# Patient Record
Sex: FEMALE | Race: OTHER | HISPANIC OR LATINO | Employment: FULL TIME | URBAN - METROPOLITAN AREA
[De-identification: names, ages, dates, MRNs, and addresses within clinical notes are randomized per-mention and may not be internally consistent; named-entity substitution may affect disease eponyms.]

---

## 2017-05-24 ENCOUNTER — GENERIC CONVERSION - ENCOUNTER (OUTPATIENT)
Dept: OTHER | Facility: OTHER | Age: 42
End: 2017-05-24

## 2017-05-25 ENCOUNTER — GENERIC CONVERSION - ENCOUNTER (OUTPATIENT)
Dept: OTHER | Facility: OTHER | Age: 42
End: 2017-05-25

## 2017-06-23 ENCOUNTER — ALLSCRIPTS OFFICE VISIT (OUTPATIENT)
Dept: OTHER | Facility: OTHER | Age: 42
End: 2017-06-23

## 2017-06-29 ENCOUNTER — HOSPITAL ENCOUNTER (OUTPATIENT)
Dept: NON INVASIVE DIAGNOSTICS | Facility: HOSPITAL | Age: 42
Discharge: HOME | End: 2017-06-29
Attending: INTERNAL MEDICINE
Payer: COMMERCIAL

## 2017-06-29 DIAGNOSIS — R42 DIZZINESS AND GIDDINESS: ICD-10-CM

## 2017-06-29 PROCEDURE — 93660 TILT TABLE EVALUATION: CPT

## 2017-07-31 ENCOUNTER — APPOINTMENT (OUTPATIENT)
Dept: LAB | Facility: CLINIC | Age: 42
End: 2017-07-31
Payer: COMMERCIAL

## 2017-07-31 ENCOUNTER — LAB REQUISITION (OUTPATIENT)
Dept: LAB | Facility: HOSPITAL | Age: 42
End: 2017-07-31
Payer: COMMERCIAL

## 2017-07-31 ENCOUNTER — ALLSCRIPTS OFFICE VISIT (OUTPATIENT)
Dept: OTHER | Facility: OTHER | Age: 42
End: 2017-07-31

## 2017-07-31 DIAGNOSIS — N93.9 ABNORMAL UTERINE AND VAGINAL BLEEDING, UNSPECIFIED: ICD-10-CM

## 2017-07-31 DIAGNOSIS — Z01.419 ENCOUNTER FOR GYNECOLOGICAL EXAMINATION WITHOUT ABNORMAL FINDING: ICD-10-CM

## 2017-07-31 DIAGNOSIS — R00.1 BRADYCARDIA: ICD-10-CM

## 2017-07-31 DIAGNOSIS — N84.1 POLYP OF CERVIX UTERI: ICD-10-CM

## 2017-07-31 DIAGNOSIS — Z11.3 ENCOUNTER FOR SCREENING FOR INFECTIONS WITH PREDOMINANTLY SEXUAL MODE OF TRANSMISSION: ICD-10-CM

## 2017-07-31 DIAGNOSIS — Z12.31 ENCOUNTER FOR SCREENING MAMMOGRAM FOR MALIGNANT NEOPLASM OF BREAST: ICD-10-CM

## 2017-07-31 LAB
ERYTHROCYTE [DISTWIDTH] IN BLOOD BY AUTOMATED COUNT: 14.2 % (ref 11.6–15.1)
HBV SURFACE AG SER QL: NORMAL
HCT VFR BLD AUTO: 35.4 % (ref 34.8–46.1)
HGB BLD-MCNC: 11.2 G/DL (ref 11.5–15.4)
MCH RBC QN AUTO: 25.9 PG (ref 26.8–34.3)
MCHC RBC AUTO-ENTMCNC: 31.6 G/DL (ref 31.4–37.4)
MCV RBC AUTO: 82 FL (ref 82–98)
PLATELET # BLD AUTO: 297 THOUSANDS/UL (ref 149–390)
PMV BLD AUTO: 10 FL (ref 8.9–12.7)
RBC # BLD AUTO: 4.33 MILLION/UL (ref 3.81–5.12)
TSH SERPL DL<=0.05 MIU/L-ACNC: 1.16 UIU/ML (ref 0.36–3.74)
WBC # BLD AUTO: 7.13 THOUSAND/UL (ref 4.31–10.16)

## 2017-07-31 PROCEDURE — 87389 HIV-1 AG W/HIV-1&-2 AB AG IA: CPT

## 2017-07-31 PROCEDURE — 88344 IMHCHEM/IMCYTCHM EA MLT ANTB: CPT | Performed by: OBSTETRICS & GYNECOLOGY

## 2017-07-31 PROCEDURE — 84443 ASSAY THYROID STIM HORMONE: CPT

## 2017-07-31 PROCEDURE — 87340 HEPATITIS B SURFACE AG IA: CPT

## 2017-07-31 PROCEDURE — 86592 SYPHILIS TEST NON-TREP QUAL: CPT

## 2017-07-31 PROCEDURE — 87624 HPV HI-RISK TYP POOLED RSLT: CPT | Performed by: OBSTETRICS & GYNECOLOGY

## 2017-07-31 PROCEDURE — 87591 N.GONORRHOEAE DNA AMP PROB: CPT | Performed by: OBSTETRICS & GYNECOLOGY

## 2017-07-31 PROCEDURE — G0145 SCR C/V CYTO,THINLAYER,RESCR: HCPCS | Performed by: OBSTETRICS & GYNECOLOGY

## 2017-07-31 PROCEDURE — 88305 TISSUE EXAM BY PATHOLOGIST: CPT | Performed by: OBSTETRICS & GYNECOLOGY

## 2017-07-31 PROCEDURE — 88342 IMHCHEM/IMCYTCHM 1ST ANTB: CPT | Performed by: OBSTETRICS & GYNECOLOGY

## 2017-07-31 PROCEDURE — 87491 CHLMYD TRACH DNA AMP PROBE: CPT | Performed by: OBSTETRICS & GYNECOLOGY

## 2017-07-31 PROCEDURE — 85027 COMPLETE CBC AUTOMATED: CPT

## 2017-07-31 PROCEDURE — 36415 COLL VENOUS BLD VENIPUNCTURE: CPT

## 2017-08-01 LAB — RPR SER QL: NORMAL

## 2017-08-02 LAB
CHLAMYDIA DNA CVX QL NAA+PROBE: NORMAL
N GONORRHOEA DNA GENITAL QL NAA+PROBE: NORMAL

## 2017-08-03 LAB — HIV 1+2 AB+HIV1 P24 AG SERPL QL IA: NORMAL

## 2017-08-04 LAB — HPV RRNA GENITAL QL NAA+PROBE: ABNORMAL

## 2017-08-11 LAB
LAB AP GYN PRIMARY INTERPRETATION: NORMAL
Lab: NORMAL
PATH INTERP SPEC-IMP: NORMAL

## 2017-09-15 ENCOUNTER — GENERIC CONVERSION - ENCOUNTER (OUTPATIENT)
Dept: OTHER | Facility: OTHER | Age: 42
End: 2017-09-15

## 2017-10-12 ENCOUNTER — GENERIC CONVERSION - ENCOUNTER (OUTPATIENT)
Dept: OTHER | Facility: OTHER | Age: 42
End: 2017-10-12

## 2017-10-12 DIAGNOSIS — R87.610 ATYPICAL SQUAMOUS CELLS OF UNDETERMINED SIGNIFICANCE ON CYTOLOGIC SMEAR OF CERVIX (ASC-US): ICD-10-CM

## 2017-10-12 PROCEDURE — 88305 TISSUE EXAM BY PATHOLOGIST: CPT | Performed by: OBSTETRICS & GYNECOLOGY

## 2017-10-15 ENCOUNTER — LAB REQUISITION (OUTPATIENT)
Dept: LAB | Facility: HOSPITAL | Age: 42
End: 2017-10-15
Payer: COMMERCIAL

## 2017-10-15 DIAGNOSIS — R87.610 ATYPICAL SQUAMOUS CELLS OF UNDETERMINED SIGNIFICANCE ON CYTOLOGIC SMEAR OF CERVIX (ASC-US): ICD-10-CM

## 2017-10-19 ENCOUNTER — GENERIC CONVERSION - ENCOUNTER (OUTPATIENT)
Dept: OTHER | Facility: OTHER | Age: 42
End: 2017-10-19

## 2018-01-09 NOTE — RESULT NOTES
Verified Results  (1) TISSUE EXAM [id number] 09:41AM Rajendra Vizcarra    Order Number: [id number]     Test Name Result Flag Reference   LAB AP CASE REPORT (Report)     Surgical Pathology Report             Case: [id number]                   Authorizing Provider: Enio Crooks MD  Collected:      07/31/2017 [address] [place] [address]        Pathologist:      Bria Sutton MD       Received:      08/01/2017 7803        Specimen:  Cervix, Endocervical polyp   LAB AP FINAL DIAGNOSIS      A  Endocervical polyp (biopsy):    - Polypoid endocervical tissue with areas of mucosal inflammation /   erosion     - Viral studies pending      - No dysplasia or malignancy identified  Electronically signed by Bria Sutton MD on 8/3/2017 at 1:49 PM   LAB AP NOTE      Interpretation performed at [place] [address]  LAB AP SURGICAL ADDITIONAL INFORMATION (Report)     All controls performed with the immunohistochemical stains reported above   reacted appropriately  These tests were developed and their performance   characteristics determined by [facility] or   [employer]  They may not be cleared or approved by the [place] S  Food and Drug Administration  The FDA has determined that such clearance   or approval is not necessary  These tests are used for clinical purposes  They should not be regarded as investigational or for research  This   laboratory has been approved by CLIA 88, designated as a high-complexity   laboratory and is qualified to perform these tests  LAB AP GROSS DESCRIPTION (Report)     A  The specimen is received in formalin, labeled with the patient's name   and hospital number, and is designated endocervical polyp  The specimen   consists of one white-tan pink polypoid soft tissue mass which measures   2 4 x 1 2 x 0 8 cm  The apparent margin of resection is inked blue  The   specimen is bisected revealing white tan pink dense cut surfaces  Entirely   submitted  Two cassettes  Note: The estimated total formalin fixation time based upon information   provided by the submitting clinician and the standard processing schedule   is 24 75 hours  MAS   LAB AP CLINICAL INFORMATION      Polyp of cervix uteri   LAB AP ADDENDUM 1      Immunostains for CMV and HSV 1/2 (with appropriate controls) are negative  Addendum electronically signed by Bria Sutton MD on 8/4/2017 at 11:25 AM     (1) HIV AG/AB Mara, 4TH GEN [id number] 09:21AM [place]    Order Number: [id number]     Test Name Result Flag Reference   HIV 1/2 AND P24 Non-Reactive  Non-Reactive   This test detects HIV 1, HIV2 and p24 Antigen  (1) THIN PREP PAP WITH IMAGING [id number] 08:24AM Rajendra Radish     Test Name Result Flag Reference   LAB AP CASE REPORT (Report)     Gynecologic Cytology Report            Case: [id number]                  Authorizing Provider: Enio Crooks MD  Collected:      07/31/2017 0824        First Screen:     SOCORRO Harrington    Received:      08/03/2017 1411        Pathologist:      Pérez Cleveland DO                               Specimen:  LIQUID-BASED PAP, SCREENING, Endocervical   HPV HIGH RISK RESULT (Report)     HPV, High Risk: HPV POS, HPV16 NEG, HPV18 NEG      Other High Risk HPV Positive, HPV 16 Negative, HPV 18 Negative  Specimen is positive for the DNA of any one of, or combination of the following high risk HPV types: 79,99,84,38,59,30,55,94,27,19,76,48  HPV types 16 and 18 DNA were undetectable or below the pre-set threshold  [employer]???s FDA approved Jenni 4800 is utilized with strict adherence to the ???s instruction  manual to test for the presence of High-Risk HPV DNA, as well as HPV 16 and HPV 18  This instrument  has been validated by our laboratory and/or by the     A negative result does not preclude the presence of HPV infection because results depend on adequate  specimen collection, absence of inhibitors and sufficient DNA to be detected  Additionally, HPV negative  results are not intended to prevent women from proceeding to colposcopy if clinically warranted  Positive HPV test results indicate the presence of any one or more of the high risk types, but since patients  are often co-infected with low-risk types it does not rule out the presence of low-risk types in patients  with mixed infections  LAB AP GYN PRIMARY INTERPRETATION      Epithelial cell abnormality  Electronically signed by Pawan Machado DO on 8/11/2017 at 9:33 AM   LAB AP GYN INTERPRETATION      Atypical squamous cells of undetermined significance   LAB AP GYN SPECIMEN ADEQUACY      Satisfactory for evaluation  Scant cellularity  LAB AP GYN NOTE      Interpretation performed at [facility], [address] [address]   LAB AP GYN ADDITIONAL INFORMATION (Report)     [employer]'s FDA approved ,  and ThinPrep Imaging System are   utilized with strict adherence to the 's instruction manual to   prepare gynecologic and non-gynecologic cytology specimens for the   production of ThinPrep slides as well as for gynecologic ThinPrep imaging  These processes have been validated by our laboratory and/or by the     The Pap test is not a diagnostic procedure and should not be used as the   sole means to detect cervical cancer  It is only a screening procedure to   aid in the detection of cervical cancer and its precursors  Both   false-negative and false-positive results have been experienced  Your   patient's test result should be interpreted in this context together with   the history and clinical findings

## 2018-01-13 VITALS
HEART RATE: 94 BPM | WEIGHT: 175 LBS | SYSTOLIC BLOOD PRESSURE: 108 MMHG | BODY MASS INDEX: 32.2 KG/M2 | HEIGHT: 62 IN | DIASTOLIC BLOOD PRESSURE: 62 MMHG

## 2018-01-14 VITALS
SYSTOLIC BLOOD PRESSURE: 90 MMHG | OXYGEN SATURATION: 97 % | BODY MASS INDEX: 30.95 KG/M2 | DIASTOLIC BLOOD PRESSURE: 59 MMHG | HEART RATE: 62 BPM | HEIGHT: 63 IN | WEIGHT: 174.7 LBS

## 2018-01-15 NOTE — RESULT NOTES
Verified Results  (1) TISSUE EXAM [id number] 02:09PM Jasmyne Nelson     Test Name Result Flag Reference   LAB AP CASE REPORT (Report)     Surgical Pathology Report             Case: [id number]                   Authorizing Provider: Zainab May MD  Collected:      10/12/2017 1409        Pathologist:      Saul Chaidez MD   Received:      10/16/2017 1109        Specimen:  Cervix, 12 o'clock   LAB AP FINAL DIAGNOSIS (Report)     A  Cervix, 12:00, biopsy:  - Low-grade squamous intraepithelial lesion (LSIL/RICHA 1) with viral   cytopathic effect and background acute and chronic inflammation involving   the transformation zone  Electronically signed by Saul Chaidez MD on 10/18/2017 at 1:43 PM   LAB AP SURGICAL ADDITIONAL INFORMATION (Report)     All controls performed with the immunohistochemical stains reported above   reacted appropriately  These tests were developed and their performance   characteristics determined by [facility] or   [facility]  They may not be cleared or approved by the [place]  Food and Drug Administration  The FDA has determined that such clearance   or approval is not necessary  These tests are used for clinical purposes  They should not be regarded as investigational or for research  This   laboratory has been approved by CLIA 88, designated as a high-complexity   laboratory and is qualified to perform these tests  Interpretation performed at , [address]   LAB AP GROSS DESCRIPTION (Report)     A  The specimen is received in formalin, labeled with the patient's name   and hospital number, and is designated cervix 12 o'clock, is a single   irregularly shaped fragment of tan and glistening soft tissue measuring   0 6 cm in greatest dimension  Entirely submitted  One cassette      Note: The estimated total formalin fixation time based upon information   provided by the submitting clinician and the standard processing schedule   is over 72 hours        RLR

## 2018-01-22 VITALS
SYSTOLIC BLOOD PRESSURE: 104 MMHG | DIASTOLIC BLOOD PRESSURE: 66 MMHG | BODY MASS INDEX: 31.65 KG/M2 | HEART RATE: 56 BPM | WEIGHT: 172 LBS | HEIGHT: 62 IN

## 2018-06-08 ENCOUNTER — TELEPHONE (OUTPATIENT)
Dept: OBGYN CLINIC | Facility: CLINIC | Age: 43
End: 2018-06-08
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
## 2018-06-11 DIAGNOSIS — T74.21XA SEXUAL ASSAULT OF ADULT, INITIAL ENCOUNTER: Primary | ICD-10-CM
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
## 2018-06-13 ENCOUNTER — LAB (OUTPATIENT)
Dept: LAB | Facility: CLINIC | Age: 43
End: 2018-06-13
Payer: COMMERCIAL

## 2018-06-13 DIAGNOSIS — T74.21XA SEXUAL ASSAULT OF ADULT, INITIAL ENCOUNTER: ICD-10-CM

## 2018-06-13 DIAGNOSIS — R87.610 ATYPICAL SQUAMOUS CELLS OF UNDETERMINED SIGNIFICANCE ON CYTOLOGIC SMEAR OF CERVIX (ASC-US): ICD-10-CM

## 2018-06-13 LAB
CHLAMYDIA DNA CVX QL NAA+PROBE: NORMAL
HBV SURFACE AG SER QL: NORMAL
N GONORRHOEA DNA GENITAL QL NAA+PROBE: NORMAL

## 2018-06-13 PROCEDURE — 87340 HEPATITIS B SURFACE AG IA: CPT

## 2018-06-13 PROCEDURE — 87491 CHLMYD TRACH DNA AMP PROBE: CPT | Performed by: OBSTETRICS & GYNECOLOGY

## 2018-06-13 PROCEDURE — 36415 COLL VENOUS BLD VENIPUNCTURE: CPT

## 2018-06-13 PROCEDURE — 87389 HIV-1 AG W/HIV-1&-2 AB AG IA: CPT

## 2018-06-13 PROCEDURE — 86592 SYPHILIS TEST NON-TREP QUAL: CPT

## 2018-06-13 PROCEDURE — 87591 N.GONORRHOEAE DNA AMP PROB: CPT | Performed by: OBSTETRICS & GYNECOLOGY

## 2018-06-14 LAB — RPR SER QL: NORMAL

## 2018-06-15 LAB — HIV 1+2 AB+HIV1 P24 AG SERPL QL IA: NORMAL

## 2018-06-18 DIAGNOSIS — T74.21XD SEXUAL ASSAULT OF ADULT, SUBSEQUENT ENCOUNTER: Primary | ICD-10-CM

## 2018-06-18 NOTE — TELEPHONE ENCOUNTER
Reviewed HIV, Hep B, Rpr, GC/CT negative  Patient will need repeat HIV in 6 month  Lab slip mailed to patient per patient request  Updated address

## 2019-02-20 ENCOUNTER — HOSPITAL ENCOUNTER (OUTPATIENT)
Facility: HOSPITAL | Age: 44
Setting detail: OBSERVATION
Discharge: HOME | End: 2019-02-22
Attending: EMERGENCY MEDICINE | Admitting: FAMILY MEDICINE
Payer: COMMERCIAL

## 2019-02-20 DIAGNOSIS — D64.9 ANEMIA: ICD-10-CM

## 2019-02-20 DIAGNOSIS — R55 NEAR SYNCOPE: ICD-10-CM

## 2019-02-20 DIAGNOSIS — D50.9 MICROCYTIC ANEMIA: Primary | ICD-10-CM

## 2019-02-20 LAB
ABO GROUP BLD: NORMAL
ALBUMIN SERPL BCP-MCNC: 3.5 G/DL (ref 3.5–5)
ALP SERPL-CCNC: 87 U/L (ref 46–116)
ALT SERPL W P-5'-P-CCNC: 26 U/L (ref 12–78)
ANION GAP SERPL CALCULATED.3IONS-SCNC: 10 MMOL/L (ref 4–13)
APTT PPP: 27 SECONDS (ref 26–38)
AST SERPL W P-5'-P-CCNC: 20 U/L (ref 5–45)
BASOPHILS # BLD AUTO: 0.09 THOUSANDS/ΜL (ref 0–0.1)
BASOPHILS NFR BLD AUTO: 1 % (ref 0–1)
BILIRUB SERPL-MCNC: 0.2 MG/DL (ref 0.2–1)
BILIRUB UR QL STRIP: NEGATIVE
BLD GP AB SCN SERPL QL: NEGATIVE
BUN SERPL-MCNC: 13 MG/DL (ref 5–25)
CALCIUM SERPL-MCNC: 9.1 MG/DL (ref 8.3–10.1)
CHLORIDE SERPL-SCNC: 103 MMOL/L (ref 100–108)
CLARITY UR: CLEAR
CO2 SERPL-SCNC: 26 MMOL/L (ref 21–32)
COLOR UR: YELLOW
CREAT SERPL-MCNC: 0.68 MG/DL (ref 0.6–1.3)
EOSINOPHIL # BLD AUTO: 0.06 THOUSAND/ΜL (ref 0–0.61)
EOSINOPHIL NFR BLD AUTO: 1 % (ref 0–6)
ERYTHROCYTE [DISTWIDTH] IN BLOOD BY AUTOMATED COUNT: 19.1 % (ref 11.6–15.1)
FERRITIN SERPL-MCNC: 2 NG/ML (ref 8–388)
FERRITIN SERPL-MCNC: 2 NG/ML (ref 8–388)
FOLATE SERPL-MCNC: >20 NG/ML (ref 3.1–17.5)
GFR SERPL CREATININE-BSD FRML MDRD: 107 ML/MIN/1.73SQ M
GLUCOSE SERPL-MCNC: 81 MG/DL (ref 65–140)
GLUCOSE UR STRIP-MCNC: NEGATIVE MG/DL
HCT VFR BLD AUTO: 24.9 % (ref 34.8–46.1)
HCT VFR BLD AUTO: 24.9 % (ref 34.8–46.1)
HGB BLD-MCNC: 6.6 G/DL (ref 11.5–15.4)
HGB BLD-MCNC: 7.1 G/DL (ref 11.5–15.4)
HGB UR QL STRIP.AUTO: NEGATIVE
IMM GRANULOCYTES # BLD AUTO: 0.02 THOUSAND/UL (ref 0–0.2)
IMM GRANULOCYTES NFR BLD AUTO: 0 % (ref 0–2)
INR PPP: 1.05 (ref 0.86–1.17)
IRON SATN MFR SERPL: 3 %
IRON SATN MFR SERPL: 3 %
IRON SERPL-MCNC: 12 UG/DL (ref 50–170)
IRON SERPL-MCNC: 15 UG/DL (ref 50–170)
KETONES UR STRIP-MCNC: ABNORMAL MG/DL
LEUKOCYTE ESTERASE UR QL STRIP: NEGATIVE
LYMPHOCYTES # BLD AUTO: 2.87 THOUSANDS/ΜL (ref 0.6–4.47)
LYMPHOCYTES NFR BLD AUTO: 34 % (ref 14–44)
MCH RBC QN AUTO: 17.7 PG (ref 26.8–34.3)
MCHC RBC AUTO-ENTMCNC: 26.5 G/DL (ref 31.4–37.4)
MCV RBC AUTO: 67 FL (ref 82–98)
MONOCYTES # BLD AUTO: 0.51 THOUSAND/ΜL (ref 0.17–1.22)
MONOCYTES NFR BLD AUTO: 6 % (ref 4–12)
NEUTROPHILS # BLD AUTO: 4.92 THOUSANDS/ΜL (ref 1.85–7.62)
NEUTS SEG NFR BLD AUTO: 58 % (ref 43–75)
NITRITE UR QL STRIP: NEGATIVE
NRBC BLD AUTO-RTO: 0 /100 WBCS
PH UR STRIP.AUTO: 6 [PH] (ref 4.5–8)
PLATELET # BLD AUTO: 410 THOUSANDS/UL (ref 149–390)
PMV BLD AUTO: 9.3 FL (ref 8.9–12.7)
POTASSIUM SERPL-SCNC: 3.7 MMOL/L (ref 3.5–5.3)
PROT SERPL-MCNC: 7.7 G/DL (ref 6.4–8.2)
PROT UR STRIP-MCNC: NEGATIVE MG/DL
PROTHROMBIN TIME: 13.4 SECONDS (ref 11.8–14.2)
RBC # BLD AUTO: 3.72 MILLION/UL (ref 3.81–5.12)
RH BLD: POSITIVE
SODIUM SERPL-SCNC: 139 MMOL/L (ref 136–145)
SP GR UR STRIP.AUTO: >=1.03 (ref 1–1.03)
SPECIMEN EXPIRATION DATE: NORMAL
TIBC SERPL-MCNC: 461 UG/DL (ref 250–450)
TIBC SERPL-MCNC: 581 UG/DL (ref 250–450)
UROBILINOGEN UR QL STRIP.AUTO: 0.2 E.U./DL
VIT B12 SERPL-MCNC: 1029 PG/ML (ref 100–900)
WBC # BLD AUTO: 8.47 THOUSAND/UL (ref 4.31–10.16)

## 2019-02-20 PROCEDURE — 36415 COLL VENOUS BLD VENIPUNCTURE: CPT | Performed by: EMERGENCY MEDICINE

## 2019-02-20 PROCEDURE — 86850 RBC ANTIBODY SCREEN: CPT | Performed by: EMERGENCY MEDICINE

## 2019-02-20 PROCEDURE — 83540 ASSAY OF IRON: CPT | Performed by: EMERGENCY MEDICINE

## 2019-02-20 PROCEDURE — 85014 HEMATOCRIT: CPT | Performed by: FAMILY MEDICINE

## 2019-02-20 PROCEDURE — 82746 ASSAY OF FOLIC ACID SERUM: CPT | Performed by: FAMILY MEDICINE

## 2019-02-20 PROCEDURE — 99220 PR INITIAL OBSERVATION CARE/DAY 70 MINUTES: CPT | Performed by: FAMILY MEDICINE

## 2019-02-20 PROCEDURE — 86920 COMPATIBILITY TEST SPIN: CPT

## 2019-02-20 PROCEDURE — 86900 BLOOD TYPING SEROLOGIC ABO: CPT | Performed by: EMERGENCY MEDICINE

## 2019-02-20 PROCEDURE — 80053 COMPREHEN METABOLIC PANEL: CPT | Performed by: EMERGENCY MEDICINE

## 2019-02-20 PROCEDURE — 85025 COMPLETE CBC W/AUTO DIFF WBC: CPT | Performed by: EMERGENCY MEDICINE

## 2019-02-20 PROCEDURE — 81003 URINALYSIS AUTO W/O SCOPE: CPT | Performed by: PHYSICIAN ASSISTANT

## 2019-02-20 PROCEDURE — 83550 IRON BINDING TEST: CPT | Performed by: EMERGENCY MEDICINE

## 2019-02-20 PROCEDURE — 93005 ELECTROCARDIOGRAM TRACING: CPT

## 2019-02-20 PROCEDURE — 82607 VITAMIN B-12: CPT | Performed by: FAMILY MEDICINE

## 2019-02-20 PROCEDURE — 83550 IRON BINDING TEST: CPT | Performed by: FAMILY MEDICINE

## 2019-02-20 PROCEDURE — 83540 ASSAY OF IRON: CPT | Performed by: FAMILY MEDICINE

## 2019-02-20 PROCEDURE — 82728 ASSAY OF FERRITIN: CPT | Performed by: EMERGENCY MEDICINE

## 2019-02-20 PROCEDURE — 82728 ASSAY OF FERRITIN: CPT | Performed by: FAMILY MEDICINE

## 2019-02-20 PROCEDURE — 86901 BLOOD TYPING SEROLOGIC RH(D): CPT | Performed by: EMERGENCY MEDICINE

## 2019-02-20 PROCEDURE — 85610 PROTHROMBIN TIME: CPT | Performed by: EMERGENCY MEDICINE

## 2019-02-20 PROCEDURE — 99285 EMERGENCY DEPT VISIT HI MDM: CPT

## 2019-02-20 PROCEDURE — 85018 HEMOGLOBIN: CPT | Performed by: FAMILY MEDICINE

## 2019-02-20 PROCEDURE — 85730 THROMBOPLASTIN TIME PARTIAL: CPT | Performed by: EMERGENCY MEDICINE

## 2019-02-20 PROCEDURE — P9021 RED BLOOD CELLS UNIT: HCPCS

## 2019-02-20 PROCEDURE — 96360 HYDRATION IV INFUSION INIT: CPT

## 2019-02-20 RX ORDER — LIDOCAINE 50 MG/G
1 PATCH TOPICAL DAILY
Status: DISCONTINUED | OUTPATIENT
Start: 2019-02-20 | End: 2019-02-22 | Stop reason: HOSPADM

## 2019-02-20 RX ORDER — ACETAMINOPHEN 325 MG/1
650 TABLET ORAL EVERY 6 HOURS PRN
Status: DISCONTINUED | OUTPATIENT
Start: 2019-02-20 | End: 2019-02-22 | Stop reason: HOSPADM

## 2019-02-20 RX ORDER — CALCIUM CARBONATE 200(500)MG
1000 TABLET,CHEWABLE ORAL DAILY PRN
Status: DISCONTINUED | OUTPATIENT
Start: 2019-02-20 | End: 2019-02-22 | Stop reason: HOSPADM

## 2019-02-20 RX ORDER — KETOROLAC TROMETHAMINE 30 MG/ML
30 INJECTION, SOLUTION INTRAMUSCULAR; INTRAVENOUS ONCE
Status: COMPLETED | OUTPATIENT
Start: 2019-02-20 | End: 2019-02-20

## 2019-02-20 RX ORDER — ONDANSETRON 2 MG/ML
4 INJECTION INTRAMUSCULAR; INTRAVENOUS EVERY 6 HOURS PRN
Status: DISCONTINUED | OUTPATIENT
Start: 2019-02-20 | End: 2019-02-22 | Stop reason: HOSPADM

## 2019-02-20 RX ADMIN — LIDOCAINE 1 PATCH: 50 PATCH TOPICAL at 21:18

## 2019-02-20 RX ADMIN — ACETAMINOPHEN 650 MG: 325 TABLET, FILM COATED ORAL at 18:25

## 2019-02-20 RX ADMIN — KETOROLAC TROMETHAMINE 30 MG: 30 INJECTION, SOLUTION INTRAMUSCULAR at 21:19

## 2019-02-20 RX ADMIN — SODIUM CHLORIDE 1000 ML: 0.9 INJECTION, SOLUTION INTRAVENOUS at 12:52

## 2019-02-21 LAB
ABO GROUP BLD BPU: NORMAL
ALBUMIN SERPL BCP-MCNC: 2.8 G/DL (ref 3.5–5)
ALP SERPL-CCNC: 70 U/L (ref 46–116)
ALT SERPL W P-5'-P-CCNC: 20 U/L (ref 12–78)
ANION GAP SERPL CALCULATED.3IONS-SCNC: 8 MMOL/L (ref 4–13)
AST SERPL W P-5'-P-CCNC: 18 U/L (ref 5–45)
BILIRUB SERPL-MCNC: 0.2 MG/DL (ref 0.2–1)
BPU ID: NORMAL
BUN SERPL-MCNC: 16 MG/DL (ref 5–25)
CALCIUM SERPL-MCNC: 8.3 MG/DL (ref 8.3–10.1)
CHLORIDE SERPL-SCNC: 109 MMOL/L (ref 100–108)
CO2 SERPL-SCNC: 23 MMOL/L (ref 21–32)
CREAT SERPL-MCNC: 0.64 MG/DL (ref 0.6–1.3)
CROSSMATCH: NORMAL
ERYTHROCYTE [DISTWIDTH] IN BLOOD BY AUTOMATED COUNT: 20.7 % (ref 11.6–15.1)
GFR SERPL CREATININE-BSD FRML MDRD: 110 ML/MIN/1.73SQ M
GLUCOSE P FAST SERPL-MCNC: 80 MG/DL (ref 65–99)
GLUCOSE SERPL-MCNC: 80 MG/DL (ref 65–140)
HCT VFR BLD AUTO: 24.7 % (ref 34.8–46.1)
HCT VFR BLD AUTO: 29 % (ref 34.8–46.1)
HCT VFR BLD AUTO: 31.6 % (ref 34.8–46.1)
HEMOCCULT STL QL: NEGATIVE
HGB BLD-MCNC: 7.2 G/DL (ref 11.5–15.4)
HGB BLD-MCNC: 8.1 G/DL (ref 11.5–15.4)
HGB BLD-MCNC: 9.3 G/DL (ref 11.5–15.4)
MAGNESIUM SERPL-MCNC: 2.1 MG/DL (ref 1.6–2.6)
MCH RBC QN AUTO: 20 PG (ref 26.8–34.3)
MCHC RBC AUTO-ENTMCNC: 29.1 G/DL (ref 31.4–37.4)
MCV RBC AUTO: 69 FL (ref 82–98)
PLATELET # BLD AUTO: 359 THOUSANDS/UL (ref 149–390)
PMV BLD AUTO: 9.4 FL (ref 8.9–12.7)
POTASSIUM SERPL-SCNC: 4 MMOL/L (ref 3.5–5.3)
PROT SERPL-MCNC: 6.3 G/DL (ref 6.4–8.2)
RBC # BLD AUTO: 3.6 MILLION/UL (ref 3.81–5.12)
SODIUM SERPL-SCNC: 140 MMOL/L (ref 136–145)
UNIT DISPENSE STATUS: NORMAL
UNIT PRODUCT CODE: NORMAL
UNIT RH: NORMAL
WBC # BLD AUTO: 8.21 THOUSAND/UL (ref 4.31–10.16)

## 2019-02-21 PROCEDURE — 82272 OCCULT BLD FECES 1-3 TESTS: CPT | Performed by: FAMILY MEDICINE

## 2019-02-21 PROCEDURE — P9016 RBC LEUKOCYTES REDUCED: HCPCS

## 2019-02-21 PROCEDURE — 83735 ASSAY OF MAGNESIUM: CPT | Performed by: FAMILY MEDICINE

## 2019-02-21 PROCEDURE — 85014 HEMATOCRIT: CPT | Performed by: NURSE PRACTITIONER

## 2019-02-21 PROCEDURE — 80053 COMPREHEN METABOLIC PANEL: CPT | Performed by: FAMILY MEDICINE

## 2019-02-21 PROCEDURE — 99225 PR SBSQ OBSERVATION CARE/DAY 25 MINUTES: CPT | Performed by: NURSE PRACTITIONER

## 2019-02-21 PROCEDURE — 85018 HEMOGLOBIN: CPT | Performed by: NURSE PRACTITIONER

## 2019-02-21 PROCEDURE — 85027 COMPLETE CBC AUTOMATED: CPT | Performed by: FAMILY MEDICINE

## 2019-02-21 RX ORDER — FERROUS SULFATE 325(65) MG
325 TABLET ORAL 2 TIMES DAILY WITH MEALS
Status: DISCONTINUED | OUTPATIENT
Start: 2019-02-21 | End: 2019-02-22 | Stop reason: HOSPADM

## 2019-02-21 RX ADMIN — ACETAMINOPHEN 650 MG: 325 TABLET, FILM COATED ORAL at 19:43

## 2019-02-21 RX ADMIN — ACETAMINOPHEN 650 MG: 325 TABLET, FILM COATED ORAL at 08:47

## 2019-02-21 RX ADMIN — FERROUS SULFATE TAB 325 MG (65 MG ELEMENTAL FE) 325 MG: 325 (65 FE) TAB at 16:31

## 2019-02-21 RX ADMIN — LIDOCAINE 1 PATCH: 50 PATCH TOPICAL at 21:37

## 2019-02-22 VITALS
HEIGHT: 61 IN | TEMPERATURE: 98.1 F | RESPIRATION RATE: 18 BRPM | OXYGEN SATURATION: 98 % | BODY MASS INDEX: 33.17 KG/M2 | SYSTOLIC BLOOD PRESSURE: 102 MMHG | DIASTOLIC BLOOD PRESSURE: 57 MMHG | HEART RATE: 61 BPM | WEIGHT: 175.71 LBS

## 2019-02-22 LAB
ABO GROUP BLD BPU: NORMAL
ATRIAL RATE: 55 BPM
BPU ID: NORMAL
CROSSMATCH: NORMAL
ERYTHROCYTE [DISTWIDTH] IN BLOOD BY AUTOMATED COUNT: 22.4 % (ref 11.6–15.1)
HCT VFR BLD AUTO: 29.2 % (ref 34.8–46.1)
HGB BLD-MCNC: 8.4 G/DL (ref 11.5–15.4)
MCH RBC QN AUTO: 20.6 PG (ref 26.8–34.3)
MCHC RBC AUTO-ENTMCNC: 28.8 G/DL (ref 31.4–37.4)
MCV RBC AUTO: 72 FL (ref 82–98)
P AXIS: 72 DEGREES
PLATELET # BLD AUTO: 366 THOUSANDS/UL (ref 149–390)
PMV BLD AUTO: 9 FL (ref 8.9–12.7)
PR INTERVAL: 140 MS
QRS AXIS: 37 DEGREES
QRSD INTERVAL: 82 MS
QT INTERVAL: 432 MS
QTC INTERVAL: 413 MS
RBC # BLD AUTO: 4.08 MILLION/UL (ref 3.81–5.12)
T WAVE AXIS: 52 DEGREES
UNIT DISPENSE STATUS: NORMAL
UNIT PRODUCT CODE: NORMAL
UNIT RH: NORMAL
VENTRICULAR RATE: 55 BPM
WBC # BLD AUTO: 9.19 THOUSAND/UL (ref 4.31–10.16)

## 2019-02-22 PROCEDURE — 85027 COMPLETE CBC AUTOMATED: CPT | Performed by: NURSE PRACTITIONER

## 2019-02-22 PROCEDURE — 93010 ELECTROCARDIOGRAM REPORT: CPT | Performed by: INTERNAL MEDICINE

## 2019-02-22 PROCEDURE — 99217 PR OBSERVATION CARE DISCHARGE MANAGEMENT: CPT | Performed by: NURSE PRACTITIONER

## 2019-02-22 RX ORDER — FERROUS SULFATE 325(65) MG
325 TABLET ORAL
Refills: 0 | Status: SHIPPED | OUTPATIENT
Start: 2019-02-22 | End: 2019-07-02

## 2019-02-22 RX ADMIN — IRON SUCROSE 200 MG: 20 INJECTION, SOLUTION INTRAVENOUS at 11:35

## 2019-02-22 RX ADMIN — ACETAMINOPHEN 650 MG: 325 TABLET, FILM COATED ORAL at 09:06

## 2019-02-22 RX ADMIN — FERROUS SULFATE TAB 325 MG (65 MG ELEMENTAL FE) 325 MG: 325 (65 FE) TAB at 09:06

## 2019-02-24 ENCOUNTER — HOSPITAL ENCOUNTER (EMERGENCY)
Facility: HOSPITAL | Age: 44
Discharge: HOME | End: 2019-02-24
Attending: EMERGENCY MEDICINE | Admitting: EMERGENCY MEDICINE
Payer: COMMERCIAL

## 2019-02-24 ENCOUNTER — APPOINTMENT (EMERGENCY)
Dept: ULTRASOUND IMAGING | Facility: HOSPITAL | Age: 44
End: 2019-02-24
Payer: COMMERCIAL

## 2019-02-24 VITALS
RESPIRATION RATE: 20 BRPM | SYSTOLIC BLOOD PRESSURE: 129 MMHG | HEART RATE: 71 BPM | DIASTOLIC BLOOD PRESSURE: 74 MMHG | WEIGHT: 174 LBS | TEMPERATURE: 98.9 F | BODY MASS INDEX: 32.88 KG/M2 | OXYGEN SATURATION: 99 %

## 2019-02-24 DIAGNOSIS — I80.9 SUPERFICIAL THROMBOPHLEBITIS: Primary | ICD-10-CM

## 2019-02-24 DIAGNOSIS — M79.602 LEFT ARM PAIN: ICD-10-CM

## 2019-02-24 PROCEDURE — 99283 EMERGENCY DEPT VISIT LOW MDM: CPT

## 2019-02-24 PROCEDURE — 93971 EXTREMITY STUDY: CPT

## 2019-02-24 PROCEDURE — 96372 THER/PROPH/DIAG INJ SC/IM: CPT

## 2019-02-24 RX ORDER — CEPHALEXIN 500 MG/1
500 CAPSULE ORAL EVERY 6 HOURS SCHEDULED
Qty: 28 CAPSULE | Refills: 0 | Status: SHIPPED | OUTPATIENT
Start: 2019-02-24 | End: 2019-03-03

## 2019-02-24 RX ORDER — CEPHALEXIN 250 MG/1
500 CAPSULE ORAL ONCE
Status: COMPLETED | OUTPATIENT
Start: 2019-02-24 | End: 2019-02-24

## 2019-02-24 RX ORDER — KETOROLAC TROMETHAMINE 30 MG/ML
30 INJECTION, SOLUTION INTRAMUSCULAR; INTRAVENOUS ONCE
Status: COMPLETED | OUTPATIENT
Start: 2019-02-24 | End: 2019-02-24

## 2019-02-24 RX ADMIN — KETOROLAC TROMETHAMINE 30 MG: 30 INJECTION, SOLUTION INTRAMUSCULAR at 14:39

## 2019-02-24 RX ADMIN — CEPHALEXIN 500 MG: 250 CAPSULE ORAL at 14:38

## 2019-02-25 PROCEDURE — 93971 EXTREMITY STUDY: CPT | Performed by: SURGERY

## 2019-03-18 ENCOUNTER — OFFICE VISIT (OUTPATIENT)
Dept: FAMILY MEDICINE CLINIC | Facility: OTHER | Age: 44
End: 2019-03-18
Payer: COMMERCIAL

## 2019-03-18 VITALS
HEART RATE: 78 BPM | TEMPERATURE: 99 F | SYSTOLIC BLOOD PRESSURE: 122 MMHG | HEIGHT: 61 IN | BODY MASS INDEX: 32.88 KG/M2 | OXYGEN SATURATION: 98 % | DIASTOLIC BLOOD PRESSURE: 88 MMHG

## 2019-03-18 DIAGNOSIS — E11.9 TYPE 2 DIABETES MELLITUS WITHOUT COMPLICATION, WITHOUT LONG-TERM CURRENT USE OF INSULIN (HCC): ICD-10-CM

## 2019-03-18 DIAGNOSIS — E46 PROTEIN-CALORIE MALNUTRITION, UNSPECIFIED SEVERITY (HCC): ICD-10-CM

## 2019-03-18 DIAGNOSIS — D50.8 IRON DEFICIENCY ANEMIA SECONDARY TO INADEQUATE DIETARY IRON INTAKE: ICD-10-CM

## 2019-03-18 DIAGNOSIS — Z12.39 BREAST CANCER SCREENING: Primary | ICD-10-CM

## 2019-03-18 DIAGNOSIS — Z98.84 HISTORY OF GASTRIC BYPASS: ICD-10-CM

## 2019-03-18 PROBLEM — N84.1 ENDOCERVICAL POLYP: Status: ACTIVE | Noted: 2017-07-31

## 2019-03-18 PROBLEM — R42 DIZZINESS: Status: ACTIVE | Noted: 2017-06-23

## 2019-03-18 PROBLEM — R00.1 BRADYCARDIA: Status: ACTIVE | Noted: 2017-06-23

## 2019-03-18 PROBLEM — R87.810 ASCUS WITH POSITIVE HIGH RISK HPV CERVICAL: Status: ACTIVE | Noted: 2017-10-12

## 2019-03-18 PROBLEM — R87.610 ASCUS WITH POSITIVE HIGH RISK HPV CERVICAL: Status: ACTIVE | Noted: 2017-10-12

## 2019-03-18 PROBLEM — N93.9 ABNORMAL UTERINE BLEEDING (AUB): Status: ACTIVE | Noted: 2017-07-31

## 2019-03-18 PROCEDURE — 99203 OFFICE O/P NEW LOW 30 MIN: CPT | Performed by: FAMILY MEDICINE

## 2019-03-18 RX ORDER — UBIDECARENONE 75 MG
CAPSULE ORAL EVERY 24 HOURS
COMMUNITY
Start: 2016-02-03

## 2019-03-18 NOTE — PATIENT INSTRUCTIONS
Anemia   WHAT YOU NEED TO KNOW:   Anemia is a low number of red blood cells or a low amount of hemoglobin in your red blood cells  Hemoglobin is a protein that helps carry oxygen throughout your body  Red blood cells use iron to create hemoglobin  Anemia may develop if your body does not have enough iron  It may also develop if your body does not make enough red blood cells or they die faster than your body can make them  DISCHARGE INSTRUCTIONS:   Call 911 or have someone call 911 for any of the following:   · You lose consciousness  · You have severe chest pain  Return to the emergency department if:   · You have dark or bloody bowel movements  Contact your healthcare provider if:   · Your symptoms are worse, even after treatment  · You have questions or concerns about your condition or care  Medicines:   · Iron or folic acid supplements  help increase your red blood cell and hemoglobin levels  · Vitamin B12 injections  may help boost your red blood cell level and decrease your symptoms  Ask your healthcare provider how to inject B12  · Take your medicine as directed  Contact your healthcare provider if you think your medicine is not helping or if you have side effects  Tell him of her if you are allergic to any medicine  Keep a list of the medicines, vitamins, and herbs you take  Include the amounts, and when and why you take them  Bring the list or the pill bottles to follow-up visits  Carry your medicine list with you in case of an emergency  Prevent anemia:  Eat healthy foods rich in iron and vitamin C  Nuts, meat, dark leafy green vegetables, and beans are high in iron and protein  Vitamin C helps your body absorb iron  Foods rich in vitamin C include oranges and other citrus fruits  Ask your healthcare provider for a list of other foods that are high in iron or vitamin C  Ask if you need to be on a special diet     Follow up with your healthcare provider as directed:  Write down your questions so you remember to ask them during your visits  © 2017 [address] [employer] Information is for End User's use only and may not be sold, redistributed or otherwise used for commercial purposes  All illustrations and images included in CareNotes® are the copyrighted property of A D [employer] M [employer] Inc  or [employer]  The above information is an  only  It is not intended as medical advice for individual conditions or treatments  Talk to your doctor, nurse or pharmacist before following any medical regimen to see if it is safe and effective for you

## 2019-03-18 NOTE — PROGRESS NOTES
Assessment/Plan:           Problem List Items Addressed This Visit        Endocrine    Type 2 diabetes mellitus without complication ([facility] [address] )    Relevant Orders:  Patient was controlled with diet   Will check labs  CBC and differential    Comprehensive metabolic panel    TSH, 3rd generation with Free T4 reflex    Lipid panel    HEMOGLOBIN A1C W/ EAG ESTIMATION    Vitamin D 25 hydroxy    UA w Reflex to Microscopic w Reflex to Culture -Lab Collect    Microalbumin / creatinine urine ratio       Other    Anemia    Relevant Medications    cyanocobalamin (VITAMIN B-12) 100 mcg tablet    Other Relevant Orders    CBC and differential    Comprehensive metabolic panel    TSH, 3rd generation with Free T4 reflex    Lipid panel    HEMOGLOBIN A1C W/ EAG ESTIMATION    Vitamin D 25 hydroxy    UA w Reflex to Microscopic w Reflex to Culture -Lab Collect    Microalbumin / creatinine urine ratio    History of gastric bypass  Doing well and taking supplements      Other Visit Diagnoses     Breast cancer screening    -  Primary    Relevant Orders    Mammo screening bilateral w 3d & cad    BMI 32 0-32 9,adult        Relevant Orders:  Discusussed low fat diet and regular exercise  CBC and differential    Comprehensive metabolic panel    TSH, 3rd generation with Free T4 reflex    Lipid panel    HEMOGLOBIN A1C W/ EAG ESTIMATION    Vitamin D 25 hydroxy    UA w Reflex to Microscopic w Reflex to Culture -Lab Collect    Microalbumin / creatinine urine ratio    Protein-calorie malnutrition, unspecified severity (HCC)        Relevant Orders:  Increase protein in diet  CBC and differential    Comprehensive metabolic panel    TSH, 3rd generation with Free T4 reflex    Lipid panel    HEMOGLOBIN A1C W/ EAG ESTIMATION    Vitamin D 25 hydroxy    UA w Reflex to Microscopic w Reflex to Culture -Lab Collect    Microalbumin / creatinine urine ratio            Subjective:      Patient ID: Maryann Markham is a 37 y o  female      Patient is here to establish care today  She has history of anemia chronic and also family history of anemia in mother along with leukemia  Other significant PMHx includes gastric bypass surgery in 2017 due to morbid obesity  She states that about a month ago she was feeling very dizzy and tired and was at the ER and they found her to be significantly anemic and was given 2 units of packed RBC's and iron infusion  After her therapy she felt good and was discharged with Hb level of 8+  4 days later felt pain in the arm and there was some redness and streaking of the arm where her IV was when she was evaluated for anemia  She was diagnosed with infection and treated with keflex which she finished for 7 days and feels the arm is back to normal     Today she is here as regular follow up  She has been on iron supplement and b12 and takes MV1 as well  She says since the gastric bypass surgery she hasn't followed with bariatric surgeon due to insurance reasons  The following portions of the patient's history were reviewed and updated as appropriate: allergies, current medications, past family history, past medical history, past social history, past surgical history and problem list     Review of Systems   Constitutional: Negative for appetite change, chills, fatigue, fever and unexpected weight change  HENT: Negative for congestion, ear pain, rhinorrhea and sore throat  Eyes: Negative for visual disturbance  Respiratory: Negative for cough, chest tightness and shortness of breath  Cardiovascular: Negative for chest pain, palpitations and leg swelling  Gastrointestinal: Negative for abdominal pain, blood in stool, constipation and diarrhea  Endocrine: Negative for cold intolerance, heat intolerance, polydipsia, polyphagia and polyuria  Genitourinary: Negative for dysuria, flank pain, menstrual problem and vaginal discharge  Musculoskeletal: Negative for arthralgias and back pain  Skin: Negative for rash  Allergic/Immunologic: Negative for environmental allergies  Neurological: Negative for dizziness, weakness and headaches  Hematological: Negative for adenopathy  Psychiatric/Behavioral: Negative for behavioral problems, decreased concentration and sleep disturbance  The patient is not nervous/anxious and is not hyperactive  Objective:      /88 (BP Location: Left arm, Patient Position: Sitting, Cuff Size: Adult)   Pulse 78   Temp 99 °F (37 2 °C) (Tympanic)   Ht 5' 1" (1 549 m)   LMP 02/22/2019   SpO2 98%   BMI 32 88 kg/m²          Physical Exam   Constitutional: She is oriented to person, place, and time  She appears well-developed and well-nourished  No distress  HENT:   Head: Normocephalic and atraumatic  Eyes: Right eye exhibits no discharge  Left eye exhibits no discharge  No scleral icterus  Neck: Normal range of motion  Neck supple  Cardiovascular: Normal rate, regular rhythm and normal heart sounds  No murmur heard  Pulmonary/Chest: Effort normal and breath sounds normal  No respiratory distress  She has no wheezes  Abdominal: Soft  Bowel sounds are normal  She exhibits no distension  There is no tenderness  Lymphadenopathy:     She has no cervical adenopathy  Neurological: She is alert and oriented to person, place, and time  No cranial nerve deficit  Skin: Skin is warm and dry  No rash noted  She is not diaphoretic  Psychiatric: She has a normal mood and affect  Her behavior is normal    Nursing note and vitals reviewed        Lab Results   Component Value Date    WBC 9 19 02/22/2019    HGB 8 4 (L) 02/22/2019    HCT 29 2 (L) 02/22/2019     02/22/2019    ALT 20 02/21/2019    AST 18 02/21/2019    K 4 0 02/21/2019     (H) 02/21/2019    CREATININE 0 64 02/21/2019    BUN 16 02/21/2019    CO2 23 02/21/2019    INR 1 05 02/20/2019    GLUF 80 02/21/2019     Lab Results   Component Value Date    GOJ2KEMVGENF 1 160 07/31/2017

## 2019-03-27 ENCOUNTER — TELEPHONE (OUTPATIENT)
Dept: FAMILY MEDICINE CLINIC | Facility: OTHER | Age: 44
End: 2019-03-27

## 2019-03-27 DIAGNOSIS — D50.9 IRON DEFICIENCY ANEMIA, UNSPECIFIED IRON DEFICIENCY ANEMIA TYPE: Primary | ICD-10-CM

## 2019-03-27 LAB
25(OH)D3 SERPL-MCNC: 33 NG/ML (ref 30–100)
ALBUMIN SERPL-MCNC: 3.9 G/DL (ref 3.6–5.1)
ALBUMIN/GLOB SERPL: 1.2 (CALC) (ref 1–2.5)
ALP SERPL-CCNC: 86 U/L (ref 33–115)
ALT SERPL-CCNC: 14 U/L (ref 6–29)
AST SERPL-CCNC: 18 U/L (ref 10–30)
BASOPHILS # BLD AUTO: 91 CELLS/UL (ref 0–200)
BASOPHILS NFR BLD AUTO: 1.3 %
BILIRUB SERPL-MCNC: 0.3 MG/DL (ref 0.2–1.2)
BUN SERPL-MCNC: 18 MG/DL (ref 7–25)
BUN/CREAT SERPL: NORMAL (CALC) (ref 6–22)
CALCIUM SERPL-MCNC: 9 MG/DL (ref 8.6–10.2)
CHLORIDE SERPL-SCNC: 106 MMOL/L (ref 98–110)
CHOLEST SERPL-MCNC: 164 MG/DL
CHOLEST/HDLC SERPL: 2.9 (CALC)
CO2 SERPL-SCNC: 26 MMOL/L (ref 20–32)
CREAT SERPL-MCNC: 0.73 MG/DL (ref 0.5–1.1)
EOSINOPHIL # BLD AUTO: 77 CELLS/UL (ref 15–500)
EOSINOPHIL NFR BLD AUTO: 1.1 %
ERYTHROCYTE [DISTWIDTH] IN BLOOD BY AUTOMATED COUNT: 24.7 % (ref 11–15)
EST. AVERAGE GLUCOSE BLD GHB EST-MCNC: 103 (CALC)
EST. AVERAGE GLUCOSE BLD GHB EST-SCNC: 5.7 (CALC)
GLOBULIN SER CALC-MCNC: 3.2 G/DL (CALC) (ref 1.9–3.7)
GLUCOSE SERPL-MCNC: 83 MG/DL (ref 65–99)
HBA1C MFR BLD: 5.2 % OF TOTAL HGB
HCT VFR BLD AUTO: 34.4 % (ref 35–45)
HDLC SERPL-MCNC: 56 MG/DL
HGB BLD-MCNC: 10.4 G/DL (ref 11.7–15.5)
LDLC SERPL CALC-MCNC: 89 MG/DL (CALC)
LYMPHOCYTES # BLD AUTO: 2212 CELLS/UL (ref 850–3900)
LYMPHOCYTES NFR BLD AUTO: 31.6 %
MCH RBC QN AUTO: 22.6 PG (ref 27–33)
MCHC RBC AUTO-ENTMCNC: 30.2 G/DL (ref 32–36)
MCV RBC AUTO: 74.8 FL (ref 80–100)
MONOCYTES # BLD AUTO: 385 CELLS/UL (ref 200–950)
MONOCYTES NFR BLD AUTO: 5.5 %
NEUTROPHILS # BLD AUTO: 4235 CELLS/UL (ref 1500–7800)
NEUTROPHILS NFR BLD AUTO: 60.5 %
NONHDLC SERPL-MCNC: 108 MG/DL (CALC)
PLATELET # BLD AUTO: 364 THOUSAND/UL (ref 140–400)
PMV BLD REES-ECKER: 9.8 FL (ref 7.5–12.5)
POTASSIUM SERPL-SCNC: 3.9 MMOL/L (ref 3.5–5.3)
PROT SERPL-MCNC: 7.1 G/DL (ref 6.1–8.1)
RBC # BLD AUTO: 4.6 MILLION/UL (ref 3.8–5.1)
SL AMB EGFR AFRICAN AMERICAN: 117 ML/MIN/1.73M2
SL AMB EGFR NON AFRICAN AMERICAN: 101 ML/MIN/1.73M2
SODIUM SERPL-SCNC: 139 MMOL/L (ref 135–146)
TRIGL SERPL-MCNC: 93 MG/DL
TSH SERPL-ACNC: 1.2 MIU/L
WBC # BLD AUTO: 7 THOUSAND/UL (ref 3.8–10.8)

## 2019-03-27 NOTE — TELEPHONE ENCOUNTER
Left message for pt to return call    ----- Message from Froylan Hawkins MD sent at 3/27/2019 11:53 AM EDT -----  The CBC results shows anemia  Hb level is at 10 and she will need to start taking OTC iron supplement if not on it already  Please have her follow up with me if any further questions  Will need to see her and check the blood count in 3 months to ensure its resolved

## 2019-03-27 NOTE — TELEPHONE ENCOUNTER
That will be alright  She will need to do labs before the visit  I will order the blood count today  No need to fast for this lab  Thanks

## 2019-07-02 ENCOUNTER — OFFICE VISIT (OUTPATIENT)
Dept: FAMILY MEDICINE CLINIC | Facility: OTHER | Age: 44
End: 2019-07-02
Payer: COMMERCIAL

## 2019-07-02 VITALS
DIASTOLIC BLOOD PRESSURE: 62 MMHG | BODY MASS INDEX: 32.92 KG/M2 | HEART RATE: 77 BPM | HEIGHT: 61 IN | SYSTOLIC BLOOD PRESSURE: 100 MMHG | WEIGHT: 174.38 LBS | OXYGEN SATURATION: 98 %

## 2019-07-02 DIAGNOSIS — Z11.4 SCREENING FOR HIV (HUMAN IMMUNODEFICIENCY VIRUS): ICD-10-CM

## 2019-07-02 DIAGNOSIS — Z98.84 HISTORY OF BARIATRIC SURGERY: ICD-10-CM

## 2019-07-02 DIAGNOSIS — K59.03 DRUG-INDUCED CONSTIPATION: ICD-10-CM

## 2019-07-02 DIAGNOSIS — Z00.00 ROUTINE PHYSICAL EXAMINATION: ICD-10-CM

## 2019-07-02 PROCEDURE — 99396 PREV VISIT EST AGE 40-64: CPT | Performed by: FAMILY MEDICINE

## 2019-07-02 RX ORDER — DOCUSATE SODIUM 100 MG/1
100 CAPSULE, LIQUID FILLED ORAL 2 TIMES DAILY
Qty: 60 CAPSULE | Refills: 5 | Status: SHIPPED | OUTPATIENT
Start: 2019-07-02 | End: 2020-04-27

## 2019-07-02 RX ORDER — PNV NO.95/FERROUS FUM/FOLIC AC 28MG-0.8MG
1 TABLET ORAL
Qty: 30 EACH | Refills: 6 | COMMUNITY
Start: 2019-07-02 | End: 2020-04-27
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
## 2019-07-02 NOTE — PATIENT INSTRUCTIONS
Low Fat Diet   AMBULATORY CARE:   A low-fat diet  is an eating plan that is low in total fat, unhealthy fat, and cholesterol  You may need to follow a low-fat diet if you have trouble digesting or absorbing fat  You may also need to follow this diet if you have high cholesterol  You can also lower your cholesterol by increasing the amount of fiber in your diet  Soluble fiber is a type of fiber that helps to decrease cholesterol levels  Different types of fat in food:   · Limit unhealthy fats  A diet that is high in cholesterol, saturated fat, and trans fat may cause unhealthy cholesterol levels  Unhealthy cholesterol levels increase your risk of heart disease  ¨ Cholesterol:  Limit intake of cholesterol to less than 200 mg per day  Cholesterol is found in meat, eggs, and dairy  ¨ Saturated fat:  Limit saturated fat to less than 7% of your total daily calories  Ask your dietitian how many calories you need each day  Saturated fat is found in butter, cheese, ice cream, whole milk, and palm oil  Saturated fat is also found in meat, such as beef, pork, chicken skin, and processed meats  Processed meats include sausage, hot dogs, and bologna  ¨ Trans fat:  Avoid trans fat as much as possible  Trans fat is used in fried and baked foods  Foods that say trans fat free on the label may still have up to 0 5 grams of trans fat per serving  · Include healthy fats  Replace foods that are high in saturated and trans fat with foods high in healthy fats  This may help to decrease high cholesterol levels  ¨ Monounsaturated fats: These are found in avocados, nuts, and vegetable oils, such as olive, canola, and sunflower oil  ¨ Polyunsaturated fats: These can be found in vegetable oils, such as soybean or corn oil  Omega-3 fats can help to decrease the risk of heart disease  Omega-3 fats are found in fish, such as salmon, herring, trout, and tuna   Omega-3 fats can also be found in plant foods, such as walnuts, flaxseed, soybeans, and canola oil    Foods to limit or avoid:   · Grains:      ¨ Snacks that are made with partially hydrogenated oils, such as chips, regular crackers, and butter-flavored popcorn    ¨ High-fat baked goods, such as biscuits, croissants, doughnuts, pies, cookies, and pastries    · Dairy:      ¨ Whole milk, 2% milk, and yogurt and ice cream made with whole milk    ¨ Half and half creamer, heavy cream, and whipping cream    ¨ Cheese, cream cheese, and sour cream    · Meats and proteins:      ¨ High-fat cuts of meat (T-bone steak, regular hamburger, and ribs)    ¨ Fried meat, poultry (turkey and chicken), and fish    ¨ Poultry (chicken and turkey) with skin    ¨ Cold cuts (salami or bologna), hot dogs, ulloa, and sausage    ¨ Whole eggs and egg yolks    · Vegetables and fruits with added fat:      ¨ Fried vegetables or vegetables in butter or high-fat sauces, such as cream or cheese sauces    ¨ Fried fruit or fruit served with butter or cream    · Fats:      ¨ Butter, stick margarine, and shortening    ¨ Coconut, palm oil, and palm kernel oil  Foods to include:   · Grains:      ¨ Whole-grain breads, cereals, pasta, and brown rice    ¨ Low-fat crackers and pretzels    · Vegetables and fruits:      ¨ Fresh, frozen, or canned vegetables (no salt or low-sodium)    ¨ Fresh, frozen, dried, or canned fruit (canned in light syrup or fruit juice)    ¨ Avocado    · Low-fat dairy products:      ¨ Nonfat (skim) or 1% milk    ¨ Nonfat or low-fat cheese, yogurt, and cottage cheese    · Meats and proteins:      ¨ Chicken or turkey with no skin    ¨ Baked or broiled fish    ¨ Lean beef and pork (loin, round, extra lean hamburger)    ¨ Beans and peas, unsalted nuts, soy products    ¨ Egg whites and substitutes    ¨ Seeds and nuts    · Fats:      ¨ Unsaturated oil, such as canola, olive, peanut, soybean, or sunflower oil    ¨ Soft or liquid margarine and vegetable oil spread    ¨ Low-fat salad dressing  Other ways to decrease fat:   · Read food labels before you buy foods  Choose foods that have less than 30% of calories from fat  Choose low-fat or fat-free dairy products  Remember that fat free does not mean calorie free  These foods still contain calories, and too many calories can lead to weight gain  · Trim fat from meat and avoid fried food  Trim all visible fat from meat before you cook it  Remove the skin from poultry  Do not avila meat, fish, or poultry  Bake, roast, boil, or broil these foods instead  Avoid fried foods  Eat a baked potato instead of [place] fries  Steam vegetables instead of sautéing them in butter  · Add less fat to foods  Use imitation ulloa bits on salads and baked potatoes instead of regular ulloa bits  Use fat-free or low-fat salad dressings instead of regular dressings  Use low-fat or nonfat butter-flavored topping instead of regular butter or margarine on popcorn and other foods  Ways to decrease fat in recipes:  Replace high-fat ingredients with low-fat or nonfat ones  This may cause baked goods to be drier than usual  You may need to use nonfat cooking spray on pans to prevent food from sticking  You also may need to change the amount of other ingredients, such as water, in the recipe  Try the following:  · Use low-fat or light margarine instead of regular margarine or shortening  · Use lean ground turkey breast or chicken, or lean ground beef (less than 5% fat) instead of hamburger  · Add 1 teaspoon of canola oil to 8 ounces of skim milk instead of using cream or half and half  · Use grated zucchini, carrots, or apples in breads instead of coconut  · Use blenderized, low-fat cottage cheese, plain tofu, or low-fat ricotta cheese instead of cream cheese  · Use 1 egg white and 1 teaspoon of canola oil, or use ¼ cup (2 ounces) of fat-free egg substitute instead of a whole egg       · Replace half of the oil that is called for in a recipe with applesauce when you bake  Use 3 tablespoons of cocoa powder and 1 tablespoon of canola oil instead of a square of baking chocolate  How to increase fiber:  Eat enough high-fiber foods to get 20 to 30 grams of fiber every day  Slowly increase your fiber intake to avoid stomach cramps, gas, and other problems  · Eat 3 ounces of whole-grain foods each day  An ounce is about 1 slice of bread  Eat whole-grain breads, such as whole-wheat bread  Whole wheat, whole-wheat flour, or other whole grains should be listed as the first ingredient on the food label  Replace white flour with whole-grain flour or use half of each in recipes  Whole-grain flour is heavier than white flour, so you may have to add more yeast or baking powder  · Eat a high-fiber cereal for breakfast   Oatmeal is a good source of soluble fiber  Look for cereals that have bran or fiber in the name  Choose whole-grain products, such as brown rice, barley, and whole-wheat pasta  · Eat more beans, peas, and lentils  For example, add beans to soups or salads  Eat at least 5 cups of fruits and vegetables each day  Eat fruits and vegetables with the peel because the peel is high in fiber  © 2017 [employer] Information is for End User's use only and may not be sold, redistributed or otherwise used for commercial purposes  All illustrations and images included in CareNotes® are the copyrighted property of [employer]  or [employer]  The above information is an  only  It is not intended as medical advice for individual conditions or treatments  Talk to your doctor, nurse or pharmacist before following any medical regimen to see if it is safe and effective for you  Heart Healthy Diet   AMBULATORY CARE:   A heart healthy diet  is an eating plan low in total fat, unhealthy fats, and sodium (salt)  A heart healthy diet helps decrease your risk for heart disease and stroke   Limit the amount of fat you eat to 25% to 35% of your total daily calories  Limit sodium to less than 2,300 mg each day  Healthy fats:  Healthy fats can help improve cholesterol levels  The risk for heart disease is decreased when cholesterol levels are normal  Choose healthy fats, such as the following:  · Unsaturated fat  is found in foods such as soybean, canola, olive, corn, and safflower oils  It is also found in soft tub margarine that is made with liquid vegetable oil  · Omega-3 fat  is found in certain fish, such as salmon, tuna, and trout, and in walnuts and flaxseed  Unhealthy fats:  Unhealthy fats can cause unhealthy cholesterol levels in your blood and increase your risk of heart disease  Limit unhealthy fats, such as the following:  · Cholesterol  is found in animal foods, such as eggs and lobster, and in dairy products made from whole milk  Limit cholesterol to less than 300 milligrams (mg) each day  You may need to limit cholesterol to 200 mg each day if you have heart disease  · Saturated fat  is found in meats, such as ulloa and hamburger  It is also found in chicken or turkey skin, whole milk, and butter  Limit saturated fat to less than 7% of your total daily calories  Limit saturated fat to less than 6% if you have heart disease or are at increased risk for it  · Trans fat  is found in packaged foods, such as potato chips and cookies  It is also in hard margarine, some fried foods, and shortening  Avoid trans fats as much as possible    Heart healthy foods and drinks to include:  Ask your dietitian or healthcare provider how many servings to have from each of the following food groups:  · Grains:      ¨ Whole-wheat breads, cereals, and pastas, and brown rice    ¨ Low-fat, low-sodium crackers and chips    · Vegetables:      ¨ Broccoli, green beans, green peas, and spinach    ¨ Collards, kale, and lima beans    ¨ Carrots, sweet potatoes, tomatoes, and peppers    ¨ Canned vegetables with no salt added    · Fruits:      ¨ Bananas, peaches, pears, and pineapple    ¨ Grapes, raisins, and dates    ¨ Oranges, tangerines, grapefruit, orange juice, and grapefruit juice    ¨ Apricots, mangoes, melons, and papaya    ¨ Raspberries and strawberries    ¨ Canned fruit with no added sugar    · Low-fat dairy products:      ¨ Nonfat (skim) milk, 1% milk, and low-fat almond, cashew, or soy milks fortified with calcium    ¨ Low-fat cheese, regular or frozen yogurt, and cottage cheese    · Meats and proteins , such as lean cuts of beef and pork (loin, leg, round), skinless chicken and turkey, legumes, soy products, egg whites, and nuts  Foods and drinks to limit or avoid:  Ask your dietitian or healthcare provider about these and other foods that are high in unhealthy fat, sodium, and sugar:  · Snack or packaged foods , such as frozen dinners, cookies, macaroni and cheese, and cereals with more than 300 mg of sodium per serving    · Canned or dry mixes  for cakes, soups, sauces, or gravies    · Vegetables with added sodium , such as instant potatoes, vegetables with added sauces, or regular canned vegetables    · Other foods high in sodium , such as ketchup, barbecue sauce, salad dressing, pickles, olives, soy sauce, and miso    · High-fat dairy foods  such as whole or 2% milk, cream cheese, or sour cream, and cheeses     · High-fat protein foods  such as high-fat cuts of beef (T-bone steaks, ribs), chicken or turkey with skin, and organ meats, such as liver    · Cured or smoked meats , such as hot dogs, ulloa, and sausage    · Unhealthy fats and oils , such as butter, stick margarine, shortening, and cooking oils such as coconut or palm oil    · Food and drinks high in sugar , such as soft drinks (soda), sports drinks, sweetened tea, candy, cake, cookies, pies, and doughnuts  Other diet guidelines to follow:   · Eat more foods containing omega-3 fats  Eat fish high in omega-3 fats at least 2 times a week  · Limit alcohol    Too much alcohol can damage your heart and raise your blood pressure  Women should limit alcohol to 1 drink a day  Men should limit alcohol to 2 drinks a day  A drink of alcohol is 12 ounces of beer, 5 ounces of wine, or 1½ ounces of liquor  · Choose low-sodium foods  High-sodium foods can lead to high blood pressure  Add little or no salt to food you prepare  Use herbs and spices in place of salt  · Eat more fiber  to help lower cholesterol levels  Eat at least 5 servings of fruits and vegetables each day  Eat 3 ounces of whole-grain foods each day  Legumes (beans) are also a good source of fiber  Lifestyle guidelines:   · Do not smoke  Nicotine and other chemicals in cigarettes and cigars can cause lung and heart damage  Ask your healthcare provider for information if you currently smoke and need help to quit  E-cigarettes or smokeless tobacco still contain nicotine  Talk to your healthcare provider before you use these products  · Exercise regularly  to help you maintain a healthy weight and improve your blood pressure and cholesterol levels  Ask your healthcare provider about the best exercise plan for you  Do not start an exercise program without asking your healthcare provider  Follow up with your healthcare provider as directed:  Write down your questions so you remember to ask them during your visits  © 2017 [address] [facility] Information is for End User's use only and may not be sold, redistributed or otherwise used for commercial purposes  All illustrations and images included in CareNotes® are the copyrighted property of [employer]  or Guille Cabello  The above information is an  only  It is not intended as medical advice for individual conditions or treatments  Talk to your doctor, nurse or pharmacist before following any medical regimen to see if it is safe and effective for you            Wellness Visit for Adults   AMBULATORY CARE:   A wellness visit  is when you see your healthcare provider to get screened for health problems  You can also get advice on how to stay healthy  Write down your questions so you remember to ask them  Ask your healthcare provider how often you should have a wellness visit  What happens at a wellness visit:  Your healthcare provider will ask about your health, and your family history of health problems  This includes high blood pressure, heart disease, and cancer  He or she will ask if you have symptoms that concern you, if you smoke, and about your mood  You may also be asked about your intake of medicines, supplements, food, and alcohol  Any of the following may be done: Your weight  will be checked  Your height may also be checked so your body mass index (BMI) can be calculated  Your BMI shows if you are at a healthy weight  Your blood pressure  and heart rate will be checked  Your temperature may also be checked  Blood and urine tests  may be done  Blood tests may be done to check your cholesterol levels  Abnormal cholesterol levels increase your risk for heart disease and stroke  You may also need a blood or urine test to check for diabetes if you are at increased risk  Urine tests may be done to look for signs of an infection or kidney disease  A physical exam  includes checking your heartbeat and lungs with a stethoscope  Your healthcare provider may also check your skin to look for sun damage  Screening tests  may be recommended  A screening test is done to check for diseases that may not cause symptoms  The screening tests you may need depend on your age, gender, family history, and lifestyle habits  For example, colorectal screening may be recommended if you are 48years old or older  Screening tests you need if you are a woman:   A Pap smear  is used to screen for cervical cancer  Pap smears are usually done every 3 to 5 years depending on your age   You may need them more often if you have had abnormal Pap smear test results in the past  Ask your healthcare provider how often you should have a Pap smear  A mammogram  is an x-ray of your breasts to screen for breast cancer  Experts recommend mammograms every 2 years starting at age 48 years  You may need a mammogram at age 52 years or younger if you have an increased risk for breast cancer  Talk to your healthcare provider about when you should start having mammograms and how often you need them  Vaccines you may need:   Get an influenza vaccine  every year  The influenza vaccine protects you from the flu  Several types of viruses cause the flu  The viruses change over time, so new vaccines are made each year  Get a tetanus-diphtheria (Td) booster vaccine  every 10 years  This vaccine protects you against tetanus and diphtheria  Tetanus is a severe infection that may cause painful muscle spasms and lockjaw  Diphtheria is a severe bacterial infection that causes a thick covering in the back of your mouth and throat  Get a human papillomavirus (HPV) vaccine  if you are female and aged 23 to 32 or male 23 to 24 and never received it  This vaccine protects you from HPV infection  HPV is the most common infection spread by sexual contact  HPV may also cause vaginal, penile, and anal cancers  Get a pneumococcal vaccine  if you are aged 72 years or older  The pneumococcal vaccine is an injection given to protect you from pneumococcal disease  Pneumococcal disease is an infection caused by pneumococcal bacteria  The infection may cause pneumonia, meningitis, or an ear infection  Get a shingles vaccine  if you are aged 61 or older, even if you have had shingles before  The shingles vaccine is an injection to protect you from the varicella-zoster virus  This is the same virus that causes chickenpox  Shingles is a painful rash that develops in people who had chickenpox or have been exposed to the virus  How to eat healthy:  My Plate is a model for planning healthy meals   It shows the types and amounts of foods that should go on your plate  Fruits and vegetables make up about half of your plate, and grains and protein make up the other half  A serving of dairy is included on the side of your plate  The amount of calories and serving sizes you need depends on your age, gender, weight, and height  Examples of healthy foods are listed below:  Eat a variety of vegetables  such as dark green, red, and orange vegetables  You can also include canned vegetables low in sodium (salt) and frozen vegetables without added butter or sauces  Eat a variety of fresh fruits , canned fruit in 100% juice, frozen fruit, and dried fruit  Include whole grains  At least half of the grains you eat should be whole grains  Examples include whole-wheat bread, wheat pasta, brown rice, and whole-grain cereals such as oatmeal     Eat a variety of protein foods such as seafood (fish and shellfish), lean meat, and poultry without skin (turkey and chicken)  Examples of lean meats include pork leg, shoulder, or tenderloin, and beef round, sirloin, tenderloin, and extra lean ground beef  Other protein foods include eggs and egg substitutes, beans, peas, soy products, nuts, and seeds  Choose low-fat dairy products such as skim or 1% milk or low-fat yogurt, cheese, and cottage cheese  Limit unhealthy fats  such as butter, hard margarine, and shortening  Exercise:  Exercise at least 30 minutes per day on most days of the week  Some examples of exercise include walking, biking, dancing, and swimming  You can also fit in more physical activity by taking the stairs instead of the elevator or parking farther away from stores  Include muscle strengthening activities 2 days each week  Regular exercise provides many health benefits  It helps you manage your weight, and decreases your risk for type 2 diabetes, heart disease, stroke, and high blood pressure  Exercise can also help improve your mood   Ask your healthcare provider about the best exercise plan for you  General health and safety guidelines:   Do not smoke  Nicotine and other chemicals in cigarettes and cigars can cause lung damage  Ask your healthcare provider for information if you currently smoke and need help to quit  E-cigarettes or smokeless tobacco still contain nicotine  Talk to your healthcare provider before you use these products  Limit alcohol  A drink of alcohol is 12 ounces of beer, 5 ounces of wine, or 1½ ounces of liquor  Lose weight, if needed  Being overweight increases your risk of certain health conditions  These include heart disease, high blood pressure, type 2 diabetes, and certain types of cancer  Protect your skin  Do not sunbathe or use tanning beds  Use sunscreen with a SPF 15 or higher  Apply sunscreen at least 15 minutes before you go outside  Reapply sunscreen every 2 hours  Wear protective clothing, hats, and sunglasses when you are outside  Drive safely  Always wear your seatbelt  Make sure everyone in your car wears a seatbelt  A seatbelt can save your life if you are in an accident  Do not use your cell phone when you are driving  This could distract you and cause an accident  Pull over if you need to make a call or send a text message  Practice safe sex  Use latex condoms if are sexually active and have more than one partner  Your healthcare provider may recommend screening tests for sexually transmitted infections (STIs)  Wear helmets, lifejackets, and protective gear  Always wear a helmet when you ride a bike or motorcycle, go skiing, or play sports that could cause a head injury  Wear protective equipment when you play sports  Wear a lifejacket when you are on a boat or doing water sports  © 2017 [address] [employer]  Information is for End User's use only and may not be sold, redistributed or otherwise used for commercial purposes   All illustrations and images included in CareNotes® are the copyrighted property of [employer]  or [employer]ello  The above information is an  only  It is not intended as medical advice for individual conditions or treatments  Talk to your doctor, nurse or pharmacist before following any medical regimen to see if it is safe and effective for you

## 2019-07-07 LAB
BASOPHILS # BLD AUTO: 58 CELLS/UL (ref 0–200)
BASOPHILS NFR BLD AUTO: 0.9 %
EOSINOPHIL # BLD AUTO: 70 CELLS/UL (ref 15–500)
EOSINOPHIL NFR BLD AUTO: 1.1 %
ERYTHROCYTE [DISTWIDTH] IN BLOOD BY AUTOMATED COUNT: 15.5 % (ref 11–15)
HCT VFR BLD AUTO: 31.9 % (ref 35–45)
HGB BLD-MCNC: 9.9 G/DL (ref 11.7–15.5)
LYMPHOCYTES # BLD AUTO: 1907 CELLS/UL (ref 850–3900)
LYMPHOCYTES NFR BLD AUTO: 29.8 %
MCH RBC QN AUTO: 22.9 PG (ref 27–33)
MCHC RBC AUTO-ENTMCNC: 31 G/DL (ref 32–36)
MCV RBC AUTO: 73.8 FL (ref 80–100)
MONOCYTES # BLD AUTO: 371 CELLS/UL (ref 200–950)
MONOCYTES NFR BLD AUTO: 5.8 %
NEUTROPHILS # BLD AUTO: 3994 CELLS/UL (ref 1500–7800)
NEUTROPHILS NFR BLD AUTO: 62.4 %
PLATELET # BLD AUTO: 395 THOUSAND/UL (ref 140–400)
PMV BLD REES-ECKER: 10.1 FL (ref 7.5–12.5)
RBC # BLD AUTO: 4.32 MILLION/UL (ref 3.8–5.1)
WBC # BLD AUTO: 6.4 THOUSAND/UL (ref 3.8–10.8)

## 2019-07-08 ENCOUNTER — TELEPHONE (OUTPATIENT)
Dept: FAMILY MEDICINE CLINIC | Facility: OTHER | Age: 44
End: 2019-07-08

## 2019-07-08 DIAGNOSIS — D50.8 IRON DEFICIENCY ANEMIA SECONDARY TO INADEQUATE DIETARY IRON INTAKE: Primary | ICD-10-CM

## 2019-07-08 NOTE — TELEPHONE ENCOUNTER
Left message for pt to return call    ----- Message from Carlos Tello MD sent at 7/8/2019 11:20 AM EDT -----  The patient still has anemia according to the result of the blood draw  The levels are slightly lower at 9 9 than the blood count 3 months ago at 10 4  Please recommend she start taking iron Twice daily regularly and follow with OBGYN in regards to the polyps  Follow with me every 3 months  I will order labs to be done prior to the next visit

## 2019-08-06 ENCOUNTER — HOSPITAL ENCOUNTER (OUTPATIENT)
Dept: RADIOLOGY | Age: 44
Discharge: HOME | End: 2019-08-06
Payer: COMMERCIAL

## 2019-08-06 VITALS — WEIGHT: 174 LBS | BODY MASS INDEX: 32.85 KG/M2 | HEIGHT: 61 IN

## 2019-08-06 DIAGNOSIS — Z12.39 BREAST CANCER SCREENING: ICD-10-CM

## 2019-08-06 PROCEDURE — 77067 SCR MAMMO BI INCL CAD: CPT

## 2019-08-06 PROCEDURE — 77063 BREAST TOMOSYNTHESIS BI: CPT

## 2019-08-16 ENCOUNTER — HOSPITAL ENCOUNTER (OUTPATIENT)
Dept: MAMMOGRAPHY | Facility: CLINIC | Age: 44
Discharge: HOME | End: 2019-08-16
Payer: COMMERCIAL

## 2019-08-16 ENCOUNTER — HOSPITAL ENCOUNTER (OUTPATIENT)
Dept: ULTRASOUND IMAGING | Facility: CLINIC | Age: 44
Discharge: HOME | End: 2019-08-16
Payer: COMMERCIAL

## 2019-08-16 VITALS — BODY MASS INDEX: 32.85 KG/M2 | HEIGHT: 61 IN | WEIGHT: 174 LBS

## 2019-08-16 DIAGNOSIS — R92.8 ABNORMAL MAMMOGRAM: ICD-10-CM

## 2019-08-16 PROCEDURE — 77065 DX MAMMO INCL CAD UNI: CPT

## 2019-08-16 PROCEDURE — G0279 TOMOSYNTHESIS, MAMMO: HCPCS

## 2019-08-16 PROCEDURE — 76642 ULTRASOUND BREAST LIMITED: CPT

## 2019-08-16 NOTE — PROGRESS NOTES
Met with patient and Dr Ricky Cage                 regarding recommendation for;      __x___ RIGHT ______LEFT      __x___Ultrasound guided  ______Stereotactic  Breast biopsy  __x___Verbalized understanding        Blood thinners:  _____yes __x___no    Date stopped: _____n/a______    Biopsy teaching sheet given:  ___x____yes ______no

## 2019-08-23 ENCOUNTER — HOSPITAL ENCOUNTER (OUTPATIENT)
Dept: ULTRASOUND IMAGING | Facility: CLINIC | Age: 44
Discharge: HOME | End: 2019-08-23
Payer: COMMERCIAL

## 2019-08-23 ENCOUNTER — HOSPITAL ENCOUNTER (OUTPATIENT)
Dept: MAMMOGRAPHY | Facility: CLINIC | Age: 44
Discharge: HOME | End: 2019-08-23
Payer: COMMERCIAL

## 2019-08-23 VITALS
SYSTOLIC BLOOD PRESSURE: 140 MMHG | DIASTOLIC BLOOD PRESSURE: 80 MMHG | HEART RATE: 68 BPM | BODY MASS INDEX: 32.66 KG/M2 | HEIGHT: 61 IN | WEIGHT: 173 LBS

## 2019-08-23 DIAGNOSIS — R92.8 ABNORMAL MAMMOGRAM: ICD-10-CM

## 2019-08-23 DIAGNOSIS — R92.8 ABNORMAL ULTRASOUND OF BREAST: ICD-10-CM

## 2019-08-23 PROCEDURE — 88305 TISSUE EXAM BY PATHOLOGIST: CPT | Performed by: PATHOLOGY

## 2019-08-23 PROCEDURE — 19083 BX BREAST 1ST LESION US IMAG: CPT

## 2019-08-23 RX ORDER — LIDOCAINE HYDROCHLORIDE 10 MG/ML
4 INJECTION, SOLUTION INFILTRATION; PERINEURAL ONCE
Status: COMPLETED | OUTPATIENT
Start: 2019-08-23 | End: 2019-08-23

## 2019-08-23 RX ADMIN — LIDOCAINE HYDROCHLORIDE 4 ML: 10 INJECTION, SOLUTION INFILTRATION; PERINEURAL at 10:49

## 2019-08-23 NOTE — PROGRESS NOTES
Ice pack given:    __x___yes _____no    Discharge instructions signed by patient:    __x___yes _____no    Discharge instructions given to patient:    __x___yes _____no    Discharged via:    _x____amulatory    _____wheelchair    _____stretcher    Stable on discharge:    __x___yes ____no

## 2019-08-23 NOTE — DISCHARGE INSTR - OTHER ORDERS
POST LARGE CORE BREAST BIOPSY PATIENT INFORMATION      1  Place an ice pack inside your bra over the top of the dressing every hour for 20 minutes (20 minutes on, 60 minutes off)  Do this until bedtime  2  Do not shower or bathe until the following morning  3  You may bathe your breast carefully with the steri-strips in place  Be careful    Not to loosen them  The steri-strips will fall off in 3-5 days  4  You may have mild discomfort, and you may have some bruising where the   Needle entered the skin  This should clear within 5-7 days  5  If you need medicine for discomfort, take acetaminophen products such as   Tylenol  You may also take Advil or Motrin products  6  Do not participate in strenuous activities such as-tennis, aerobics, skiing,  Weight lifting, etc  for 24 hours  Refrain from swimming/soaking for 72 hours  7  Wearing a bra for sleeping may be more comfortable for the first 24-48 hours  8  Watch for continued bleeding, pain or fever over 101; please call with any questions or concerns  For procedures done at the [facility]  [facility] call:  Radha Aly RN at [phone]  Estefany Esparza RN at [phone]                    *After 4 PM call the Interventional Radiology Department                    [phone] and ask to speak with the nurse on call  For procedures done at the [address] [place] [address] call:         Liz Matt RN at   *After 4 PM call the Interventional Radiology Department   [phone] and ask to speak with the nurse on call  For procedures done at [address] [place] [address] call: The Radiology Nurse at [phone]  *After 4 PM call your physician, or go to the Emergency Department  9          The final results of your biopsy are usually available within one week

## 2019-08-23 NOTE — PROGRESS NOTES
Procedure type:    ___X__ultrasound guided _____stereotactic    Breast:    _____Left __X___Right    Location: 9 o'clock    Needle: 12ga [place]    # of passes: 4    Clip: Heart (Ultra clip)    Performed by: Elkin Lake(PCA)    Pressure held for 5 minutes by:  Elkin Lake(PCA)    Steri Strips:    ____X_yes _____no    Band aid:    ___X__yes_____no    Tape and guaze:    _____yes ___X__no    Tolerated procedure:    __X___yes _____no

## 2019-08-26 NOTE — PROGRESS NOTES
Post procedure call completed on 8/26/19 @ 1020    Bleeding: _____yes ___x__no    Pain: _____yes __x____no    Redness/Swelling: ______yes __x____no    Band aid removed: __x___yes _____no    Steri-Strips intact: __x____yes _____no  Pt states her right breast is a little sore and bruised

## 2019-08-27 ENCOUNTER — TELEPHONE (OUTPATIENT)
Dept: MAMMOGRAPHY | Facility: CLINIC | Age: 44
End: 2019-08-27

## 2019-11-25 ENCOUNTER — ANNUAL EXAM (OUTPATIENT)
Dept: OBGYN CLINIC | Facility: CLINIC | Age: 44
End: 2019-11-25
Payer: COMMERCIAL

## 2019-11-25 ENCOUNTER — LAB (OUTPATIENT)
Dept: LAB | Facility: CLINIC | Age: 44
End: 2019-11-25
Payer: COMMERCIAL

## 2019-11-25 VITALS — WEIGHT: 180.8 LBS | BODY MASS INDEX: 34.16 KG/M2 | DIASTOLIC BLOOD PRESSURE: 76 MMHG | SYSTOLIC BLOOD PRESSURE: 118 MMHG

## 2019-11-25 DIAGNOSIS — K59.03 DRUG-INDUCED CONSTIPATION: ICD-10-CM

## 2019-11-25 DIAGNOSIS — B37.3 VAGINA, CANDIDIASIS: ICD-10-CM

## 2019-11-25 DIAGNOSIS — Z11.4 SCREENING FOR HIV (HUMAN IMMUNODEFICIENCY VIRUS): ICD-10-CM

## 2019-11-25 DIAGNOSIS — Z12.4 SCREENING FOR MALIGNANT NEOPLASM OF CERVIX: ICD-10-CM

## 2019-11-25 DIAGNOSIS — N93.9 ABNORMAL UTERINE BLEEDING: ICD-10-CM

## 2019-11-25 DIAGNOSIS — Z01.419 WOMEN'S ANNUAL ROUTINE GYNECOLOGICAL EXAMINATION: Primary | ICD-10-CM

## 2019-11-25 DIAGNOSIS — Z98.84 HISTORY OF BARIATRIC SURGERY: ICD-10-CM

## 2019-11-25 DIAGNOSIS — Z11.3 SCREEN FOR STD (SEXUALLY TRANSMITTED DISEASE): ICD-10-CM

## 2019-11-25 DIAGNOSIS — Z11.51 SCREENING FOR HPV (HUMAN PAPILLOMAVIRUS): ICD-10-CM

## 2019-11-25 DIAGNOSIS — D50.8 IRON DEFICIENCY ANEMIA SECONDARY TO INADEQUATE DIETARY IRON INTAKE: ICD-10-CM

## 2019-11-25 LAB
25(OH)D3 SERPL-MCNC: 28.6 NG/ML (ref 30–100)
ALBUMIN SERPL BCP-MCNC: 4 G/DL (ref 3.5–5)
ALP SERPL-CCNC: 98 U/L (ref 46–116)
ALT SERPL W P-5'-P-CCNC: 20 U/L (ref 12–78)
ANION GAP SERPL CALCULATED.3IONS-SCNC: 5 MMOL/L (ref 4–13)
AST SERPL W P-5'-P-CCNC: 18 U/L (ref 5–45)
BASOPHILS # BLD AUTO: 0.07 THOUSANDS/ΜL (ref 0–0.1)
BASOPHILS NFR BLD AUTO: 1 % (ref 0–1)
BILIRUB SERPL-MCNC: 0.24 MG/DL (ref 0.2–1)
BUN SERPL-MCNC: 17 MG/DL (ref 5–25)
CALCIUM SERPL-MCNC: 9 MG/DL (ref 8.3–10.1)
CHLORIDE SERPL-SCNC: 110 MMOL/L (ref 100–108)
CO2 SERPL-SCNC: 27 MMOL/L (ref 21–32)
CREAT SERPL-MCNC: 0.69 MG/DL (ref 0.6–1.3)
EOSINOPHIL # BLD AUTO: 0.05 THOUSAND/ΜL (ref 0–0.61)
EOSINOPHIL NFR BLD AUTO: 1 % (ref 0–6)
ERYTHROCYTE [DISTWIDTH] IN BLOOD BY AUTOMATED COUNT: 17.1 % (ref 11.6–15.1)
GFR SERPL CREATININE-BSD FRML MDRD: 106 ML/MIN/1.73SQ M
GLUCOSE P FAST SERPL-MCNC: 77 MG/DL (ref 65–99)
HBV SURFACE AG SER QL: NORMAL
HCT VFR BLD AUTO: 32.3 % (ref 34.8–46.1)
HGB BLD-MCNC: 9.4 G/DL (ref 11.5–15.4)
IMM GRANULOCYTES # BLD AUTO: 0.02 THOUSAND/UL (ref 0–0.2)
IMM GRANULOCYTES NFR BLD AUTO: 0 % (ref 0–2)
LYMPHOCYTES # BLD AUTO: 1.99 THOUSANDS/ΜL (ref 0.6–4.47)
LYMPHOCYTES NFR BLD AUTO: 26 % (ref 14–44)
MCH RBC QN AUTO: 22.1 PG (ref 26.8–34.3)
MCHC RBC AUTO-ENTMCNC: 29.1 G/DL (ref 31.4–37.4)
MCV RBC AUTO: 76 FL (ref 82–98)
MONOCYTES # BLD AUTO: 0.34 THOUSAND/ΜL (ref 0.17–1.22)
MONOCYTES NFR BLD AUTO: 5 % (ref 4–12)
NEUTROPHILS # BLD AUTO: 5.09 THOUSANDS/ΜL (ref 1.85–7.62)
NEUTS SEG NFR BLD AUTO: 67 % (ref 43–75)
NRBC BLD AUTO-RTO: 0 /100 WBCS
PLATELET # BLD AUTO: 392 THOUSANDS/UL (ref 149–390)
PMV BLD AUTO: 9.3 FL (ref 8.9–12.7)
POTASSIUM SERPL-SCNC: 4.2 MMOL/L (ref 3.5–5.3)
PROT SERPL-MCNC: 7.9 G/DL (ref 6.4–8.2)
RBC # BLD AUTO: 4.25 MILLION/UL (ref 3.81–5.12)
SODIUM SERPL-SCNC: 142 MMOL/L (ref 136–145)
TSH SERPL DL<=0.05 MIU/L-ACNC: 1.7 UIU/ML (ref 0.36–3.74)
VIT B12 SERPL-MCNC: 1288 PG/ML (ref 100–900)
WBC # BLD AUTO: 7.56 THOUSAND/UL (ref 4.31–10.16)

## 2019-11-25 PROCEDURE — 85025 COMPLETE CBC W/AUTO DIFF WBC: CPT

## 2019-11-25 PROCEDURE — 87389 HIV-1 AG W/HIV-1&-2 AB AG IA: CPT

## 2019-11-25 PROCEDURE — 36415 COLL VENOUS BLD VENIPUNCTURE: CPT

## 2019-11-25 PROCEDURE — 99396 PREV VISIT EST AGE 40-64: CPT | Performed by: OBSTETRICS & GYNECOLOGY

## 2019-11-25 PROCEDURE — 86592 SYPHILIS TEST NON-TREP QUAL: CPT

## 2019-11-25 PROCEDURE — 87624 HPV HI-RISK TYP POOLED RSLT: CPT | Performed by: OBSTETRICS & GYNECOLOGY

## 2019-11-25 PROCEDURE — 84443 ASSAY THYROID STIM HORMONE: CPT

## 2019-11-25 PROCEDURE — 82607 VITAMIN B-12: CPT

## 2019-11-25 PROCEDURE — 87591 N.GONORRHOEAE DNA AMP PROB: CPT | Performed by: OBSTETRICS & GYNECOLOGY

## 2019-11-25 PROCEDURE — G0145 SCR C/V CYTO,THINLAYER,RESCR: HCPCS | Performed by: OBSTETRICS & GYNECOLOGY

## 2019-11-25 PROCEDURE — 87491 CHLMYD TRACH DNA AMP PROBE: CPT | Performed by: OBSTETRICS & GYNECOLOGY

## 2019-11-25 PROCEDURE — 82306 VITAMIN D 25 HYDROXY: CPT

## 2019-11-25 PROCEDURE — 80053 COMPREHEN METABOLIC PANEL: CPT

## 2019-11-25 PROCEDURE — 87340 HEPATITIS B SURFACE AG IA: CPT

## 2019-11-25 RX ORDER — FLUCONAZOLE 150 MG/1
150 TABLET ORAL
Qty: 3 TABLET | Refills: 0 | Status: SHIPPED | OUTPATIENT
Start: 2019-11-25 | End: 2019-12-02
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
## 2019-11-26 LAB
C TRACH DNA SPEC QL NAA+PROBE: NEGATIVE
N GONORRHOEA DNA SPEC QL NAA+PROBE: NEGATIVE
RPR SER QL: NORMAL

## 2019-11-27 ENCOUNTER — TELEPHONE (OUTPATIENT)
Dept: FAMILY MEDICINE CLINIC | Facility: OTHER | Age: 44
End: 2019-11-27

## 2019-11-27 ENCOUNTER — TELEPHONE (OUTPATIENT)
Dept: HEMATOLOGY ONCOLOGY | Facility: CLINIC | Age: 44
End: 2019-11-27

## 2019-11-27 DIAGNOSIS — D64.89 ANEMIA DUE TO OTHER CAUSE, NOT CLASSIFIED: Primary | ICD-10-CM

## 2019-11-27 DIAGNOSIS — Z98.84 HISTORY OF GASTRIC BYPASS: ICD-10-CM

## 2019-11-27 LAB
HIV 1+2 AB+HIV1 P24 AG SERPL QL IA: NORMAL
HPV HR 12 DNA CVX QL NAA+PROBE: POSITIVE
HPV16 DNA CVX QL NAA+PROBE: NEGATIVE
HPV18 DNA CVX QL NAA+PROBE: NEGATIVE

## 2019-11-27 NOTE — TELEPHONE ENCOUNTER
----- Message from Rhiannon Kong MD sent at 11/27/2019  2:06 PM EST -----  The blood work shows normal renal and liver function and electrolytes  Blood sugar is good as well  Normal thyroid level and normal screen for HIV  The vit D level is slightly low  Please start taking vit D 2000 IU daily and if already taking then double the dose  Will recheck labs routine  The blood count shows anemia  Stable at Hb of 9+  For now it appears the oral therapy with iron is not helpful since there might be digestion issue  Will recommend you see hematology for further therapy recommendations  Referral is made today  Thanks

## 2019-11-27 NOTE — TELEPHONE ENCOUNTER
New Patient Encounter    New Patient Intake Form   Patient Details:  Rosana Douglas  1975  [id number]    Background Information:  [address] starts by opening a telephone encounter and gathering the following information   Who is calling to schedule? If not self, relationship to patient? self   Referring Provider Zurdo Mcneal   What is the diagnosis? anemia   When was the diagnosis? 11/2019   Is patient aware of diagnosis? Yes   Reason for visit? NP DX   Have you had any testing done? If so: when, where? Yes   Are records in "[employer]? yes   Was the patient told to bring a disk? no   Scheduling Information:   Preferred [place]: [place]     Requesting Specific Provider? no   Are there any dates/time the patient cannot be seen? no      Miscellaneous:    After completing the above information, please route to Financial Counselor and the appropriate Nurse Navigator for review

## 2019-12-02 LAB
LAB AP GYN PRIMARY INTERPRETATION: NORMAL
Lab: NORMAL

## 2019-12-19 NOTE — PROGRESS NOTES
Oncology Outpatient Consult Note  Kathlyne Brunner 40 y o  female MRN: @ Encounter: [id number]        Date:  12/24/2019      Assessment/ Plan:    1  FACUNDO  Secondary to poor absorption from history of gastric bypass surgery as well as blood loss from heavy menses  We discussed the possibility of IV iron  Potential side effects of IV iron could include but may not be limited to:  change in taste, diarrhea, muscle cramps, nausea or vomiting, pain in the arms or legs, pain or burning sensation in the injection site, allergic reaction  The patient verbalized understanding and wishes to proceed  Venofer 300 milligrams x6 doses recommended  She is asked to follow-up in approximately 4 months with repeat labs  We discussed the possibility of maintenance Venofer program           HPI:  Kathlyne Brunner is a 40 y o  seen for initial consultation 12/24/2019 at the referral of Juan Sanford MD regarding chronic anemia  11/25/2019 hemoglobin 9 4, MCV of 76, white blood cell count 7 56 with 67% neutrophils, 26% lymphocytes, 5% monocytes  Vitamin B12 1288  HIV normal       2/20/2019:  Hemoglobin 6 6, MCV 67  FOBT negative  Iron saturation 3%  Ferritin 2     7/31/2017:  Hemoglobin 11 2 with MCV 82       She takes oral iron bid since 7/2019  Patient was admitted 2/20/2019 which presented to the ED due to shortness of breath and fatigue  She received 2 units of packed red blood cells as well as IV iron  Patient status post gastric bypass surgery in January 2016  She also has heavy menses  She changes pad/ tampon hourly  Her periods are irregular and heavy  Progestins, IUD, ablation were discussed at her 11/25/2019 office visit with gynecology  She is undecided if she wishes to pursue any of these options  She denies any melena or hematochezia  She has not take NSAIDs regularly  Denies dyspepsia  Denies any dizziness or lightheadedness        Test Results:      Labs:   Lab Results   Component Value Date    HGB 9 4 (L) 2019    HCT 32 3 (L) 2019    MCV 76 (L) 2019     (H) 2019    WBC 7 56 2019    NRBC 0 2019     Lab Results   Component Value Date    K 4 2 2019     (H) 2019    CO2 27 2019    BUN 17 2019    CREATININE 0 69 2019    GLUF 77 2019    CALCIUM 9 0 2019    AST 18 2019    ALT 20 2019    ALKPHOS 98 2019    EGFR 106 2019           Imaging:   No results found  ROS: As mentioned in HPI & Interval History otherwise 14 point ROS negative        Active Problems:   Patient Active Problem List   Diagnosis    Anemia    Sprain of coronary ligament of knee, left, sequela    Abnormal uterine bleeding (AUB)    ASCUS with positive high risk HPV cervical    Bradycardia    Dizziness    Endocervical polyp    H/O bariatric surgery    Obesity    History of gastric bypass       Past Medical History:   Past Medical History:   Diagnosis Date    Abnormal Pap smear of cervix     2017    Anemia     Asthma     Diabetes mellitus ([address] )     HPV (human papilloma virus) infection     2017       Surgical History:   Past Surgical History:   Procedure Laterality Date    BREAST BIOPSY Right     u/s core bx    BREAST SURGERY      Incisional breast biopsy     SECTION      And     GASTRIC BYPASS  2016    TUBAL LIGATION  2009    US GUIDED BREAST BIOPSY RIGHT COMPLETE Right 2019       Family History:    Family History   Problem Relation Age of Onset    Diabetes Mother     Hypertension Mother     Cancer Mother     Arthritis Mother     Leukemia Mother     Hyperlipidemia Mother     Diabetes Father     Hypertension Father     Heart disease Father     Coronary artery disease Father     Hyperlipidemia Father     Stroke Paternal Grandfather     Thyroid cancer Sister 37    Hypertension Brother     Breast cancer Maternal Aunt 48    Breast cancer Paternal Aunt 79  No Known Problems Maternal Grandmother     No Known Problems Maternal Grandfather     No Known Problems Paternal Grandmother     No Known Problems Son     No Known Problems Son     No Known Problems Maternal Aunt     No Known Problems Maternal Aunt     No Known Problems Maternal Aunt     No Known Problems Maternal Aunt     No Known Problems Maternal Aunt     Cervical cancer Paternal Aunt 36    No Known Problems Paternal Aunt     No Known Problems Paternal Aunt     No Known Problems Paternal Aunt     No Known Problems Paternal Aunt     No Known Problems Paternal Aunt     Lung cancer Paternal Uncle 80       Cancer-related family history includes Breast cancer (age of onset: 48) in her maternal aunt; Breast cancer (age of onset: 79) in her paternal aunt; Cancer in her mother; Cervical cancer (age of onset: 36) in her paternal aunt; Lung cancer (age of onset: 80) in her paternal uncle; Thyroid cancer (age of onset: 37) in her sister      Social History:   Social History     Socioeconomic History    Marital status: Single     Spouse name: Not on file    Number of children: 2    Years of education: Not on file    Highest education level: Not on file   Occupational History    Not on file   Social Needs    Financial resource strain: Not on file    Food insecurity:     Worry: Not on file     Inability: Not on file    Transportation needs:     Medical: Not on file     Non-medical: Not on file   Tobacco Use    Smoking status: Current Every Day Smoker    Smokeless tobacco: Never Used   Substance and Sexual Activity    Alcohol use: Yes     Comment: social     Drug use: Never    Sexual activity: Yes     Partners: Male     Birth control/protection: None   Lifestyle    Physical activity:     Days per week: Not on file     Minutes per session: Not on file    Stress: Not on file   Relationships    Social connections:     Talks on phone: Not on file     Gets together: Not on file     Attends [employer] service: Not on file     Active member of club or organization: Not on file     Attends meetings of clubs or organizations: Not on file     Relationship status: Not on file    Intimate partner violence:     Fear of current or ex partner: Not on file     Emotionally abused: Not on file     Physically abused: Not on file     Forced sexual activity: Not on file   Other Topics Concern    Not on file   Social History Narrative    Current every day smoker - As per [employer]     Exercises daily    Sleeps 6-7 hours a day        Current Medications:   Current Outpatient Medications   Medication Sig Dispense Refill    Calcium Citrate-Vitamin D (CALCET CREAMY BITES) 500-400 MG-UNIT CHEW take 1 tablet by oral route 3 times every day      cyanocobalamin (VITAMIN B-12) 100 mcg tablet every 24 hours      docusate sodium (COLACE) 100 mg capsule Take 1 capsule (100 mg total) by mouth 2 (two) times a day 60 capsule 5    Ferrous Sulfate (IRON) 325 (65 Fe) MG TABS Take 1 tablet (325 mg total) by mouth 2 (two) times daily after meals 30 each 6    Multiple Vitamins-Minerals (MULTIVITAMIN ADULT PO) Take by mouth       No current facility-administered medications for this visit  Allergies: No Known Allergies      Physical Exam:    There is no height or weight on file to calculate BSA     Ht Readings from Last 3 Encounters:   08/23/19 5' 1" (1 549 m)   08/16/19 5' 1" (1 549 m)   08/06/19 5' 1" (1 549 m)       Wt Readings from Last 3 Encounters:   11/25/19 82 kg (180 lb 12 8 oz)   08/23/19 78 5 kg (173 lb)   08/16/19 78 9 kg (174 lb)        Temp Readings from Last 3 Encounters:   03/18/19 99 °F (37 2 °C) (Tympanic)   02/24/19 98 9 °F (37 2 °C) (Oral)   02/22/19 98 1 °F (36 7 °C) (Oral)        BP Readings from Last 3 Encounters:   11/25/19 118/76   08/23/19 140/80   07/02/19 100/62         Pulse Readings from Last 3 Encounters:   08/23/19 68   07/02/19 77   03/18/19 78         Physical Exam    Physical Exam Constitutional: She is oriented to person, place, and time  She appears well-developed and well-nourished  No distress  HENT:   Head: Normocephalic and atraumatic  Eyes: Conjunctivae are normal    Neck: Normal range of motion  Neck supple  No tracheal deviation present  Cardiovascular: Normal rate and regular rhythm  Exam reveals no gallop and no friction rub  No murmur heard  Pulmonary/Chest: Effort normal and breath sounds normal  No respiratory distress  She has no wheezes  She has no rales  She exhibits no tenderness  Abdominal: Soft  She exhibits no distension  There is no tenderness  Lymphadenopathy:     She has no cervical adenopathy  Neurological: She is alert and oriented to person, place, and time  Skin: Skin is warm and dry  She is not diaphoretic  No erythema  No pallor  Psychiatric: She has a normal mood and affect  Her behavior is normal  Judgment and thought content normal    Vitals reviewed            Emergency Contacts:    Elisa Javier, [phone],

## 2019-12-24 ENCOUNTER — CONSULT (OUTPATIENT)
Dept: HEMATOLOGY ONCOLOGY | Facility: CLINIC | Age: 44
End: 2019-12-24
Payer: COMMERCIAL

## 2019-12-24 VITALS
TEMPERATURE: 97.2 F | BODY MASS INDEX: 33.57 KG/M2 | HEIGHT: 61 IN | OXYGEN SATURATION: 98 % | SYSTOLIC BLOOD PRESSURE: 122 MMHG | HEART RATE: 74 BPM | DIASTOLIC BLOOD PRESSURE: 76 MMHG | RESPIRATION RATE: 16 BRPM | WEIGHT: 177.8 LBS

## 2019-12-24 DIAGNOSIS — D64.89 ANEMIA DUE TO OTHER CAUSE, NOT CLASSIFIED: ICD-10-CM

## 2019-12-24 DIAGNOSIS — Z98.84 HISTORY OF GASTRIC BYPASS: ICD-10-CM

## 2019-12-24 DIAGNOSIS — D50.0 IRON DEFICIENCY ANEMIA DUE TO CHRONIC BLOOD LOSS: ICD-10-CM

## 2019-12-24 DIAGNOSIS — Z98.84 H/O BARIATRIC SURGERY: Primary | ICD-10-CM

## 2019-12-24 PROCEDURE — 99202 OFFICE O/P NEW SF 15 MIN: CPT | Performed by: PHYSICIAN ASSISTANT

## 2019-12-24 RX ORDER — SODIUM CHLORIDE 9 MG/ML
20 INJECTION, SOLUTION INTRAVENOUS ONCE
Status: CANCELLED | OUTPATIENT
Start: 2019-12-27

## 2019-12-27 ENCOUNTER — HOSPITAL ENCOUNTER (OUTPATIENT)
Dept: INFUSION CENTER | Facility: HOSPITAL | Age: 44
Discharge: HOME | End: 2019-12-27
Attending: INTERNAL MEDICINE
Payer: COMMERCIAL

## 2019-12-27 VITALS
HEART RATE: 67 BPM | RESPIRATION RATE: 16 BRPM | TEMPERATURE: 97.6 F | DIASTOLIC BLOOD PRESSURE: 74 MMHG | SYSTOLIC BLOOD PRESSURE: 119 MMHG

## 2019-12-27 DIAGNOSIS — D50.0 IRON DEFICIENCY ANEMIA DUE TO CHRONIC BLOOD LOSS: Primary | ICD-10-CM

## 2019-12-27 PROCEDURE — 96366 THER/PROPH/DIAG IV INF ADDON: CPT

## 2019-12-27 PROCEDURE — 96365 THER/PROPH/DIAG IV INF INIT: CPT

## 2019-12-27 RX ORDER — SODIUM CHLORIDE 9 MG/ML
20 INJECTION, SOLUTION INTRAVENOUS ONCE
Status: COMPLETED | OUTPATIENT
Start: 2019-12-27 | End: 2019-12-27

## 2019-12-27 RX ORDER — SODIUM CHLORIDE 9 MG/ML
20 INJECTION, SOLUTION INTRAVENOUS ONCE
Status: CANCELLED | OUTPATIENT
Start: 2020-01-04

## 2019-12-27 RX ADMIN — IRON SUCROSE 300 MG: 20 INJECTION, SOLUTION INTRAVENOUS at 11:35

## 2019-12-27 RX ADMIN — SODIUM CHLORIDE 20 ML/HR: 0.9 INJECTION, SOLUTION INTRAVENOUS at 11:35

## 2020-01-03 ENCOUNTER — TELEPHONE (OUTPATIENT)
Dept: HEMATOLOGY ONCOLOGY | Facility: CLINIC | Age: 45
End: 2020-01-03

## 2020-01-03 NOTE — TELEPHONE ENCOUNTER
Pt has new insurance but it is not active until 1/15/2020-  She has an upcoming infusion for Venofer tomorrow  Is patient able to maybe r/s the appt for 1/4/2020 and 1/11/2020 ? Clinically patient said she has no symptoms except she is light headed time to time  Patient needs Saturday appointments and uses [facility] infusion       Thank you

## 2020-01-15 ENCOUNTER — DOCUMENTATION (OUTPATIENT)
Dept: HEMATOLOGY ONCOLOGY | Facility: CLINIC | Age: 45
End: 2020-01-15

## 2020-01-15 NOTE — PROGRESS NOTES
recvd email that pt has no ins  Saw notes in epic from [place] dated 01/03/20 that pt will have new ins that will be effective 01/15/20  I called thept to see if she has that new ins info with her  Got voicemail  left her a message to call me back

## 2020-01-18 ENCOUNTER — HOSPITAL ENCOUNTER (OUTPATIENT)
Dept: INFUSION CENTER | Facility: CLINIC | Age: 45
Discharge: HOME | End: 2020-01-18
Payer: COMMERCIAL

## 2020-01-18 VITALS
HEART RATE: 56 BPM | DIASTOLIC BLOOD PRESSURE: 62 MMHG | RESPIRATION RATE: 14 BRPM | OXYGEN SATURATION: 99 % | TEMPERATURE: 98 F | SYSTOLIC BLOOD PRESSURE: 104 MMHG

## 2020-01-18 DIAGNOSIS — D50.0 IRON DEFICIENCY ANEMIA DUE TO CHRONIC BLOOD LOSS: Primary | ICD-10-CM

## 2020-01-18 PROCEDURE — 96365 THER/PROPH/DIAG IV INF INIT: CPT

## 2020-01-18 PROCEDURE — 96366 THER/PROPH/DIAG IV INF ADDON: CPT

## 2020-01-18 RX ORDER — SODIUM CHLORIDE 9 MG/ML
20 INJECTION, SOLUTION INTRAVENOUS ONCE
Status: CANCELLED | OUTPATIENT
Start: 2020-01-25

## 2020-01-18 RX ORDER — SODIUM CHLORIDE 9 MG/ML
20 INJECTION, SOLUTION INTRAVENOUS ONCE
Status: COMPLETED | OUTPATIENT
Start: 2020-01-18 | End: 2020-01-18

## 2020-01-18 RX ADMIN — IRON SUCROSE 300 MG: 20 INJECTION, SOLUTION INTRAVENOUS at 10:21

## 2020-01-18 RX ADMIN — SODIUM CHLORIDE 20 ML/HR: 0.9 INJECTION, SOLUTION INTRAVENOUS at 10:21

## 2020-01-18 NOTE — PROGRESS NOTES
Pt tolerated Venofer without issue  Pt has f/u return appt next Saturday 1/25/2020   pt AVS provided to her      dcd stable and ambulatory

## 2020-01-25 ENCOUNTER — HOSPITAL ENCOUNTER (OUTPATIENT)
Dept: INFUSION CENTER | Facility: CLINIC | Age: 45
Discharge: HOME | End: 2020-01-25
Payer: COMMERCIAL

## 2020-01-25 VITALS
RESPIRATION RATE: 14 BRPM | TEMPERATURE: 98.4 F | OXYGEN SATURATION: 96 % | SYSTOLIC BLOOD PRESSURE: 104 MMHG | HEART RATE: 86 BPM | DIASTOLIC BLOOD PRESSURE: 60 MMHG

## 2020-01-25 DIAGNOSIS — D50.0 IRON DEFICIENCY ANEMIA DUE TO CHRONIC BLOOD LOSS: Primary | ICD-10-CM

## 2020-01-25 PROCEDURE — 96366 THER/PROPH/DIAG IV INF ADDON: CPT

## 2020-01-25 PROCEDURE — 96365 THER/PROPH/DIAG IV INF INIT: CPT

## 2020-01-25 RX ORDER — SODIUM CHLORIDE 9 MG/ML
20 INJECTION, SOLUTION INTRAVENOUS ONCE
Status: COMPLETED | OUTPATIENT
Start: 2020-01-25 | End: 2020-01-25

## 2020-01-25 RX ORDER — SODIUM CHLORIDE 9 MG/ML
20 INJECTION, SOLUTION INTRAVENOUS ONCE
Status: CANCELLED | OUTPATIENT
Start: 2020-01-27

## 2020-01-25 RX ADMIN — IRON SUCROSE 300 MG: 20 INJECTION, SOLUTION INTRAVENOUS at 09:49

## 2020-01-25 RX ADMIN — SODIUM CHLORIDE 20 ML/HR: 0.9 INJECTION, SOLUTION INTRAVENOUS at 09:48

## 2020-01-25 NOTE — PROGRESS NOTES
Patient here for venofer and tolerated treatment without incident  She offers no c/o at time of discharged and will RTO 2/1/20 for same as previously scheduled

## 2020-01-25 NOTE — PATIENT INSTRUCTIONS
January 2020 Sunday Monday Tuesday Wednesday Thursday Friday Saturday                  1     2     3     4    INF THERAPY PLAN   9:30 AM   (150 min )   AN INF CHAIR 5   St  [address] [place] [address] Road            5     6     7     8     9     10     11       Cycle 1, Treatment 4         12     13     14     15     16     17     18    INF THERAPY PLAN   9:30 AM   (150 min )   AN INF CHAIR 2   [facility]         Cycle 1, Treatment 2   19     20     21     22     23     24     25    INF THERAPY PLAN   9:30 AM   (150 min )   AN INF BED 1   [facility]     Cycle 1, Treatment 5   Cycle 1, Treatment 6 Cycle 1, Treatment 3   26     27     28     29     30     31              Cycle 1, Treatment 7   Cycle 1, Treatment 8         Treatment Details       1/5/2020 - Cycle 1, Treatment 4      Supportive Care: APPT 17    1/18/2020 - Cycle 1, Treatment 2      Supportive Care: APPT 17, ONCBCN NURSE [id number], sodium chloride, iron sucrose (VENOFER), MONITOR LACEY    1/21/2020 - Cycle 1, Treatment 5      Supportive Care: APPT 17    1/24/2020 - Cycle 1, Treatment 6      Supportive Care: APPT 17    1/25/2020 - Cycle 1, Treatment 3      Supportive Care: APPT 17, ONCBCN NURSE [id number], sodium chloride, iron sucrose (VENOFER), MONITOR LACEY    1/28/2020 - Cycle 1, Treatment 7      Supportive Care: APPT 17    1/31/2020 - Cycle 1, Treatment 8      Supportive Care: APPT 17 February 2020 Sunday Monday Tuesday Wednesday Thursday Friday Saturday                                 1    INF THERAPY PLAN  10:00 AM   (150 min )   AN INF BED [address]            2     3     4     5     6     7     8    INF THERAPY PLAN   9:00 AM   (150 min )   AN INF BED 6   [facility]    Cycle 1, Treatment 9        9     10     11     12     13     14     15    INF THERAPY PLAN   9:00 AM   (150 min )   AN INF CHAIR 2   [address]            16     17     18     19     20     21     22                23     24     25     26     27     28     29                     Treatment Details       2/3/2020 - Cycle 1, Treatment 9      Supportive Care: APPT 17

## 2020-02-01 ENCOUNTER — HOSPITAL ENCOUNTER (OUTPATIENT)
Dept: INFUSION CENTER | Facility: CLINIC | Age: 45
Discharge: HOME | End: 2020-02-01
Payer: COMMERCIAL

## 2020-02-01 VITALS
HEART RATE: 76 BPM | OXYGEN SATURATION: 97 % | RESPIRATION RATE: 16 BRPM | DIASTOLIC BLOOD PRESSURE: 62 MMHG | SYSTOLIC BLOOD PRESSURE: 106 MMHG | TEMPERATURE: 98.4 F

## 2020-02-01 DIAGNOSIS — D50.0 IRON DEFICIENCY ANEMIA DUE TO CHRONIC BLOOD LOSS: Primary | ICD-10-CM

## 2020-02-01 PROCEDURE — 96365 THER/PROPH/DIAG IV INF INIT: CPT

## 2020-02-01 RX ORDER — SODIUM CHLORIDE 9 MG/ML
20 INJECTION, SOLUTION INTRAVENOUS ONCE
Status: COMPLETED | OUTPATIENT
Start: 2020-02-01 | End: 2020-02-01

## 2020-02-01 RX ORDER — SODIUM CHLORIDE 9 MG/ML
20 INJECTION, SOLUTION INTRAVENOUS ONCE
Status: CANCELLED | OUTPATIENT
Start: 2020-02-08

## 2020-02-01 RX ADMIN — IRON SUCROSE 300 MG: 20 INJECTION, SOLUTION INTRAVENOUS at 10:24

## 2020-02-01 RX ADMIN — SODIUM CHLORIDE 20 ML/HR: 0.9 INJECTION, SOLUTION INTRAVENOUS at 10:21

## 2020-02-01 NOTE — PROGRESS NOTES
Patient is here for venofer  She states she does feel tired after her dose  She also stated she did vomit after her second dose when she was home  She also stated she does get headache at home as well  Patient instructed to call the dr if this happens again  She verbalized understanding

## 2020-02-01 NOTE — PROGRESS NOTES
venofer complete  She states she tired and a slight headache  she states she will take tylenol when she gets home  Reviewed with patient again to call the Dr if the symptoms get worse and she verbalized understanding  next appointment confirmed and she declined avs

## 2020-02-03 ENCOUNTER — HOSPITAL ENCOUNTER (EMERGENCY)
Facility: HOSPITAL | Age: 45
Discharge: HOME | End: 2020-02-03
Attending: EMERGENCY MEDICINE | Admitting: EMERGENCY MEDICINE
Payer: COMMERCIAL

## 2020-02-03 ENCOUNTER — APPOINTMENT (EMERGENCY)
Dept: CT IMAGING | Facility: HOSPITAL | Age: 45
End: 2020-02-03
Payer: COMMERCIAL

## 2020-02-03 VITALS
OXYGEN SATURATION: 97 % | RESPIRATION RATE: 17 BRPM | HEART RATE: 50 BPM | BODY MASS INDEX: 32.1 KG/M2 | WEIGHT: 170 LBS | HEIGHT: 61 IN | TEMPERATURE: 98.4 F | DIASTOLIC BLOOD PRESSURE: 57 MMHG | SYSTOLIC BLOOD PRESSURE: 100 MMHG

## 2020-02-03 DIAGNOSIS — K43.9 VENTRAL HERNIA WITHOUT OBSTRUCTION OR GANGRENE: ICD-10-CM

## 2020-02-03 DIAGNOSIS — K80.20 CHOLELITHIASIS: ICD-10-CM

## 2020-02-03 DIAGNOSIS — R10.9 ACUTE ABDOMINAL PAIN: Primary | ICD-10-CM

## 2020-02-03 LAB
ALBUMIN SERPL BCP-MCNC: 3.6 G/DL (ref 3.5–5)
ALP SERPL-CCNC: 99 U/L (ref 46–116)
ALT SERPL W P-5'-P-CCNC: 24 U/L (ref 12–78)
ANION GAP SERPL CALCULATED.3IONS-SCNC: 12 MMOL/L (ref 4–13)
AST SERPL W P-5'-P-CCNC: 27 U/L (ref 5–45)
BASOPHILS # BLD AUTO: 0.09 THOUSANDS/ΜL (ref 0–0.1)
BASOPHILS NFR BLD AUTO: 1 % (ref 0–1)
BILIRUB SERPL-MCNC: 0.18 MG/DL (ref 0.2–1)
BILIRUB UR QL STRIP: NEGATIVE
BUN SERPL-MCNC: 22 MG/DL (ref 5–25)
CALCIUM SERPL-MCNC: 9.1 MG/DL (ref 8.3–10.1)
CHLORIDE SERPL-SCNC: 108 MMOL/L (ref 100–108)
CLARITY UR: CLEAR
CO2 SERPL-SCNC: 23 MMOL/L (ref 21–32)
COLOR UR: YELLOW
CREAT SERPL-MCNC: 0.82 MG/DL (ref 0.6–1.3)
EOSINOPHIL # BLD AUTO: 0.12 THOUSAND/ΜL (ref 0–0.61)
EOSINOPHIL NFR BLD AUTO: 1 % (ref 0–6)
ERYTHROCYTE [DISTWIDTH] IN BLOOD BY AUTOMATED COUNT: 24.1 % (ref 11.6–15.1)
EXT PREG TEST URINE: NEGATIVE
EXT. CONTROL ED NAV: NORMAL
GFR SERPL CREATININE-BSD FRML MDRD: 87 ML/MIN/1.73SQ M
GLUCOSE SERPL-MCNC: 128 MG/DL (ref 65–140)
GLUCOSE UR STRIP-MCNC: NEGATIVE MG/DL
HCT VFR BLD AUTO: 38.2 % (ref 34.8–46.1)
HGB BLD-MCNC: 11.8 G/DL (ref 11.5–15.4)
HGB UR QL STRIP.AUTO: NEGATIVE
IMM GRANULOCYTES # BLD AUTO: 0.03 THOUSAND/UL (ref 0–0.2)
IMM GRANULOCYTES NFR BLD AUTO: 0 % (ref 0–2)
KETONES UR STRIP-MCNC: ABNORMAL MG/DL
LEUKOCYTE ESTERASE UR QL STRIP: NEGATIVE
LYMPHOCYTES # BLD AUTO: 2.83 THOUSANDS/ΜL (ref 0.6–4.47)
LYMPHOCYTES NFR BLD AUTO: 31 % (ref 14–44)
MCH RBC QN AUTO: 25 PG (ref 26.8–34.3)
MCHC RBC AUTO-ENTMCNC: 30.9 G/DL (ref 31.4–37.4)
MCV RBC AUTO: 81 FL (ref 82–98)
MONOCYTES # BLD AUTO: 0.49 THOUSAND/ΜL (ref 0.17–1.22)
MONOCYTES NFR BLD AUTO: 5 % (ref 4–12)
NEUTROPHILS # BLD AUTO: 5.44 THOUSANDS/ΜL (ref 1.85–7.62)
NEUTS SEG NFR BLD AUTO: 62 % (ref 43–75)
NITRITE UR QL STRIP: NEGATIVE
NRBC BLD AUTO-RTO: 0 /100 WBCS
PH UR STRIP.AUTO: 5 [PH] (ref 4.5–8)
PLATELET # BLD AUTO: 294 THOUSANDS/UL (ref 149–390)
PMV BLD AUTO: 9.4 FL (ref 8.9–12.7)
POTASSIUM SERPL-SCNC: 3.9 MMOL/L (ref 3.5–5.3)
PROT SERPL-MCNC: 7.7 G/DL (ref 6.4–8.2)
PROT UR STRIP-MCNC: NEGATIVE MG/DL
RBC # BLD AUTO: 4.72 MILLION/UL (ref 3.81–5.12)
SODIUM SERPL-SCNC: 143 MMOL/L (ref 136–145)
SP GR UR STRIP.AUTO: >=1.03 (ref 1–1.03)
UROBILINOGEN UR QL STRIP.AUTO: 0.2 E.U./DL
WBC # BLD AUTO: 9 THOUSAND/UL (ref 4.31–10.16)

## 2020-02-03 PROCEDURE — 81003 URINALYSIS AUTO W/O SCOPE: CPT

## 2020-02-03 PROCEDURE — 80053 COMPREHEN METABOLIC PANEL: CPT | Performed by: EMERGENCY MEDICINE

## 2020-02-03 PROCEDURE — 74177 CT ABD & PELVIS W/CONTRAST: CPT

## 2020-02-03 PROCEDURE — 99284 EMERGENCY DEPT VISIT MOD MDM: CPT

## 2020-02-03 PROCEDURE — 96374 THER/PROPH/DIAG INJ IV PUSH: CPT

## 2020-02-03 PROCEDURE — 36415 COLL VENOUS BLD VENIPUNCTURE: CPT | Performed by: EMERGENCY MEDICINE

## 2020-02-03 PROCEDURE — 96375 TX/PRO/DX INJ NEW DRUG ADDON: CPT

## 2020-02-03 PROCEDURE — 85025 COMPLETE CBC W/AUTO DIFF WBC: CPT | Performed by: EMERGENCY MEDICINE

## 2020-02-03 PROCEDURE — 96361 HYDRATE IV INFUSION ADD-ON: CPT

## 2020-02-03 PROCEDURE — 81025 URINE PREGNANCY TEST: CPT | Performed by: EMERGENCY MEDICINE

## 2020-02-03 PROCEDURE — 99284 EMERGENCY DEPT VISIT MOD MDM: CPT | Performed by: EMERGENCY MEDICINE

## 2020-02-03 RX ORDER — HYDROMORPHONE HCL/PF 1 MG/ML
0.5 SYRINGE (ML) INJECTION ONCE
Status: COMPLETED | OUTPATIENT
Start: 2020-02-03 | End: 2020-02-03

## 2020-02-03 RX ORDER — ONDANSETRON 2 MG/ML
4 INJECTION INTRAMUSCULAR; INTRAVENOUS ONCE
Status: COMPLETED | OUTPATIENT
Start: 2020-02-03 | End: 2020-02-03

## 2020-02-03 RX ADMIN — SODIUM CHLORIDE 1000 ML: 0.9 INJECTION, SOLUTION INTRAVENOUS at 20:17

## 2020-02-03 RX ADMIN — ONDANSETRON 4 MG: 2 INJECTION INTRAMUSCULAR; INTRAVENOUS at 20:19

## 2020-02-03 RX ADMIN — IOHEXOL 100 ML: 350 INJECTION, SOLUTION INTRAVENOUS at 21:49

## 2020-02-03 RX ADMIN — HYDROMORPHONE HYDROCHLORIDE 0.5 MG: 1 INJECTION, SOLUTION INTRAMUSCULAR; INTRAVENOUS; SUBCUTANEOUS at 20:19

## 2020-02-04 NOTE — ED PROVIDER NOTES
History  Chief Complaint   Patient presents with    Groin Pain     Pt reports R groin pain starting this am, states she was recently dx with a hernia  States the pain is radiating from her back down into her groin/leg  +NV      44-year-old female presents to the emergency department for evaluation of lower abdominal pain  Patient states that she was injured at work a few weeks ago  Since her injury at work she has been having right-sided hip pain  She had an MRI of the right hip and was informed that she has a hernia but the hip was unremarkable  Patient is unsure exact location of the hernia but she believes that is in the right inguinal region  Since being told that she has a hernia she has noticed discomfort in the right inguinal region  She is also noticing some discomfort in the left inguinal region  Patient states yesterday she did have an episode of nausea with vomiting  She has been moving her bowels normally  Patient states that she has been having low back and right hip pain since the injury and at times the pain radiates into the thigh  Patient denies fevers or chills  Patient has a history of prior gastric bypass surgery  History provided by:  Patient and medical records   used: No    Abdominal Pain   Pain location:  RLQ and LLQ (inguinal pain)  Pain quality: aching    Pain radiates to:  Groin  Pain severity:  Moderate  Onset quality:  Gradual  Duration:  2 days  Timing:  Intermittent  Progression:  Worsening  Chronicity:  New  Context: previous surgery and trauma    Context: not eating and not sick contacts    Relieved by:  Nothing  Worsened by:  Nothing  Ineffective treatments:  None tried  Associated symptoms: nausea and vomiting    Associated symptoms: no anorexia, no diarrhea, no fatigue, no fever and no hematuria    Risk factors: no recent hospitalization        Prior to Admission Medications   Prescriptions Last Dose Informant Patient Reported? Taking? Calcium Citrate-Vitamin D (CALCET CREAMY BITES) 500-400 MG-UNIT CHEW  Self Yes No   Sig: take 1 tablet by oral route 3 times every day   Ferrous Sulfate (IRON) 325 (65 Fe) MG TABS   No No   Sig: Take 1 tablet (325 mg total) by mouth 2 (two) times daily after meals   Patient not taking: Reported on 2019   Multiple Vitamins-Minerals (MULTIVITAMIN ADULT PO)  Self Yes No   Sig: Take by mouth   cyanocobalamin (VITAMIN B-12) 100 mcg tablet  Self Yes No   Sig: every 24 hours   docusate sodium (COLACE) 100 mg capsule   No No   Sig: Take 1 capsule (100 mg total) by mouth 2 (two) times a day   Patient not taking: Reported on 2019      Facility-Administered Medications: None       Past Medical History:   Diagnosis Date    Abnormal Pap smear of cervix     2017    Anemia     Asthma     Diabetes mellitus ([address] )     HPV (human papilloma virus) infection     2017       Past Surgical History:   Procedure Laterality Date    BREAST BIOPSY Right     u/s core bx    BREAST SURGERY      Incisional breast biopsy     SECTION      And     GASTRIC BYPASS  2016    TUBAL LIGATION  2009    US GUIDED BREAST BIOPSY RIGHT COMPLETE Right 2019       Family History   Problem Relation Age of Onset    Diabetes Mother     Hypertension Mother     Cancer Mother     Arthritis Mother     Leukemia Mother     Hyperlipidemia Mother     Diabetes Father     Hypertension Father     Heart disease Father     Coronary artery disease Father     Hyperlipidemia Father     Stroke Paternal Grandfather     Thyroid cancer Sister 37    Hypertension Brother     Breast cancer Maternal Aunt 48    Breast cancer Paternal Aunt 79    No Known Problems Maternal Grandmother     No Known Problems Maternal Grandfather     No Known Problems Paternal Grandmother     No Known Problems Son     No Known Problems Son     No Known Problems Maternal Aunt     No Known Problems Maternal Aunt     No Known Problems Maternal Aunt     No Known Problems Maternal Aunt     No Known Problems Maternal Aunt     Cervical cancer Paternal Aunt 36    No Known Problems Paternal Aunt     No Known Problems Paternal Aunt     No Known Problems Paternal Aunt     No Known Problems Paternal Aunt     No Known Problems Paternal Aunt     Lung cancer Paternal Uncle 80     I have reviewed and agree with the history as documented  Social History     Tobacco Use    Smoking status: Current Every Day Smoker     Packs/day: 0 25     Types: Cigarettes    Smokeless tobacco: Never Used    Tobacco comment: refused   Substance Use Topics    Alcohol use: Yes     Frequency: Monthly or less     Comment: social     Drug use: Never        Review of Systems   Constitutional: Negative for fatigue and fever  Gastrointestinal: Positive for abdominal pain, nausea and vomiting  Negative for anorexia and diarrhea  Genitourinary: Negative for hematuria  All other systems reviewed and are negative  Physical Exam  Physical Exam   Constitutional: She is oriented to person, place, and time  She appears well-developed and well-nourished  No distress  HENT:   Head: Normocephalic  Nose: Nose normal    Mouth/Throat: Oropharynx is clear and moist  No oropharyngeal exudate  Eyes: Pupils are equal, round, and reactive to light  Conjunctivae and EOM are normal    Neck: Normal range of motion  Neck supple  Cardiovascular: Normal rate, regular rhythm, normal heart sounds and intact distal pulses  Pulmonary/Chest: Effort normal and breath sounds normal    Abdominal: Soft  Bowel sounds are normal  She exhibits no distension  There is tenderness  There is no rebound and no guarding  A hernia is present  Hernia confirmed positive in the ventral area  Hernia confirmed negative in the right inguinal area and confirmed negative in the left inguinal area  Musculoskeletal: Normal range of motion  She exhibits no edema, tenderness or deformity  Lymphadenopathy:     She has no cervical adenopathy  Neurological: She is alert and oriented to person, place, and time  She has normal strength and normal reflexes  No cranial nerve deficit or sensory deficit  She exhibits normal muscle tone  Coordination and gait normal    Skin: Skin is warm, dry and intact  No rash noted  Psychiatric: She has a normal mood and affect  Her behavior is normal  Judgment and thought content normal    Nursing note and vitals reviewed        Vital Signs  ED Triage Vitals   Temperature Pulse Respirations Blood Pressure SpO2   02/03/20 1933 02/03/20 1934 02/03/20 1934 02/03/20 1934 02/03/20 1934   98 4 °F (36 9 °C) 66 17 122/78 98 %      Temp Source Heart Rate Source Patient Position - Orthostatic VS BP Location FiO2 (%)   02/03/20 1933 02/03/20 1934 02/03/20 1934 02/03/20 1934 --   Oral Monitor Sitting Left arm       Pain Score       02/03/20 1934       Worst Possible Pain           Vitals:    02/03/20 1934 02/03/20 2030 02/03/20 2246   BP: 122/78 118/61 100/57   Pulse: 66 86 (!) 50   Patient Position - Orthostatic VS: Sitting Lying Lying         Visual Acuity      ED Medications  Medications   sodium chloride 0 9 % bolus 1,000 mL (0 mL Intravenous Stopped 2/3/20 2117)   HYDROmorphone (DILAUDID) injection 0 5 mg (0 5 mg Intravenous Given 2/3/20 2019)   ondansetron (ZOFRAN) injection 4 mg (4 mg Intravenous Given 2/3/20 2019)   iohexol (OMNIPAQUE) 350 MG/ML injection (MULTI-DOSE) 100 mL (100 mL Intravenous Given 2/3/20 2149)       Diagnostic Studies  Results Reviewed     Procedure Component Value Units Date/Time    Comprehensive metabolic panel [[id number]]  (Abnormal) Collected:  02/03/20 2015    Lab Status:  Final result Specimen:  Blood from Arm, Right Updated:  02/03/20 2115     Sodium 143 mmol/L      Potassium 3 9 mmol/L      Chloride 108 mmol/L      CO2 23 mmol/L      ANION GAP 12 mmol/L      BUN 22 mg/dL      Creatinine 0 82 mg/dL      Glucose 128 mg/dL      Calcium 9 1 mg/dL AST 27 U/L      ALT 24 U/L      Alkaline Phosphatase 99 U/L      Total Protein 7 7 g/dL      Albumin 3 6 g/dL      Total Bilirubin 0 18 mg/dL      eGFR 87 ml/min/1 73sq m     Narrative:       [place] guidelines for Chronic Kidney Disease (CKD):     Stage 1 with normal or high GFR (GFR > 90 mL/min/1 73 square meters)    Stage 2 Mild CKD (GFR = 60-89 mL/min/1 73 square meters)    Stage 3A Moderate CKD (GFR = 45-59 mL/min/1 73 square meters)    Stage 3B Moderate CKD (GFR = 30-44 mL/min/1 73 square meters)    Stage 4 Severe CKD (GFR = 15-29 mL/min/1 73 square meters)    Stage 5 End Stage CKD (GFR <15 mL/min/1 73 square meters)  Note: GFR calculation is accurate only with a steady state creatinine    CBC and differential [[id number]]  (Abnormal) Collected:  02/03/20 2015    Lab Status:  Final result Specimen:  Blood from Arm, Right Updated:  02/03/20 2034     WBC 9 00 Thousand/uL      RBC 4 72 Million/uL      Hemoglobin 11 8 g/dL      Hematocrit 38 2 %      MCV 81 fL      MCH 25 0 pg      MCHC 30 9 g/dL      RDW 24 1 %      MPV 9 4 fL      Platelets 885 Thousands/uL      nRBC 0 /100 WBCs      Neutrophils Relative 62 %      Immat GRANS % 0 %      Lymphocytes Relative 31 %      Monocytes Relative 5 %      Eosinophils Relative 1 %      Basophils Relative 1 %      Neutrophils Absolute 5 44 Thousands/µL      Immature Grans Absolute 0 03 Thousand/uL      Lymphocytes Absolute 2 83 Thousands/µL      Monocytes Absolute 0 49 Thousand/µL      Eosinophils Absolute 0 12 Thousand/µL      Basophils Absolute 0 09 Thousands/µL     POCT pregnancy, urine [[id number]]  (Normal) Resulted:  02/03/20 2006    Lab Status:  Final result Updated:  02/03/20 2006     EXT PREG TEST UR (Ref: Negative) negative     Control valid    Urine Macroscopic, POC [[id number]]  (Abnormal) Collected:  02/03/20 2010    Lab Status:  Final result Specimen:  Urine Updated:  02/03/20 2005     Color, UA Yellow     Clarity, UA Clear pH, UA 5 0     Leukocytes, UA Negative     Nitrite, UA Negative     Protein, UA Negative mg/dl      Glucose, UA Negative mg/dl      Ketones, UA Trace mg/dl      Urobilinogen, UA 0 2 E U /dl      Bilirubin, UA Negative     Blood, UA Negative     Specific Gravity, UA >=1 030    Narrative:       [facility] RESULT                 CT abdomen pelvis with contrast   Final Result by Amor Swenson MD (02/03 2230)      No acute intra-abdominal abnormality  No free air or free fluid  Large midline infraumbilical ventral hernia containing multiple loops of nondilated small bowel  No evidence of large or small bowel obstruction  Cholelithiasis with no pericholecystic inflammatory change  Workstation performed: [id number]                    Procedures  Procedures         ED Course                               MDM  Number of Diagnoses or Management Options  Acute abdominal pain: new and requires workup  Cholelithiasis: new and requires workup  Ventral hernia without obstruction or gangrene: new and requires workup     Amount and/or Complexity of Data Reviewed  Clinical lab tests: ordered and reviewed  Tests in the radiology section of CPT®: ordered and reviewed  Decide to obtain previous medical records or to obtain history from someone other than the patient: yes  Independent visualization of images, tracings, or specimens: yes    Risk of Complications, Morbidity, and/or Mortality  General comments: 45-year-old female presents with inguinal/lower abdominal pain  Patient has a ventral hernia without any sign of obstruction  Patient's pain is currently well controlled and she feels comfortable being discharged  She will follow up with General surgery as needed  We discussed signs and symptoms to return to the emergency department      Patient Progress  Patient progress: stable        Disposition  Final diagnoses:   Acute abdominal pain   Ventral hernia without obstruction or gangrene   Cholelithiasis Time reflects when diagnosis was documented in both MDM as applicable and the Disposition within this note     Time User Action Codes Description Comment    2/3/2020 10:40 PM Yesenia Weinstein Add [R10 9] Acute abdominal pain     2/3/2020 10:40 PM Yesenia Weinstein Add [K43 9] Ventral hernia without obstruction or gangrene     2/3/2020 10:41 PM Jannet December Add [K80 20] Cholelithiasis       ED Disposition     ED Disposition Condition Date/Time Comment    Discharge Stable Mon Feb 3, 2020 10:40 PM Cristin Maskofi discharge to home/self care  Follow-up Information     Follow up With Specialties Details Why Contact Info    Amie Barr MD Family Medicine Schedule an appointment as soon as possible for a visit  As needed [address] [facility]      Ginger Quach MD General Surgery Schedule an appointment as soon as possible for a visit in 3 days for evaluation of ventral hernia [address] [place] [address]  [phone]            Discharge Medication List as of 2/3/2020 10:42 PM      CONTINUE these medications which have NOT CHANGED    Details   Calcium Citrate-Vitamin D (CALCET CREAMY BITES) 500-400 MG-UNIT CHEW take 1 tablet by oral route 3 times every day, Historical Med      cyanocobalamin (VITAMIN B-12) 100 mcg tablet every 24 hours, Starting Wed 2/3/2016, Historical Med      docusate sodium (COLACE) 100 mg capsule Take 1 capsule (100 mg total) by mouth 2 (two) times a day, Starting Tue 7/2/2019, Normal      Ferrous Sulfate (IRON) 325 (65 Fe) MG TABS Take 1 tablet (325 mg total) by mouth 2 (two) times daily after meals, Starting Tue 7/2/2019, OTC      Multiple Vitamins-Minerals (MULTIVITAMIN ADULT PO) Take by mouth, Starting Thu 10/12/2017, Historical Med           No discharge procedures on file      ED Provider  Electronically Signed by           Milli Marcos DO  02/05/20 [id number]

## 2020-02-08 ENCOUNTER — HOSPITAL ENCOUNTER (OUTPATIENT)
Dept: INFUSION CENTER | Facility: CLINIC | Age: 45
Discharge: HOME | End: 2020-02-08
Payer: COMMERCIAL

## 2020-02-08 VITALS
TEMPERATURE: 97.6 F | SYSTOLIC BLOOD PRESSURE: 100 MMHG | HEART RATE: 67 BPM | OXYGEN SATURATION: 97 % | DIASTOLIC BLOOD PRESSURE: 68 MMHG | RESPIRATION RATE: 16 BRPM

## 2020-02-08 DIAGNOSIS — D50.0 IRON DEFICIENCY ANEMIA DUE TO CHRONIC BLOOD LOSS: Primary | ICD-10-CM

## 2020-02-08 PROCEDURE — 96365 THER/PROPH/DIAG IV INF INIT: CPT

## 2020-02-08 RX ORDER — SODIUM CHLORIDE 9 MG/ML
20 INJECTION, SOLUTION INTRAVENOUS ONCE
Status: COMPLETED | OUTPATIENT
Start: 2020-02-08 | End: 2020-02-08

## 2020-02-08 RX ORDER — SODIUM CHLORIDE 9 MG/ML
20 INJECTION, SOLUTION INTRAVENOUS ONCE
Status: CANCELLED | OUTPATIENT
Start: 2020-02-10

## 2020-02-08 RX ADMIN — SODIUM CHLORIDE 20 ML/HR: 0.9 INJECTION, SOLUTION INTRAVENOUS at 09:04

## 2020-02-08 RX ADMIN — IRON SUCROSE 300 MG: 20 INJECTION, SOLUTION INTRAVENOUS at 09:10

## 2020-02-08 NOTE — PROGRESS NOTES
Pt here for #5/6 venofer  Patient states she gets sick when she gets home with vomiting, headache and fatigue  Patient did have a couple episodes of dry heaving during infusion today and patient states she did not eat prior to coming  Patient felt better after infusion stopped with only 4 minutes left and KVO wide open, but continued with headache and will go home to take tylenol and rest   Email sent to med/onc nurses to add premeds for last treatment  Confirmed appt back next Sat  And AVS declined

## 2020-02-15 ENCOUNTER — HOSPITAL ENCOUNTER (OUTPATIENT)
Dept: INFUSION CENTER | Facility: CLINIC | Age: 45
Discharge: HOME | End: 2020-02-15
Payer: COMMERCIAL

## 2020-02-15 VITALS
RESPIRATION RATE: 14 BRPM | TEMPERATURE: 98 F | HEART RATE: 71 BPM | DIASTOLIC BLOOD PRESSURE: 62 MMHG | SYSTOLIC BLOOD PRESSURE: 104 MMHG | OXYGEN SATURATION: 96 %

## 2020-02-15 DIAGNOSIS — D50.0 IRON DEFICIENCY ANEMIA DUE TO CHRONIC BLOOD LOSS: Primary | ICD-10-CM

## 2020-02-15 PROCEDURE — 96365 THER/PROPH/DIAG IV INF INIT: CPT

## 2020-02-15 RX ORDER — SODIUM CHLORIDE 9 MG/ML
20 INJECTION, SOLUTION INTRAVENOUS ONCE
Status: CANCELLED | OUTPATIENT
Start: 2020-02-17

## 2020-02-15 RX ORDER — SODIUM CHLORIDE 9 MG/ML
20 INJECTION, SOLUTION INTRAVENOUS ONCE
Status: COMPLETED | OUTPATIENT
Start: 2020-02-15 | End: 2020-02-15

## 2020-02-15 RX ADMIN — IRON SUCROSE 300 MG: 20 INJECTION, SOLUTION INTRAVENOUS at 09:12

## 2020-02-15 RX ADMIN — SODIUM CHLORIDE 20 ML/HR: 0.9 INJECTION, SOLUTION INTRAVENOUS at 09:10

## 2020-02-15 NOTE — PROGRESS NOTES
Pt arrived to unit for ordered Venofer #6 of 6  Pt states she did not receive any call or follow up since last Saturdays Venofer infusion from the office re a possible pre med to be added today  Pt didn't want me to page anyone today, stating " I do get nauseous and have a headche after the Venofer txs but Im just dealing with it, and this is my last one"  pt given crackers and ginger ale to start eating while PIV initiated    Pt has call bell within reach

## 2020-04-24 ENCOUNTER — APPOINTMENT (OUTPATIENT)
Dept: LAB | Facility: CLINIC | Age: 45
End: 2020-04-24
Payer: COMMERCIAL

## 2020-04-24 ENCOUNTER — TRANSCRIBE ORDERS (OUTPATIENT)
Dept: LAB | Facility: CLINIC | Age: 45
End: 2020-04-24

## 2020-04-24 ENCOUNTER — TELEPHONE (OUTPATIENT)
Dept: HEMATOLOGY ONCOLOGY | Facility: CLINIC | Age: 45
End: 2020-04-24

## 2020-04-24 DIAGNOSIS — Z98.84 HISTORY OF GASTRIC BYPASS: ICD-10-CM

## 2020-04-24 DIAGNOSIS — D64.89 ANEMIA DUE TO OTHER CAUSE, NOT CLASSIFIED: ICD-10-CM

## 2020-04-24 LAB
BASOPHILS # BLD AUTO: 0.05 THOUSANDS/ΜL (ref 0–0.1)
BASOPHILS NFR BLD AUTO: 1 % (ref 0–1)
EOSINOPHIL # BLD AUTO: 0.06 THOUSAND/ΜL (ref 0–0.61)
EOSINOPHIL NFR BLD AUTO: 1 % (ref 0–6)
ERYTHROCYTE [DISTWIDTH] IN BLOOD BY AUTOMATED COUNT: 15.7 % (ref 11.6–15.1)
FERRITIN SERPL-MCNC: 101 NG/ML (ref 8–388)
HCT VFR BLD AUTO: 48 % (ref 34.8–46.1)
HGB BLD-MCNC: 15.8 G/DL (ref 11.5–15.4)
IMM GRANULOCYTES # BLD AUTO: 0.03 THOUSAND/UL (ref 0–0.2)
IMM GRANULOCYTES NFR BLD AUTO: 0 % (ref 0–2)
IRON SATN MFR SERPL: 20 %
IRON SERPL-MCNC: 77 UG/DL (ref 50–170)
LYMPHOCYTES # BLD AUTO: 3.09 THOUSANDS/ΜL (ref 0.6–4.47)
LYMPHOCYTES NFR BLD AUTO: 32 % (ref 14–44)
MCH RBC QN AUTO: 29.8 PG (ref 26.8–34.3)
MCHC RBC AUTO-ENTMCNC: 32.9 G/DL (ref 31.4–37.4)
MCV RBC AUTO: 90 FL (ref 82–98)
MONOCYTES # BLD AUTO: 0.43 THOUSAND/ΜL (ref 0.17–1.22)
MONOCYTES NFR BLD AUTO: 5 % (ref 4–12)
NEUTROPHILS # BLD AUTO: 5.93 THOUSANDS/ΜL (ref 1.85–7.62)
NEUTS SEG NFR BLD AUTO: 61 % (ref 43–75)
NRBC BLD AUTO-RTO: 0 /100 WBCS
PLATELET # BLD AUTO: 263 THOUSANDS/UL (ref 149–390)
PMV BLD AUTO: 9.3 FL (ref 8.9–12.7)
RBC # BLD AUTO: 5.31 MILLION/UL (ref 3.81–5.12)
TIBC SERPL-MCNC: 391 UG/DL (ref 250–450)
VIT B12 SERPL-MCNC: 1083 PG/ML (ref 100–900)
WBC # BLD AUTO: 9.59 THOUSAND/UL (ref 4.31–10.16)

## 2020-04-24 PROCEDURE — 36415 COLL VENOUS BLD VENIPUNCTURE: CPT

## 2020-04-24 PROCEDURE — 83550 IRON BINDING TEST: CPT

## 2020-04-24 PROCEDURE — 82607 VITAMIN B-12: CPT

## 2020-04-24 PROCEDURE — 83540 ASSAY OF IRON: CPT

## 2020-04-24 PROCEDURE — 82728 ASSAY OF FERRITIN: CPT

## 2020-04-24 PROCEDURE — 85025 COMPLETE CBC W/AUTO DIFF WBC: CPT

## 2020-04-27 ENCOUNTER — OFFICE VISIT (OUTPATIENT)
Dept: HEMATOLOGY ONCOLOGY | Facility: CLINIC | Age: 45
End: 2020-04-27
Payer: COMMERCIAL

## 2020-04-27 VITALS
WEIGHT: 183 LBS | HEIGHT: 61 IN | TEMPERATURE: 99 F | SYSTOLIC BLOOD PRESSURE: 108 MMHG | BODY MASS INDEX: 34.55 KG/M2 | RESPIRATION RATE: 14 BRPM | DIASTOLIC BLOOD PRESSURE: 64 MMHG

## 2020-04-27 DIAGNOSIS — D50.0 IRON DEFICIENCY ANEMIA DUE TO CHRONIC BLOOD LOSS: Primary | ICD-10-CM

## 2020-04-27 DIAGNOSIS — Z98.84 H/O BARIATRIC SURGERY: ICD-10-CM

## 2020-04-27 PROCEDURE — 3008F BODY MASS INDEX DOCD: CPT | Performed by: PHYSICIAN ASSISTANT

## 2020-04-27 PROCEDURE — 99213 OFFICE O/P EST LOW 20 MIN: CPT | Performed by: PHYSICIAN ASSISTANT

## 2020-08-10 ENCOUNTER — HOSPITAL ENCOUNTER (OUTPATIENT)
Dept: RADIOLOGY | Age: 45
Discharge: HOME | End: 2020-08-10
Payer: COMMERCIAL

## 2020-08-10 VITALS — BODY MASS INDEX: 32.47 KG/M2 | HEIGHT: 61 IN | WEIGHT: 172 LBS

## 2020-08-10 DIAGNOSIS — Z12.31 ENCOUNTER FOR SCREENING MAMMOGRAM FOR MALIGNANT NEOPLASM OF BREAST: ICD-10-CM

## 2020-08-10 PROCEDURE — 77063 BREAST TOMOSYNTHESIS BI: CPT

## 2020-08-10 PROCEDURE — 77067 SCR MAMMO BI INCL CAD: CPT

## 2020-08-25 ENCOUNTER — HOSPITAL ENCOUNTER (OUTPATIENT)
Dept: ULTRASOUND IMAGING | Facility: CLINIC | Age: 45
Discharge: HOME | End: 2020-08-25
Payer: COMMERCIAL

## 2020-08-25 VITALS — WEIGHT: 172 LBS | BODY MASS INDEX: 32.47 KG/M2 | HEIGHT: 61 IN

## 2020-08-25 DIAGNOSIS — R92.8 ABNORMAL MAMMOGRAM: ICD-10-CM

## 2020-08-25 PROCEDURE — 76642 ULTRASOUND BREAST LIMITED: CPT

## 2020-09-01 ENCOUNTER — TELEPHONE (OUTPATIENT)
Dept: FAMILY MEDICINE CLINIC | Facility: OTHER | Age: 45
End: 2020-09-01

## 2020-09-01 NOTE — TELEPHONE ENCOUNTER
----- Message from Chaparrita Kurtz MD sent at 8/31/2020  9:24 AM EDT -----  Please schedule a follow up visit to discuss mammogram and US results in detail and plan for follow up studies  Next available visit is ok      Thanks,  Dr Ansley Dewey

## 2020-09-04 ENCOUNTER — OFFICE VISIT (OUTPATIENT)
Dept: FAMILY MEDICINE CLINIC | Facility: OTHER | Age: 45
End: 2020-09-04
Payer: COMMERCIAL

## 2020-09-04 VITALS
SYSTOLIC BLOOD PRESSURE: 132 MMHG | OXYGEN SATURATION: 98 % | HEIGHT: 61 IN | DIASTOLIC BLOOD PRESSURE: 80 MMHG | WEIGHT: 175.2 LBS | HEART RATE: 59 BPM | TEMPERATURE: 98.4 F | RESPIRATION RATE: 18 BRPM | BODY MASS INDEX: 33.08 KG/M2

## 2020-09-04 DIAGNOSIS — D64.89 ANEMIA DUE TO OTHER CAUSE, NOT CLASSIFIED: ICD-10-CM

## 2020-09-04 DIAGNOSIS — R92.8 ABNORMAL MAMMOGRAM OF LEFT BREAST: ICD-10-CM

## 2020-09-04 DIAGNOSIS — B07.8 COMMON WART: ICD-10-CM

## 2020-09-04 DIAGNOSIS — N63.20 BREAST MASS, LEFT: Primary | ICD-10-CM

## 2020-09-04 DIAGNOSIS — Z23 ENCOUNTER FOR IMMUNIZATION: ICD-10-CM

## 2020-09-04 PROCEDURE — 17003 DESTRUCT PREMALG LES 2-14: CPT | Performed by: FAMILY MEDICINE

## 2020-09-04 PROCEDURE — 3725F SCREEN DEPRESSION PERFORMED: CPT | Performed by: FAMILY MEDICINE

## 2020-09-04 PROCEDURE — 90471 IMMUNIZATION ADMIN: CPT

## 2020-09-04 PROCEDURE — 90732 PPSV23 VACC 2 YRS+ SUBQ/IM: CPT

## 2020-09-04 PROCEDURE — 99213 OFFICE O/P EST LOW 20 MIN: CPT | Performed by: FAMILY MEDICINE

## 2020-09-04 PROCEDURE — 17000 DESTRUCT PREMALG LESION: CPT | Performed by: FAMILY MEDICINE

## 2020-09-04 NOTE — PROGRESS NOTES
Assessment/Plan:           Problem List Items Addressed This Visit        Other    Anemia    Relevant Orders  Already has orders for CBC iron panel as well as B12 levels  Comprehensive metabolic panel    Lipid panel    TSH, 3rd generation with Free T4 reflex      Other Visit Diagnoses     Breast mass, left    -  Primary    Relevant Orders    US breast left complete (abus)    Abnormal mammogram of left breast        Relevant Orders    US breast left complete (abus)    Encounter for immunization        Relevant Orders    Pneumococcal Polysaccharide Vaccine 23-Valent =>1yo SQ IM    Common wart    : This lesion was located on the lateral thigh left side  Had been present for months and growing  Discussed options for therapy and agreed to have it removed by cryotherapy  Consent obtained and R/B/A discussed and she agreed to go for the treatment today  FU In 4-8 weeks  Subjective:      Patient ID: Dimitrios Henriquez is a 39 y o  female  Patient is here for a visit to go over mammogram and breast ultrasound results as well as requesting blood work prior to next visit to ensure she gets screen for anemia due to history of anemia in the past   Of note she does not have any current symptoms of fatigue shortness of breath chest pain breathing trouble  No soreness or pain in the breasts either  Patient was aware that the breast lesion on the left side was benign however she is recommended to have a 6 month follow-up study which will be ordered today  Patient's questions were answered to her satisfaction  she would also like to bring up a lesion that is on her left thigh laterally for several months now  Patient says that it can get irritated and it will feel itchy but there is no pain no discharge no bleeding no purulence  She does not have any fever related to this lesion the lesion does not feel sore to touch  It has been slowly growing  It feels rough to touch          The following portions of the patient's history were reviewed and updated as appropriate:   She  has a past medical history of Abnormal Pap smear of cervix, Anemia, Asthma, Diabetes mellitus ([address] ), and HPV (human papilloma virus) infection  She   Patient Active Problem List    Diagnosis Date Noted    Iron deficiency anemia due to chronic blood loss 2019    History of gastric bypass 2019    Anemia 2019    ASCUS with positive high risk HPV cervical 10/12/2017    Abnormal uterine bleeding (AUB) 2017    Endocervical polyp 2017    Bradycardia 2017    Dizziness 2017    H/O bariatric surgery 2016    Obesity 2015    Sprain of coronary ligament of knee, left, sequela 2015     She  has a past surgical history that includes Gastric bypass (2016);  section (); Tubal ligation (); Breast surgery; Breast biopsy (Right, ); and US guided breast biopsy right complete (Right, 2019)  Her family history includes Arthritis in her mother; Breast cancer (age of onset: 48) in her maternal aunt; Breast cancer (age of onset: 79) in her paternal aunt; Cancer in her mother; Cervical cancer (age of onset: 36) in her paternal aunt; Coronary artery disease in her father; Diabetes in her father and mother; Heart disease in her father; Hyperlipidemia in her father and mother; Hypertension in her brother, father, and mother; Leukemia in her mother; Lung cancer (age of onset: 80) in her paternal uncle; No Known Problems in her maternal aunt, maternal aunt, maternal aunt, maternal aunt, maternal aunt, maternal grandfather, maternal grandmother, paternal aunt, paternal aunt, paternal aunt, paternal aunt, paternal aunt, paternal grandmother, son, and son; Stroke in her paternal grandfather; Thyroid cancer (age of onset: 37) in her sister  She  reports that she has been smoking cigarettes  She has been smoking about 0 25 packs per day   She has never used smokeless tobacco  She reports current alcohol use  She reports that she does not use drugs  Current Outpatient Medications   Medication Sig Dispense Refill    Calcium Citrate-Vitamin D (CALCET CREAMY BITES) 500-400 MG-UNIT CHEW take 1 tablet by oral route 3 times every day      cyanocobalamin (VITAMIN B-12) 100 mcg tablet every 24 hours      Multiple Vitamins-Minerals (MULTIVITAMIN ADULT PO) Take by mouth       No current facility-administered medications for this visit  Current Outpatient Medications on File Prior to Visit   Medication Sig    Calcium Citrate-Vitamin D (CALCET CREAMY BITES) 500-400 MG-UNIT CHEW take 1 tablet by oral route 3 times every day    cyanocobalamin (VITAMIN B-12) 100 mcg tablet every 24 hours    Multiple Vitamins-Minerals (MULTIVITAMIN ADULT PO) Take by mouth     No current facility-administered medications on file prior to visit  She has No Known Allergies       Review of Systems   Constitutional: Negative for appetite change, chills, fatigue and fever  HENT: Negative for congestion  Respiratory: Negative for cough and shortness of breath  Cardiovascular: Negative for chest pain  Gastrointestinal: Negative for abdominal pain, nausea and vomiting  Musculoskeletal: Negative for arthralgias and myalgias  Skin: Positive for rash  Neurological: Negative for tremors, weakness, light-headedness and headaches  Objective:      /80   Pulse 59   Temp 98 4 °F (36 9 °C)   Resp 18   Ht 5' 1" (1 549 m)   Wt 79 5 kg (175 lb 3 2 oz)   LMP 08/14/2020 (Exact Date)   SpO2 98%   BMI 33 10 kg/m²          Physical Exam  Vitals signs and nursing note reviewed  Constitutional:       General: She is not in acute distress  Appearance: She is well-developed  She is not diaphoretic  HENT:      Head: Normocephalic and atraumatic  Right Ear: External ear normal       Left Ear: External ear normal       Mouth/Throat:      Pharynx: No oropharyngeal exudate     Eyes: General: No scleral icterus  Right eye: No discharge  Left eye: No discharge  Neck:      Musculoskeletal: Normal range of motion and neck supple  Cardiovascular:      Rate and Rhythm: Normal rate and regular rhythm  Heart sounds: Normal heart sounds  No murmur  Pulmonary:      Effort: Pulmonary effort is normal  No respiratory distress  Breath sounds: Normal breath sounds  No wheezing  Abdominal:      General: Bowel sounds are normal  There is no distension  Palpations: Abdomen is soft  Tenderness: There is no abdominal tenderness  Musculoskeletal: Normal range of motion  Lymphadenopathy:      Cervical: No cervical adenopathy  Skin:     General: Skin is warm and dry  Findings: Lesion present  No rash  Neurological:      General: No focal deficit present  Mental Status: She is alert and oriented to person, place, and time  Psychiatric:         Mood and Affect: Mood normal          Behavior: Behavior normal                  Study Result     DIAGNOSIS: Encounter for screening mammogram for malignant neoplasm of breast      TECHNIQUE:  Digital screening mammography was performed  Computer Aided Detection (CAD) analyzed all applicable images  COMPARISONS: Prior breast imaging dated: 08/23/2019, 08/16/2019, and 08/06/2019     RELEVANT HISTORY:   Family Breast Cancer History: History of breast cancer in Maternal Aunt, Paternal Aunt  Family Medical History: Family medical history includes breast cancer in maternal aunt and breast cancer in paternal aunt  Personal History: No known relevant hormone history  Surgical history includes breast biopsy  No known relevant medical history      The patient is scheduled in a reminder system for screening mammography      8-10% of cancers will be missed on mammography  Management of a palpable abnormality must be based on clinical grounds  Patients will be notified of their results via letter from our facility  Accredited by [employer] and FDA      RISK ASSESSMENT:   5 Year Tyrer-Cuzick: 1 06 %  10 Year Tyrer-Cuzick: 2 42 %  Lifetime Tyrer-Cuzick: 13 19 %     TISSUE DENSITY:   There are scattered areas of fibroglandular density         INDICATION: Katrina Alvarez is a 39 y o  female presenting for screening mammography      FINDINGS:   LEFT  3) MASS: There is a round mass with circumscribed margins seen in the left breast at 2 o'clock  This is a new finding  Recommend ultrasound evaluation      RIGHT  There are no suspicious masses, grouped microcalcifications or areas of architectural distortion  The skin and nipple areolar complex are unremarkable  Previously biopsied right breast lesions are unchanged         IMPRESSION:  Additional imaging required  A breast health care nurse from our facility will be contacting the patient regarding the need for additional imaging        ASSESSMENT/BI-RADS CATEGORY:  Left: 0 - Incomplete: Needs Additional Imaging Evaluation  Right: 2 - Benign  Overall: 0 - Incomplete: Needs Additional Imaging Evaluation     RECOMMENDATION:       - Ultrasound at the current time for the left breast        - Routine screening mammogram in 1 year for the right breast      Workstation ID: [id number]      Imaging     Mammo screening bilateral w 3d & cad (Order: [id number]) - 8/10/2020   Result History     Mammo screening bilateral w 3d & cad (Order #[id number]) on 8/14/2020 - Order Result History Report    Order Report     Order Details   Order Questions     Question  Answer  Comment    Exam reason  screening mammogram     Note:  Enter reason for exam    Pregnant?   No     Approval for Diagnostic Call Back/Ultrasound If Necessary  Yes     Approval for Coordination for Additional Testing  Yes             Reason for Exam     screening mammogram    Dx: Encounter for screening mammogram for malignant neoplasm of breast [Z12 31 (ICD-10-CM)]    All Reviewers List     Jayme Willams MD on 8/25/2020  6:13 PM    Signed by     Signed  Date/Time   Phone  Pager    Go Marc  8/14/2020  11:49  [phone]     Exam Information     Status  Exam Begun   Exam Ended   Performing Tech    Final [99]  8/10/2020  10:18  8/10/2020  10:44  Kesha David        US breast left limited (diagnostic)  8/25/2020  Status: Final result    PACS Images      Show images for US breast left limited (diagnostic)    Study Result        DIAGNOSIS: Abnormal mammogram     TECHNIQUE:   Ultrasound of the left breast(s) was performed      COMPARISONS: Prior breast imaging dated: 08/10/2020     RELEVANT HISTORY:   Family Breast Cancer History: History of breast cancer in Maternal Aunt, Paternal [de-identified]  Family Medical History: Family medical history includes breast cancer in maternal aunt and breast cancer in paternal aunt  Personal History: No known relevant hormone history  Surgical history includes breast biopsy  No known relevant medical history      RISK ASSESSMENT:   5 Year Tyrer-Cuzick: 1 06 %  10 Year Tyrer-Cuzick: 2 42 %  Lifetime Tyrer-Cuzick: 13 19 %     INDICATION: Shubham Browning is a 39 y o  female presenting for   abnormal mammogram      FINDINGS:   LEFT  3) MASS: There is a 7 mm x 6 mm x 3 mm oval, hypoechoic mass with posterior enhancement seen in the left breast at 2 o'clock, 12 cm from the nipple  The mass correlates with the finding seen on the mammogram   The appearance is consistent with either complicated cyst or fibroadenoma, among other entities      No other cystic or solid masses are seen         IMPRESSION:   1  There is a 7 mm probably benign mass at the 2 o'clock position of the left breast, corresponding to the finding on recent mammography    2  Repeat left breast ultrasound in 6 months recommended to ensure stability/resolution            ASSESSMENT/BI-RADS CATEGORY:  Left: 3 - Probably Benign  Overall: 3 - Probably Benign     RECOMMENDATION:       - Ultrasound in 6 months for the left breast      Workstation ID: [id number]     Lab Results   Component Value Date    WBC 9 59 04/24/2020    HGB 15 8 (H) 04/24/2020    HCT 48 0 (H) 04/24/2020     04/24/2020    TRIG 93 03/25/2019    HDL 56 03/25/2019    ALT 24 02/03/2020    AST 27 02/03/2020    K 3 9 02/03/2020     02/03/2020    CREATININE 0 82 02/03/2020    BUN 22 02/03/2020    CO2 23 02/03/2020    INR 1 05 02/20/2019    GLUF 77 11/25/2019    HGBA1C 5 2 03/25/2019     Lab Results   Component Value Date    OAV8GEPHBQKX 1 700 11/25/2019       Lesion Destruction    Date/Time: 9/4/2020 4:24 PM  Performed by: Mary Moore MD  Authorized by: Mary Moore MD     Procedure Details - Lesion Destruction:     Number of Lesions:  1  Lesion 1:     Body area:  Lower extremity    Lower extremity location:  L upper leg    Skin lesion 1 size (mm): 4  Final defect size (mm): 4     Malignancy: benign hyperkeratotic lesion      Destruction method: cryotherapy    Lesion 6:         Informed consent was obtained prior to procedure  Patient tolerated the procedure well  No complications noted during procedure  Follow-up in 4 weeks if lesion is still persistent for 2nd treatment

## 2020-10-19 ENCOUNTER — TRANSCRIBE ORDERS (OUTPATIENT)
Dept: LAB | Facility: CLINIC | Age: 45
End: 2020-10-19

## 2020-10-19 ENCOUNTER — LAB (OUTPATIENT)
Dept: LAB | Facility: CLINIC | Age: 45
End: 2020-10-19
Payer: COMMERCIAL

## 2020-10-19 DIAGNOSIS — D64.89 ANEMIA DUE TO OTHER CAUSE, NOT CLASSIFIED: ICD-10-CM

## 2020-10-19 DIAGNOSIS — D50.0 IRON DEFICIENCY ANEMIA DUE TO CHRONIC BLOOD LOSS: ICD-10-CM

## 2020-10-19 LAB
ALBUMIN SERPL BCP-MCNC: 3.6 G/DL (ref 3.5–5)
ALP SERPL-CCNC: 91 U/L (ref 46–116)
ALT SERPL W P-5'-P-CCNC: 25 U/L (ref 12–78)
ANION GAP SERPL CALCULATED.3IONS-SCNC: 9 MMOL/L (ref 4–13)
AST SERPL W P-5'-P-CCNC: 19 U/L (ref 5–45)
BASOPHILS # BLD AUTO: 0.08 THOUSANDS/ΜL (ref 0–0.1)
BASOPHILS NFR BLD AUTO: 1 % (ref 0–1)
BILIRUB SERPL-MCNC: 0.38 MG/DL (ref 0.2–1)
BUN SERPL-MCNC: 16 MG/DL (ref 5–25)
CALCIUM SERPL-MCNC: 9.3 MG/DL (ref 8.3–10.1)
CHLORIDE SERPL-SCNC: 104 MMOL/L (ref 100–108)
CHOLEST SERPL-MCNC: 177 MG/DL (ref 50–200)
CO2 SERPL-SCNC: 28 MMOL/L (ref 21–32)
CREAT SERPL-MCNC: 0.76 MG/DL (ref 0.6–1.3)
EOSINOPHIL # BLD AUTO: 0.1 THOUSAND/ΜL (ref 0–0.61)
EOSINOPHIL NFR BLD AUTO: 1 % (ref 0–6)
ERYTHROCYTE [DISTWIDTH] IN BLOOD BY AUTOMATED COUNT: 12.7 % (ref 11.6–15.1)
FERRITIN SERPL-MCNC: 27 NG/ML (ref 8–388)
GFR SERPL CREATININE-BSD FRML MDRD: 95 ML/MIN/1.73SQ M
GLUCOSE P FAST SERPL-MCNC: 82 MG/DL (ref 65–99)
HCT VFR BLD AUTO: 45 % (ref 34.8–46.1)
HDLC SERPL-MCNC: 61 MG/DL
HGB BLD-MCNC: 14.6 G/DL (ref 11.5–15.4)
IMM GRANULOCYTES # BLD AUTO: 0.02 THOUSAND/UL (ref 0–0.2)
IMM GRANULOCYTES NFR BLD AUTO: 0 % (ref 0–2)
IRON SATN MFR SERPL: 26 %
IRON SERPL-MCNC: 94 UG/DL (ref 50–170)
LDLC SERPL CALC-MCNC: 95 MG/DL (ref 0–100)
LYMPHOCYTES # BLD AUTO: 2.43 THOUSANDS/ΜL (ref 0.6–4.47)
LYMPHOCYTES NFR BLD AUTO: 30 % (ref 14–44)
MCH RBC QN AUTO: 29.7 PG (ref 26.8–34.3)
MCHC RBC AUTO-ENTMCNC: 32.4 G/DL (ref 31.4–37.4)
MCV RBC AUTO: 92 FL (ref 82–98)
MONOCYTES # BLD AUTO: 0.48 THOUSAND/ΜL (ref 0.17–1.22)
MONOCYTES NFR BLD AUTO: 6 % (ref 4–12)
NEUTROPHILS # BLD AUTO: 4.98 THOUSANDS/ΜL (ref 1.85–7.62)
NEUTS SEG NFR BLD AUTO: 62 % (ref 43–75)
NONHDLC SERPL-MCNC: 116 MG/DL
NRBC BLD AUTO-RTO: 0 /100 WBCS
PLATELET # BLD AUTO: 276 THOUSANDS/UL (ref 149–390)
PMV BLD AUTO: 9.5 FL (ref 8.9–12.7)
POTASSIUM SERPL-SCNC: 4.4 MMOL/L (ref 3.5–5.3)
PROT SERPL-MCNC: 8 G/DL (ref 6.4–8.2)
RBC # BLD AUTO: 4.91 MILLION/UL (ref 3.81–5.12)
SODIUM SERPL-SCNC: 141 MMOL/L (ref 136–145)
TIBC SERPL-MCNC: 355 UG/DL (ref 250–450)
TRIGL SERPL-MCNC: 105 MG/DL
TSH SERPL DL<=0.05 MIU/L-ACNC: 1.82 UIU/ML (ref 0.36–3.74)
VIT B12 SERPL-MCNC: 612 PG/ML (ref 100–900)
WBC # BLD AUTO: 8.09 THOUSAND/UL (ref 4.31–10.16)

## 2020-10-19 PROCEDURE — 80053 COMPREHEN METABOLIC PANEL: CPT

## 2020-10-19 PROCEDURE — 83550 IRON BINDING TEST: CPT

## 2020-10-19 PROCEDURE — 82728 ASSAY OF FERRITIN: CPT

## 2020-10-19 PROCEDURE — 82607 VITAMIN B-12: CPT

## 2020-10-19 PROCEDURE — 85025 COMPLETE CBC W/AUTO DIFF WBC: CPT

## 2020-10-19 PROCEDURE — 84443 ASSAY THYROID STIM HORMONE: CPT

## 2020-10-19 PROCEDURE — 80061 LIPID PANEL: CPT

## 2020-10-19 PROCEDURE — 36415 COLL VENOUS BLD VENIPUNCTURE: CPT

## 2020-10-19 PROCEDURE — 83540 ASSAY OF IRON: CPT

## 2020-12-13 ENCOUNTER — APPOINTMENT (EMERGENCY)
Dept: RADIOLOGY | Facility: HOSPITAL | Age: 45
End: 2020-12-13
Payer: COMMERCIAL

## 2020-12-13 ENCOUNTER — APPOINTMENT (EMERGENCY)
Dept: ULTRASOUND IMAGING | Facility: HOSPITAL | Age: 45
End: 2020-12-13
Payer: COMMERCIAL

## 2020-12-13 ENCOUNTER — HOSPITAL ENCOUNTER (EMERGENCY)
Facility: HOSPITAL | Age: 45
Discharge: HOME | End: 2020-12-13
Payer: COMMERCIAL

## 2020-12-13 VITALS
HEART RATE: 65 BPM | RESPIRATION RATE: 18 BRPM | DIASTOLIC BLOOD PRESSURE: 67 MMHG | HEIGHT: 61 IN | SYSTOLIC BLOOD PRESSURE: 119 MMHG | WEIGHT: 172 LBS | TEMPERATURE: 98 F | BODY MASS INDEX: 32.47 KG/M2 | OXYGEN SATURATION: 100 %

## 2020-12-13 DIAGNOSIS — R93.89 THICKENED ENDOMETRIUM: ICD-10-CM

## 2020-12-13 DIAGNOSIS — B37.3 VAGINAL CANDIDA: ICD-10-CM

## 2020-12-13 DIAGNOSIS — M54.50 ACUTE LOW BACK PAIN: Primary | ICD-10-CM

## 2020-12-13 LAB
BILIRUB UR QL STRIP: NEGATIVE
CLARITY UR: CLEAR
COLOR UR: YELLOW
EXT PREG TEST URINE: NEGATIVE
EXT. CONTROL ED NAV: NORMAL
GLUCOSE UR STRIP-MCNC: NEGATIVE MG/DL
HGB UR QL STRIP.AUTO: NEGATIVE
KETONES UR STRIP-MCNC: NEGATIVE MG/DL
LEUKOCYTE ESTERASE UR QL STRIP: NEGATIVE
NITRITE UR QL STRIP: NEGATIVE
PH UR STRIP.AUTO: 5.5 [PH]
PROT UR STRIP-MCNC: NEGATIVE MG/DL
SP GR UR STRIP.AUTO: 1.01 (ref 1–1.03)
UROBILINOGEN UR QL STRIP.AUTO: 0.2 E.U./DL

## 2020-12-13 PROCEDURE — 72100 X-RAY EXAM L-S SPINE 2/3 VWS: CPT

## 2020-12-13 PROCEDURE — 99284 EMERGENCY DEPT VISIT MOD MDM: CPT | Performed by: PHYSICIAN ASSISTANT

## 2020-12-13 PROCEDURE — 87481 CANDIDA DNA AMP PROBE: CPT | Performed by: PHYSICIAN ASSISTANT

## 2020-12-13 PROCEDURE — 76830 TRANSVAGINAL US NON-OB: CPT

## 2020-12-13 PROCEDURE — 87801 DETECT AGNT MULT DNA AMPLI: CPT | Performed by: PHYSICIAN ASSISTANT

## 2020-12-13 PROCEDURE — 81025 URINE PREGNANCY TEST: CPT | Performed by: PHYSICIAN ASSISTANT

## 2020-12-13 PROCEDURE — 87661 TRICHOMONAS VAGINALIS AMPLIF: CPT | Performed by: PHYSICIAN ASSISTANT

## 2020-12-13 PROCEDURE — 81003 URINALYSIS AUTO W/O SCOPE: CPT | Performed by: PHYSICIAN ASSISTANT

## 2020-12-13 PROCEDURE — 76856 US EXAM PELVIC COMPLETE: CPT

## 2020-12-13 PROCEDURE — 99284 EMERGENCY DEPT VISIT MOD MDM: CPT

## 2020-12-13 RX ORDER — FLUCONAZOLE 150 MG/1
150 TABLET ORAL ONCE
Qty: 1 TABLET | Refills: 0 | Status: SHIPPED | OUTPATIENT
Start: 2020-12-13 | End: 2020-12-13

## 2020-12-13 RX ORDER — ACETAMINOPHEN 325 MG/1
650 TABLET ORAL ONCE
Status: COMPLETED | OUTPATIENT
Start: 2020-12-13 | End: 2020-12-13

## 2020-12-13 RX ADMIN — ACETAMINOPHEN 650 MG: 325 TABLET, FILM COATED ORAL at 11:33

## 2020-12-14 LAB
C GLABRATA DNA VAG QL NAA+PROBE: NEGATIVE
C KRUSEI DNA VAG QL NAA+PROBE: NEGATIVE
CANDIDA SP 6 PNL VAG NAA+PROBE: NEGATIVE
T VAGINALIS DNA VAG QL NAA+PROBE: NEGATIVE
VAGINOSIS/ITIS DNA PNL VAG PROBE+SIG AMP: NEGATIVE

## 2020-12-15 ENCOUNTER — OFFICE VISIT (OUTPATIENT)
Dept: OBGYN CLINIC | Facility: CLINIC | Age: 45
End: 2020-12-15
Payer: COMMERCIAL

## 2020-12-15 VITALS
HEIGHT: 61 IN | BODY MASS INDEX: 34.17 KG/M2 | WEIGHT: 181 LBS | DIASTOLIC BLOOD PRESSURE: 64 MMHG | SYSTOLIC BLOOD PRESSURE: 122 MMHG

## 2020-12-15 DIAGNOSIS — N92.0 MENORRHAGIA WITH REGULAR CYCLE: ICD-10-CM

## 2020-12-15 DIAGNOSIS — N84.0 ENDOMETRIAL POLYP: Primary | ICD-10-CM

## 2020-12-15 PROCEDURE — 3008F BODY MASS INDEX DOCD: CPT | Performed by: OBSTETRICS & GYNECOLOGY

## 2020-12-15 PROCEDURE — 99214 OFFICE O/P EST MOD 30 MIN: CPT | Performed by: OBSTETRICS & GYNECOLOGY

## 2021-01-22 ENCOUNTER — OFFICE VISIT (OUTPATIENT)
Dept: LAB | Facility: CLINIC | Age: 46
End: 2021-01-22
Payer: COMMERCIAL

## 2021-01-22 ENCOUNTER — OFFICE VISIT (OUTPATIENT)
Dept: OBGYN CLINIC | Facility: CLINIC | Age: 46
End: 2021-01-22
Payer: COMMERCIAL

## 2021-01-22 VITALS — WEIGHT: 179 LBS | SYSTOLIC BLOOD PRESSURE: 108 MMHG | BODY MASS INDEX: 33.82 KG/M2 | DIASTOLIC BLOOD PRESSURE: 68 MMHG

## 2021-01-22 DIAGNOSIS — R00.1 BRADYCARDIA: ICD-10-CM

## 2021-01-22 DIAGNOSIS — Z12.31 ENCOUNTER FOR SCREENING MAMMOGRAM FOR MALIGNANT NEOPLASM OF BREAST: ICD-10-CM

## 2021-01-22 DIAGNOSIS — Z12.4 SCREENING FOR MALIGNANT NEOPLASM OF CERVIX: ICD-10-CM

## 2021-01-22 DIAGNOSIS — Z01.818 PREOP EXAMINATION: Primary | ICD-10-CM

## 2021-01-22 PROCEDURE — 99213 OFFICE O/P EST LOW 20 MIN: CPT | Performed by: OBSTETRICS & GYNECOLOGY

## 2021-01-22 PROCEDURE — 93005 ELECTROCARDIOGRAM TRACING: CPT

## 2021-01-22 PROCEDURE — G0143 SCR C/V CYTO,THINLAYER,RESCR: HCPCS | Performed by: OBSTETRICS & GYNECOLOGY

## 2021-01-22 PROCEDURE — 87624 HPV HI-RISK TYP POOLED RSLT: CPT | Performed by: OBSTETRICS & GYNECOLOGY

## 2021-01-22 NOTE — H&P (VIEW-ONLY)
Assessment Diagnoses and all orders for this visit:    Well woman exam with routine gynecological exam    Screening for malignant neoplasm of cervix  -     Liquid-based pap, screening    Encounter for screening mammogram for malignant neoplasm of breast    Bradycardia  -     ECG with rhythm strip; Future         Plan    Deya Howell is scheduled for hysteroscopy, polypectomy, D&C on 2/16/21  Pre-op instructions discussed with patient and patient received paper copy  The risks, benefits and alternatives to the procedure were discussed  We discussed the risks of pain, bleeding, blood clot, infection, allergic reaction, neurovascular injury, injury to uterus, injury to surrounding structures such as bowel, bladder and/or ureters, and possibility of inability to complete the procedure  We discussed code status - full code  Blood transfusion is acceptable  We discussed resident physician participation in the procedure, including pelvic exam   All questions answered, consent obtained  Subjective     Deya Howell is a 39 y o  female here for a pre-op visit  She states her last period was slightly less painful and less heavy and she attributes this to more intense exercise  She has decided against using the IUD at the time of her procedure, understands we can place it in the office postop if her bleeding continues to be heavy  Her only preop concern is her history of bradycardia  She has had sinus bradycardia for several years and has been evaluated with stress test that was normal  She has been told that her heart rate is low because she routinely exercises  No other cardiac history          Patient Active Problem List   Diagnosis    Anemia    Sprain of coronary ligament of knee, left, sequela    Abnormal uterine bleeding (AUB)    ASCUS with positive high risk HPV cervical    Bradycardia    Dizziness    Endocervical polyp    H/O bariatric surgery    Obesity    History of gastric bypass    Iron deficiency anemia due to chronic blood loss         Gynecologic History  Patient's last menstrual period was 2021 (exact date)        Obstetric History  OB History    Para Term  AB Living   2 2 2     2   SAB TAB Ectopic Multiple Live Births           2      # Outcome Date GA Lbr Faisal/2nd Weight Sex Delivery Anes PTL Lv   2 Term      CS-LTranv   SHELIA   1 Term      CS-LTranv   SHELIA      Obstetric Comments   Menarche 10       Past Medical/Surgical/Family/Social History    Past Medical History:   Diagnosis Date    Abnormal Pap smear of cervix         Anemia     Asthma     Diabetes mellitus ([facility] [address] )     HPV (human papilloma virus) infection     2017    Varicella      Past Surgical History:   Procedure Laterality Date    BREAST BIOPSY Right     u/s core bx    BREAST SURGERY      Incisional breast biopsy     SECTION      And     GASTRIC BYPASS  2016    TUBAL LIGATION  2009    US GUIDED BREAST BIOPSY RIGHT COMPLETE Right 2019     Family History   Problem Relation Age of Onset    Diabetes Mother     Hypertension Mother     Cancer Mother     Arthritis Mother     Leukemia Mother     Hyperlipidemia Mother     Diabetes Father     Hypertension Father     Heart disease Father     Coronary artery disease Father     Hyperlipidemia Father     Stroke Paternal Grandfather     Thyroid cancer Sister 37    Hypertension Brother     Breast cancer Maternal Aunt 48    Breast cancer Paternal Aunt 79    No Known Problems Maternal Grandmother     No Known Problems Maternal Grandfather     No Known Problems Paternal Grandmother     No Known Problems Son     No Known Problems Son     No Known Problems Maternal Aunt     No Known Problems Maternal Aunt     No Known Problems Maternal Aunt     No Known Problems Maternal Aunt     No Known Problems Maternal Aunt     Cervical cancer Paternal Aunt 36    No Known Problems Paternal Aunt     No Known Problems Paternal Aunt     No Known Problems Paternal Aunt     No Known Problems Paternal Aunt     No Known Problems Paternal Aunt     Lung cancer Paternal Uncle 80     Social History     Socioeconomic History    Marital status: Single     Spouse name: Not on file    Number of children: 2    Years of education: Not on file    Highest education level: Not on file   Occupational History    Not on file   Social Needs    Financial resource strain: Not on file    Food insecurity     Worry: Not on file     Inability: Not on file   [employer] needs     Medical: Not on file     Non-medical: Not on file   Tobacco Use    Smoking status: Current Every Day Smoker     Packs/day: 0 25     Years: 10 00     Pack years: 2 50     Types: Cigarettes    Smokeless tobacco: Never Used    Tobacco comment: refused   Substance and Sexual Activity    Alcohol use: Yes     Alcohol/week: 0 0 standard drinks     Frequency: Monthly or less     Comment: social     Drug use: Never    Sexual activity: Yes     Partners: Male     Birth control/protection: Condom Male, None   Lifestyle    Physical activity     Days per week: Not on file     Minutes per session: Not on file    Stress: Not on file   Relationships    Social connections     Talks on phone: Not on file     Gets together: Not on file     Attends Mu-ism service: Not on file     Active member of club or organization: Not on file     Attends meetings of clubs or organizations: Not on file     Relationship status: Not on file    Intimate partner violence     Fear of current or ex partner: Not on file     Emotionally abused: Not on file     Physically abused: Not on file     Forced sexual activity: Not on file   Other Topics Concern    Not on file   Social History Narrative    Current every day smoker - As per [employer]     Exercises daily    Sleeps 6-7 hours a day          Patient has no known allergies      Current Outpatient Medications:     Calcium Citrate-Vitamin D (CALCET CREAMY BITES) 500-400 MG-UNIT CHEW, take 1 tablet by oral route 3 times every day, Disp: , Rfl:     cyanocobalamin (VITAMIN B-12) 100 mcg tablet, every 24 hours, Disp: , Rfl:     Multiple Vitamins-Minerals (MULTIVITAMIN ADULT PO), Take by mouth, Disp: , Rfl:       Review of Systems  Constitutional: no fever, feels well  Pulmonary: no cough, no shortness of breath  Gastrointestinal: no complaints of abdominal pain, constipation, nausea, vomiting, or diarrhea or bloody stools  Genitourinary : see HPI       Objective     /68   Wt 81 2 kg (179 lb)   LMP 01/11/2021 (Exact Date)   Breastfeeding No   BMI 33 82 kg/m²     GEN: The patient was alert and oriented x3, pleasant well-appearing female in no acute distress  CV: Bradycardic rate (~55), regular rhythm  PULM: nonlabored respirations, CTAB  : Normal appearing external female genitalia  On speculum exam the vaginal mucosa is rugated, pink and normal in appearance  Grossly normal cervix without lesions  No vaginal discharge or bleeding     MSK: Normal gait  Skin: warm, dry  Neuro: no focal deficits  Psych: normal affect and judgement, cooperative

## 2021-01-22 NOTE — PROGRESS NOTES
Assessment Diagnoses and all orders for this visit:    Well woman exam with routine gynecological exam    Screening for malignant neoplasm of cervix  -     Liquid-based pap, screening    Encounter for screening mammogram for malignant neoplasm of breast    Bradycardia  -     ECG with rhythm strip; Future         Plan    Deya Howell is scheduled for hysteroscopy, polypectomy, D&C on 2/16/21  Pre-op instructions discussed with patient and patient received paper copy  The risks, benefits and alternatives to the procedure were discussed  We discussed the risks of pain, bleeding, blood clot, infection, allergic reaction, neurovascular injury, injury to uterus, injury to surrounding structures such as bowel, bladder and/or ureters, and possibility of inability to complete the procedure  We discussed code status - full code  Blood transfusion is acceptable  We discussed resident physician participation in the procedure, including pelvic exam   All questions answered, consent obtained  Subjective     Deya Howell is a 39 y o  female here for a pre-op visit  She states her last period was slightly less painful and less heavy and she attributes this to more intense exercise  She has decided against using the IUD at the time of her procedure, understands we can place it in the office postop if her bleeding continues to be heavy  Her only preop concern is her history of bradycardia  She has had sinus bradycardia for several years and has been evaluated with stress test that was normal  She has been told that her heart rate is low because she routinely exercises  No other cardiac history          Patient Active Problem List   Diagnosis    Anemia    Sprain of coronary ligament of knee, left, sequela    Abnormal uterine bleeding (AUB)    ASCUS with positive high risk HPV cervical    Bradycardia    Dizziness    Endocervical polyp    H/O bariatric surgery    Obesity    History of gastric bypass    Iron deficiency anemia due to chronic blood loss         Gynecologic History  Patient's last menstrual period was 2021 (exact date)        Obstetric History  OB History    Para Term  AB Living   2 2 2     2   SAB TAB Ectopic Multiple Live Births           2      # Outcome Date GA Lbr Faisal/2nd Weight Sex Delivery Anes PTL Lv   2 Term      CS-LTranv   SHELIA   1 Term      CS-LTranv   SHELIA      Obstetric Comments   Menarche 10       Past Medical/Surgical/Family/Social History    Past Medical History:   Diagnosis Date    Abnormal Pap smear of cervix         Anemia     Asthma     Diabetes mellitus ([facility] [address]     HPV (human papilloma virus) infection     2017    Varicella      Past Surgical History:   Procedure Laterality Date    BREAST BIOPSY Right     u/s core bx    BREAST SURGERY      Incisional breast biopsy     SECTION      And     GASTRIC BYPASS  2016    TUBAL LIGATION  2009    US GUIDED BREAST BIOPSY RIGHT COMPLETE Right 2019     Family History   Problem Relation Age of Onset    Diabetes Mother     Hypertension Mother     Cancer Mother     Arthritis Mother     Leukemia Mother     Hyperlipidemia Mother     Diabetes Father     Hypertension Father     Heart disease Father     Coronary artery disease Father     Hyperlipidemia Father     Stroke Paternal Grandfather     Thyroid cancer Sister 37    Hypertension Brother     Breast cancer Maternal Aunt 48    Breast cancer Paternal Aunt 79    No Known Problems Maternal Grandmother     No Known Problems Maternal Grandfather     No Known Problems Paternal Grandmother     No Known Problems Son     No Known Problems Son     No Known Problems Maternal Aunt     No Known Problems Maternal Aunt     No Known Problems Maternal Aunt     No Known Problems Maternal Aunt     No Known Problems Maternal Aunt     Cervical cancer Paternal Aunt 36    No Known Problems Paternal Aunt     No Known Problems Paternal Aunt     No Known Problems Paternal Aunt     No Known Problems Paternal Aunt     No Known Problems Paternal Aunt     Lung cancer Paternal Uncle 80     Social History     Socioeconomic History    Marital status: Single     Spouse name: Not on file    Number of children: 2    Years of education: Not on file    Highest education level: Not on file   Occupational History    Not on file   Social Needs    Financial resource strain: Not on file    Food insecurity     Worry: Not on file     Inability: Not on file   [employer] needs     Medical: Not on file     Non-medical: Not on file   Tobacco Use    Smoking status: Current Every Day Smoker     Packs/day: 0 25     Years: 10 00     Pack years: 2 50     Types: Cigarettes    Smokeless tobacco: Never Used    Tobacco comment: refused   Substance and Sexual Activity    Alcohol use: Yes     Alcohol/week: 0 0 standard drinks     Frequency: Monthly or less     Comment: social     Drug use: Never    Sexual activity: Yes     Partners: Male     Birth control/protection: Condom Male, None   Lifestyle    Physical activity     Days per week: Not on file     Minutes per session: Not on file    Stress: Not on file   Relationships    Social connections     Talks on phone: Not on file     Gets together: Not on file     Attends [employer] service: Not on file     Active member of club or organization: Not on file     Attends meetings of clubs or organizations: Not on file     Relationship status: Not on file    Intimate partner violence     Fear of current or ex partner: Not on file     Emotionally abused: Not on file     Physically abused: Not on file     Forced sexual activity: Not on file   Other Topics Concern    Not on file   Social History Narrative    Current every day smoker - As per [employer]     Exercises daily    Sleeps 6-7 hours a day          Patient has no known allergies      Current Outpatient Medications:     Calcium Citrate-Vitamin D (CALCET CREAMY BITES) 500-400 MG-UNIT CHEW, take 1 tablet by oral route 3 times every day, Disp: , Rfl:     cyanocobalamin (VITAMIN B-12) 100 mcg tablet, every 24 hours, Disp: , Rfl:     Multiple Vitamins-Minerals (MULTIVITAMIN ADULT PO), Take by mouth, Disp: , Rfl:       Review of Systems  Constitutional: no fever, feels well  Pulmonary: no cough, no shortness of breath  Gastrointestinal: no complaints of abdominal pain, constipation, nausea, vomiting, or diarrhea or bloody stools  Genitourinary : see HPI       Objective     /68   Wt 81 2 kg (179 lb)   LMP 01/11/2021 (Exact Date)   Breastfeeding No   BMI 33 82 kg/m²     GEN: The patient was alert and oriented x3, pleasant well-appearing female in no acute distress  CV: Bradycardic rate (~55), regular rhythm  PULM: nonlabored respirations, CTAB  : Normal appearing external female genitalia  On speculum exam the vaginal mucosa is rugated, pink and normal in appearance  Grossly normal cervix without lesions  No vaginal discharge or bleeding     MSK: Normal gait  Skin: warm, dry  Neuro: no focal deficits  Psych: normal affect and judgement, cooperative

## 2021-01-23 ENCOUNTER — DOCUMENTATION (OUTPATIENT)
Dept: FAMILY MEDICINE CLINIC | Facility: OTHER | Age: 46
End: 2021-01-23

## 2021-01-23 ENCOUNTER — TELEMEDICINE (OUTPATIENT)
Dept: FAMILY MEDICINE CLINIC | Facility: OTHER | Age: 46
End: 2021-01-23
Payer: COMMERCIAL

## 2021-01-23 ENCOUNTER — TELEPHONE (OUTPATIENT)
Dept: OTHER | Facility: OTHER | Age: 46
End: 2021-01-23

## 2021-01-23 DIAGNOSIS — B34.9 VIRAL INFECTION, UNSPECIFIED: Primary | ICD-10-CM

## 2021-01-23 DIAGNOSIS — J45.20 MILD INTERMITTENT ASTHMA WITHOUT COMPLICATION: ICD-10-CM

## 2021-01-23 DIAGNOSIS — Z20.822 EXPOSURE TO COVID-19 VIRUS: Primary | ICD-10-CM

## 2021-01-23 LAB
ATRIAL RATE: 51 BPM
P AXIS: 34 DEGREES
PR INTERVAL: 134 MS
QRS AXIS: 16 DEGREES
QRSD INTERVAL: 78 MS
QT INTERVAL: 432 MS
QTC INTERVAL: 398 MS
T WAVE AXIS: 22 DEGREES
VENTRICULAR RATE: 51 BPM

## 2021-01-23 PROCEDURE — 93010 ELECTROCARDIOGRAM REPORT: CPT | Performed by: INTERNAL MEDICINE

## 2021-01-23 PROCEDURE — 99214 OFFICE O/P EST MOD 30 MIN: CPT | Performed by: FAMILY MEDICINE

## 2021-01-23 RX ORDER — ALBUTEROL SULFATE 90 UG/1
2 AEROSOL, METERED RESPIRATORY (INHALATION) EVERY 6 HOURS PRN
Qty: 1 INHALER | Refills: 1 | Status: SHIPPED | OUTPATIENT
Start: 2021-01-23 | End: 2022-07-29 | Stop reason: SDUPTHER

## 2021-01-23 NOTE — PROGRESS NOTES
COVID-19 Virtual Visit     Assessment/Plan:    Patient is a 38 yo F with pmhx significant for well controlled asthma presenting with possible COVID-19 exposure and symptoms of sore throat, headache, cough, mild chest tightness, and myalgias  Patient presently on Day 3 after symptoms onset with some respiratory complaints but overall stable  Will send albuterol inhaler, reviewed self-isolation guidelines and patient given ED precautions should symptoms acutely worsen  Problem List Items Addressed This Visit        Respiratory    Mild intermittent asthma without complication    Relevant Medications    - Patient with pmhx significant for well-controlled asthma but experiencing cough with mild chest tightness  Will order albuterol inhaler at this time   -albuterol (PROVENTIL HFA,VENTOLIN HFA) 90 mcg/act inhaler      Other Visit Diagnoses     Viral infection, unspecified    -  Primary  - Patient on Day 3 after symptom onset, COVID-19 test ordered and patient to begin self-isolation precautions   - Reviewed guidelines for self-isolation as noted below  - Continue symptomatic care as needed  -  Encouraged to call back if respiratory symptoms worsen, given ED precautions if worsening respiratory symptoms/ decreasing oxygen saturation levels( states that she does own a pulse oximeter) and will monitor closely         Disposition:     I referred patient to one of our centralized sites for a COVID-19 swab  Patient to begin self-isolation of at least 10 days from symptoms onset  Given symptoms of mild cough and chest-tightness patient given call back/ ED precautions if she experiences SOB or worsening respiratory symptoms  I have spent 11 minutes directly with the patient  Greater than 50% of this time was spent in counseling/coordination of care regarding: instructions for management        [address]    Your healthcare provider and/or public health staff have evaluated you and have determined that you do not need to remain in the hospital at this time  At this time you can be isolated at home where you will be monitored by staff from your local or state health department  You should carefully follow the prevention and isolation steps below until a healthcare provider or local or state health department says that you can return to your normal activities  Stay home except to get medical care    People who are mildly ill with COVID-19 are able to isolate at home during their illness  You should restrict activities outside your home, except for getting medical care  Do not go to work, school, or public areas  Avoid using public transportation, ride-sharing, or taxis  Separate yourself from other people and animals in your home    People: As much as possible, you should stay in a specific room and away from other people in your home  Also, you should use a separate bathroom, if available  Animals: You should restrict contact with pets and other animals while you are sick with COVID-19, just like you would around other people  Although there have not been reports of pets or other animals becoming sick with COVID-19, it is still recommended that people sick with COVID-19 limit contact with animals until more information is known about the virus  When possible, have another member of your household care for your animals while you are sick  If you are sick with COVID-19, avoid contact with your pet, including petting, snuggling, being kissed or licked, and sharing food  If you must care for your pet or be around animals while you are sick, wash your hands before and after you interact with pets and wear a facemask  See COVID-19 and Animals for more information  Call ahead before visiting your doctor    If you have a medical appointment, call the healthcare provider and tell them that you have or may have COVID-19   This will help the healthcare providers office take steps to keep other people from getting infected or exposed  Wear a facemask    You should wear a facemask when you are around other people (e g , sharing a room or vehicle) or pets and before you enter a healthcare providers office  If you are not able to wear a facemask (for example, because it causes trouble breathing), then people who live with you should not stay in the same room with you, or they should wear a facemask if they enter your room  Cover your coughs and sneezes    Cover your mouth and nose with a tissue when you cough or sneeze  Throw used tissues in a lined trash can  Immediately wash your hands with soap and water for at least 20 seconds or, if soap and water are not available, clean your hands with an alcohol-based hand  that contains at least 60% alcohol  Clean your hands often    Wash your hands often with soap and water for at least 20 seconds, especially after blowing your nose, coughing, or sneezing; going to the bathroom; and before eating or preparing food  If soap and water are not readily available, use an alcohol-based hand  with at least 60% alcohol, covering all surfaces of your hands and rubbing them together until they feel dry  Soap and water are the best option if hands are visibly dirty  Avoid touching your eyes, nose, and mouth with unwashed hands  Avoid sharing personal household items    You should not share dishes, drinking glasses, cups, eating utensils, towels, or bedding with other people or pets in your home  After using these items, they should be washed thoroughly with soap and water  Clean all high-touch surfaces everyday    High touch surfaces include counters, tabletops, doorknobs, bathroom fixtures, toilets, phones, keyboards, tablets, and bedside tables  Also, clean any surfaces that may have blood, stool, or body fluids on them  Use a household cleaning spray or wipe, according to the label instructions   Labels contain instructions for safe and effective use of the cleaning product including precautions you should take when applying the product, such as wearing gloves and making sure you have good ventilation during use of the product  Monitor your symptoms    Seek prompt medical attention if your illness is worsening (e g , difficulty breathing)  Before seeking care, call your healthcare provider and tell them that you have, or are being evaluated for, COVID-19  Put on a facemask before you enter the facility  These steps will help the healthcare providers office to keep other people in the office or waiting room from getting infected or exposed  Ask your healthcare provider to call the local or [facility] health department  Persons who are placed under active monitoring or facilitated self-monitoring should follow instructions provided by their local health department or occupational health professionals, as appropriate  If you have a medical emergency and need to call 911, notify the dispatch personnel that you have, or are being evaluated for COVID-19  If possible, put on a facemask before emergency medical services arrive  Discontinuing home isolation    Patients with confirmed COVID-19 should remain under home isolation precautions until the following conditions are met:   - They have had no fever for at least 24 hours (that is one full day of no fever without the use medicine that reduces fevers)  AND  - other symptoms have improved (for example, when their cough or shortness of breath have improved)  AND  - If had mild or moderate illness, at least 10 days have passed since their symptoms first appeared or if severe illness (needed oxygen) or immunosuppressed, at least 20 days have passed since symptoms first appeared  Patients with confirmed COVID-19 should also notify close contacts (including their workplace) and ask that they self-quarantine   Currently, close contact is defined as being within 6 feet for 15 minutes or more from the period 24 hours starting 48 hours before symptom onset to the time at which the patient went into isolation  Close contacts of patients diagnosed with COVID-19 should be instructed by the patient to self-quarantine for 14 days from the last time of their last contact with the patient  Source: [facility] fi      Encounter provider Sagar Tapia MD    Provider located at [address] [address]    Recent Visits  No visits were found meeting these conditions  Showing recent visits within past 7 days and meeting all other requirements     Today's Visits  Date Type Provider Dept   01/23/21 Telemedicine Sagar Tapia MD [facility]   Showing today's visits and meeting all other requirements     Future Appointments  No visits were found meeting these conditions  Showing future appointments within next 150 days and meeting all other requirements      This virtual check-in was done via [employer] and patient was informed that this is a secure, HIPAA-compliant platform  She agrees to proceed  Patient agrees to participate in a virtual check in via telephone or video visit instead of presenting to the office to address urgent/immediate medical needs  Patient is aware this is a billable service  After connecting through [facility], the patient was identified by name and date of birth  Cadence Miller was informed that this was a telemedicine visit and that the exam was being conducted confidentially over secure lines  My office door was closed  No one else was in the room  Cadence Miller acknowledged consent and understanding of privacy and security of the telemedicine visit  I informed the patient that I have reviewed her record in Epic and presented the opportunity for her to ask any questions regarding the visit today  The patient agreed to participate  Subjective:   Cadence Miller is a 39 y o  female who is concerned about COVID-19  Patient's symptoms include fatigue, nasal congestion, sore throat, cough, chest tightness, myalgias and headache  Patient denies fever, shortness of breath, nausea, vomiting and diarrhea  Date of symptom onset: 2021    Exposure:   Contact with a person who is under investigation (PUI) for or who is positive for COVID-19 within the last 14 days?: Yes    Hospitalized recently for fever and/or lower respiratory symptoms?: No      Currently a healthcare worker that is involved in direct patient care?: No      Works in a special setting where the risk of COVID-19 transmission may be high? (this may include long-term care, correctional and nursing home facilities; homeless shelters; assisted-living facilities and group homes ): No      Resident in a special setting where the risk of COVID-19 transmission may be high? (this may include long-term care, correctional and nursing home facilities; homeless shelters; assisted-living facilities and group homes ): No      Patient reporting that last weekend her son was over at his bestfriend's house  States that the friend's mother started feeling ill and tested positive on 21  Patient states that he son is asymptomatic, but she started to develop symptoms on 21 and then subsequently worsened by 21  Of, note patient does have a history of asthma (diagnosed 10 years ago), usually well controlled-last asthma attack was 5 years ago and uses albuterol inhaler on a PRN basis      No results found for: [address] [facility] [address] [facility], [address] [facility] [address]  Past Medical History:   Diagnosis Date    Abnormal Pap smear of cervix     2017    Anemia     Asthma     Diabetes mellitus ([facility] [address] )     HPV (human papilloma virus) infection     2017    Varicella      Past Surgical History:   Procedure Laterality Date    BREAST BIOPSY Right     u/s core bx    BREAST SURGERY      Incisional breast biopsy     SECTION      And 2003    GASTRIC BYPASS  01/25/2016    TUBAL LIGATION  2009    US GUIDED BREAST BIOPSY RIGHT COMPLETE Right 8/23/2019     Current Outpatient Medications   Medication Sig Dispense Refill    albuterol (PROVENTIL HFA,VENTOLIN HFA) 90 mcg/act inhaler Inhale 2 puffs every 6 (six) hours as needed for wheezing or shortness of breath 1 Inhaler 1    Calcium Citrate-Vitamin D (CALCET CREAMY BITES) 500-400 MG-UNIT CHEW take 1 tablet by oral route 3 times every day      cyanocobalamin (VITAMIN B-12) 100 mcg tablet every 24 hours      Multiple Vitamins-Minerals (MULTIVITAMIN ADULT PO) Take by mouth       No current facility-administered medications for this visit  No Known Allergies    Review of Systems   Constitutional: Positive for fatigue  Negative for fever  HENT: Positive for congestion and sore throat  Respiratory: Positive for cough and chest tightness  Negative for shortness of breath  Gastrointestinal: Negative for diarrhea, nausea and vomiting  Musculoskeletal: Positive for myalgias  Neurological: Positive for headaches  Objective: There were no vitals filed for this visit  Physical Exam  Constitutional:       General: She is not in acute distress  Comments: Telephonically   HENT:      Nose:      Comments: Nasal voice secondary to congestation  Pulmonary:      Effort: Pulmonary effort is normal       Comments: No conversational dyspnea  Neurological:      Mental Status: She is alert and oriented to person, place, and time  Psychiatric:         Mood and Affect: Mood normal          Thought Content: Thought content normal           Patient was driving during the time of encounter  In order to ensure safety, I did not have patient turn on video at that time  VIRTUAL VISIT DISCLAIMER    Quan Conrad acknowledges that she has consented to an online visit or consultation   She understands that the online visit is based solely on information provided by her, and that, in the absence of a face-to-face physical evaluation by the physician, the diagnosis she receives is both limited and provisional in terms of accuracy and completeness  This is not intended to replace a full medical face-to-face evaluation by the physician  Char Guy understands and accepts these terms

## 2021-01-24 DIAGNOSIS — Z20.822 EXPOSURE TO COVID-19 VIRUS: ICD-10-CM

## 2021-01-24 PROCEDURE — U0005 INFEC AGEN DETEC AMPLI PROBE: HCPCS | Performed by: FAMILY MEDICINE

## 2021-01-24 PROCEDURE — U0003 INFECTIOUS AGENT DETECTION BY NUCLEIC ACID (DNA OR RNA); SEVERE ACUTE RESPIRATORY SYNDROME CORONAVIRUS 2 (SARS-COV-2) (CORONAVIRUS DISEASE [COVID-19]), AMPLIFIED PROBE TECHNIQUE, MAKING USE OF HIGH THROUGHPUT TECHNOLOGIES AS DESCRIBED BY CMS-2020-01-R: HCPCS | Performed by: FAMILY MEDICINE

## 2021-01-25 LAB
HPV HR 12 DNA CVX QL NAA+PROBE: NEGATIVE
HPV16 DNA CVX QL NAA+PROBE: NEGATIVE
HPV18 DNA CVX QL NAA+PROBE: NEGATIVE
SARS-COV-2 RNA RESP QL NAA+PROBE: NEGATIVE

## 2021-01-26 ENCOUNTER — TELEPHONE (OUTPATIENT)
Dept: OBGYN CLINIC | Facility: CLINIC | Age: 46
End: 2021-01-26

## 2021-01-26 ENCOUNTER — TELEPHONE (OUTPATIENT)
Dept: FAMILY MEDICINE CLINIC | Facility: OTHER | Age: 46
End: 2021-01-26

## 2021-01-26 NOTE — TELEPHONE ENCOUNTER
Patient called to request a copy of CoVid-19 test results be faxed over to her employer  Results to be faxed to [phone] Attn: Zen Mitchell    She is also asking for a return to work note stating that she can return tomorrow

## 2021-01-26 NOTE — TELEPHONE ENCOUNTER
Please schedule a follow up visit as recommended per last virtual visit  This Friday  Currently It was suggest she should self isolate for 10 days since the onset of symptoms per Dr Guru Kincaid who saw her  Going back to work tomorrow is too soon  Thanks,  Dr Maryann Cantu

## 2021-01-26 NOTE — TELEPHONE ENCOUNTER
Called pts insurance to see if pt needs prior auth for surgery on 02/16/21   Using code [id number] (D+C Hysteroscopy, Polypectomy)            NO PA REQUIRED   Call Ref # [id number], 01/26/21 @ 12:30pm

## 2021-01-26 NOTE — TELEPHONE ENCOUNTER
----- Message from Suzi Roe DO sent at 1/26/2021  3:30 PM EST -----  COVID negative -- needs appt to evaluate sx and clear for return to work    Suzi Roe DO

## 2021-01-28 ENCOUNTER — TELEMEDICINE (OUTPATIENT)
Dept: FAMILY MEDICINE CLINIC | Facility: OTHER | Age: 46
End: 2021-01-28
Payer: COMMERCIAL

## 2021-01-28 DIAGNOSIS — Z20.822 EXPOSURE TO COVID-19 VIRUS: Primary | ICD-10-CM

## 2021-01-28 LAB
LAB AP GYN PRIMARY INTERPRETATION: NORMAL
Lab: NORMAL

## 2021-01-28 PROCEDURE — 99213 OFFICE O/P EST LOW 20 MIN: CPT | Performed by: FAMILY MEDICINE

## 2021-01-28 NOTE — PROGRESS NOTES
COVID-19 Virtual Visit     Assessment/Plan:    Problem List Items Addressed This Visit     None      Visit Diagnoses     Exposure to COVID-19 virus    -  Primary         Disposition:     I have spent 12 minutes directly with the patient  Though patient reports improvement in symptoms, she continues to experience shortness of breath with exertion, chest tightness and numerous other symptoms listed in HPI  Although patient tested negative for COVID-19 on January 24th, I have advised that she continue on quarantine until resolution of respiratory symptoms and fevers  Patient was given ED precautions  Return in about 4 days (around 2/1/2021) for Recheck symptoms  The patient indicates understanding of these issues and agrees with the plan  Encounter provider Melanie Boone MD    Provider located at [address] [address]    Recent Visits  Date Type Provider Dept   01/26/21 Telephone Bridget Benitez [facility]   01/26/21 Telephone María Elena Trufantchristina MENG MA [facility]   01/23/21 Telemedicine Karena Mercado MD [facility]   Showing recent visits within past 7 days and meeting all other requirements     Today's Visits  Date Type Provider Dept   01/28/21 Ashly Choudhury MD [facility]   Showing today's visits and meeting all other requirements     Future Appointments  No visits were found meeting these conditions  Showing future appointments within next 150 days and meeting all other requirements      This virtual check-in was done via [employer] and patient was informed that this is a secure, HIPAA-compliant platform  She agrees to proceed  Patient agrees to participate in a virtual check in via telephone or video visit instead of presenting to the office to address urgent/immediate medical needs  Patient is aware this is a billable service      After connecting through [facility], the patient was identified by name and date of birth  Shelby Colvin was informed that this was a telemedicine visit and that the exam was being conducted confidentially over secure lines  My office door was closed  No one else was in the room  Shelby Colvin acknowledged consent and understanding of privacy and security of the telemedicine visit  I informed the patient that I have reviewed her record in Epic and presented the opportunity for her to ask any questions regarding the visit today  The patient agreed to participate  Subjective:   Shelby Colvin is a 39 y o  female who has been screened for COVID-19  Symptom change since last report: improving  Patient's symptoms include fever (  last fever was on Monday ), nasal congestion, rhinorrhea, shortness of breath, chest tightness, nausea, diarrhea and myalgias  Patient denies chills, fatigue, sore throat, anosmia, loss of taste, cough, abdominal pain, vomiting and headaches  Moe Bradley has been staying home and has isolated themselves in her home  She is taking care to not share personal items and is cleaning all surfaces that are touched often, like counters, tabletops, and doorknobs using household cleaning sprays or wipes  She is wearing a mask when she leaves her room  Date of symptom onset: 2021    Though patient tested COVID-19 negative on , she was advised to continue on quarantine until improvement in respiratory symptoms seen       Lab Results   Component Value Date    SARSCOV2 Negative 2021     Past Medical History:   Diagnosis Date    Abnormal Pap smear of cervix     2017    Anemia     Asthma     Diabetes mellitus ([address] )     HPV (human papilloma virus) infection     2017    Varicella      Past Surgical History:   Procedure Laterality Date    BREAST BIOPSY Right     u/s core bx    BREAST SURGERY      Incisional breast biopsy     SECTION      And     GASTRIC BYPASS  2016    TUBAL LIGATION  2009    US GUIDED BREAST BIOPSY RIGHT COMPLETE Right 8/23/2019     Current Outpatient Medications   Medication Sig Dispense Refill    albuterol (PROVENTIL HFA,VENTOLIN HFA) 90 mcg/act inhaler Inhale 2 puffs every 6 (six) hours as needed for wheezing or shortness of breath 1 Inhaler 1    Calcium Citrate-Vitamin D (CALCET CREAMY BITES) 500-400 MG-UNIT CHEW take 1 tablet by oral route 3 times every day      cyanocobalamin (VITAMIN B-12) 100 mcg tablet every 24 hours      Multiple Vitamins-Minerals (MULTIVITAMIN ADULT PO) Take by mouth       No current facility-administered medications for this visit  No Known Allergies    Review of Systems   Constitutional: Positive for fever (  last fever was on Monday )  Negative for chills and fatigue  HENT: Positive for congestion and rhinorrhea  Negative for sore throat  Respiratory: Positive for chest tightness and shortness of breath  Negative for cough  Gastrointestinal: Positive for diarrhea and nausea  Negative for abdominal pain and vomiting  Musculoskeletal: Positive for myalgias  Neurological: Negative for headaches  Objective: There were no vitals filed for this visit  Physical Exam  Constitutional:       General: She is not in acute distress  Appearance: Normal appearance  She is not ill-appearing, toxic-appearing or diaphoretic  HENT:      Nose: Congestion present  Eyes:      General: No scleral icterus  Right eye: No discharge  Left eye: No discharge  Extraocular Movements: Extraocular movements intact  Conjunctiva/sclera: Conjunctivae normal    Pulmonary:      Effort: Pulmonary effort is normal  No respiratory distress  Skin:     General: Skin is dry  Neurological:      Mental Status: She is alert and oriented to person, place, and time     Psychiatric:         Mood and Affect: Mood normal          Behavior: Behavior normal        VIRTUAL VISIT DISCLAIMER    Yvette Puckett acknowledges that she has consented to an online visit or consultation  She understands that the online visit is based solely on information provided by her, and that, in the absence of a face-to-face physical evaluation by the physician, the diagnosis she receives is both limited and provisional in terms of accuracy and completeness  This is not intended to replace a full medical face-to-face evaluation by the physician  Garth Boykin understands and accepts these terms

## 2021-02-01 ENCOUNTER — TELEMEDICINE (OUTPATIENT)
Dept: FAMILY MEDICINE CLINIC | Facility: OTHER | Age: 46
End: 2021-02-01
Payer: COMMERCIAL

## 2021-02-01 DIAGNOSIS — R05.9 COUGH: Primary | ICD-10-CM

## 2021-02-01 PROCEDURE — 99212 OFFICE O/P EST SF 10 MIN: CPT | Performed by: FAMILY MEDICINE

## 2021-02-01 NOTE — LETTER
February 1, 2021     Patient: Yvette Puckett   YOB: 1975   Date of Visit: 2/1/2021       To Whom it May Concern:    Yvette Puckett is under my professional care  She was seen in my office on 2/1/2021  She may return to work on 2/1/21  If you have any questions or concerns, please don't hesitate to call           Sincerely,          Micah Arzola MD        CC: No Recipients

## 2021-02-01 NOTE — PROGRESS NOTES
COVID-19 Virtual Visit     Assessment/Plan:    Problem List Items Addressed This Visit     None      Visit Diagnoses     Cough    -  Primary         Advised patient to use inhaler prn ( rescue)  Use OTC cough medication prn  Work release note provided today  Follow the masking and distancing and hygiene measures as before  Disposition:     I recommended continued isolation until at least 24 hours have passed since recovery defined as resolution of fever without the use of fever-reducing medications AND improvement in COVID symptoms AND 10 days have passed since onset of symptoms (or 10 days have passed since date of first positive viral diagnostic test for asymptomatic patients)  Patient cleared back to work as the duration of self isolation is completed and patient shows significant improvement  Work release note is provided today  I have spent 7 minutes directly with the patient  Greater than 50% of this time was spent in counseling/coordination of care regarding: diagnostic results, prognosis, risks and benefits of treatment options, instructions for management, patient and family education, importance of treatment compliance and risk factor reductions  Encounter provider Micah Arzola MD    Provider located at [address] [address]    Recent Visits  Date Type Provider Dept   01/28/21 Telemedicine Danni Jon MD [facility]   01/26/21 Telephone Bridget Benitez [facility]   01/26/21 Telephone Yane MENG MA [facility]   Showing recent visits within past 7 days and meeting all other requirements     Today's Visits  Date Type Provider Dept   02/01/21 Telemedicine MD Presley Adams   Showing today's visits and meeting all other requirements     Future Appointments  No visits were found meeting these conditions     Showing future appointments within next 150 days and meeting all other requirements        Patient agrees to participate in a virtual check in via telephone or video visit instead of presenting to the office to address urgent/immediate medical needs  Patient is aware this is a billable service  After connecting through Telephone, the patient was identified by name and date of birth  Irene Morejon was informed that this was a telemedicine visit and that the exam was being conducted confidentially over secure lines  My office door was closed  No one else was in the room  Irene Morejon acknowledged consent and understanding of privacy and security of the telemedicine visit  I informed the patient that I have reviewed her record in Epic and presented the opportunity for her to ask any questions regarding the visit today  The patient agreed to participate  It was my intent to perform this visit via video technology but the patient was not able to do a video connection so the visit was completed via audio telephone only  Subjective:   Irene Morejon is a 39 y o  female who has been screened for COVID-19  Patient's symptoms include cough (much improved  )  Patient denies fever, congestion, shortness of breath and chest tightness         Date of symptom onset: 2021    Date tested Negative 21    Lab Results   Component Value Date    SARSCOV2 Negative 2021     Past Medical History:   Diagnosis Date    Abnormal Pap smear of cervix     2017    Anemia     Asthma     Diabetes mellitus ([facility] [address] [facility]     HPV (human papilloma virus) infection     2017    Varicella      Past Surgical History:   Procedure Laterality Date    BREAST BIOPSY Right     u/s core bx    BREAST SURGERY      Incisional breast biopsy     SECTION      And     GASTRIC BYPASS  2016    TUBAL LIGATION  2009    US GUIDED BREAST BIOPSY RIGHT COMPLETE Right 2019     Current Outpatient Medications   Medication Sig Dispense Refill    albuterol (PROVENTIL HFA,VENTOLIN HFA) 90 mcg/act inhaler Inhale 2 puffs every 6 (six) hours as needed for wheezing or shortness of breath 1 Inhaler 1    Calcium Citrate-Vitamin D (CALCET CREAMY BITES) 500-400 MG-UNIT CHEW take 1 tablet by oral route 3 times every day      cyanocobalamin (VITAMIN B-12) 100 mcg tablet every 24 hours      Multiple Vitamins-Minerals (MULTIVITAMIN ADULT PO) Take by mouth       No current facility-administered medications for this visit  No Known Allergies    Review of Systems   Constitutional: Negative for fever  HENT: Negative for congestion  Respiratory: Positive for cough (much improved  )  Negative for chest tightness and shortness of breath  Objective: There were no vitals filed for this visit  Physical Exam  Constitutional:       General: She is not in acute distress  Pulmonary:      Effort: Pulmonary effort is normal  No respiratory distress  Comments: Speaks in full sentences with occasional cough  No respiratory distress  Neurological:      Mental Status: She is alert and oriented to person, place, and time  VIRTUAL VISIT DISCLAIMER    Casimiro Morgan acknowledges that she has consented to an online visit or consultation  She understands that the online visit is based solely on information provided by her, and that, in the absence of a face-to-face physical evaluation by the physician, the diagnosis she receives is both limited and provisional in terms of accuracy and completeness  This is not intended to replace a full medical face-to-face evaluation by the physician  Casimiro Morgan understands and accepts these terms

## 2021-02-02 ENCOUNTER — ANESTHESIA EVENT (OUTPATIENT)
Dept: PERIOP | Facility: AMBULARY SURGERY CENTER | Age: 46
End: 2021-02-02
Payer: COMMERCIAL

## 2021-02-05 ENCOUNTER — OFFICE VISIT (OUTPATIENT)
Dept: URGENT CARE | Facility: CLINIC | Age: 46
End: 2021-02-05
Payer: COMMERCIAL

## 2021-02-05 VITALS
HEART RATE: 52 BPM | BODY MASS INDEX: 33.79 KG/M2 | WEIGHT: 179 LBS | DIASTOLIC BLOOD PRESSURE: 59 MMHG | HEIGHT: 61 IN | TEMPERATURE: 98 F | RESPIRATION RATE: 16 BRPM | SYSTOLIC BLOOD PRESSURE: 103 MMHG

## 2021-02-05 DIAGNOSIS — M79.604 RIGHT LEG PAIN: Primary | ICD-10-CM

## 2021-02-05 PROCEDURE — 99213 OFFICE O/P EST LOW 20 MIN: CPT | Performed by: NURSE PRACTITIONER

## 2021-02-05 NOTE — PROGRESS NOTES
[facility]        NAME: Yvette Puckett is a 39 y o  female  : 1975    MRN: [id number]  DATE: 2021  TIME: 2:30 PM    Assessment and Plan   Right leg pain [M79 [id number]]  1  Right leg pain           Patient Instructions   Tylenol as needed for pain   Ice and heat to the knee/leg   Follow up with physical therapy  If swelling, erythema, or increased pain proceed to the ER    Follow up with PCP in 3-5 days  Proceed to  ER if symptoms worsen  Chief Complaint     Chief Complaint   Patient presents with    Leg Pain     started in back of R knee now goes down leg to ankle and recently pain in buttocks  Also having nubness of foot  Took no meds Started two weeks ago  History of Present Illness       Patient is a 39year old female presenting with right leg pain for the past 2+ weeks  She denies trauma  Pain originated in the posterior knee and now radiates to the buttock and anterior ankle  Denies swelling, ecchymosis, erythema, fever, or chills  She has taken Tylenol without improvement  She is unable to take NSAID s/p gastric bypass  She is a smoker  Denies birth control, recent surgery or trauma  Review of Systems   Review of Systems   Constitutional: Negative for activity change, chills and fever  HENT: Negative for congestion, rhinorrhea, sinus pain and sore throat  Respiratory: Negative for cough and shortness of breath  Gastrointestinal: Negative for abdominal pain, diarrhea, nausea and vomiting  Musculoskeletal: Positive for arthralgias and myalgias  Negative for joint swelling  Skin: Negative for color change, rash and wound  Neurological: Negative for weakness, numbness and headaches           Current Medications       Current Outpatient Medications:     albuterol (PROVENTIL HFA,VENTOLIN HFA) 90 mcg/act inhaler, Inhale 2 puffs every 6 (six) hours as needed for wheezing or shortness of breath, Disp: 1 Inhaler, Rfl: 1    Calcium Citrate-Vitamin D (CALCET CREAMY BITES) 500-400 MG-UNIT CHEW, take 1 tablet by oral route 3 times every day, Disp: , Rfl:     cyanocobalamin (VITAMIN B-12) 100 mcg tablet, every 24 hours, Disp: , Rfl:     Multiple Vitamins-Minerals (MULTIVITAMIN ADULT PO), Take by mouth, Disp: , Rfl:     Current Allergies     Allergies as of 2021    (No Known Allergies)            The following portions of the patient's history were reviewed and updated as appropriate: allergies, current medications, past family history, past medical history, past social history, past surgical history and problem list      Past Medical History:   Diagnosis Date    Abnormal Pap smear of cervix     2017    Anemia     Asthma     [facility] mellitus ([facility] [address]     HPV (human papilloma virus) infection     2017    Varicella        Past Surgical History:   Procedure Laterality Date    BREAST BIOPSY Right     u/s core bx    BREAST SURGERY      Incisional breast biopsy     SECTION      And     GASTRIC BYPASS  2016    TUBAL LIGATION      US GUIDED BREAST BIOPSY RIGHT COMPLETE Right 2019       Family History   Problem Relation Age of Onset    Diabetes Mother     Hypertension Mother     Cancer Mother     Arthritis Mother     Leukemia Mother     Hyperlipidemia Mother     Diabetes Father     Hypertension Father     Heart disease Father     Coronary artery disease Father     Hyperlipidemia Father     Stroke Paternal Grandfather     Thyroid cancer Sister 37    Hypertension Brother     Breast cancer Maternal Aunt 48    Breast cancer Paternal Aunt 79    No Known Problems Maternal Grandmother     No Known Problems Maternal Grandfather     No Known Problems Paternal Grandmother     No Known Problems Son     No Known Problems Son     No Known Problems Maternal Aunt     No Known Problems Maternal Aunt     No Known Problems Maternal Aunt     No Known Problems Maternal Aunt     No Known Problems Maternal Aunt     Cervical cancer Paternal Aunt 36    No Known Problems Paternal Aunt     No Known Problems Paternal Aunt     No Known Problems Paternal Aunt     No Known Problems Paternal Aunt     No Known Problems Paternal Aunt     Lung cancer Paternal Uncle 80         Medications have been verified  Objective   /59 (Patient Position: Sitting)   Pulse (!) 52   Temp 98 °F (36 7 °C) (Temporal)   Resp 16   Ht 5' 1" (1 549 m)   Wt 81 2 kg (179 lb)   LMP 01/11/2021 (Exact Date)   BMI 33 82 kg/m²        Physical Exam     Physical Exam  Vitals signs reviewed  Constitutional:       General: She is awake  She is not in acute distress  Appearance: Normal appearance  She is obese  HENT:      Head: Normocephalic  Right Ear: Hearing normal       Left Ear: Hearing normal       Nose: Nose normal    Cardiovascular:      Rate and Rhythm: Normal rate and regular rhythm  Pulses:           Dorsalis pedis pulses are 2+ on the right side  Posterior tibial pulses are 2+ on the right side  Pulmonary:      Effort: Pulmonary effort is normal    Musculoskeletal:      Right knee: Normal  She exhibits normal range of motion, no swelling, no effusion and no ecchymosis  No tenderness found  Neurological:      General: No focal deficit present  Mental Status: She is alert, oriented to person, place, and time and easily aroused  Psychiatric:         Behavior: Behavior is cooperative

## 2021-02-09 NOTE — PRE-PROCEDURE INSTRUCTIONS
Pre-Surgery Instructions:   Medication Instructions    albuterol (PROVENTIL HFA,VENTOLIN HFA) 90 mcg/act inhaler Instructed patient per Anesthesia Guidelines   Calcium Citrate-Vitamin D (CALCET CREAMY BITES) 500-400 MG-UNIT CHEW Patient was instructed by Physician and understands   cyanocobalamin (VITAMIN B-12) 100 mcg tablet Patient was instructed by Physician and understands   Multiple Vitamins-Minerals (MULTIVITAMIN ADULT PO) Patient was instructed by Physician and understands  Pre-op medication, and showering instructions with antibacteral soap reviewed  Pt  Verbalized understanding of current visitor restrictions  Pt  Verbalized an understanding of all instructions reviewed and offers no concerns at this time  Instructed to take as needed including DOS  Instructed to avoid all ASA/NSAIDs and OTC Vit/Supp from now until after surgery   Tylenol ok prn

## 2021-02-16 ENCOUNTER — ANESTHESIA (OUTPATIENT)
Dept: PERIOP | Facility: AMBULARY SURGERY CENTER | Age: 46
End: 2021-02-16
Payer: COMMERCIAL

## 2021-02-16 ENCOUNTER — HOSPITAL ENCOUNTER (OUTPATIENT)
Facility: AMBULARY SURGERY CENTER | Age: 46
Setting detail: OUTPATIENT SURGERY
Discharge: HOME | End: 2021-02-16
Attending: OBSTETRICS & GYNECOLOGY | Admitting: OBSTETRICS & GYNECOLOGY
Payer: COMMERCIAL

## 2021-02-16 VITALS
DIASTOLIC BLOOD PRESSURE: 67 MMHG | BODY MASS INDEX: 33.99 KG/M2 | HEART RATE: 61 BPM | WEIGHT: 180 LBS | RESPIRATION RATE: 16 BRPM | HEIGHT: 61 IN | SYSTOLIC BLOOD PRESSURE: 97 MMHG | OXYGEN SATURATION: 97 % | TEMPERATURE: 97.4 F

## 2021-02-16 VITALS — HEART RATE: 59 BPM

## 2021-02-16 DIAGNOSIS — N92.0 MENORRHAGIA WITH REGULAR CYCLE: ICD-10-CM

## 2021-02-16 DIAGNOSIS — N84.0 ENDOMETRIAL POLYP: ICD-10-CM

## 2021-02-16 LAB
EXT PREGNANCY TEST URINE: NEGATIVE
EXT. CONTROL: NORMAL

## 2021-02-16 PROCEDURE — 58558 HYSTEROSCOPY BIOPSY: CPT | Performed by: OBSTETRICS & GYNECOLOGY

## 2021-02-16 PROCEDURE — 88305 TISSUE EXAM BY PATHOLOGIST: CPT | Performed by: PATHOLOGY

## 2021-02-16 PROCEDURE — 81025 URINE PREGNANCY TEST: CPT | Performed by: OBSTETRICS & GYNECOLOGY

## 2021-02-16 RX ORDER — DEXMEDETOMIDINE HYDROCHLORIDE 100 UG/ML
INJECTION, SOLUTION INTRAVENOUS AS NEEDED
Status: DISCONTINUED | OUTPATIENT
Start: 2021-02-16 | End: 2021-02-16

## 2021-02-16 RX ORDER — FENTANYL CITRATE/PF 50 MCG/ML
50 SYRINGE (ML) INJECTION
Status: DISCONTINUED | OUTPATIENT
Start: 2021-02-16 | End: 2021-02-16

## 2021-02-16 RX ORDER — ACETAMINOPHEN 325 MG/1
650 TABLET ORAL EVERY 6 HOURS PRN
Status: DISCONTINUED | OUTPATIENT
Start: 2021-02-16 | End: 2021-02-16 | Stop reason: HOSPADM

## 2021-02-16 RX ORDER — ONDANSETRON 2 MG/ML
4 INJECTION INTRAMUSCULAR; INTRAVENOUS ONCE AS NEEDED
Status: DISCONTINUED | OUTPATIENT
Start: 2021-02-16 | End: 2021-02-16

## 2021-02-16 RX ORDER — IBUPROFEN 600 MG/1
600 TABLET ORAL EVERY 6 HOURS PRN
Status: DISCONTINUED | OUTPATIENT
Start: 2021-02-16 | End: 2021-02-16 | Stop reason: HOSPADM

## 2021-02-16 RX ORDER — MIDAZOLAM HYDROCHLORIDE 2 MG/2ML
INJECTION, SOLUTION INTRAMUSCULAR; INTRAVENOUS AS NEEDED
Status: DISCONTINUED | OUTPATIENT
Start: 2021-02-16 | End: 2021-02-16

## 2021-02-16 RX ORDER — DEXAMETHASONE SODIUM PHOSPHATE 10 MG/ML
INJECTION, SOLUTION INTRAMUSCULAR; INTRAVENOUS AS NEEDED
Status: DISCONTINUED | OUTPATIENT
Start: 2021-02-16 | End: 2021-02-16

## 2021-02-16 RX ORDER — ONDANSETRON 2 MG/ML
INJECTION INTRAMUSCULAR; INTRAVENOUS AS NEEDED
Status: DISCONTINUED | OUTPATIENT
Start: 2021-02-16 | End: 2021-02-16

## 2021-02-16 RX ORDER — ONDANSETRON 2 MG/ML
4 INJECTION INTRAMUSCULAR; INTRAVENOUS EVERY 6 HOURS PRN
Status: DISCONTINUED | OUTPATIENT
Start: 2021-02-16 | End: 2021-02-16 | Stop reason: HOSPADM

## 2021-02-16 RX ORDER — MAGNESIUM HYDROXIDE 1200 MG/15ML
LIQUID ORAL AS NEEDED
Status: DISCONTINUED | OUTPATIENT
Start: 2021-02-16 | End: 2021-02-16 | Stop reason: HOSPADM

## 2021-02-16 RX ORDER — LIDOCAINE HYDROCHLORIDE 20 MG/ML
INJECTION, SOLUTION EPIDURAL; INFILTRATION; INTRACAUDAL; PERINEURAL AS NEEDED
Status: DISCONTINUED | OUTPATIENT
Start: 2021-02-16 | End: 2021-02-16

## 2021-02-16 RX ORDER — PROPOFOL 10 MG/ML
INJECTION, EMULSION INTRAVENOUS AS NEEDED
Status: DISCONTINUED | OUTPATIENT
Start: 2021-02-16 | End: 2021-02-16

## 2021-02-16 RX ORDER — KETOROLAC TROMETHAMINE 30 MG/ML
INJECTION, SOLUTION INTRAMUSCULAR; INTRAVENOUS AS NEEDED
Status: DISCONTINUED | OUTPATIENT
Start: 2021-02-16 | End: 2021-02-16

## 2021-02-16 RX ORDER — SODIUM CHLORIDE, SODIUM LACTATE, POTASSIUM CHLORIDE, CALCIUM CHLORIDE 600; 310; 30; 20 MG/100ML; MG/100ML; MG/100ML; MG/100ML
50 INJECTION, SOLUTION INTRAVENOUS CONTINUOUS
Status: DISCONTINUED | OUTPATIENT
Start: 2021-02-16 | End: 2021-02-16

## 2021-02-16 RX ORDER — DIPHENHYDRAMINE HYDROCHLORIDE 50 MG/ML
12.5 INJECTION INTRAMUSCULAR; INTRAVENOUS ONCE AS NEEDED
Status: DISCONTINUED | OUTPATIENT
Start: 2021-02-16 | End: 2021-02-16

## 2021-02-16 RX ADMIN — ONDANSETRON 4 MG: 2 INJECTION INTRAMUSCULAR; INTRAVENOUS at 09:32

## 2021-02-16 RX ADMIN — KETOROLAC TROMETHAMINE 30 MG: 30 INJECTION, SOLUTION INTRAMUSCULAR at 09:39

## 2021-02-16 RX ADMIN — ACETAMINOPHEN 650 MG: 325 TABLET, FILM COATED ORAL at 11:51

## 2021-02-16 RX ADMIN — DEXMEDETOMIDINE HYDROCHLORIDE 12 MCG: 100 INJECTION, SOLUTION INTRAVENOUS at 09:39

## 2021-02-16 RX ADMIN — PROPOFOL 200 MG: 10 INJECTION, EMULSION INTRAVENOUS at 09:39

## 2021-02-16 RX ADMIN — MIDAZOLAM HYDROCHLORIDE 2 MG: 1 INJECTION, SOLUTION INTRAMUSCULAR; INTRAVENOUS at 09:32

## 2021-02-16 RX ADMIN — DEXAMETHASONE SODIUM PHOSPHATE 4 MG: 10 INJECTION, SOLUTION INTRAMUSCULAR; INTRAVENOUS at 09:39

## 2021-02-16 RX ADMIN — LIDOCAINE HYDROCHLORIDE 100 MG: 20 INJECTION, SOLUTION EPIDURAL; INFILTRATION; INTRACAUDAL at 09:39

## 2021-02-16 RX ADMIN — SODIUM CHLORIDE, SODIUM LACTATE, POTASSIUM CHLORIDE, AND CALCIUM CHLORIDE: .6; .31; .03; .02 INJECTION, SOLUTION INTRAVENOUS at 09:26

## 2021-02-16 RX ADMIN — DEXMEDETOMIDINE HYDROCHLORIDE 8 MCG: 100 INJECTION, SOLUTION INTRAVENOUS at 09:47

## 2021-02-16 NOTE — DISCHARGE INSTRUCTIONS
Hysteroscopy   WHAT YOU NEED TO KNOW:   A hysteroscopy is a procedure to find and treat problems in your uterus  A hysteroscopy may be done to find, and possibly treat, the cause of abnormal vaginal bleeding, problems getting pregnant, or miscarriage  It may also be done to insert or remove a device that prevents pregnancy  DISCHARGE INSTRUCTIONS:   Call 911 if:   · You have trouble breathing  Seek care immediately if:   · You have heavy vaginal bleeding that fills 1 or more sanitary pads in 1 hour  · You have severe pain or bloating in your abdomen  · You feel lightheaded, weak, and confused  · You see blood in your urine  · You stop urinating or urinate less than usual     Contact your healthcare provider if:   · You have a fever or chills  · You have pus or a foul-smelling odor coming from your vagina  · You see blood clots on your sanitary pad that are larger than the size of a quarter  · You have nausea or are vomiting  · Your skin is itchy, swollen, or you have a rash  · You have questions or concerns about your condition or care  Medicines: You may need any of the following:  · Estrogen  helps heal the lining of your uterus  · Antibiotics  help prevent a bacterial infection  · Prescription pain medicine  may be given  Ask your healthcare provider how to take this medicine safely  Some prescription pain medicines contain acetaminophen  Do not take other medicines that contain acetaminophen without talking to your healthcare provider  Too much acetaminophen may cause liver damage  Prescription pain medicine may cause constipation  Ask your healthcare provider how to prevent or treat constipation  · NSAIDs , such as ibuprofen, help decrease swelling, pain, and fever  NSAIDs can cause stomach bleeding or kidney problems in certain people  If you take blood thinner medicine, always ask your healthcare provider if NSAIDs are safe for you   Always read the medicine label and follow directions  · Take your medicine as directed  Contact your healthcare provider if you think your medicine is not helping or if you have side effects  Tell him or her if you are allergic to any medicine  Keep a list of the medicines, vitamins, and herbs you take  Include the amounts, and when and why you take them  Bring the list or the pill bottles to follow-up visits  Carry your medicine list with you in case of an emergency  Self-care:  Do not have sex, use tampons, or douche for 6 weeks  These actions may cause an infection  Ask your healthcare provider if it is okay to take a tub bath or swim  Rest as needed  Do not drive, return to work, or exercise for 24 hours or as directed  You can return to most activities in 1 to 2 days  Follow up with your healthcare provider as directed:  Write down your questions so you remember to ask them during your visits  © Copyright [address] Information is for End User's use only and may not be sold, redistributed or otherwise used for commercial purposes  All illustrations and images included in CareNotes® are the copyrighted property of [employer]  or [facility] [employer]   The above information is an  only  It is not intended as medical advice for individual conditions or treatments  Talk to your doctor, nurse or pharmacist before following any medical regimen to see if it is safe and effective for you

## 2021-02-16 NOTE — OP NOTE
OPERATIVE REPORT  PATIENT NAME: Adolph Romero    :  1975  MRN: [id number]  Pt Location: [facility]    SURGERY DATE: 2021    Surgeon(s) and Role:     * Nikki Garcia DO - Primary     * Esteban Hughes MD - Assisting    Preop Diagnosis:  Endometrial polyp [N84 0]  Menorrhagia with regular cycle [N92 0]    Post-Op Diagnosis Codes:     * Endometrial polyp [N84 0]     * Menorrhagia with regular cycle [N92 0]    Procedure(s) (LRB):  DILATATION AND CURETTAGE (D&C) WITH HYSTEROSCOPY (MYOSURE) (N/A)  POLYPECTOMY (N/A)    Specimen(s):  ID Type Source Tests Collected by Time Destination   1 : Endometrial currettings  Tissue Endometrium TISSUE EXAM Nikki Garcia DO 2021 1004    2 : Endometrial polyp Tissue Polyp, Cervical/Endometrial TISSUE EXAM Nikki Garcia DO 2021 1005        Estimated Blood Loss:   Minimal    Drains:  * No LDAs found *    Anesthesia Type:   Choice    Operative Indications:  Endometrial polyp [N84 0]  Menorrhagia with regular cycle [N92 0]      Operative Findings:  Anteverted normal size uterus   Normal appearing cervix  Endometrial polyps from anterior wall and left ostia    Complications:   None    Procedure and Technique:  Patient was identified in the holding area and brought back to the operating room where general anesthesia was initiated  Bilateral lower extremity compression boots were placed prior to initiation of anesthesia  Patient was placed in the dorsal lithotomy position and prepped and draped in a sterile fashion  Timeout procedure was completed  Bladder was emptied with a sterile straight catheter  Exam under anesthesia was completed and above findings noted  A Guthrie retractor and emmie were placed into the vagina for visualization of the cervix  A single toothed tenaculum was used to grasp the anterior lip of the cervix  The uterus was sounded and the cervix was dilated to accommodate the insertion of the hysteroscope      Using the Aquilex fluid management system according to product instructions, the device was primed prior to use  The hysteroscope was inserted and using normal saline as the distension fluid the endometrium was visualized  The above findings were noted  The Myosure was inserted through the hysteroscope and used to remove the polyps under direct visualization  Good hemostasis was noted  The hysteroscope was removed and the specimen was sent to pathology for evaluation  Sharp curetting was performed, starting at the 12'oclock position and rotating a total of 360 degrees to cover all surfaces  Endometrial tissue was obtained and sent for pathology  The single toothed tenaculum was removed from the anterior lip of the cervix  Good hemostasis was confirmed at the tenaculum puncture sites  Guthrie retractor was then removed from the vagina  All instruments were removed from the vagina and good hemostasis was noted  The patient was extubated and brought the the recovery room in stable condition  All sponge, lap and needle counts were correct  Dr Fran Rodrigues was present and participated in all key portions of the case      Patient Disposition:  PACU     SIGNATURE: Kami Cardenas MD  DATE: February 16, 2021  TIME: 10:22 AM

## 2021-02-16 NOTE — ANESTHESIA POSTPROCEDURE EVALUATION
Post-Op Assessment Note    CV Status:  Stable  Pain Score: 0    Pain management: adequate     Mental Status:  Awake and alert   Hydration Status:  Stable   PONV Controlled:  None   Airway Patency:  Patent and adequate      Post Op Vitals Reviewed: Yes      Staff: CRNA, Anesthesiologist         No complications documented      BP   91/64   Temp  97 4   Pulse  95   Resp   15   SpO2   98%

## 2021-02-16 NOTE — ANESTHESIA PREPROCEDURE EVALUATION
Procedure:  DILATATION AND CURETTAGE (D&C) WITH HYSTEROSCOPY (MYOSURE) (N/A Uterus)  POLYPECTOMY (N/A Uterus)    Relevant Problems   ANESTHESIA (within normal limits)      GI/HEPATIC   (+) H/O bariatric surgery (2016)      HEMATOLOGY   (+) Anemia   (+) Iron deficiency anemia due to chronic blood loss      PULMONARY   (+) Mild intermittent asthma without complication        Physical Exam    Airway    Mallampati score: II  TM Distance: >3 FB  Neck ROM: full     Dental       Cardiovascular  Cardiovascular exam normal    Pulmonary  Pulmonary exam normal     Other Findings        Anesthesia Plan  ASA Score- 2     Anesthesia Type- general with ASA Monitors  Additional Monitors:   Airway Plan: LMA  Plan Factors-Exercise tolerance (METS): >4 METS  Chart reviewed  Existing labs reviewed  Patient is a current smoker  Patient instructed to abstain from smoking on day of procedure  Patient did not smoke on day of surgery  Induction- intravenous  Postoperative Plan- Plan for postoperative opioid use  Informed Consent- Anesthetic plan and risks discussed with patient  I personally reviewed this patient with the CRNA  Discussed and agreed on the Anesthesia Plan with the CRNA             Lab Results   Component Value Date    HGBA1C 5 2 03/25/2019       Lab Results   Component Value Date    K 4 4 10/19/2020     10/19/2020    CO2 28 10/19/2020    BUN 16 10/19/2020    CREATININE 0 76 10/19/2020    GLUF 82 10/19/2020    CALCIUM 9 3 10/19/2020    AST 19 10/19/2020    ALT 25 10/19/2020    ALKPHOS 91 10/19/2020    EGFR 95 10/19/2020       Lab Results   Component Value Date    WBC 8 09 10/19/2020    HGB 14 6 10/19/2020    HCT 45 0 10/19/2020    MCV 92 10/19/2020     10/19/2020    Sinus bradycardia  When compared with ECG of 20-FEB-2019 12:50,  No significant change was found  Confirmed by Aniya March ([id number]) on 1/23/2021 3:33:39 PM

## 2021-02-16 NOTE — INTERVAL H&P NOTE
H&P reviewed  After examining the patient I find no changes in the patients condition since the H&P had been written      Vitals:    02/16/21 0909   BP: 118/63   Pulse: 71   Resp: 18   Temp: (!) 97 1 °F (36 2 °C)   SpO2: 97%

## 2021-02-17 ENCOUNTER — TELEPHONE (OUTPATIENT)
Dept: OBGYN CLINIC | Facility: CLINIC | Age: 46
End: 2021-02-17

## 2021-02-17 NOTE — TELEPHONE ENCOUNTER
Ace Richards called to ask for a note to go back to work for Friday or Monday of next week is that ok or does she have to wait for her post op?

## 2021-02-26 ENCOUNTER — TELEPHONE (OUTPATIENT)
Dept: FAMILY MEDICINE CLINIC | Facility: OTHER | Age: 46
End: 2021-02-26

## 2021-02-26 ENCOUNTER — TELEMEDICINE (OUTPATIENT)
Dept: FAMILY MEDICINE CLINIC | Facility: CLINIC | Age: 46
End: 2021-02-26
Payer: COMMERCIAL

## 2021-02-26 DIAGNOSIS — B34.9 VIRAL ILLNESS: Primary | ICD-10-CM

## 2021-02-26 DIAGNOSIS — B34.9 VIRAL INFECTION, UNSPECIFIED: Primary | ICD-10-CM

## 2021-02-26 DIAGNOSIS — B34.9 VIRAL ILLNESS: ICD-10-CM

## 2021-02-26 PROCEDURE — 99212 OFFICE O/P EST SF 10 MIN: CPT | Performed by: FAMILY MEDICINE

## 2021-02-26 PROCEDURE — U0005 INFEC AGEN DETEC AMPLI PROBE: HCPCS | Performed by: FAMILY MEDICINE

## 2021-02-26 PROCEDURE — U0003 INFECTIOUS AGENT DETECTION BY NUCLEIC ACID (DNA OR RNA); SEVERE ACUTE RESPIRATORY SYNDROME CORONAVIRUS 2 (SARS-COV-2) (CORONAVIRUS DISEASE [COVID-19]), AMPLIFIED PROBE TECHNIQUE, MAKING USE OF HIGH THROUGHPUT TECHNOLOGIES AS DESCRIBED BY CMS-2020-01-R: HCPCS | Performed by: FAMILY MEDICINE

## 2021-02-26 NOTE — TELEPHONE ENCOUNTER
COVID SYMPTOM/EXPOSURE CALL    Marlene Malik calling with the following sx concerning for COVID:  COVID-19 SYMPTOMS: fever, chills, headache, fatigue, abdominal pain, diarrhea and nausea  Symptoms Began: 2/24/2021  Date of Exposure: N/A      PATIENT ADVISED:   Testing will be ordered at the discretion of their Physician -- Instruction will be provided once order is placed   Instructed pt to call clinic if symptoms worsen  If unable to reach us, call 911, and be sure to tell  you are COVID positive and wear mask before their arrival    Advised the following:  o Monitor your symptoms     o Stay home except to get medical care  o Separate yourself from other people   o Wear a face mask when around others  o Avoid sharing personal household items/utensils  o Clean your hands often  o Clean all high touch surfaces daily

## 2021-02-26 NOTE — TELEPHONE ENCOUNTER
Will order the COVID test today  Please have patient self isolate and go for testing to the designated testing centers  Please schedule a visit today with PM provider this evening  Thanks

## 2021-02-26 NOTE — PROGRESS NOTES
COVID-19 Virtual Visit     Assessment/Plan:    Problem List Items Addressed This Visit        Other    Viral infection, unspecified - Primary     Symptoms started 2/24  They include headache, chills, myalgias, fatigue, abdominal pain, nausea, and diarrhea  No known exposure to person with or being tested for COVID-19  Last fever was 2/25 with a temp of 100 4 ° F  Today temp is 99° F     Plan:  · COVID swab PCR ordered and completed today  Will call with results  · Patient advised to isolate herself  Also continue with Covid-19 precautions including frequent hand washing, cleaning high touch surfaces, wearing mask if she ever has to leave her room  · Call office if symptoms worsen and/or she begins to have shortness of breath, chest pain, blue discoloration of lips/tongue/fingers/toes  · Patient verbalized understanding and is agreeable  Disposition:     I referred patient to one of our centralized sites for a COVID-19 swab  I have spent 15 minutes directly with the patient  Greater than 50% of this time was spent in counseling/coordination of care regarding: instructions for management  Encounter provider Shilpa Prieto MD    Provider located at [address] [place] [address]  [phone]    Recent Visits  No visits were found meeting these conditions  Showing recent visits within past 7 days and meeting all other requirements     Today's Visits  Date Type Provider Dept   02/26/21 Telemedicine Shilpa Prieto MD [facility]   Showing today's visits and meeting all other requirements     Future Appointments  No visits were found meeting these conditions  Showing future appointments within next 150 days and meeting all other requirements      This virtual check-in was done via [employer] and patient was informed that this is a secure, HIPAA-compliant platform  She agrees to proceed      Patient agrees to participate in a virtual check in via telephone or video visit instead of presenting to the office to address urgent/immediate medical needs  Patient is aware this is a billable service  After connecting through [facility], the patient was identified by name and date of birth  Nkechi Burk was informed that this was a telemedicine visit and that the exam was being conducted confidentially over secure lines  My office door was closed  No one else was in the room  Nkechi Burk acknowledged consent and understanding of privacy and security of the telemedicine visit  I informed the patient that I have reviewed her record in Epic and presented the opportunity for her to ask any questions regarding the visit today  The patient agreed to participate  Subjective:   Nkechi Burk is a 39 y o  female who is concerned about COVID-19  Patient's symptoms include chills, fatigue, abdominal pain, nausea, diarrhea and myalgias  Patient denies fever, malaise, congestion, rhinorrhea, sore throat, anosmia, loss of taste, cough, shortness of breath, chest tightness, vomiting and headaches  Date of symptom onset: 2/24/2021    Exposure:   Contact with a person who is under investigation (PUI) for or who is positive for COVID-19 within the last 14 days?: No    Hospitalized recently for fever and/or lower respiratory symptoms?: No      Currently a healthcare worker that is involved in direct patient care?: No      Works in a special setting where the risk of COVID-19 transmission may be high? (this may include long-term care, correctional and senior living facilities; homeless shelters; assisted-living facilities and group homes ): No      Resident in a special setting where the risk of COVID-19 transmission may be high? (this may include long-term care, correctional and senior living facilities; homeless shelters; assisted-living facilities and group homes ): No      Headache on day 1  Day 2 myalgia, fever, headache, nausea   Had D&C done on  at [facility]  Today temp 99F  Lab Results   Component Value Date    SARSCOV2 Negative 2021     Past Medical History:   Diagnosis Date    Abnormal Pap smear of cervix     2017    Anemia     Asthma     History of transfusion 2018    HPV (human papilloma virus) infection     2017    Varicella      Past Surgical History:   Procedure Laterality Date    BREAST BIOPSY Right 2012    u/s core bx    BREAST SURGERY      Incisional breast biopsy     SECTION      And     GASTRIC BYPASS  2016    UT HYSTEROSCOPY,W/ENDO BX N/A 2021    Procedure: DILATATION AND CURETTAGE (D&C) WITH HYSTEROSCOPY (MYOSURE); Surgeon: Omar Marie DO;  [facility];  Service: Gynecology    UT HYSTEROSCOPY,W/ENDO BX N/A 2021    Procedure: POLYPECTOMY;  Surgeon: Omar Marie DO;  [facility];  Service: Gynecology    TUBAL LIGATION      US GUIDED BREAST BIOPSY RIGHT COMPLETE Right 2019     Current Outpatient Medications   Medication Sig Dispense Refill    albuterol (PROVENTIL HFA,VENTOLIN HFA) 90 mcg/act inhaler Inhale 2 puffs every 6 (six) hours as needed for wheezing or shortness of breath 1 Inhaler 1    Calcium Citrate-Vitamin D (CALCET CREAMY BITES) 500-400 MG-UNIT CHEW take 1 tablet by oral route 3 times every day      cyanocobalamin (VITAMIN B-12) 100 mcg tablet every 24 hours      Multiple Vitamins-Minerals (MULTIVITAMIN ADULT PO) Take by mouth       No current facility-administered medications for this visit  No Known Allergies    Review of Systems   Constitutional: Positive for chills and fatigue  Negative for fever  HENT: Negative for congestion, rhinorrhea and sore throat  Respiratory: Negative for cough, chest tightness and shortness of breath  Gastrointestinal: Positive for abdominal pain, diarrhea and nausea  Negative for vomiting  Musculoskeletal: Positive for myalgias  Neurological: Negative for headaches  Objective: There were no vitals filed for this visit  Physical Exam  Constitutional:       General: She is not in acute distress  Appearance: She is not diaphoretic  Comments: Speaking in full sentences without needing to stop to catch breath and smiling which supports the statement of no SOB / no distress  Eyes:      Extraocular Movements: Extraocular movements intact  Pulmonary:      Effort: Pulmonary effort is normal       Comments: No cyanosis of lips  Neurological:      Mental Status: She is alert  Psychiatric:         Mood and Affect: Mood normal       Comments: Smiling, cooperative        VIRTUAL VISIT DISCLAIMER    Torres Thomas acknowledges that she has consented to an online visit or consultation  She understands that the online visit is based solely on information provided by her, and that, in the absence of a face-to-face physical evaluation by the physician, the diagnosis she receives is both limited and provisional in terms of accuracy and completeness  This is not intended to replace a full medical face-to-face evaluation by the physician  Torres Thomas understands and accepts these terms

## 2021-02-26 NOTE — ASSESSMENT & PLAN NOTE
Symptoms started 2/24  They include headache, chills, myalgias, fatigue, abdominal pain, nausea, and diarrhea  No known exposure to person with or being tested for COVID-19  Last fever was 2/25 with a temp of 100 4 ° F  Today temp is 99° F     Plan:  · COVID swab PCR ordered and completed today  Will call with results  · Patient advised to isolate herself  Also continue with Covid-19 precautions including frequent hand washing, cleaning high touch surfaces, wearing mask if she ever has to leave her room  · Call office if symptoms worsen and/or she begins to have shortness of breath, chest pain, blue discoloration of lips/tongue/fingers/toes  · Patient verbalized understanding and is agreeable

## 2021-02-27 LAB — SARS-COV-2 RNA RESP QL NAA+PROBE: NEGATIVE

## 2021-03-02 ENCOUNTER — TELEPHONE (OUTPATIENT)
Dept: FAMILY MEDICINE CLINIC | Facility: OTHER | Age: 46
End: 2021-03-02

## 2021-03-02 NOTE — TELEPHONE ENCOUNTER
----- Message from Maine Mccarthy MD sent at 3/1/2021  2:22 PM EST -----  The COVID-19 test result is negative

## 2021-03-08 ENCOUNTER — HOSPITAL ENCOUNTER (OUTPATIENT)
Dept: RADIOLOGY | Facility: HOSPITAL | Age: 46
Discharge: HOME | End: 2021-03-08
Payer: COMMERCIAL

## 2021-03-08 ENCOUNTER — OFFICE VISIT (OUTPATIENT)
Dept: FAMILY MEDICINE CLINIC | Facility: OTHER | Age: 46
End: 2021-03-08
Payer: COMMERCIAL

## 2021-03-08 ENCOUNTER — OFFICE VISIT (OUTPATIENT)
Dept: OBGYN CLINIC | Facility: CLINIC | Age: 46
End: 2021-03-08

## 2021-03-08 VITALS
TEMPERATURE: 97.8 F | BODY MASS INDEX: 33.61 KG/M2 | DIASTOLIC BLOOD PRESSURE: 62 MMHG | WEIGHT: 178 LBS | SYSTOLIC BLOOD PRESSURE: 94 MMHG | HEART RATE: 62 BPM | OXYGEN SATURATION: 98 % | HEIGHT: 61 IN

## 2021-03-08 VITALS
WEIGHT: 178.6 LBS | BODY MASS INDEX: 33.72 KG/M2 | DIASTOLIC BLOOD PRESSURE: 68 MMHG | SYSTOLIC BLOOD PRESSURE: 118 MMHG | HEIGHT: 61 IN

## 2021-03-08 DIAGNOSIS — Z09 POSTOP CHECK: Primary | ICD-10-CM

## 2021-03-08 DIAGNOSIS — M25.561 ACUTE PAIN OF RIGHT KNEE: ICD-10-CM

## 2021-03-08 DIAGNOSIS — Z28.21 REFUSED INFLUENZA VACCINE: Primary | ICD-10-CM

## 2021-03-08 DIAGNOSIS — M79.604 LEG PAIN, POSTERIOR, RIGHT: ICD-10-CM

## 2021-03-08 PROBLEM — N84.1 ENDOCERVICAL POLYP: Status: RESOLVED | Noted: 2017-07-31 | Resolved: 2021-03-08

## 2021-03-08 PROCEDURE — 99024 POSTOP FOLLOW-UP VISIT: CPT | Performed by: OBSTETRICS & GYNECOLOGY

## 2021-03-08 PROCEDURE — 73564 X-RAY EXAM KNEE 4 OR MORE: CPT

## 2021-03-08 PROCEDURE — 99213 OFFICE O/P EST LOW 20 MIN: CPT | Performed by: FAMILY MEDICINE

## 2021-03-08 NOTE — PROGRESS NOTES
Assessment/Plan:    No problem-specific Assessment & Plan notes found for this encounter  Problem List Items Addressed This Visit     None      Visit Diagnoses     Refused influenza vaccine    -  Primary    Relevant Orders    influenza vaccine, quadrivalent, 0 5 mL, preservative-free, for adult and pediatric patients 6 mos+ (AFLURIA, FLUARIX, FLULAVAL, FLUZONE)    Leg pain, posterior, right        Relevant Orders    XR knee 4+ vw right injury    Acute pain of right knee        Relevant Orders    XR knee 4+ vw right injury       Will check Xray of right knee  Recommended RICE therapy  Recommended knee support  Take OTC tylenol prn discomfort  If xray is normal and pain persist might benefit from steroid taper dose  Subjective:      Patient ID: Juan A Dickens is a 39 y o  female  Knee Pain   The incident occurred more than 1 week ago (feels discomfort for a month  has had knee pain in the past as well but not persistent  )  The incident occurred at home  There was no injury mechanism  The pain is present in the right knee  The pain is moderate  The pain has been constant since onset  Pertinent negatives include no inability to bear weight, muscle weakness or numbness  Associated symptoms comments: Feels numbness in the feet bilaterally but right is worse  Occurs after she exercises    She reports no foreign bodies present  The symptoms are aggravated by palpation (sitting for long)  She has tried acetaminophen and ice (patient had gastric surgery and can't take NSAIDS) for the symptoms  The treatment provided mild relief  The following portions of the patient's history were reviewed and updated as appropriate:   She  has a past medical history of Abnormal Pap smear of cervix, Anemia, Anxiety, Asthma, Diabetes mellitus ([address] ), History of transfusion (2018), HPV (human papilloma virus) infection, Obesity, and Varicella    She   Patient Active Problem List    Diagnosis Date Noted    Viral infection, unspecified 2021    Mild intermittent asthma without complication 15/54/3661    Iron deficiency anemia due to chronic blood loss 2019    History of gastric bypass 2019    Anemia 2019    ASCUS with positive high risk HPV cervical 10/12/2017    Abnormal uterine bleeding (AUB) 2017    Bradycardia 2017    Dizziness 2017    H/O bariatric surgery 2016    Obesity 2015    Sprain of coronary ligament of knee, left, sequela 2015     She  has a past surgical history that includes Gastric bypass (2016);  section (); Tubal ligation (); Breast surgery; Breast biopsy (Right, ); US guided breast biopsy right complete (Right, 2019); pr hysteroscopy,w/endo bx (N/A, 2021); and pr hysteroscopy,w/endo bx (N/A, 2021)  Her family history includes Arthritis in her mother; Breast cancer (age of onset: 48) in her maternal aunt; Breast cancer (age of onset: 79) in her paternal aunt; Cancer in her mother; Cervical cancer (age of onset: 36) in her paternal aunt; Coronary artery disease in her father; Diabetes in her father and mother; Heart disease in her father; Hyperlipidemia in her father and mother; Hypertension in her brother, father, and mother; Leukemia in her mother; Lung cancer (age of onset: 80) in her paternal uncle; No Known Problems in her maternal aunt, maternal aunt, maternal aunt, maternal aunt, maternal aunt, maternal grandfather, maternal grandmother, paternal aunt, paternal aunt, paternal aunt, paternal aunt, paternal aunt, paternal grandmother, son, and son; Stroke in her paternal grandfather; Thyroid cancer (age of onset: 37) in her sister  She  reports that she has been smoking cigarettes  She has a 2 50 pack-year smoking history  She has never used smokeless tobacco  She reports current alcohol use  She reports that she does not use drugs    Current Outpatient Medications   Medication Sig Dispense Refill    albuterol (PROVENTIL HFA,VENTOLIN HFA) 90 mcg/act inhaler Inhale 2 puffs every 6 (six) hours as needed for wheezing or shortness of breath 1 Inhaler 1    Calcium Citrate-Vitamin D (CALCET CREAMY BITES) 500-400 MG-UNIT CHEW take 1 tablet by oral route 3 times every day      cyanocobalamin (VITAMIN B-12) 100 mcg tablet every 24 hours      Multiple Vitamins-Minerals (MULTIVITAMIN ADULT PO) Take by mouth       No current facility-administered medications for this visit  Current Outpatient Medications on File Prior to Visit   Medication Sig    albuterol (PROVENTIL HFA,VENTOLIN HFA) 90 mcg/act inhaler Inhale 2 puffs every 6 (six) hours as needed for wheezing or shortness of breath    Calcium Citrate-Vitamin D (CALCET CREAMY BITES) 500-400 MG-UNIT CHEW take 1 tablet by oral route 3 times every day    cyanocobalamin (VITAMIN B-12) 100 mcg tablet every 24 hours    Multiple Vitamins-Minerals (MULTIVITAMIN ADULT PO) Take by mouth     No current facility-administered medications on file prior to visit  She has No Known Allergies       Review of Systems   Constitutional: Negative for chills, fatigue, fever and unexpected weight change  Genitourinary: Negative for dysuria and flank pain  Musculoskeletal: Positive for arthralgias and myalgias  Negative for back pain  Neurological: Positive for weakness  Negative for light-headedness and numbness  Objective:      BP 94/62 (BP Location: Left arm, Patient Position: Sitting, Cuff Size: Adult)   Pulse 62   Temp 97 8 °F (36 6 °C) (Temporal)   Ht 5' 1" (1 549 m)   Wt 80 7 kg (178 lb)   LMP 03/05/2021 (Exact Date)   SpO2 98%   BMI 33 63 kg/m²          Physical Exam  Vitals signs and nursing note reviewed  Constitutional:       General: She is not in acute distress  Appearance: She is well-developed  She is not diaphoretic  HENT:      Head: Normocephalic and atraumatic     Neck:      Musculoskeletal: Normal range of motion and neck supple  Cardiovascular:      Rate and Rhythm: Normal rate and regular rhythm  Heart sounds: Normal heart sounds  No murmur  Pulmonary:      Effort: Pulmonary effort is normal  No respiratory distress  Breath sounds: Normal breath sounds  No wheezing  Musculoskeletal:         General: No deformity  Right lower leg: No edema  Left lower leg: No edema  Legs:    Skin:     General: Skin is warm and dry  Findings: No rash  Neurological:      General: No focal deficit present  Mental Status: She is alert and oriented to person, place, and time     Psychiatric:         Behavior: Behavior normal

## 2021-03-08 NOTE — PROGRESS NOTES
Assessment      Doing well postoperatively  Plan    Will see how the next few cycles go  If continued heavy menstrual bleeding follow up in the office to discuss more definitive management  Can consider IUD, ablation, hysterectomy  Subjective    Quan Conrad is a 39 y o  y o  female who presents 3 weeks status post operative hysteroscopy for abnormal uterine bleeding and endometrial polyp  Eating a regular diet without difficulty  Bowel movements are normal  The patient is not having any pain  Operative findings, photos and pathology discussed with patient  The following portions of the patient's history were reviewed and updated as appropriate: allergies, current medications, past family history, past medical history, past social history, past surgical history and problem list       Patient has no known allergies  Current Outpatient Medications:     albuterol (PROVENTIL HFA,VENTOLIN HFA) 90 mcg/act inhaler, Inhale 2 puffs every 6 (six) hours as needed for wheezing or shortness of breath, Disp: 1 Inhaler, Rfl: 1    Calcium Citrate-Vitamin D (CALCET CREAMY BITES) 500-400 MG-UNIT CHEW, take 1 tablet by oral route 3 times every day, Disp: , Rfl:     cyanocobalamin (VITAMIN B-12) 100 mcg tablet, every 24 hours, Disp: , Rfl:     Multiple Vitamins-Minerals (MULTIVITAMIN ADULT PO), Take by mouth, Disp: , Rfl:       Review of Systems  Denies Fevers/chills/N/V/Constipation/Vaginal bleeding/excessive pain/dysuria/frequency of urination  Also as noted in HPI        Objective   /68   Ht 5' 1" (1 549 m)   Wt 81 kg (178 lb 9 6 oz)   LMP 03/05/2021 (Exact Date)   BMI 33 75 kg/m²     General: alert and oriented, in no acute distress  CV: RRR  PULM: CTAB  Abdomen:soft, non-tender
independent

## 2021-03-10 DIAGNOSIS — Z23 ENCOUNTER FOR IMMUNIZATION: ICD-10-CM

## 2021-03-12 ENCOUNTER — IMMUNIZATIONS (OUTPATIENT)
Dept: FAMILY MEDICINE CLINIC | Facility: HOSPITAL | Age: 46
End: 2021-03-12

## 2021-03-12 DIAGNOSIS — Z23 ENCOUNTER FOR IMMUNIZATION: Primary | ICD-10-CM

## 2021-03-12 PROCEDURE — 91300 SARS-COV-2 / COVID-19 MRNA VACCINE (PFIZER-BIONTECH) 30 MCG: CPT

## 2021-03-12 PROCEDURE — 0001A SARS-COV-2 / COVID-19 MRNA VACCINE (PFIZER-BIONTECH) 30 MCG: CPT

## 2021-03-15 ENCOUNTER — TELEPHONE (OUTPATIENT)
Dept: FAMILY MEDICINE CLINIC | Facility: OTHER | Age: 46
End: 2021-03-15

## 2021-03-15 NOTE — TELEPHONE ENCOUNTER
Left message      ----- Message from Micah Arzola MD sent at 3/15/2021 10:32 AM EDT -----  The xray of right knee shows no fractures or bone abnormality  Thanks

## 2021-04-01 ENCOUNTER — OFFICE VISIT (OUTPATIENT)
Dept: FAMILY MEDICINE CLINIC | Facility: OTHER | Age: 46
End: 2021-04-01
Payer: COMMERCIAL

## 2021-04-01 ENCOUNTER — PROCEDURE VISIT (OUTPATIENT)
Dept: FAMILY MEDICINE CLINIC | Facility: OTHER | Age: 46
End: 2021-04-01
Payer: COMMERCIAL

## 2021-04-01 VITALS
WEIGHT: 177 LBS | OXYGEN SATURATION: 97 % | TEMPERATURE: 98.2 F | HEART RATE: 60 BPM | BODY MASS INDEX: 33.42 KG/M2 | HEIGHT: 61 IN | DIASTOLIC BLOOD PRESSURE: 76 MMHG | SYSTOLIC BLOOD PRESSURE: 110 MMHG

## 2021-04-01 DIAGNOSIS — M25.361 INSTABILITY OF RIGHT KNEE JOINT: ICD-10-CM

## 2021-04-01 DIAGNOSIS — L91.8 SKIN TAGS, MULTIPLE ACQUIRED: Primary | ICD-10-CM

## 2021-04-01 DIAGNOSIS — Z00.00 ANNUAL PHYSICAL EXAM: Primary | ICD-10-CM

## 2021-04-01 DIAGNOSIS — D22.9 ATYPICAL NEVUS: ICD-10-CM

## 2021-04-01 DIAGNOSIS — N93.9 ABNORMAL UTERINE BLEEDING (AUB): ICD-10-CM

## 2021-04-01 DIAGNOSIS — M25.561 RIGHT KNEE PAIN, UNSPECIFIED CHRONICITY: ICD-10-CM

## 2021-04-01 DIAGNOSIS — R10.2 PELVIC PAIN: ICD-10-CM

## 2021-04-01 PROCEDURE — 11200 RMVL SKIN TAGS UP TO&INC 15: CPT | Performed by: FAMILY MEDICINE

## 2021-04-01 PROCEDURE — 88305 TISSUE EXAM BY PATHOLOGIST: CPT | Performed by: STUDENT IN AN ORGANIZED HEALTH CARE EDUCATION/TRAINING PROGRAM

## 2021-04-01 PROCEDURE — 3008F BODY MASS INDEX DOCD: CPT | Performed by: FAMILY MEDICINE

## 2021-04-01 PROCEDURE — 99396 PREV VISIT EST AGE 40-64: CPT | Performed by: FAMILY MEDICINE

## 2021-04-01 PROCEDURE — 17000 DESTRUCT PREMALG LESION: CPT | Performed by: FAMILY MEDICINE

## 2021-04-01 NOTE — PATIENT INSTRUCTIONS
Skin Tags   AMBULATORY CARE:   A skin tag  is a small growth that forms on the skin  The growth hangs off the skin from a small piece of tissue called a stalk  A skin tag often grows where skin rubs against skin and causes friction  Diabetes or obesity can increase your risk for skin tags  The risk also increases with age  Skin tags are usually harmless  Signs and symptoms of a skin tag: You may have a few skin tags in the same area  This is common  A skin tag is usually the same color as your skin, or it may be a little darker  A skin tag is usually small but can be as large as 1/2 inch (1 centimeter)  Skin tags usually do not cause pain, but they can cause irritation by rubbing against your clothes or jewelry  Common areas for skin tags to grow are your underarms, between folds of skin, eyelids, or inner thighs  They can also grow on your neck or other body areas  Contact your healthcare provider if:   · The skin tag changes in size, shape, or color  · You have a rash or other skin problem around the skin tag  · The skin tag bleeds  · Your skin tag is affecting your ability to do your daily activities  · You have questions or concerns about your condition or care  Treatment  may not be needed  The skin tag will not go away on its own, but you may not notice it or be bothered by it  You can help remove a skin tag by tying a string or dental floss around the skin tag  This will cut off the blood supply to the skin tag, and it will fall off after a few days  The following may be needed if the skin tag irritates your skin:  · Cryotherapy  is a procedure used to freeze the skin tag  Your healthcare provider uses liquid nitrogen to freeze the area  · Cauterization  is a procedure used to burn the skin tag off  Your healthcare provider uses a device to burn the area  · Surgery  may be used to remove the skin tag  Do not try to cut the skin tag off by yourself with a sharp object   Skin tags can bleed heavily when they still have a blood supply  Manage or prevent skin tags:   · You can put a bandage over the skin tag to help with irritation  Change the bandage every day or if it gets wet or dirty  · Skin tags often cannot be prevented  You may be able to lower your risk by losing weight  This will reduce skin folds where skin tags tend to form  Talk to your healthcare provider about how much you should weigh  He or she can help you create a safe weight loss plan  Follow up with your healthcare provider as directed:  Write down your questions so you remember to ask them during your visits  © Copyright [address] Information is for End User's use only and may not be sold, redistributed or otherwise used for commercial purposes  All illustrations and images included in CareNotes® are the copyrighted property of A D A M , Inc  or [facility] [place]   The above information is an  only  It is not intended as medical advice for individual conditions or treatments  Talk to your doctor, nurse or pharmacist before following any medical regimen to see if it is safe and effective for you  Skin biopsy   WHAT YOU NEED TO KNOW:   A skin biopsy is a procedure used to remove a small piece of skin for testing  Part or all of a skin lesion (affected area of skin) may be removed  A punch biopsy allows the whole thickness of a very small piece skin to be taken  WHILE YOU ARE HERE:   Before your procedure:   · Informed consent  is a legal document that explains the tests, treatments, or procedures that you may need  Informed consent means you understand what will be done and can make decisions about what you want  You give your permission when you sign the consent form  You can have someone sign this form for you if you are not able to sign it  You have the right to understand your medical care in words you know   Before you sign the consent form, understand the risks and benefits of what will be done  Make sure all your questions are answered  · Your healthcare provider will clean the skin where he will do the biopsy  He will use a local anesthetic medicine to make you more comfortable during your procedure  This medicine is a shot put into the skin that will numb the area, and dull your pain  You may still feel pressure or pushing during the procedure after you get this medicine  During your procedure:   · Your healthcare provider will stretch the skin around the biopsy area to make the skin tight  The biopsy punch tool will be firmly placed on the area where the skin sample will be taken  Your healthcare provider will move and press the punch downward to cut the skin  A punch with a round cutting edge will be rotated (turned in circles)  If the punch has an oval cutting edge, the punch will be moved back and forth  Once the skin is loosened, your healthcare provider will pull it up, and cut it out  · The wound may be closed with tapes or stitches  Your healthcare provider may put medicine on your wound to stop the area from bleeding  The skin sample will be sent to a lab for tests  After your procedure:  A bandage will cover the biopsy area to keep it clean and dry, and protect the area from infection  When the procedure is over, you may be able to go home  You will return to your room, or stay in your room, if you are a hospital patient  You may have some bleeding, oozing, redness, or swelling after the biopsy  These are normal  You may also have pain during the first 24 to 48 hours after your procedure  RISKS:   · A skin biopsy may cause you to bleed from the biopsy area, or get an infection  You may have bruising, swelling, or pain in the area where the biopsy was done  You may have scarring from where the skin tissue was removed  You are at higher risk of having problems healing after your procedure if you smoke, or take steroid medicines   You may have an allergic response from the numbing medicine used for the procedure  · If you do not have a needed skin biopsy, you may have a serious skin or nerve condition, and not know it  Serious skin and nerve conditions left untreated may cause you to have lifelong problems and illness  CARE AGREEMENT:   You have the right to help plan your care  Learn about your health condition and how it may be treated  Discuss treatment options with your caregivers to decide what care you want to receive  You always have the right to refuse treatment  © 2017 [address] [facility] Information is for End User's use only and may not be sold, redistributed or otherwise used for commercial purposes  All illustrations and images included in CareNotes® are the copyrighted property of A D A M , Inc  or Guille Cabello  The above information is an  only  It is not intended as medical advice for individual conditions or treatments  Talk to your doctor, nurse or pharmacist before following any medical regimen to see if it is safe and effective for you

## 2021-04-01 NOTE — PROGRESS NOTES
Lesion Destruction    Date/Time: 4/1/2021 2:42 PM  Performed by: Kerrie Escobar MD  Authorized by: Kerrie Escobar MD   Universal Protocol:  Procedure performed by: (Dr Leah No)  Consent: Verbal consent obtained  Written consent obtained  Risks and benefits: risks, benefits and alternatives were discussed  Consent given by: patient  Time out: Immediately prior to procedure a "time out" was called to verify the correct patient, procedure, equipment, support staff and site/side marked as required  Timeout called at: 4/1/2021 2:20 PM   Patient understanding: patient states understanding of the procedure being performed  Patient consent: the patient's understanding of the procedure matches consent given  Procedure consent: procedure consent matches procedure scheduled  Relevant documents: relevant documents present and verified  Patient identity confirmed: verbally with patient      Procedure Details - Lesion Destruction:     Number of Lesions:  1  Lesion 1:     Body area:  Head/neck    Initial size (mm):  10    Malignancy: pre-malignant lesion      Destruction method: surgical removal      Destruction method comment:  Shave     2mL 2% lidocaine with epi  Skin tag removal    Date/Time: 4/1/2021 2:53 PM  Performed by: Kerrie Escobar MD  Authorized by: Kerrie Escobar MD   Universal Protocol:  Consent: Verbal consent obtained  Risks and benefits: risks, benefits and alternatives were discussed  Consent given by: patient  Time out: Immediately prior to procedure a "time out" was called to verify the correct patient, procedure, equipment, support staff and site/side marked as required    Timeout called at: 4/1/2021 2:20 PM   Patient understanding: patient states understanding of the procedure being performed  Patient consent: the patient's understanding of the procedure matches consent given  Procedure consent: procedure consent matches procedure scheduled  Relevant documents: relevant documents not present or verified  Site marked: the operative site was marked  Patient identity confirmed: verbally with patient        Procedure Details - Skin Tag Destruction:     Up to 15      Body area:  Head/neck    Initial size (mm):  1    Cosmetic?: Yes    Lesion 2:     Body area:  Head/neck    Initial size (mm):  2    Destruction method: scissors used for extraction      Cosmetic?: Yes    Lesion 3:     Body area:  Head/neck    Initial size (mm):  1    Destruction method: scissors used for extraction      Cosmetic?: Yes    Lesion 4:     Body area:  Head/neck    Initial size (mm):  1    Malignancy: benign lesion      Destruction method: scissors used for extraction      Cosmetic?: Yes    Lesion 6:      no concerns      Judy Lisa MD  Family Medicine PGY-1  4/1/2021  3:05 PM

## 2021-04-01 NOTE — PATIENT INSTRUCTIONS

## 2021-04-01 NOTE — PROGRESS NOTES
ADULT ANNUAL [address] [facility]    NAME: Claritza Zaragoza  AGE: 39 y o  SEX: female  : 1975     DATE: 2021     Assessment and Plan:     Problem List Items Addressed This Visit        Genitourinary    Abnormal uterine bleeding (AUB)    Relevant Orders    UA w Reflex to Microscopic w Reflex to Culture -Lab Collect      Other Visit Diagnoses     Annual physical exam    -  Primary    Right knee pain, unspecified chronicity        Relevant Orders    MRI knee right  wo contrast    Ambulatory referral to Physical Therapy    BMI 33 0-33 9,adult        Relevant Orders    CBC and differential    Comprehensive metabolic panel    Lipid panel    TSH, 3rd generation with Free T4 reflex    HEMOGLOBIN A1C W/ EAG ESTIMATION    Instability of right knee joint        Relevant Orders    MRI knee right  wo contrast    Ambulatory referral to Physical Therapy    Pelvic pain        Relevant Orders    UA w Reflex to Microscopic w Reflex to Culture -Lab Collect          Immunizations and preventive care screenings were discussed with patient today  Appropriate education was printed on patient's after visit summary  Counseling:  Dental Health: discussed importance of regular tooth brushing, flossing, and dental visits  Injury prevention: discussed safety/seat belts, safety helmets, smoke detectors, carbon dioxide detectors, and smoking near bedding or upholstery  · Exercise: the importance of regular exercise/physical activity was discussed  Recommend exercise 3-5 times per week for at least 30 minutes  BMI Counseling: Body mass index is 33 44 kg/m²  The BMI is above normal  Nutrition recommendations include encouraging healthy choices of fruits and vegetables, limiting drinks that contain sugar and reducing intake of cholesterol  Depression Screening and Follow-up Plan: Patient's depression screening was positive with a PHQ-2 score of 0   Clincally patient does not have depression  No treatment is required  Return in about 1 year (around 4/1/2022) for Annual physical      Chief Complaint:     Chief Complaint   Patient presents with    Physical Exam      History of Present Illness:     Adult Annual Physical   Patient here for a comprehensive physical exam  The patient reports problems - patient has chronic knee pain right sided that has been persistent for months  Knee is painful up to 8/10 depending on activity  Knee also gives out as she is going about her daily activities  At times she is close to falling due to the knee instability  She has been using OTC pain killers and xray imaging shows no significant arthritis or bone fracture or abnormalities  She hasn't worked with PT and will refer her to work with PT today  She is agreeable to do so  Will obtain MRI as well  Fall precautions advised  Patient is using a knee brace daily  Patient with history of abnormal menstrual bleeds with normal biopsy of the uterus  She feels pain when she voids  For now recommend checking UA to rule out infectious causes  Diet and Physical Activity  · Diet/Nutrition: well balanced diet  · Exercise: no formal exercise  Tough to do due to the knee issue as well but she has been working out as possible  Depression Screening  PHQ-9 Depression Screening    PHQ-9:   Frequency of the following problems over the past two weeks:      Little interest or pleasure in doing things: 0 - not at all  Feeling down, depressed, or hopeless: 0 - not at all  PHQ-2 Score: 0       General Health  · Sleep: sleeps well  · Hearing: normal - bilateral   · Vision: no vision problems  · Dental: regular dental visits  /GYN Health  · Patient is:having periods with heavy bleeding and irregular intervals  Follows with GYN  · Last menstrual period: 4/1/21  · Contraceptive method: none   Patient interested in hysterectomy due to the uterine bleeding issues    Will follow up with GYN to discuss further options  Review of Systems:     Review of Systems   Constitutional: Negative for chills and fever  HENT: Negative for ear pain and sore throat  Eyes: Negative for pain and visual disturbance  Respiratory: Negative for cough and shortness of breath  Cardiovascular: Negative for chest pain and palpitations  Gastrointestinal: Negative for abdominal pain and vomiting  Genitourinary: Positive for menstrual problem and pelvic pain  Negative for dysuria and hematuria  Musculoskeletal: Positive for arthralgias, gait problem and myalgias  Negative for back pain  Skin: Negative for color change and rash  Neurological: Positive for weakness (knee instablity )  Negative for seizures and syncope  All other systems reviewed and are negative  Past Medical History:     Past Medical History:   Diagnosis Date    Abnormal Pap smear of cervix     2017    Anemia     Anxiety     Asthma     Diabetes mellitus ([facility] [address] )     History of transfusion 2018    HPV (human papilloma virus) infection     2017    Obesity     Varicella       Past Surgical History:     Past Surgical History:   Procedure Laterality Date    BREAST BIOPSY Right     u/s core bx    BREAST SURGERY      Incisional breast biopsy     SECTION      And     GASTRIC BYPASS  2016    NM HYSTEROSCOPY,W/ENDO BX N/A 2021    Procedure: DILATATION AND CURETTAGE (D&C) WITH HYSTEROSCOPY (MYOSURE);   Surgeon: Toby Evans DO;  [facility];  Service: Gynecology    NM HYSTEROSCOPY,W/ENDO BX N/A 2021    Procedure: POLYPECTOMY;  Surgeon: Toby Evans DO;  [facility];  Service: Gynecology    TUBAL LIGATION      US GUIDED BREAST BIOPSY RIGHT COMPLETE Right 2019      Social History:     E-Cigarette/Vaping    E-Cigarette Use Never User      E-Cigarette/Vaping Substances    Nicotine No     THC No     CBD No     Flavoring No     Other No     Unknown No      Social History     Socioeconomic History    Marital status: Single     Spouse name: None    Number of children: 2    Years of education: None    Highest education level: None   Occupational History    None   Social Needs    Financial resource strain: None    Food insecurity     Worry: None     Inability: None    Transportation needs     Medical: None     Non-medical: None   Tobacco Use    Smoking status: Current Every Day Smoker     Packs/day: 0 25     Years: 10 00     Pack years: 2 50     Types: Cigarettes    Smokeless tobacco: Never Used    Tobacco comment: refused   Substance and Sexual Activity    Alcohol use:  Yes     Alcohol/week: 0 0 standard drinks     Frequency: Monthly or less     Drinks per session: 1 or 2     Comment: social     Drug use: Never    Sexual activity: Yes     Partners: Male     Birth control/protection: Condom Male, None   Lifestyle    Physical activity     Days per week: None     Minutes per session: None    Stress: None   Relationships    Social connections     Talks on phone: None     Gets together: None     Attends Congregational service: None     Active member of club or organization: None     Attends meetings of clubs or organizations: None     Relationship status: None    Intimate partner violence     Fear of current or ex partner: None     Emotionally abused: None     Physically abused: None     Forced sexual activity: None   Other Topics Concern    None   Social History Narrative    Current every day smoker - As per [employer]     Exercises daily    Sleeps 6-7 hours a day       Family History:     Family History   Problem Relation Age of Onset    Diabetes Mother     Hypertension Mother     Cancer Mother     Arthritis Mother     Leukemia Mother     Hyperlipidemia Mother     Diabetes Father     Hypertension Father     Heart disease Father     Coronary artery disease Father     Hyperlipidemia Father     Stroke Paternal Rom Richard Thyroid cancer Sister 37    Hypertension Brother     Breast cancer Maternal Aunt 48    Breast cancer Paternal Aunt 79    No Known Problems Maternal Grandmother     No Known Problems Maternal Grandfather     No Known Problems Paternal Grandmother     No Known Problems Son     No Known Problems Son     No Known Problems Maternal Aunt     No Known Problems Maternal Aunt     No Known Problems Maternal Aunt     No Known Problems Maternal Aunt     No Known Problems Maternal Aunt     Cervical cancer Paternal Aunt 36    No Known Problems Paternal Aunt     No Known Problems Paternal Aunt     No Known Problems Paternal Aunt     No Known Problems Paternal Aunt     No Known Problems Paternal Aunt     Lung cancer Paternal Uncle 80      Current Medications:     Current Outpatient Medications   Medication Sig Dispense Refill    albuterol (PROVENTIL HFA,VENTOLIN HFA) 90 mcg/act inhaler Inhale 2 puffs every 6 (six) hours as needed for wheezing or shortness of breath 1 Inhaler 1    Calcium Citrate-Vitamin D (CALCET CREAMY BITES) 500-400 MG-UNIT CHEW take 1 tablet by oral route 3 times every day      cyanocobalamin (VITAMIN B-12) 100 mcg tablet every 24 hours      Multiple Vitamins-Minerals (MULTIVITAMIN ADULT PO) Take by mouth       No current facility-administered medications for this visit  Allergies:     No Known Allergies   Physical Exam:     /76 (BP Location: Left arm, Patient Position: Sitting, Cuff Size: Adult)   Pulse 60   Temp 98 2 °F (36 8 °C) (Temporal)   Ht 5' 1" (1 549 m)   Wt 80 3 kg (177 lb)   LMP 03/05/2021 (Exact Date)   SpO2 97%   BMI 33 44 kg/m²     Physical Exam  Constitutional:       General: She is not in acute distress  Appearance: She is not diaphoretic  HENT:      Head: Normocephalic and atraumatic  Eyes:      General: No scleral icterus  Conjunctiva/sclera: Conjunctivae normal    Neck:      Musculoskeletal: Normal range of motion and neck supple  Cardiovascular:      Rate and Rhythm: Normal rate and regular rhythm  Pulses: Normal pulses  Heart sounds: No murmur  Pulmonary:      Effort: Pulmonary effort is normal  No respiratory distress  Breath sounds: Normal breath sounds  No wheezing  Abdominal:      General: Bowel sounds are normal       Palpations: Abdomen is soft  Musculoskeletal:      Right lower leg: No edema  Left lower leg: No edema  Skin:     General: Skin is warm and dry  Comments: Many skin tags at the neck area  Neurological:      General: No focal deficit present  Mental Status: She is alert and oriented to person, place, and time  Psychiatric:         Behavior: Behavior normal          Thought Content:  Thought content normal         Lab Results   Component Value Date    WBC 8 09 10/19/2020    HGB 14 6 10/19/2020    HCT 45 0 10/19/2020     10/19/2020    TRIG 105 10/19/2020    HDL 61 10/19/2020    ALT 25 10/19/2020    AST 19 10/19/2020    K 4 4 10/19/2020     10/19/2020    CREATININE 0 76 10/19/2020    BUN 16 10/19/2020    CO2 28 10/19/2020    INR 1 05 02/20/2019    GLUF 82 10/19/2020    HGBA1C 5 2 03/25/2019     Lab Results   Component Value Date    CDO4RZGVCONS 1 822 10/19/2020         Maryann Patel MD  [address]

## 2021-04-02 ENCOUNTER — APPOINTMENT (OUTPATIENT)
Dept: LAB | Facility: AMBULARY SURGERY CENTER | Age: 46
End: 2021-04-02
Payer: COMMERCIAL

## 2021-04-02 LAB
ALBUMIN SERPL BCP-MCNC: 3.8 G/DL (ref 3.5–5)
ALP SERPL-CCNC: 111 U/L (ref 46–116)
ALT SERPL W P-5'-P-CCNC: 31 U/L (ref 12–78)
ANION GAP SERPL CALCULATED.3IONS-SCNC: 4 MMOL/L (ref 4–13)
AST SERPL W P-5'-P-CCNC: 23 U/L (ref 5–45)
BACTERIA UR QL AUTO: ABNORMAL /HPF
BASOPHILS # BLD AUTO: 0.06 THOUSANDS/ΜL (ref 0–0.1)
BASOPHILS NFR BLD AUTO: 1 % (ref 0–1)
BILIRUB SERPL-MCNC: 0.72 MG/DL (ref 0.2–1)
BILIRUB UR QL STRIP: NEGATIVE
BUN SERPL-MCNC: 18 MG/DL (ref 5–25)
CALCIUM SERPL-MCNC: 9.5 MG/DL (ref 8.3–10.1)
CHLORIDE SERPL-SCNC: 107 MMOL/L (ref 100–108)
CHOLEST SERPL-MCNC: 173 MG/DL (ref 50–200)
CLARITY UR: ABNORMAL
CO2 SERPL-SCNC: 28 MMOL/L (ref 21–32)
COLOR UR: ABNORMAL
CREAT SERPL-MCNC: 0.83 MG/DL (ref 0.6–1.3)
EOSINOPHIL # BLD AUTO: 0.07 THOUSAND/ΜL (ref 0–0.61)
EOSINOPHIL NFR BLD AUTO: 1 % (ref 0–6)
ERYTHROCYTE [DISTWIDTH] IN BLOOD BY AUTOMATED COUNT: 13.3 % (ref 11.6–15.1)
EST. AVERAGE GLUCOSE BLD GHB EST-MCNC: 108 MG/DL
GFR SERPL CREATININE-BSD FRML MDRD: 85 ML/MIN/1.73SQ M
GLUCOSE P FAST SERPL-MCNC: 82 MG/DL (ref 65–99)
GLUCOSE UR STRIP-MCNC: NEGATIVE MG/DL
HBA1C MFR BLD: 5.4 %
HCT VFR BLD AUTO: 44.8 % (ref 34.8–46.1)
HDLC SERPL-MCNC: 59 MG/DL
HGB BLD-MCNC: 14.6 G/DL (ref 11.5–15.4)
HGB UR QL STRIP.AUTO: ABNORMAL
IMM GRANULOCYTES # BLD AUTO: 0.02 THOUSAND/UL (ref 0–0.2)
IMM GRANULOCYTES NFR BLD AUTO: 0 % (ref 0–2)
KETONES UR STRIP-MCNC: ABNORMAL MG/DL
LDLC SERPL CALC-MCNC: 97 MG/DL (ref 0–100)
LEUKOCYTE ESTERASE UR QL STRIP: ABNORMAL
LYMPHOCYTES # BLD AUTO: 1.98 THOUSANDS/ΜL (ref 0.6–4.47)
LYMPHOCYTES NFR BLD AUTO: 29 % (ref 14–44)
MCH RBC QN AUTO: 28.5 PG (ref 26.8–34.3)
MCHC RBC AUTO-ENTMCNC: 32.6 G/DL (ref 31.4–37.4)
MCV RBC AUTO: 88 FL (ref 82–98)
MONOCYTES # BLD AUTO: 0.36 THOUSAND/ΜL (ref 0.17–1.22)
MONOCYTES NFR BLD AUTO: 5 % (ref 4–12)
NEUTROPHILS # BLD AUTO: 4.26 THOUSANDS/ΜL (ref 1.85–7.62)
NEUTS SEG NFR BLD AUTO: 64 % (ref 43–75)
NITRITE UR QL STRIP: NEGATIVE
NON-SQ EPI CELLS URNS QL MICRO: ABNORMAL /HPF
NONHDLC SERPL-MCNC: 114 MG/DL
NRBC BLD AUTO-RTO: 0 /100 WBCS
PH UR STRIP.AUTO: 6 [PH]
PLATELET # BLD AUTO: 304 THOUSANDS/UL (ref 149–390)
PMV BLD AUTO: 9.9 FL (ref 8.9–12.7)
POTASSIUM SERPL-SCNC: 4.2 MMOL/L (ref 3.5–5.3)
PROT SERPL-MCNC: 8 G/DL (ref 6.4–8.2)
PROT UR STRIP-MCNC: ABNORMAL MG/DL
RBC # BLD AUTO: 5.12 MILLION/UL (ref 3.81–5.12)
RBC #/AREA URNS AUTO: ABNORMAL /HPF
SODIUM SERPL-SCNC: 139 MMOL/L (ref 136–145)
SP GR UR STRIP.AUTO: 1.01 (ref 1–1.03)
TRIGL SERPL-MCNC: 86 MG/DL
TSH SERPL DL<=0.05 MIU/L-ACNC: 0.89 UIU/ML (ref 0.36–3.74)
UROBILINOGEN UR QL STRIP.AUTO: 1 E.U./DL
WBC # BLD AUTO: 6.75 THOUSAND/UL (ref 4.31–10.16)
WBC #/AREA URNS AUTO: ABNORMAL /HPF

## 2021-04-02 PROCEDURE — 80061 LIPID PANEL: CPT

## 2021-04-02 PROCEDURE — 36415 COLL VENOUS BLD VENIPUNCTURE: CPT

## 2021-04-02 PROCEDURE — 81001 URINALYSIS AUTO W/SCOPE: CPT | Performed by: FAMILY MEDICINE

## 2021-04-02 PROCEDURE — 85025 COMPLETE CBC W/AUTO DIFF WBC: CPT

## 2021-04-02 PROCEDURE — 80053 COMPREHEN METABOLIC PANEL: CPT

## 2021-04-02 PROCEDURE — 84443 ASSAY THYROID STIM HORMONE: CPT

## 2021-04-02 PROCEDURE — 83036 HEMOGLOBIN GLYCOSYLATED A1C: CPT

## 2021-04-05 ENCOUNTER — TELEPHONE (OUTPATIENT)
Dept: FAMILY MEDICINE CLINIC | Facility: OTHER | Age: 46
End: 2021-04-05

## 2021-04-05 NOTE — TELEPHONE ENCOUNTER
----- Message from Maryann Patel MD sent at 4/5/2021  8:33 AM EDT -----  The blood work result is normal

## 2021-04-14 ENCOUNTER — TELEPHONE (OUTPATIENT)
Dept: FAMILY MEDICINE CLINIC | Facility: OTHER | Age: 46
End: 2021-04-14

## 2021-04-14 ENCOUNTER — IMMUNIZATIONS (OUTPATIENT)
Dept: FAMILY MEDICINE CLINIC | Facility: HOSPITAL | Age: 46
End: 2021-04-14

## 2021-04-14 DIAGNOSIS — Z23 ENCOUNTER FOR IMMUNIZATION: Primary | ICD-10-CM

## 2021-04-14 PROCEDURE — 91300 SARS-COV-2 / COVID-19 MRNA VACCINE (PFIZER-BIONTECH) 30 MCG: CPT

## 2021-04-14 PROCEDURE — 0002A SARS-COV-2 / COVID-19 MRNA VACCINE (PFIZER-BIONTECH) 30 MCG: CPT

## 2021-04-14 NOTE — TELEPHONE ENCOUNTER
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    Thank you   Stephanie Hernandez

## 2021-05-05 ENCOUNTER — TELEPHONE (OUTPATIENT)
Dept: FAMILY MEDICINE CLINIC | Facility: OTHER | Age: 46
End: 2021-05-05

## 2021-05-05 NOTE — TELEPHONE ENCOUNTER
I contacted the insurance company and discussed the study  Please let patient know that they are not accepting the MRI due to no PT therapy records in chart  Has she done any PT for at least 4 weeks? If so I will need the dates for these services and records  If no PT then there was a referral in chart and have patient present for the PT therapy at least for 4 weeks  The study will then be approved if her knee issue is still not resolved  Thanks,  Dr Radha Hughes

## 2021-05-05 NOTE — TELEPHONE ENCOUNTER
Yarely Escamilla from [facility] called and said the MRI of the right knee needs a peer to peer  The number is [phone] option 3  Tracking # is [id number]

## 2021-06-07 ENCOUNTER — HOSPITAL ENCOUNTER (EMERGENCY)
Facility: HOSPITAL | Age: 46
Discharge: HOME | End: 2021-06-07
Attending: RADIOLOGY
Payer: COMMERCIAL

## 2021-06-07 VITALS
SYSTOLIC BLOOD PRESSURE: 114 MMHG | TEMPERATURE: 97.6 F | RESPIRATION RATE: 16 BRPM | DIASTOLIC BLOOD PRESSURE: 79 MMHG | OXYGEN SATURATION: 98 % | HEART RATE: 82 BPM

## 2021-06-07 DIAGNOSIS — T78.40XA ALLERGIC REACTION, INITIAL ENCOUNTER: Primary | ICD-10-CM

## 2021-06-07 PROCEDURE — 99284 EMERGENCY DEPT VISIT MOD MDM: CPT | Performed by: PHYSICIAN ASSISTANT

## 2021-06-07 PROCEDURE — 99282 EMERGENCY DEPT VISIT SF MDM: CPT

## 2021-06-07 RX ORDER — PREDNISONE 20 MG/1
60 TABLET ORAL ONCE
Status: COMPLETED | OUTPATIENT
Start: 2021-06-07 | End: 2021-06-07

## 2021-06-07 RX ORDER — PREDNISONE 50 MG/1
50 TABLET ORAL DAILY
Qty: 4 TABLET | Refills: 0 | Status: SHIPPED | OUTPATIENT
Start: 2021-06-08 | End: 2021-06-12

## 2021-06-07 RX ORDER — FAMOTIDINE 20 MG/1
20 TABLET, FILM COATED ORAL ONCE
Status: COMPLETED | OUTPATIENT
Start: 2021-06-07 | End: 2021-06-07

## 2021-06-07 RX ADMIN — PREDNISONE 60 MG: 20 TABLET ORAL at 18:52

## 2021-06-07 RX ADMIN — FAMOTIDINE 20 MG: 20 TABLET ORAL at 18:53

## 2021-06-07 NOTE — DISCHARGE INSTRUCTIONS
Use Benadryl 50 mg every 6 hours as needed for itching  You can add over-the-counter Pepcid 20 mg daily to help with symptoms  Take all oral steroids until done  Ice to area to help keep skin cool to help with symptoms  Try to keep skin covered to prevent scratching    If no improvement follow-up with your doctor in next few days  none

## 2021-06-07 NOTE — ED NOTES
D/c reviewed with pt  Pt verbalized understanding and has no further questions at this time  Pt ambulatory off unit with steady gait       Jam Jimenez RN  06/07/21 [id number]

## 2021-06-07 NOTE — ED PROVIDER NOTES
History  Chief Complaint   Patient presents with    Rash     pt recently traveled to [facility] where she obtained mosquito bites that she feels has caused an itchy blistering rash all over her body  Pt with Past Medical History: Abnormal Pap smear of cervix, Anemia, Anxiety, Asthma, Diabetes mellitus, HPV, Obesity, Varicella   Past Surgical History: GASTRIC BYPASS 01/25/2016, HYSTEROSCOPY,W/ENDO BX, POLYPECTOMY, TUBAL LIGATION  2009  Presents to ED c/o puritic rash to B/L forearms since she  recently traveled to [facility] where she obtained mosquito bites, which she's had an allergy to in the past, and then developed worsening rash  Pt scratching at forearms with scattered, excoriated papules, scratched open, + mild swelling ntoed to left forearm around rash  No fever, no sob, no NVD, no wheezing  Pt has been using Benadryl 50mg at home, none today, states she doesn't like to take bc it makes her sleepy and she has 2 sons at home to take care of   PT driving, doesn't want any benadryl here          Prior to Admission Medications   Prescriptions Last Dose Informant Patient Reported? Taking?    Calcium Citrate-Vitamin D (CALCET CREAMY BITES) 500-400 MG-UNIT CHEW  Self Yes No   Sig: take 1 tablet by oral route 3 times every day   Multiple Vitamins-Minerals (MULTIVITAMIN ADULT PO)  Self Yes No   Sig: Take by mouth   albuterol (PROVENTIL HFA,VENTOLIN HFA) 90 mcg/act inhaler  Self No No   Sig: Inhale 2 puffs every 6 (six) hours as needed for wheezing or shortness of breath   cyanocobalamin (VITAMIN B-12) 100 mcg tablet  Self Yes No   Sig: every 24 hours      Facility-Administered Medications: None       Past Medical History:   Diagnosis Date    Abnormal Pap smear of cervix     2017    Anemia     Anxiety     Asthma     Diabetes mellitus ([facility] [address] )     History of transfusion 2018    HPV (human papilloma virus) infection     2017    Obesity     Varicella        Past Surgical History:   Procedure Laterality Date    BREAST BIOPSY Right 2012    u/s core bx    BREAST SURGERY      Incisional breast biopsy     SECTION      And     GASTRIC BYPASS  2016    GA HYSTEROSCOPY,W/ENDO BX N/A 2021    Procedure: DILATATION AND CURETTAGE (D&C) WITH HYSTEROSCOPY (MYOSURE); Surgeon: Wendie Booker DO;  [facility];  Service: Gynecology    GA HYSTEROSCOPY,W/ENDO BX N/A 2021    Procedure: POLYPECTOMY;  Surgeon: Wendie Booker DO;  [facility];  Service: Gynecology    TUBAL LIGATION      US GUIDED BREAST BIOPSY RIGHT COMPLETE Right 2019       Family History   Problem Relation Age of Onset    Diabetes Mother     Hypertension Mother     Cancer Mother     Arthritis Mother     Leukemia Mother     Hyperlipidemia Mother     Diabetes Father     Hypertension Father     Heart disease Father     Coronary artery disease Father     Hyperlipidemia Father     Stroke Paternal Grandfather     Thyroid cancer Sister 37    Hypertension Brother     Breast cancer Maternal Aunt 48    Breast cancer Paternal Aunt 79    No Known Problems Maternal Grandmother     No Known Problems Maternal Grandfather     No Known Problems Paternal Grandmother     No Known Problems Son     No Known Problems Son     No Known Problems Maternal Aunt     No Known Problems Maternal Aunt     No Known Problems Maternal Aunt     No Known Problems Maternal Aunt     No Known Problems Maternal Aunt     Cervical cancer Paternal Aunt 36    No Known Problems Paternal Aunt     No Known Problems Paternal Aunt     No Known Problems Paternal Aunt     No Known Problems Paternal Aunt     No Known Problems Paternal Aunt     Lung cancer Paternal Uncle 80     I have reviewed and agree with the history as documented      E-Cigarette/Vaping    E-Cigarette Use Never User      E-Cigarette/Vaping Substances    Nicotine No     THC No     CBD No     Flavoring No     Other No     Unknown No Social History     Tobacco Use    Smoking status: Current Every Day Smoker     Packs/day: 0 25     Years: 10 00     Pack years: 2 50     Types: Cigarettes    Smokeless tobacco: Never Used    Tobacco comment: refused   Substance Use Topics    Alcohol use: Yes     Alcohol/week: 0 0 standard drinks     Frequency: Monthly or less     Drinks per session: 1 or 2     Comment: social     Drug use: Never       Review of Systems   Constitutional: Negative for chills and fever  HENT: Negative for hearing loss, mouth sores, sore throat and trouble swallowing  Eyes: Negative for visual disturbance  Respiratory: Negative for cough, shortness of breath and wheezing  Cardiovascular: Negative for chest pain and leg swelling  Gastrointestinal: Negative for abdominal pain, diarrhea and vomiting  Genitourinary: Negative for dysuria and frequency  Musculoskeletal: Negative for arthralgias and myalgias  Skin: Positive for rash  Negative for color change and pallor  Neurological: Negative for dizziness, weakness and headaches  Psychiatric/Behavioral: Negative for behavioral problems  All other systems reviewed and are negative  Physical Exam  Physical Exam  Vitals signs and nursing note reviewed  Constitutional:       General: She is not in acute distress  Appearance: Normal appearance  She is well-developed  HENT:      Head: Normocephalic and atraumatic  Right Ear: External ear normal       Left Ear: External ear normal       Nose: Nose normal       Mouth/Throat:      Mouth: Mucous membranes are moist       Tongue: Tongue does not deviate from midline  Pharynx: Oropharynx is clear  Uvula midline  No pharyngeal swelling or uvula swelling  Eyes:      Conjunctiva/sclera: Conjunctivae normal    Neck:      Musculoskeletal: Normal range of motion and neck supple  Cardiovascular:      Rate and Rhythm: Normal rate and regular rhythm     Pulmonary:      Effort: Pulmonary effort is normal  Breath sounds: Normal breath sounds  No wheezing  Musculoskeletal: Normal range of motion  General: Swelling present  Right lower leg: No edema  Left lower leg: No edema  Skin:     General: Skin is warm and dry  Capillary Refill: Capillary refill takes less than 2 seconds  Findings: Rash present  Comments: + scattered papular /erythematous rash noted to B/L forearms, L>R with excoriated papules from pt scratching, worse on left   Neurological:      General: No focal deficit present  Mental Status: She is alert and oriented to person, place, and time  Motor: No weakness  Psychiatric:         Mood and Affect: Mood normal          Behavior: Behavior normal          Vital Signs  ED Triage Vitals   Temperature Pulse Respirations Blood Pressure SpO2   06/07/21 1843 06/07/21 1843 06/07/21 1843 06/07/21 1846 06/07/21 1843   97 6 °F (36 4 °C) 82 16 114/79 98 %      Temp Source Heart Rate Source Patient Position - Orthostatic VS BP Location FiO2 (%)   06/07/21 1843 06/07/21 1843 06/07/21 1843 06/07/21 1843 --   Oral Monitor Sitting Right arm       Pain Score       --                  Vitals:    06/07/21 1843 06/07/21 1846   BP:  114/79   Pulse: 82    Patient Position - Orthostatic VS: Sitting          Visual Acuity      ED Medications  Medications   predniSONE tablet 60 mg (60 mg Oral Given 6/7/21 1852)   famotidine (PEPCID) tablet 20 mg (20 mg Oral Given 6/7/21 1853)       Diagnostic Studies  Results Reviewed     None                 No orders to display              Procedures  Procedures         ED Course                                           MDM  Number of Diagnoses or Management Options  Allergic reaction, initial encounter:       Disposition  Final diagnoses:    Allergic reaction, initial encounter     Time reflects when diagnosis was documented in both MDM as applicable and the Disposition within this note     Time User Action Codes Description Comment 6/7/2021  6:50 PM Laly Bolton Add [T78 [id number]] Allergic reaction, initial encounter       ED Disposition     ED Disposition Condition Date/Time Comment    Discharge Stable Mon Jun 7, 2021  6:50 PM Claritza Zaragoza discharge to home/self care  Follow-up Information     Follow up With Specialties Details Why Contact Info    Tara Stinson MD Family Medicine  As needed [address]  [phone]            Discharge Medication List as of 6/7/2021  6:52 PM      START taking these medications    Details   predniSONE 50 mg tablet Take 1 tablet (50 mg total) by mouth daily for 4 days, Starting Tue 6/8/2021, Until Sat 6/12/2021, Normal         CONTINUE these medications which have NOT CHANGED    Details   albuterol (PROVENTIL HFA,VENTOLIN HFA) 90 mcg/act inhaler Inhale 2 puffs every 6 (six) hours as needed for wheezing or shortness of breath, Starting Sat 1/23/2021, Normal      Calcium Citrate-Vitamin D (CALCET CREAMY BITES) 500-400 MG-UNIT CHEW take 1 tablet by oral route 3 times every day, Historical Med      cyanocobalamin (VITAMIN B-12) 100 mcg tablet every 24 hours, Starting Wed 2/3/2016, Historical Med      Multiple Vitamins-Minerals (MULTIVITAMIN ADULT PO) Take by mouth, Starting Thu 10/12/2017, Historical Med           No discharge procedures on file      PDMP Review     None          ED Provider  Electronically Signed by           Fritz Warren PA-C  06/07/21 5612

## 2021-06-10 ENCOUNTER — TELEMEDICINE (OUTPATIENT)
Dept: FAMILY MEDICINE CLINIC | Facility: OTHER | Age: 46
End: 2021-06-10
Payer: COMMERCIAL

## 2021-06-10 DIAGNOSIS — L20.9 ATOPIC DERMATITIS, UNSPECIFIED TYPE: Primary | ICD-10-CM

## 2021-06-10 PROCEDURE — 99213 OFFICE O/P EST LOW 20 MIN: CPT | Performed by: FAMILY MEDICINE

## 2021-06-10 RX ORDER — PREDNISONE 10 MG/1
TABLET ORAL
Qty: 20 TABLET | Refills: 0 | Status: SHIPPED | OUTPATIENT
Start: 2021-06-10 | End: 2021-09-16 | Stop reason: ALTCHOICE

## 2021-06-10 RX ORDER — CETIRIZINE HYDROCHLORIDE 10 MG/1
10 TABLET ORAL DAILY PRN
Qty: 30 TABLET | Refills: 0 | Status: SHIPPED | OUTPATIENT
Start: 2021-06-10 | End: 2022-07-29

## 2021-06-10 NOTE — PROGRESS NOTES
Virtual Regular Visit      Assessment/Plan:    Problem List Items Addressed This Visit     None      Visit Diagnoses     Atopic dermatitis, unspecified type    -  Primary    Relevant Medications    cetirizine (ZyrTEC) 10 mg tablet    predniSONE 10 mg tablet        Advised patient to finish the prednisone 50 mg for 5 days then start to taper dosage that I will be faxing to her pharmacy today  She will start taking Zyrtec daily as well    patient was advised to stay hydrated where natural cotton fibers and loose clothing  ER precautions were advised in case patient's rash was worsening despite treatment or if she was to develop any breathing trouble swelling in throat or face  Reason for visit is   Chief Complaint   Patient presents with    Follow-up     ED f/u     Urticaria    Virtual Regular Visit        Encounter provider Bernard Rincon MD    Provider located at [address] [address]      Recent Visits  No visits were found meeting these conditions  Showing recent visits within past 7 days and meeting all other requirements     Today's Visits  Date Type Provider Dept   06/10/21 Telemedicine MD Presley Green   Showing today's visits and meeting all other requirements     Future Appointments  No visits were found meeting these conditions  Showing future appointments within next 150 days and meeting all other requirements        The patient was identified by name and date of birth  Josh Latoya was informed that this is a telemedicine visit and that the visit is being conducted through [address] [place] [address] Now and patient was informed that this is a secure, HIPAA-compliant platform  She agrees to proceed     My office door was closed  No one else was in the room  She acknowledged consent and understanding of privacy and security of the video platform   The patient has agreed to participate and understands they can discontinue the visit at any time  Patient is aware this is a billable service  Subjective  Josh Klein is a 39 y o  female presents for ER follow up after experiencing rash   Patient presents today for a follow-up on ER visit   On   Patient was seen there due to a itchy rash that she noted on the forearms bilaterally as well as her systemic  She states that it started after a mosquito bite while she was away in [facility]   She was treated with prednisone 50 mg daily for 4 days after the emergency room evaluation and she states that although the forearm rash is okay it is spreading to the neck area and she has noticed some on the face as well  The rash is mild but they are itchy there is no fever there is no pain associated no induration  She denies having any shortness of breath although initially her asthma was not flare but it has calmed down  She denies any chest pain any swelling in the mouth or throat  She has taken the 4 days of 50 mg prednisone daily and she has 1 dose left for tomorrow  She also has been taking Benadryl when she goes to bed  Past Medical History:   Diagnosis Date    Abnormal Pap smear of cervix     2017    Anemia     Anxiety     Asthma     Diabetes mellitus ([address] )     History of transfusion 2018    HPV (human papilloma virus) infection     2017    Obesity     Varicella        Past Surgical History:   Procedure Laterality Date    BREAST BIOPSY Right 2012    u/s core bx    BREAST SURGERY      Incisional breast biopsy     SECTION      And     GASTRIC BYPASS  2016    [facility] HYSTEROSCOPY,W/ENDO BX N/A 2021    Procedure: DILATATION AND CURETTAGE (D&C) WITH HYSTEROSCOPY (MYOSURE);   Surgeon: Louise York DO;  [facility];  Service: Gynecology    [place] HYSTEROSCOPY,W/ENDO [address] N/A 2021    Procedure: POLYPECTOMY;  Surgeon: Louise York DO;  [facility];  Service: Gynecology    TUBAL LIGATION      US GUIDED BREAST BIOPSY RIGHT COMPLETE Right 8/23/2019       Current Outpatient Medications   Medication Sig Dispense Refill    albuterol (PROVENTIL HFA,VENTOLIN HFA) 90 mcg/act inhaler Inhale 2 puffs every 6 (six) hours as needed for wheezing or shortness of breath 1 Inhaler 1    Calcium Citrate-Vitamin D (CALCET CREAMY BITES) 500-400 MG-UNIT CHEW take 1 tablet by oral route 3 times every day      cyanocobalamin (VITAMIN B-12) 100 mcg tablet every 24 hours      Multiple Vitamins-Minerals (MULTIVITAMIN ADULT PO) Take by mouth      predniSONE 50 mg tablet Take 1 tablet (50 mg total) by mouth daily for 4 days 4 tablet 0    cetirizine (ZyrTEC) 10 mg tablet Take 1 tablet (10 mg total) by mouth daily as needed for allergies 30 tablet 0    predniSONE 10 mg tablet Take 4 tabs po daily for 2 days then 3 tab po daily for 2 days then 2 tab po daily for 2 days then 1 tab po daily for 2 days then stop  20 tablet 0     No current facility-administered medications for this visit  No Known Allergies    Review of Systems   Constitutional: Negative for chills, fever and unexpected weight change  HENT: Negative for congestion  Respiratory: Negative for cough, shortness of breath and wheezing  Cardiovascular: Negative for chest pain  Gastrointestinal: Negative for abdominal pain  Musculoskeletal: Negative for arthralgias and myalgias  Skin: Positive for color change and rash  Neurological: Negative for tremors, weakness, light-headedness and headaches  Video Exam    There were no vitals filed for this visit  Physical Exam  Constitutional:       General: She is not in acute distress  Appearance: She is well-developed  She is not diaphoretic  HENT:      Head: Normocephalic and atraumatic  Pulmonary:      Effort: Pulmonary effort is normal  No respiratory distress  Skin:     Findings: Rash (noted erythematous patch on the sternum right side  mild patch of erythema on face) present  Neurological:      General: No focal deficit present  Mental Status: She is alert and oriented to person, place, and time  Psychiatric:         Behavior: Behavior normal          Thought Content: Thought content normal           I spent 15 minutes directly with the patient during this visit      VIRTUAL VISIT DISCLAIMER    Keeshashe Childsta acknowledges that she has consented to an online visit or consultation  She understands that the online visit is based solely on information provided by her, and that, in the absence of a face-to-face physical evaluation by the physician, the diagnosis she receives is both limited and provisional in terms of accuracy and completeness  This is not intended to replace a full medical face-to-face evaluation by the physician  Keesha Ojeda understands and accepts these terms

## 2021-06-28 ENCOUNTER — OFFICE VISIT (OUTPATIENT)
Dept: FAMILY MEDICINE CLINIC | Facility: OTHER | Age: 46
End: 2021-06-28
Payer: COMMERCIAL

## 2021-06-28 ENCOUNTER — HOSPITAL ENCOUNTER (EMERGENCY)
Facility: HOSPITAL | Age: 46
Discharge: HOME | End: 2021-06-28
Attending: EMERGENCY MEDICINE
Payer: COMMERCIAL

## 2021-06-28 VITALS
DIASTOLIC BLOOD PRESSURE: 81 MMHG | RESPIRATION RATE: 18 BRPM | TEMPERATURE: 98 F | SYSTOLIC BLOOD PRESSURE: 131 MMHG | HEART RATE: 61 BPM | OXYGEN SATURATION: 97 %

## 2021-06-28 VITALS
DIASTOLIC BLOOD PRESSURE: 74 MMHG | OXYGEN SATURATION: 98 % | SYSTOLIC BLOOD PRESSURE: 118 MMHG | HEIGHT: 61 IN | HEART RATE: 58 BPM | TEMPERATURE: 98 F | WEIGHT: 178 LBS | BODY MASS INDEX: 33.61 KG/M2

## 2021-06-28 DIAGNOSIS — H57.13 EYE PAIN, BILATERAL: Primary | ICD-10-CM

## 2021-06-28 DIAGNOSIS — H57.89 EYE REDNESS: ICD-10-CM

## 2021-06-28 PROCEDURE — 3008F BODY MASS INDEX DOCD: CPT | Performed by: FAMILY MEDICINE

## 2021-06-28 PROCEDURE — 99284 EMERGENCY DEPT VISIT MOD MDM: CPT | Performed by: EMERGENCY MEDICINE

## 2021-06-28 PROCEDURE — 99213 OFFICE O/P EST LOW 20 MIN: CPT | Performed by: FAMILY MEDICINE

## 2021-06-28 PROCEDURE — 99283 EMERGENCY DEPT VISIT LOW MDM: CPT

## 2021-06-28 RX ORDER — ERYTHROMYCIN 5 MG/G
0.5 OINTMENT OPHTHALMIC ONCE
Status: COMPLETED | OUTPATIENT
Start: 2021-06-28 | End: 2021-06-28

## 2021-06-28 RX ORDER — TETRACAINE HYDROCHLORIDE 5 MG/ML
1 SOLUTION OPHTHALMIC ONCE
Status: COMPLETED | OUTPATIENT
Start: 2021-06-28 | End: 2021-06-28

## 2021-06-28 RX ORDER — ERYTHROMYCIN 5 MG/G
OINTMENT OPHTHALMIC
Qty: 3.5 G | Refills: 1 | Status: SHIPPED | OUTPATIENT
Start: 2021-06-28 | End: 2022-03-21

## 2021-06-28 RX ADMIN — TETRACAINE HYDROCHLORIDE 1 DROP: 5 SOLUTION OPHTHALMIC at 18:15

## 2021-06-28 RX ADMIN — FLUORESCEIN SODIUM 1 STRIP: 1 STRIP OPHTHALMIC at 18:15

## 2021-06-28 RX ADMIN — ERYTHROMYCIN 0.5 INCH: 5 OINTMENT OPHTHALMIC at 18:43

## 2021-06-28 NOTE — DISCHARGE INSTRUCTIONS
Please do not wear contacts for 1 week  Follow-up with your eye doctor in the next 2-3 days if you are still having symptoms

## 2021-06-28 NOTE — PROGRESS NOTES
Assessment/Plan:    No problem-specific Assessment & Plan notes found for this encounter  Problem List Items Addressed This Visit     None      Visit Diagnoses     Eye pain, bilateral    -  Primary    Relevant Orders    Transfer to other facility (Completed)    Eye redness        Relevant Orders    Transfer to other facility (Completed)        Given this patient's symptoms of bilateral eye redness light sensitivity foreign body sensation as well as pain with sudden onset I would suggest patient gets evaluated at the emergency room by Ophthalmology as there is no outpatient ophthalmology available to see her at this point  Follow-up after emergency visit as needed  Subjective:      Patient ID: Norma Vázquez is a 55 y o  female  Eye Pain   Both eyes are affected  This is a new problem  The current episode started today  The problem occurs constantly  The problem has been unchanged  The injury mechanism was contact lenses (  Patient started noticing eye irritation redness light sensitivity and pain bilaterally as of this morning without any apparent injury mechanism  She does wear contacts and she had to remove them and replace it with her glasses  )  The pain is moderate  There is no known exposure to pink eye  She wears contacts  Associated symptoms include eye redness, a foreign body sensation, itching and photophobia  Pertinent negatives include no blurred vision, eye discharge, fever, nausea, recent URI or vomiting  She has tried nothing for the symptoms  The treatment provided no relief         The following portions of the patient's history were reviewed and updated as appropriate: She   Patient Active Problem List    Diagnosis Date Noted    Viral infection, unspecified 02/26/2021    Mild intermittent asthma without complication 62/74/9440    Iron deficiency anemia due to chronic blood loss 12/24/2019    History of gastric bypass 03/18/2019    Anemia 02/20/2019    ASCUS with positive high Physical Exam  Constitutional:       General: She is in acute distress (secondary to discomfort of eyes)  Appearance: She is not ill-appearing  HENT:      Head: Normocephalic and atraumatic  Eyes:      General: No scleral icterus  Extraocular Movements: Extraocular movements intact  Pupils: Pupils are equal, round, and reactive to light  Comments: Conjunctival injection present in both eyes  Both eyes are sensitive to light and she is not able to keep them fully open in the room due to the room light  No periorbital edema or erythema or discharge noted  Skin:     Coloration: Skin is not pale  Findings: No rash  Neurological:      Mental Status: She is alert and oriented to person, place, and time

## 2021-06-28 NOTE — ED PROVIDER NOTES
History  Chief Complaint   Patient presents with    Eye Pain     bilateral eye pain and redness starting today, sent here from pcp for further eval     HPI     Patient sent to ED for bilateral eye pain / redness  Started today  No blurry vision  Typically wears contacts but currently wearing eyeglasses  Has seasonal allergies  Recently took steroids for bug bites  No chemical exposure  Prior to Admission Medications   Prescriptions Last Dose Informant Patient Reported? Taking? Calcium Citrate-Vitamin D (CALCET CREAMY BITES) 500-400 MG-UNIT CHEW  Self Yes No   Sig: take 1 tablet by oral route 3 times every day   Multiple Vitamins-Minerals (MULTIVITAMIN ADULT PO)  Self Yes No   Sig: Take by mouth   albuterol (PROVENTIL HFA,VENTOLIN HFA) 90 mcg/act inhaler  Self No No   Sig: Inhale 2 puffs every 6 (six) hours as needed for wheezing or shortness of breath   cetirizine (ZyrTEC) 10 mg tablet   No No   Sig: Take 1 tablet (10 mg total) by mouth daily as needed for allergies   cyanocobalamin (VITAMIN B-12) 100 mcg tablet  Self Yes No   Sig: every 24 hours   predniSONE 10 mg tablet   No No   Sig: Take 4 tabs po daily for 2 days then 3 tab po daily for 2 days then 2 tab po daily for 2 days then 1 tab po daily for 2 days then stop     Patient not taking: Reported on 2021      Facility-Administered Medications: None       Past Medical History:   Diagnosis Date    Abnormal Pap smear of cervix     2017    Anemia     Anxiety     Asthma     Diabetes mellitus ([address] )     History of transfusion 2018    HPV (human papilloma virus) infection     2017    Obesity     Varicella        Past Surgical History:   Procedure Laterality Date    BREAST BIOPSY Right 2012    u/s core bx    BREAST SURGERY      Incisional breast biopsy     SECTION      And     GASTRIC BYPASS  2016    MA HYSTEROSCOPY,W/ENDO BX N/A 2021    Procedure: DILATATION AND CURETTAGE (D&C) WITH HYSTEROSCOPY (MYOSURE); Surgeon: Ekta Mercedes DO;  Location: [facility];  Service: Gynecology    RI HYSTEROSCOPY,W/ENDO BX N/A 2/16/2021    Procedure: POLYPECTOMY;  Surgeon: Ekta Mercedes DO;  Location: [facility];  Service: Gynecology    TUBAL LIGATION  2009    US GUIDED BREAST BIOPSY RIGHT COMPLETE Right 8/23/2019       Family History   Problem Relation Age of Onset    Diabetes Mother     Hypertension Mother     Cancer Mother     Arthritis Mother     Leukemia Mother     Hyperlipidemia Mother     Diabetes Father     Hypertension Father     Heart disease Father     Coronary artery disease Father     Hyperlipidemia Father     Stroke Paternal Grandfather     Thyroid cancer Sister 37    Hypertension Brother     Breast cancer Maternal Aunt 48    Breast cancer Paternal Aunt 79    No Known Problems Maternal Grandmother     No Known Problems Maternal Grandfather     No Known Problems Paternal Grandmother     No Known Problems Son     No Known Problems Son     No Known Problems Maternal Aunt     No Known Problems Maternal Aunt     No Known Problems Maternal Aunt     No Known Problems Maternal Aunt     No Known Problems Maternal Aunt     Cervical cancer Paternal Aunt 36    No Known Problems Paternal Aunt     No Known Problems Paternal Aunt     No Known Problems Paternal Aunt     No Known Problems Paternal Aunt     No Known Problems Paternal Aunt     Lung cancer Paternal Uncle 80     I have reviewed and agree with the history as documented      E-Cigarette/Vaping    E-Cigarette Use Never User      E-Cigarette/Vaping Substances    Nicotine No     THC No     CBD No     Flavoring No     Other No     Unknown No      Social History     Tobacco Use    Smoking status: Current Every Day Smoker     Packs/day: 0 25     Years: 10 00     Pack years: 2 50     Types: Cigarettes    Smokeless tobacco: Never Used    Tobacco comment: refused   Vaping Use    Vaping Use: Never used   Substance Use Topics  Alcohol use: Yes     Alcohol/week: 0 0 standard drinks     Comment: social     Drug use: Never       Review of Systems   Eyes: Positive for redness  Negative for pain and visual disturbance  All other systems reviewed and are negative  Physical Exam  Physical Exam  Vitals and nursing note reviewed  Constitutional:       Appearance: Normal appearance  HENT:      Head: Normocephalic  Right Ear: External ear normal       Left Ear: External ear normal       Nose: Nose normal    Eyes:      Extraocular Movements: Extraocular movements intact  Conjunctiva/sclera: Conjunctivae normal       Pupils: Pupils are equal, round, and reactive to light  Comments: No ulcer or abrasion noted   Cardiovascular:      Rate and Rhythm: Normal rate  Pulmonary:      Effort: Pulmonary effort is normal    Abdominal:      General: Abdomen is flat  Musculoskeletal:         General: Normal range of motion  Cervical back: Normal range of motion  Skin:     General: Skin is warm  Capillary Refill: Capillary refill takes less than 2 seconds  Neurological:      General: No focal deficit present  Mental Status: She is alert     Psychiatric:         Mood and Affect: Mood normal          Vital Signs  ED Triage Vitals   Temperature Pulse Respirations Blood Pressure SpO2   06/28/21 1730 06/28/21 1729 06/28/21 1729 06/28/21 1729 06/28/21 1729   98 °F (36 7 °C) 61 18 131/81 97 %      Temp Source Heart Rate Source Patient Position - Orthostatic VS BP Location FiO2 (%)   06/28/21 1730 -- -- -- --   Oral          Pain Score       --                  Vitals:    06/28/21 1729   BP: 131/81   Pulse: 61         Visual Acuity      ED Medications  Medications   fluorescein sodium sterile ophthalmic strip 1 strip (1 strip Both Eyes Given 6/28/21 1815)   tetracaine 0 5 % ophthalmic solution 1 drop (1 drop Both Eyes Given 6/28/21 1815)   erythromycin (ILOTYCIN) 0 5 % ophthalmic ointment 0 5 inch (0 5 inches Both Eyes Given 6/28/21 1843)       Diagnostic Studies  Results Reviewed     None                 No orders to display              Procedures  Procedures         ED Course                                           MDM  Number of Diagnoses or Management Options  Eye pain, bilateral: new and does not require workup  Diagnosis management comments: No visual deficits  No eye pain  No significant redness noted  Does have some photophobia  No ulcer or abrasion noted  Recommended romycin four times per day for 5 days, no contacts for 7 days  Possibly mild keratitis from sun exposure  No labs/imaging indicated emergently  Recommended follow-up with her eye doctor  Risk of Complications, Morbidity, and/or Mortality  Presenting problems: moderate  Diagnostic procedures: low  Management options: moderate    Patient Progress  Patient progress: stable      Disposition  Final diagnoses:   Eye pain, bilateral     Time reflects when diagnosis was documented in both MDM as applicable and the Disposition within this note     Time User Action Codes Description Comment    6/28/2021  6:33 PM Buxton Lint Add [A09 27] Eye pain, bilateral       ED Disposition     ED Disposition Condition Date/Time Comment    Discharge Stable Mon Jun 28, 2021  6:33 PM Maria De Jesus Taet discharge to home/self care              Follow-up Information     Follow up With Specialties Details Why Contact Info Additional Information    Jaqueline Swan MD Family Medicine Go to  As needed [address] [facility]  [phone]       Your eye doctor  Schedule an appointment as soon as possible for a visit in 3 days As needed      [facility] Emergency Medicine Go to  If symptoms worsen [address] [facility] [address] [facility] Emergency Department,  [address], [place], [place], [address]          Discharge Medication List as of 6/28/2021  6:35 PM      START taking these medications    Details   erythromycin (ILOTYCIN) ophthalmic ointment Place a 1/2 inch ribbon of ointment into the lower eyelid of both eyes four times per day for 5 days  , Print         CONTINUE these medications which have NOT CHANGED    Details   albuterol (PROVENTIL HFA,VENTOLIN HFA) 90 mcg/act inhaler Inhale 2 puffs every 6 (six) hours as needed for wheezing or shortness of breath, Starting Sat 1/23/2021, Normal      Calcium Citrate-Vitamin D (CALCET CREAMY BITES) 500-400 MG-UNIT CHEW take 1 tablet by oral route 3 times every day, Historical Med      cetirizine (ZyrTEC) 10 mg tablet Take 1 tablet (10 mg total) by mouth daily as needed for allergies, Starting Thu 6/10/2021, Normal      cyanocobalamin (VITAMIN B-12) 100 mcg tablet every 24 hours, Starting Wed 2/3/2016, Historical Med      Multiple Vitamins-Minerals (MULTIVITAMIN ADULT PO) Take by mouth, Starting Thu 10/12/2017, Historical Med      predniSONE 10 mg tablet Take 4 tabs po daily for 2 days then 3 tab po daily for 2 days then 2 tab po daily for 2 days then 1 tab po daily for 2 days then stop , Normal           No discharge procedures on file      PDMP Review     None          ED Provider  Electronically Signed by           Samra Carson MD  06/28/21 2025

## 2021-06-28 NOTE — Clinical Note
Rosaura Jacobsen was seen and treated in our emergency department on 6/28/2021  Diagnosis:     Eitan Mcgrath  may return to work on return date  She may return on this date: 06/29/2021    Patient has no signs of conjunctivitis or pink eye on her visit to the emergency department on 6/28/21     If you have any questions or concerns, please don't hesitate to call        Jade Carmona MD    ______________________________           _______________          _______________  Hospital Representative                              Date                                Time

## 2021-07-28 ENCOUNTER — HOSPITAL ENCOUNTER (EMERGENCY)
Facility: HOSPITAL | Age: 46
Discharge: HOME | End: 2021-07-28
Attending: EMERGENCY MEDICINE | Admitting: EMERGENCY MEDICINE
Payer: COMMERCIAL

## 2021-07-28 ENCOUNTER — APPOINTMENT (EMERGENCY)
Dept: RADIOLOGY | Facility: HOSPITAL | Age: 46
End: 2021-07-28
Payer: COMMERCIAL

## 2021-07-28 VITALS
BODY MASS INDEX: 33.99 KG/M2 | DIASTOLIC BLOOD PRESSURE: 75 MMHG | HEIGHT: 61 IN | SYSTOLIC BLOOD PRESSURE: 119 MMHG | WEIGHT: 180 LBS | HEART RATE: 67 BPM | RESPIRATION RATE: 18 BRPM | OXYGEN SATURATION: 95 % | TEMPERATURE: 98.4 F

## 2021-07-28 DIAGNOSIS — R07.9 CHEST PAIN IN ADULT: Primary | ICD-10-CM

## 2021-07-28 LAB
ALBUMIN SERPL BCP-MCNC: 4.2 G/DL (ref 3.4–4.8)
ALP SERPL-CCNC: 75.8 U/L (ref 35–140)
ALT SERPL W P-5'-P-CCNC: 21 U/L (ref 5–54)
ANION GAP SERPL CALCULATED.3IONS-SCNC: 8 MMOL/L (ref 4–13)
AST SERPL W P-5'-P-CCNC: 32 U/L (ref 15–41)
BASOPHILS # BLD AUTO: 0.05 THOUSANDS/ΜL (ref 0–0.1)
BASOPHILS NFR BLD AUTO: 1 % (ref 0–1)
BILIRUB SERPL-MCNC: 0.28 MG/DL (ref 0.3–1.2)
BNP SERPL-MCNC: 23.9 PG/ML (ref 1–100)
BUN SERPL-MCNC: 19 MG/DL (ref 6–20)
CALCIUM SERPL-MCNC: 9.4 MG/DL (ref 8.4–10.2)
CHLORIDE SERPL-SCNC: 104 MMOL/L (ref 96–108)
CK SERPL-CCNC: 111.5 U/L (ref 38–234)
CO2 SERPL-SCNC: 25 MMOL/L (ref 22–33)
CREAT SERPL-MCNC: 0.81 MG/DL (ref 0.4–1.1)
D DIMER PPP FEU-MCNC: 0.35 MG/L FEU (ref 0.19–0.49)
EOSINOPHIL # BLD AUTO: 0.09 THOUSAND/ΜL (ref 0–0.61)
EOSINOPHIL NFR BLD AUTO: 1 % (ref 0–6)
ERYTHROCYTE [DISTWIDTH] IN BLOOD BY AUTOMATED COUNT: 14.9 % (ref 11.6–15.1)
GFR SERPL CREATININE-BSD FRML MDRD: 87 ML/MIN/1.73SQ M
GLUCOSE SERPL-MCNC: 84 MG/DL (ref 65–140)
HCG SERPL QL: NEGATIVE
HCT VFR BLD AUTO: 36.8 % (ref 34.8–46.1)
HGB BLD-MCNC: 11.8 G/DL (ref 11.5–15.4)
IMM GRANULOCYTES # BLD AUTO: 0.01 THOUSAND/UL (ref 0–0.2)
IMM GRANULOCYTES NFR BLD AUTO: 0 % (ref 0–2)
LYMPHOCYTES # BLD AUTO: 2.66 THOUSANDS/ΜL (ref 0.6–4.47)
LYMPHOCYTES NFR BLD AUTO: 26 % (ref 14–44)
MAGNESIUM SERPL-MCNC: 2 MG/DL (ref 1.6–2.6)
MCH RBC QN AUTO: 26.3 PG (ref 26.8–34.3)
MCHC RBC AUTO-ENTMCNC: 32.1 G/DL (ref 31.4–37.4)
MCV RBC AUTO: 82 FL (ref 82–98)
MONOCYTES # BLD AUTO: 0.73 THOUSAND/ΜL (ref 0.17–1.22)
MONOCYTES NFR BLD AUTO: 7 % (ref 4–12)
NEUTROPHILS # BLD AUTO: 6.58 THOUSANDS/ΜL (ref 1.85–7.62)
NEUTS SEG NFR BLD AUTO: 65 % (ref 43–75)
PLATELET # BLD AUTO: 364 THOUSANDS/UL (ref 149–390)
PMV BLD AUTO: 9.7 FL (ref 8.9–12.7)
POTASSIUM SERPL-SCNC: 4.6 MMOL/L (ref 3.5–5)
PROT SERPL-MCNC: 7.6 G/DL (ref 6.4–8.3)
RBC # BLD AUTO: 4.49 MILLION/UL (ref 3.81–5.12)
SODIUM SERPL-SCNC: 137 MMOL/L (ref 133–145)
TROPONIN I SERPL-MCNC: <0.03 NG/ML (ref 0–0.07)
WBC # BLD AUTO: 10.12 THOUSAND/UL (ref 4.31–10.16)

## 2021-07-28 PROCEDURE — 84703 CHORIONIC GONADOTROPIN ASSAY: CPT | Performed by: EMERGENCY MEDICINE

## 2021-07-28 PROCEDURE — 84484 ASSAY OF TROPONIN QUANT: CPT | Performed by: EMERGENCY MEDICINE

## 2021-07-28 PROCEDURE — 83880 ASSAY OF NATRIURETIC PEPTIDE: CPT | Performed by: EMERGENCY MEDICINE

## 2021-07-28 PROCEDURE — 93005 ELECTROCARDIOGRAM TRACING: CPT

## 2021-07-28 PROCEDURE — 99285 EMERGENCY DEPT VISIT HI MDM: CPT | Performed by: EMERGENCY MEDICINE

## 2021-07-28 PROCEDURE — 83735 ASSAY OF MAGNESIUM: CPT | Performed by: EMERGENCY MEDICINE

## 2021-07-28 PROCEDURE — 85379 FIBRIN DEGRADATION QUANT: CPT | Performed by: EMERGENCY MEDICINE

## 2021-07-28 PROCEDURE — 71045 X-RAY EXAM CHEST 1 VIEW: CPT

## 2021-07-28 PROCEDURE — 96374 THER/PROPH/DIAG INJ IV PUSH: CPT

## 2021-07-28 PROCEDURE — 99285 EMERGENCY DEPT VISIT HI MDM: CPT

## 2021-07-28 PROCEDURE — 85025 COMPLETE CBC W/AUTO DIFF WBC: CPT | Performed by: EMERGENCY MEDICINE

## 2021-07-28 PROCEDURE — 80053 COMPREHEN METABOLIC PANEL: CPT | Performed by: EMERGENCY MEDICINE

## 2021-07-28 PROCEDURE — 96361 HYDRATE IV INFUSION ADD-ON: CPT

## 2021-07-28 PROCEDURE — 82550 ASSAY OF CK (CPK): CPT | Performed by: EMERGENCY MEDICINE

## 2021-07-28 PROCEDURE — 36415 COLL VENOUS BLD VENIPUNCTURE: CPT | Performed by: EMERGENCY MEDICINE

## 2021-07-28 RX ORDER — KETOROLAC TROMETHAMINE 30 MG/ML
15 INJECTION, SOLUTION INTRAMUSCULAR; INTRAVENOUS ONCE
Status: COMPLETED | OUTPATIENT
Start: 2021-07-28 | End: 2021-07-28

## 2021-07-28 RX ORDER — ACETAMINOPHEN 325 MG/1
650 TABLET ORAL ONCE
Status: COMPLETED | OUTPATIENT
Start: 2021-07-28 | End: 2021-07-28

## 2021-07-28 RX ADMIN — KETOROLAC TROMETHAMINE 15 MG: 30 INJECTION, SOLUTION INTRAMUSCULAR at 20:40

## 2021-07-28 RX ADMIN — ACETAMINOPHEN 650 MG: 325 TABLET, FILM COATED ORAL at 19:55

## 2021-07-28 RX ADMIN — SODIUM CHLORIDE 1000 ML: 0.9 INJECTION, SOLUTION INTRAVENOUS at 19:56

## 2021-07-28 NOTE — ED PROVIDER NOTES
History  Chief Complaint   Patient presents with    Chest Pain     Since last night, feels like stabbing pain  Radiates to shoulder  Pt lifts weights daily, thought it could be related   Leg Pain     L leg heaviness  Patient is a 59-year-old female seen in the emergency department with concern for stabbing left-sided chest pain which began approximately 1 day prior to evaluation  Patient notes constant sharp/stabbing pain, radiating to the left shoulder  Patient states that she did not take any medication for pain control prior to arrival in the emergency department  Patient also notes a restless feeling/heavy feeling in her left leg  Patient also shortness of breath, cough, fever, headache, nausea, vomiting, weakness, numbness, tingling  Patient states that she has been lifting weights, and is concerned that she might have the pain due to lifting weights  Prior to Admission Medications   Prescriptions Last Dose Informant Patient Reported? Taking? Calcium Citrate-Vitamin D (CALCET CREAMY BITES) 500-400 MG-UNIT CHEW  Self Yes No   Sig: take 1 tablet by oral route 3 times every day   Multiple Vitamins-Minerals (MULTIVITAMIN ADULT PO)  Self Yes No   Sig: Take by mouth   albuterol (PROVENTIL HFA,VENTOLIN HFA) 90 mcg/act inhaler  Self No No   Sig: Inhale 2 puffs every 6 (six) hours as needed for wheezing or shortness of breath   cetirizine (ZyrTEC) 10 mg tablet   No No   Sig: Take 1 tablet (10 mg total) by mouth daily as needed for allergies   cyanocobalamin (VITAMIN B-12) 100 mcg tablet  Self Yes No   Sig: every 24 hours   erythromycin (ILOTYCIN) ophthalmic ointment   No No   Sig: Place a 1/2 inch ribbon of ointment into the lower eyelid of both eyes four times per day for 5 days  predniSONE 10 mg tablet   No No   Sig: Take 4 tabs po daily for 2 days then 3 tab po daily for 2 days then 2 tab po daily for 2 days then 1 tab po daily for 2 days then stop     Patient not taking: Reported on 2021      Facility-Administered Medications: None       Past Medical History:   Diagnosis Date    Abnormal Pap smear of cervix     2017    Anemia     Anxiety     Asthma     Diabetes mellitus ([facility] [address] )     History of transfusion     HPV (human papilloma virus) infection     2017    Obesity     Varicella        Past Surgical History:   Procedure Laterality Date    BREAST BIOPSY Right 2012    u/s core bx    BREAST SURGERY      Incisional breast biopsy     SECTION      And     GASTRIC BYPASS  2016    DE HYSTEROSCOPY,W/ENDO BX N/A 2021    Procedure: DILATATION AND CURETTAGE (D&C) WITH HYSTEROSCOPY (MYOSURE);   Surgeon: Jammie Meyers DO;  [facility];  Service: Gynecology    DE HYSTEROSCOPY,W/ENDO BX N/A 2021    Procedure: POLYPECTOMY;  Surgeon: Jammie Meyers DO;  [facility];  Service: Gynecology    TUBAL LIGATION      US GUIDED BREAST BIOPSY RIGHT COMPLETE Right 2019       Family History   Problem Relation Age of Onset    Diabetes Mother     Hypertension Mother     Cancer Mother     Arthritis Mother     Leukemia Mother     Hyperlipidemia Mother     Diabetes Father     Hypertension Father     Heart disease Father     Coronary artery disease Father     Hyperlipidemia Father     Stroke Paternal Grandfather     Thyroid cancer Sister 37    Hypertension Brother     Breast cancer Maternal Aunt 48    Breast cancer Paternal Aunt 79    No Known Problems Maternal Grandmother     No Known Problems Maternal Grandfather     No Known Problems Paternal Grandmother     No Known Problems Son     No Known Problems Son     No Known Problems Maternal Aunt     No Known Problems Maternal Aunt     No Known Problems Maternal Aunt     No Known Problems Maternal Aunt     No Known Problems Maternal Aunt     Cervical cancer Paternal Aunt 36    No Known Problems Paternal Aunt     No Known Problems Paternal Aunt     No Known Problems Paternal Aunt     No Known Problems Paternal Aunt     No Known Problems Paternal Aunt     Lung cancer Paternal Uncle 80     I have reviewed and agree with the history as documented  E-Cigarette/Vaping    E-Cigarette Use Never User      E-Cigarette/Vaping Substances    Nicotine No     THC No     CBD No     Flavoring No     Other No     Unknown No      Social History     Tobacco Use    Smoking status: Current Every Day Smoker     Packs/day: 0 50     Years: 10 00     Pack years: 5 00     Types: Cigarettes    Smokeless tobacco: Never Used    Tobacco comment: refused   Vaping Use    Vaping Use: Never used   Substance Use Topics    Alcohol use: Yes     Alcohol/week: 0 0 standard drinks     Comment: social     Drug use: Never       Review of Systems   Constitutional: Negative for chills and fever  HENT: Negative for ear pain and sore throat  Eyes: Negative for pain and visual disturbance  Respiratory: Negative for cough and shortness of breath  Cardiovascular: Positive for chest pain  Negative for palpitations  Gastrointestinal: Negative for abdominal pain, nausea and vomiting  Genitourinary: Negative for dysuria and hematuria  Musculoskeletal: Negative for arthralgias and back pain  Left leg discomfort   Skin: Negative for color change and rash  Neurological: Negative for seizures and syncope  All other systems reviewed and are negative  Physical Exam  Physical Exam  Vitals and nursing note reviewed  Constitutional:       General: She is not in acute distress  Appearance: She is well-developed  HENT:      Head: Normocephalic and atraumatic  Right Ear: External ear normal       Left Ear: External ear normal       Nose: Nose normal       Mouth/Throat:      Pharynx: Oropharynx is clear  Eyes:      General: No scleral icterus  Conjunctiva/sclera: Conjunctivae normal    Cardiovascular:      Rate and Rhythm: Normal rate and regular rhythm        Heart sounds: No murmur heard  Pulmonary:      Effort: Pulmonary effort is normal  No respiratory distress  Breath sounds: Normal breath sounds  Abdominal:      Palpations: Abdomen is soft  Tenderness: There is no abdominal tenderness  Musculoskeletal:         General: No swelling, tenderness, deformity or signs of injury  Cervical back: Normal range of motion and neck supple  Skin:     General: Skin is warm and dry  Neurological:      General: No focal deficit present  Mental Status: She is alert and oriented to person, place, and time  Cranial Nerves: No cranial nerve deficit  Sensory: No sensory deficit  Motor: No weakness        Comments: Normal strength in extremities   Psychiatric:         Mood and Affect: Mood normal          Behavior: Behavior normal          Vital Signs  ED Triage Vitals   Temperature Pulse Respirations Blood Pressure SpO2   07/28/21 1932 07/28/21 1932 07/28/21 1932 07/28/21 1932 07/28/21 1932   98 4 °F (36 9 °C) 70 18 (!) 93/48 99 %      Temp src Heart Rate Source Patient Position - Orthostatic VS BP Location FiO2 (%)   -- 07/28/21 1959 07/28/21 1959 07/28/21 1959 --    Monitor Lying Right arm       Pain Score       07/28/21 1932       8           Vitals:    07/28/21 1932 07/28/21 1959   BP: (!) 93/48 119/75   Pulse: 70 67   Patient Position - Orthostatic VS:  Lying         Visual Acuity      ED Medications  Medications   sodium chloride 0 9 % bolus 1,000 mL (0 mL Intravenous Stopped 7/28/21 2140)   acetaminophen (TYLENOL) tablet 650 mg (650 mg Oral Given 7/28/21 1955)   ketorolac (TORADOL) injection 15 mg (15 mg Intravenous Given 7/28/21 2040)       Diagnostic Studies  Results Reviewed     Procedure Component Value Units Date/Time    CK (with reflex to MB) [[id number]]  (Normal) Collected: 07/28/21 1948    Lab Status: Final result Specimen: Blood from Arm, Left Updated: 07/28/21 2131     Total  5 U/L     B-Type Natriuretic Peptide ([facility] ONLY) [[id number]]  (Normal) Collected: 07/28/21 1948    Lab Status: Final result Specimen: Blood from Arm, Left Updated: 07/28/21 2027     BNP 23 9 pg/mL     hCG, qualitative pregnancy [[id number]]  (Normal) Collected: 07/28/21 1948    Lab Status: Final result Specimen: Blood from Arm, Left Updated: 07/28/21 2019     Preg, Serum Negative    Comprehensive metabolic panel [[id number]]  (Abnormal) Collected: 07/28/21 1948    Lab Status: Final result Specimen: Blood from Arm, Left Updated: 07/28/21 2017     Sodium 137 mmol/L      Potassium 4 6 mmol/L      Chloride 104 mmol/L      CO2 25 mmol/L      ANION GAP 8 mmol/L      BUN 19 mg/dL      Creatinine 0 81 mg/dL      Glucose 84 mg/dL      Calcium 9 4 mg/dL      AST 32 U/L      ALT 21 U/L      Alkaline Phosphatase 75 8 U/L      Total Protein 7 6 g/dL      Albumin 4 2 g/dL      Total Bilirubin 0 28 mg/dL      eGFR 87 ml/min/1 73sq m     Narrative:      [place] guidelines for Chronic Kidney Disease (CKD):     Stage 1 with normal or high GFR (GFR > 90 mL/min/1 73 square meters)    Stage 2 Mild CKD (GFR = 60-89 mL/min/1 73 square meters)    Stage 3A Moderate CKD (GFR = 45-59 mL/min/1 73 square meters)    Stage 3B Moderate CKD (GFR = 30-44 mL/min/1 73 square meters)    Stage 4 Severe CKD (GFR = 15-29 mL/min/1 73 square meters)    Stage 5 End Stage CKD (GFR <15 mL/min/1 73 square meters)  Note: GFR calculation is accurate only with a steady state creatinine    Magnesium [[id number]]  (Normal) Collected: 07/28/21 1948    Lab Status: Final result Specimen: Blood from Arm, Left Updated: 07/28/21 2017     Magnesium 2 0 mg/dL     Troponin I [[id number]]  (Normal) Collected: 07/28/21 1948    Lab Status: Final result Specimen: Blood from Arm, Left Updated: 07/28/21 2014     Troponin I <0 03 ng/mL     D-dimer, quantitative [[id number]]  (Normal) Collected: 07/28/21 1953    Lab Status: Final result Specimen: Blood from Arm, Left Updated: 07/28/21 2014     D-Dimer, Quant  0 35 mg/L FEU     CBC and differential [[id number]]  (Abnormal) Collected: 07/28/21 1948    Lab Status: Final result Specimen: Blood from Arm, Left Updated: 07/28/21 2007     WBC 10 12 Thousand/uL      RBC 4 49 Million/uL      Hemoglobin 11 8 g/dL      Hematocrit 36 8 %      MCV 82 fL      MCH 26 3 pg      MCHC 32 1 g/dL      RDW 14 9 %      MPV 9 7 fL      Platelets 440 Thousands/uL      Neutrophils Relative 65 %      Immat GRANS % 0 %      Lymphocytes Relative 26 %      Monocytes Relative 7 %      Eosinophils Relative 1 %      Basophils Relative 1 %      Neutrophils Absolute 6 58 Thousands/µL      Immature Grans Absolute 0 01 Thousand/uL      Lymphocytes Absolute 2 66 Thousands/µL      Monocytes Absolute 0 73 Thousand/µL      Eosinophils Absolute 0 09 Thousand/µL      Basophils Absolute 0 05 Thousands/µL     POCT pregnancy, urine [[id number]]     Lab Status: No result                  XR chest 1 view portable   ED Interpretation by Verónica Quinn MD (07/28 2043)   No infiltrate or pneumothorax                 Procedures  ECG 12 Lead Documentation Only    Date/Time: 7/28/2021 7:46 PM  Performed by: Verónica Quinn MD  Authorized by: Verónica Quinn MD     Indications / Diagnosis:  Chest pain  ECG reviewed by me, the ED Provider: yes    Patient location:  ED  Interpretation:     Interpretation: normal    Rate:     ECG rate:  63    ECG rate assessment: normal    Rhythm:     Rhythm: sinus rhythm    QRS:     QRS axis:  Normal  ST segments:     ST segments:  Normal  T waves:     T waves: normal    Comments:      Sinus rhythm at 63, normal axis, , QRS 82, , no ST-T wave changes, no evidence of acute ischemia             ED Course                             SBIRT 20yo+      Most Recent Value   SBIRT (23 yo +)   In order to provide better care to our patients, we are screening all of our patients for alcohol and drug use  Would it be okay to ask you these screening questions?   No Filed at: 07/28/2021 1951                    [facility]  Number of Diagnoses or Management Options  Chest pain in adult  Diagnosis management comments: Patient is a 43-year-old female seen in the emergency department with concern for left-sided chest pain  EKG was obtained and noted  Patient was treated with medication for symptom control, with good effect  Chest x-ray shows no infiltrate or pneumothorax  Laboratory evaluation remarkable for troponin and D-dimer within normal limits  No definite cause of the patient's symptoms was discovered  Evaluation is not consistent with ACS, PE, pneumothorax, pneumonia, or rhabdomyolysis  Patient's symptoms might be due to muscle strain/musculoskeletal pain  Plan to have patient follow up with PCP  Patient stable for discharge home  Discharge instructions were reviewed with patient  Amount and/or Complexity of Data Reviewed  Clinical lab tests: ordered and reviewed  Tests in the radiology section of CPT®: ordered and reviewed  Tests in the medicine section of CPT®: ordered and reviewed        Disposition  Final diagnoses:   Chest pain in adult     Time reflects when diagnosis was documented in both MDM as applicable and the Disposition within this note     Time User Action Codes Description Comment    7/28/2021  7:45 PM Thaddeus Frances Add [R07 9] Chest pain in adult       ED Disposition     ED Disposition Condition Date/Time Comment    Discharge Stable Wed Jul 28, 2021  9:32 PM Clayton Walter discharge to home/self care              Follow-up Information     Follow up With Specialties Details Why Contact Info    Tabby Johnson MD Family Medicine Call in 1 day  [address]  [phone]            Discharge Medication List as of 7/28/2021  9:32 PM      CONTINUE these medications which have NOT CHANGED    Details   albuterol (PROVENTIL HFA,VENTOLIN HFA) 90 mcg/act inhaler Inhale 2 puffs every 6 (six) hours as needed for wheezing or shortness of breath, Starting Sat 1/23/2021, Normal      Calcium Citrate-Vitamin D (CALCET CREAMY BITES) 500-400 MG-UNIT CHEW take 1 tablet by oral route 3 times every day, Historical Med      cetirizine (ZyrTEC) 10 mg tablet Take 1 tablet (10 mg total) by mouth daily as needed for allergies, Starting Thu 6/10/2021, Normal      cyanocobalamin (VITAMIN B-12) 100 mcg tablet every 24 hours, Starting Wed 2/3/2016, Historical Med      erythromycin (ILOTYCIN) ophthalmic ointment Place a 1/2 inch ribbon of ointment into the lower eyelid of both eyes four times per day for 5 days  , Print      Multiple Vitamins-Minerals (MULTIVITAMIN ADULT PO) Take by mouth, Starting Thu 10/12/2017, Historical Med      predniSONE 10 mg tablet Take 4 tabs po daily for 2 days then 3 tab po daily for 2 days then 2 tab po daily for 2 days then 1 tab po daily for 2 days then stop , Normal           No discharge procedures on file      PDMP Review     None          ED Provider  Electronically Signed by           Sarahann Cranker, MD  07/28/21 6461

## 2021-07-28 NOTE — DISCHARGE INSTRUCTIONS
Troponin and d-dimer were within normal limits  Follow up with your primary doctor, and return to the emergency department for new or worsening symptoms

## 2021-07-29 LAB
ATRIAL RATE: 63 BPM
P AXIS: 31 DEGREES
PR INTERVAL: 134 MS
QRS AXIS: 9 DEGREES
QRSD INTERVAL: 82 MS
QT INTERVAL: 402 MS
QTC INTERVAL: 412 MS
T WAVE AXIS: 29 DEGREES
VENTRICULAR RATE: 63 BPM

## 2021-07-29 PROCEDURE — 93010 ELECTROCARDIOGRAM REPORT: CPT | Performed by: INTERNAL MEDICINE

## 2021-08-27 ENCOUNTER — HOSPITAL ENCOUNTER (EMERGENCY)
Facility: HOSPITAL | Age: 46
Discharge: HOME | End: 2021-08-27
Attending: EMERGENCY MEDICINE
Payer: COMMERCIAL

## 2021-08-27 VITALS
OXYGEN SATURATION: 95 % | HEART RATE: 65 BPM | SYSTOLIC BLOOD PRESSURE: 159 MMHG | DIASTOLIC BLOOD PRESSURE: 66 MMHG | TEMPERATURE: 98.2 F | RESPIRATION RATE: 18 BRPM

## 2021-08-27 DIAGNOSIS — H04.123 DRY EYE SYNDROME, BILATERAL: Primary | ICD-10-CM

## 2021-08-27 DIAGNOSIS — F41.9 ANXIETY: ICD-10-CM

## 2021-08-27 PROCEDURE — 99283 EMERGENCY DEPT VISIT LOW MDM: CPT

## 2021-08-27 PROCEDURE — 99282 EMERGENCY DEPT VISIT SF MDM: CPT | Performed by: EMERGENCY MEDICINE

## 2021-08-27 RX ORDER — MINERAL OIL AND PETROLATUM 150; 830 MG/G; MG/G
OINTMENT OPHTHALMIC
Qty: 3.5 G | Refills: 0 | Status: SHIPPED | OUTPATIENT
Start: 2021-08-27 | End: 2022-03-25 | Stop reason: HOSPADM

## 2021-08-27 NOTE — ED PROVIDER NOTES
History  Chief Complaint   Patient presents with    Dry Eye     Pt reports bilateral eye dryness x several months  Seen by eye doctor previously for same  History provided by:  Patient  Eye Pain  Location:  Bilateral eyes  Quality:  Dry  Severity:  Moderate  Onset quality:  Gradual  Duration:  4 months  Timing:  Intermittent  Progression:  Waxing and waning  Chronicity:  New  Context:  Worse recently, has been to eye doctor who told dry eye, was using drops but stopped  has been under alot of stress  Relieved by:  Nothing  Worsened by:  Being in the sun  Ineffective treatments:  None tried recently  Associated symptoms: no chest pain, no fever, no headaches, no rash and no shortness of breath        Prior to Admission Medications   Prescriptions Last Dose Informant Patient Reported? Taking? Calcium Citrate-Vitamin D (CALCET CREAMY BITES) 500-400 MG-UNIT CHEW  Self Yes No   Sig: take 1 tablet by oral route 3 times every day   Multiple Vitamins-Minerals (MULTIVITAMIN ADULT PO)  Self Yes No   Sig: Take by mouth   albuterol (PROVENTIL HFA,VENTOLIN HFA) 90 mcg/act inhaler  Self No No   Sig: Inhale 2 puffs every 6 (six) hours as needed for wheezing or shortness of breath   cetirizine (ZyrTEC) 10 mg tablet   No No   Sig: Take 1 tablet (10 mg total) by mouth daily as needed for allergies   cyanocobalamin (VITAMIN B-12) 100 mcg tablet  Self Yes No   Sig: every 24 hours   erythromycin (ILOTYCIN) ophthalmic ointment   No No   Sig: Place a 1/2 inch ribbon of ointment into the lower eyelid of both eyes four times per day for 5 days  predniSONE 10 mg tablet   No No   Sig: Take 4 tabs po daily for 2 days then 3 tab po daily for 2 days then 2 tab po daily for 2 days then 1 tab po daily for 2 days then stop     Patient not taking: Reported on 6/28/2021      Facility-Administered Medications: None       Past Medical History:   Diagnosis Date    Abnormal Pap smear of cervix     2017    Anemia     Anxiety     Asthma  Diabetes mellitus ([facility] [address]     History of transfusion 2018    HPV (human papilloma virus) infection     2017    Obesity     Varicella        Past Surgical History:   Procedure Laterality Date    BREAST BIOPSY Right 2012    u/s core bx    BREAST SURGERY      Incisional breast biopsy     SECTION      And     GASTRIC BYPASS  2016    [place] HYSTEROSCOPY,W/ENDO BX N/A 2021    Procedure: DILATATION AND CURETTAGE (D&C) WITH HYSTEROSCOPY (MYOSURE);   Surgeon: Kiran Rojas DO;  [facility];  Service: Gynecology    [place] HYSTEROSCOPY,W/ENDO BX N/A 2021    Procedure: POLYPECTOMY;  Surgeon: Kiran Rojas DO;  [facility];  Service: Gynecology    TUBAL LIGATION      US GUIDED BREAST BIOPSY RIGHT COMPLETE Right 2019       Family History   Problem Relation Age of Onset    Diabetes Mother     Hypertension Mother     Cancer Mother     Arthritis Mother     Leukemia Mother     Hyperlipidemia Mother     Diabetes Father     Hypertension Father     Heart disease Father     Coronary artery disease Father     Hyperlipidemia Father     Stroke Paternal Grandfather     Thyroid cancer Sister 37    Hypertension Brother     Breast cancer Maternal Aunt 48    Breast cancer Paternal Aunt 79    No Known Problems Maternal Grandmother     No Known Problems Maternal Grandfather     No Known Problems Paternal Grandmother     No Known Problems Son     No Known Problems Son     No Known Problems Maternal Aunt     No Known Problems Maternal Aunt     No Known Problems Maternal Aunt     No Known Problems Maternal Aunt     No Known Problems Maternal Aunt     Cervical cancer Paternal Aunt 36    No Known Problems Paternal Aunt     No Known Problems Paternal Aunt     No Known Problems Paternal Aunt     No Known Problems Paternal Aunt     No Known Problems Paternal Aunt     Lung cancer Paternal Uncle 80     I have reviewed and agree with the history as documented  E-Cigarette/Vaping    E-Cigarette Use Never User      E-Cigarette/Vaping Substances    Nicotine No     THC No     CBD No     Flavoring No     Other No     Unknown No      Social History     Tobacco Use    Smoking status: Current Every Day Smoker     Packs/day: 0 50     Years: 10 00     Pack years: 5 00     Types: Cigarettes    Smokeless tobacco: Never Used    Tobacco comment: refused   Vaping Use    Vaping Use: Never used   Substance Use Topics    Alcohol use: Yes     Alcohol/week: 0 0 standard drinks     Comment: social     Drug use: Never       Review of Systems   Constitutional: Negative for fever  Eyes: Positive for pain  Respiratory: Negative for chest tightness and shortness of breath  Cardiovascular: Negative for chest pain  Skin: Negative for rash  Neurological: Negative for dizziness, light-headedness and headaches  Physical Exam  Physical Exam  Vitals reviewed  Constitutional:       Appearance: She is well-developed  HENT:      Head: Atraumatic  Eyes:      General: No scleral icterus  Right eye: No discharge  Left eye: No discharge  Conjunctiva/sclera: Conjunctivae normal    Neck:      Trachea: No tracheal deviation  Pulmonary:      Effort: Pulmonary effort is normal  No respiratory distress  Breath sounds: No stridor  Musculoskeletal:         General: No deformity  Cervical back: Neck supple  Skin:     General: Skin is warm and dry  Coloration: Skin is not pale  Findings: No erythema or rash  Neurological:      Mental Status: She is alert  Motor: No abnormal muscle tone  Coordination: Coordination normal    Psychiatric:         Mood and Affect: Mood is anxious           Vital Signs  ED Triage Vitals [08/27/21 1754]   Temperature Pulse Respirations Blood Pressure SpO2   98 2 °F (36 8 °C) 65 18 159/66 95 %      Temp Source Heart Rate Source Patient Position - Orthostatic VS BP Location FiO2 (%) Oral Monitor -- -- --      Pain Score       --           Vitals:    08/27/21 1754   BP: 159/66   Pulse: 65         Visual Acuity      ED Medications  Medications - No data to display    Diagnostic Studies  Results Reviewed     None                 No orders to display              Procedures  Procedures         ED Course                                           MDM  Number of Diagnoses or Management Options  Anxiety: new and requires workup  Dry eye syndrome, bilateral: new and requires workup  Diagnosis management comments: Normal appearing eyes, possible due to dry eye or anxiety, will dc with eye ointment for lubrication        Amount and/or Complexity of Data Reviewed  Decide to obtain previous medical records or to obtain history from someone other than the patient: yes (pdmp  )  Review and summarize past medical records: yes        Disposition  Final diagnoses:   Dry eye syndrome, bilateral   Anxiety     Time reflects when diagnosis was documented in both MDM as applicable and the Disposition within this note     Time User Action Codes Description Comment    8/27/2021  6:48 PM Verle  Add [O13 711] Dry eye syndrome, bilateral     8/27/2021  6:51 PM Verle  Add [F41 9] Anxiety       ED Disposition     ED Disposition Condition Date/Time Comment    Discharge Stable Fri Aug 27, 2021  6:48 PM Juan Pablo Avalos discharge to home/self care  Follow-up Information     Follow up With Specialties Details Why Ainta Brunson MD Family Medicine Go to  If symptoms worsen [address]  [phone]            Patient's Medications   Discharge Prescriptions    ARTIFICIAL TEAR ([address] ) 83-15 % OPHTHALMIC OINTMENT    Administer to the right eye daily at bedtime as needed (dry eye)       Start Date: 8/27/2021 End Date: --       Order Dose: --       Quantity: 3 5 g    Refills: 0     No discharge procedures on file      PDMP Review       Value Time User PDMP Reviewed  Yes 8/27/2021  6:52 PM Khadijah Obregon DO          ED Provider  Electronically Signed by           Khadijah Obregon DO  08/27/21 6944

## 2021-09-07 DIAGNOSIS — Z11.52 ENCOUNTER FOR SCREENING FOR COVID-19: Primary | ICD-10-CM

## 2021-09-07 PROCEDURE — U0005 INFEC AGEN DETEC AMPLI PROBE: HCPCS | Performed by: FAMILY MEDICINE

## 2021-09-07 PROCEDURE — U0003 INFECTIOUS AGENT DETECTION BY NUCLEIC ACID (DNA OR RNA); SEVERE ACUTE RESPIRATORY SYNDROME CORONAVIRUS 2 (SARS-COV-2) (CORONAVIRUS DISEASE [COVID-19]), AMPLIFIED PROBE TECHNIQUE, MAKING USE OF HIGH THROUGHPUT TECHNOLOGIES AS DESCRIBED BY CMS-2020-01-R: HCPCS | Performed by: FAMILY MEDICINE

## 2021-09-16 ENCOUNTER — OFFICE VISIT (OUTPATIENT)
Dept: FAMILY MEDICINE CLINIC | Facility: OTHER | Age: 46
End: 2021-09-16
Payer: COMMERCIAL

## 2021-09-16 DIAGNOSIS — R68.2 DRY MOUTH: ICD-10-CM

## 2021-09-16 DIAGNOSIS — R23.8 EASY BRUISING: ICD-10-CM

## 2021-09-16 DIAGNOSIS — H04.129 DRY EYE: Primary | ICD-10-CM

## 2021-09-16 DIAGNOSIS — R53.83 FATIGUE, UNSPECIFIED TYPE: ICD-10-CM

## 2021-09-16 PROCEDURE — 99213 OFFICE O/P EST LOW 20 MIN: CPT | Performed by: FAMILY MEDICINE

## 2021-09-16 NOTE — PROGRESS NOTES
Virtual Regular Visit    Verification of patient location:    Patient is located in the following state in which I hold an active license [place]      Assessment/Plan:    Problem List Items Addressed This Visit     None      Visit Diagnoses     Dry eye    -  Primary    Relevant Orders    HUMBERTO Screen w/ Reflex to Titer/Pattern    Sedimentation rate, automated (Completed)    Dry mouth        Relevant Orders    HUMBERTO Screen w/ Reflex to Titer/Pattern    Sedimentation rate, automated (Completed)    Fatigue, unspecified type        Relevant Orders    TSH, 3rd generation with Free T4 reflex (Completed)    CBC and differential (Completed)    Comprehensive metabolic panel (Completed)    Easy bruising        Relevant Orders    Protime-INR (Completed)    APTT (Completed)    CBC and differential (Completed)        Some concern for Sjogren's (or other autoimmune condition) given patient's complaints of dry eye, dry mouth, joint pain and fatigue  Will check labs as listed above  If lab results unrevealing, will likely refer to Rheumatology  Return in about 2 weeks (around 9/30/2021)  The patient indicates understanding of these issues and agrees with the plan  Reason for visit is   Chief Complaint   Patient presents with    Joint Pain    Dry Eye    Dry mouth    Virtual Regular Visit        Encounter provider Neelima Lozada MD    Provider located at [address] [id number]      Recent Visits  Date Type Provider Dept   09/16/21 Office Visit Neelima Lozada MD [facility]   Showing recent visits within past 7 days and meeting all other requirements  Future Appointments  No visits were found meeting these conditions  Showing future appointments within next 150 days and meeting all other requirements       The patient was identified by name and date of birth   Sara Gabriel was informed that this is a telemedicine visit and that the visit is being conducted through [employer] and patient was informed that this is a secure, HIPAA-compliant platform  She agrees to proceed     My office door was closed  No one else was in the room  She acknowledged consent and understanding of privacy and security of the video platform  The patient has agreed to participate and understands they can discontinue the visit at any time  Patient is aware this is a billable service  Subjective  Elizabeth Vargas is a 55 y o  female       HPI   Patient states that she has been having dry eyes for the past 4 months   Opthalomolgy saw patient and did dilated eye exam and was normal- patient was prescribed Lubriderm eye drops  Patient states she recently began having joint pain throughout and dry mouth  States she has been using Biotene mouth wash with no improvement in dry mouth  Patient  Has also noticed she is beginning to bruise easier and is having red rashs on different places of her body- states the rashes aren't itchy or painful- currently no active rash  Patient states she is experiencing more migraine headaches than normal  She reports she if more fatigued than normal and also states she is gaining weight despite not eating much due to loss of appetite  She denies any fever, chills, nausea, vomiting constipation or diarrhea  She denies cold and heat intolerance  Patient denies family history of autoimmune conditions         Past Medical History:   Diagnosis Date    Abnormal Pap smear of cervix     2017    Anemia     Anxiety     Asthma     Diabetes mellitus ([facility] [address] )     History of transfusion 2018    HPV (human papilloma virus) infection     2017    Obesity     Varicella        Past Surgical History:   Procedure Laterality Date    BREAST BIOPSY Right     u/s core bx    BREAST SURGERY      Incisional breast biopsy     SECTION      And     GASTRIC BYPASS  2016    VT HYSTEROSCOPY,W/ENDO BX N/A 2021    Procedure: DILATATION AND CURETTAGE (D&C) WITH HYSTEROSCOPY (MYOSURE); Surgeon: Saroj Ortiz DO;  [facility];  Service: Gynecology    AL HYSTEROSCOPY,W/ENDO BX N/A 2/16/2021    Procedure: POLYPECTOMY;  Surgeon: Saroj Ortiz DO;  [facility];  Service: Gynecology    TUBAL LIGATION  2009    US GUIDED BREAST BIOPSY RIGHT COMPLETE Right 8/23/2019       Current Outpatient Medications   Medication Sig Dispense Refill    albuterol (PROVENTIL HFA,VENTOLIN HFA) 90 mcg/act inhaler Inhale 2 puffs every 6 (six) hours as needed for wheezing or shortness of breath 1 Inhaler 1    artificial tear (LUBRIFRESH P M ) 83-15 % ophthalmic ointment Administer to the right eye daily at bedtime as needed (dry eye) 3 5 g 0    Calcium Citrate-Vitamin D (CALCET CREAMY BITES) 500-400 MG-UNIT CHEW take 1 tablet by oral route 3 times every day      cetirizine (ZyrTEC) 10 mg tablet Take 1 tablet (10 mg total) by mouth daily as needed for allergies 30 tablet 0    cyanocobalamin (VITAMIN B-12) 100 mcg tablet every 24 hours      erythromycin (ILOTYCIN) ophthalmic ointment Place a 1/2 inch ribbon of ointment into the lower eyelid of both eyes four times per day for 5 days  3 5 g 1    Multiple Vitamins-Minerals (MULTIVITAMIN ADULT PO) Take by mouth       No current facility-administered medications for this visit  No Known Allergies    Review of Systems   Constitutional: Positive for appetite change and fatigue  Eyes: Negative for visual disturbance  Respiratory: Negative for cough and shortness of breath  Cardiovascular: Negative for chest pain and palpitations  Gastrointestinal: Negative for constipation, diarrhea, nausea and vomiting  Endocrine: Negative for cold intolerance and heat intolerance  Genitourinary: Negative for difficulty urinating and dysuria  Musculoskeletal: Positive for arthralgias  Neurological: Positive for headaches  Video Exam    There were no vitals filed for this visit      Physical Exam  Constitutional:       General: She is not in acute distress  Appearance: Normal appearance  She is well-developed  She is not ill-appearing, toxic-appearing or diaphoretic  Eyes:      General: No scleral icterus  Right eye: No discharge  Left eye: No discharge  Extraocular Movements: Extraocular movements intact  Conjunctiva/sclera: Conjunctivae normal    Pulmonary:      Effort: Pulmonary effort is normal  No respiratory distress  Musculoskeletal:      Cervical back: Normal range of motion  Neurological:      Mental Status: She is alert and oriented to person, place, and time  Psychiatric:         Behavior: Behavior normal           I spent 15 minutes directly with the patient during this visit    VIRTUAL VISIT DISCLAIMER      Lux Merida verbally agrees to participate in [employer]  Pt is aware that [employer] could be limited without vital signs or the ability to perform a full hands-on physical exam  Miladis Huffman understands she or the provider may request at any time to terminate the video visit and request the patient to seek care or treatment in person

## 2021-09-18 ENCOUNTER — APPOINTMENT (OUTPATIENT)
Dept: LAB | Facility: HOSPITAL | Age: 46
End: 2021-09-18
Payer: COMMERCIAL

## 2021-09-18 DIAGNOSIS — H04.129 DRY EYE: ICD-10-CM

## 2021-09-18 DIAGNOSIS — R23.8 EASY BRUISING: ICD-10-CM

## 2021-09-18 DIAGNOSIS — R71.8 LOW MEAN CORPUSCULAR VOLUME (MCV): ICD-10-CM

## 2021-09-18 DIAGNOSIS — R68.2 DRY MOUTH: ICD-10-CM

## 2021-09-18 DIAGNOSIS — R53.83 FATIGUE, UNSPECIFIED TYPE: ICD-10-CM

## 2021-09-18 LAB
ALBUMIN SERPL BCP-MCNC: 3.9 G/DL (ref 3.4–4.8)
ALP SERPL-CCNC: 81 U/L (ref 35–140)
ALT SERPL W P-5'-P-CCNC: 13 U/L (ref 5–54)
ANION GAP SERPL CALCULATED.3IONS-SCNC: 7 MMOL/L (ref 4–13)
APTT PPP: 23 SECONDS (ref 23–31)
AST SERPL W P-5'-P-CCNC: 16 U/L (ref 15–41)
BASOPHILS # BLD AUTO: 0.03 THOUSANDS/ΜL (ref 0–0.1)
BASOPHILS NFR BLD AUTO: 0 % (ref 0–1)
BILIRUB SERPL-MCNC: 0.25 MG/DL (ref 0.3–1.2)
BUN SERPL-MCNC: 13 MG/DL (ref 6–20)
CALCIUM SERPL-MCNC: 9.1 MG/DL (ref 8.4–10.2)
CHLORIDE SERPL-SCNC: 104 MMOL/L (ref 96–108)
CO2 SERPL-SCNC: 27 MMOL/L (ref 22–33)
CREAT SERPL-MCNC: 0.67 MG/DL (ref 0.4–1.1)
EOSINOPHIL # BLD AUTO: 0.11 THOUSAND/ΜL (ref 0–0.61)
EOSINOPHIL NFR BLD AUTO: 1 % (ref 0–6)
ERYTHROCYTE [DISTWIDTH] IN BLOOD BY AUTOMATED COUNT: 15.2 % (ref 11.6–15.1)
ERYTHROCYTE [SEDIMENTATION RATE] IN BLOOD: 20 MM/HOUR (ref 0–20)
GFR SERPL CREATININE-BSD FRML MDRD: 106 ML/MIN/1.73SQ M
GLUCOSE P FAST SERPL-MCNC: 87 MG/DL (ref 70–105)
HCT VFR BLD AUTO: 38.3 % (ref 34.8–46.1)
HGB BLD-MCNC: 12.2 G/DL (ref 11.5–15.4)
IMM GRANULOCYTES # BLD AUTO: 0.03 THOUSAND/UL (ref 0–0.2)
IMM GRANULOCYTES NFR BLD AUTO: 0 % (ref 0–2)
INR PPP: 0.93 (ref 0.9–1.1)
LYMPHOCYTES # BLD AUTO: 1.54 THOUSANDS/ΜL (ref 0.6–4.47)
LYMPHOCYTES NFR BLD AUTO: 18 % (ref 14–44)
MCH RBC QN AUTO: 25.1 PG (ref 26.8–34.3)
MCHC RBC AUTO-ENTMCNC: 31.9 G/DL (ref 31.4–37.4)
MCV RBC AUTO: 79 FL (ref 82–98)
MONOCYTES # BLD AUTO: 0.57 THOUSAND/ΜL (ref 0.17–1.22)
MONOCYTES NFR BLD AUTO: 7 % (ref 4–12)
NEUTROPHILS # BLD AUTO: 6.4 THOUSANDS/ΜL (ref 1.85–7.62)
NEUTS SEG NFR BLD AUTO: 74 % (ref 43–75)
PLATELET # BLD AUTO: 371 THOUSANDS/UL (ref 149–390)
PMV BLD AUTO: 9.8 FL (ref 8.9–12.7)
POTASSIUM SERPL-SCNC: 4.4 MMOL/L (ref 3.5–5)
PROT SERPL-MCNC: 6.8 G/DL (ref 6.4–8.3)
PROTHROMBIN TIME: 10.5 SECONDS (ref 9.5–12.1)
RBC # BLD AUTO: 4.86 MILLION/UL (ref 3.81–5.12)
SODIUM SERPL-SCNC: 138 MMOL/L (ref 133–145)
TSH SERPL DL<=0.05 MIU/L-ACNC: 1.33 UIU/ML (ref 0.34–5.6)
WBC # BLD AUTO: 8.68 THOUSAND/UL (ref 4.31–10.16)

## 2021-09-18 PROCEDURE — 83550 IRON BINDING TEST: CPT

## 2021-09-18 PROCEDURE — 85730 THROMBOPLASTIN TIME PARTIAL: CPT

## 2021-09-18 PROCEDURE — 80053 COMPREHEN METABOLIC PANEL: CPT

## 2021-09-18 PROCEDURE — 84443 ASSAY THYROID STIM HORMONE: CPT

## 2021-09-18 PROCEDURE — 85025 COMPLETE CBC W/AUTO DIFF WBC: CPT

## 2021-09-18 PROCEDURE — 82728 ASSAY OF FERRITIN: CPT

## 2021-09-18 PROCEDURE — 85652 RBC SED RATE AUTOMATED: CPT

## 2021-09-18 PROCEDURE — 85610 PROTHROMBIN TIME: CPT

## 2021-09-18 PROCEDURE — 83540 ASSAY OF IRON: CPT

## 2021-09-18 PROCEDURE — 36415 COLL VENOUS BLD VENIPUNCTURE: CPT

## 2021-09-18 PROCEDURE — 86038 ANTINUCLEAR ANTIBODIES: CPT

## 2021-09-20 ENCOUNTER — TELEMEDICINE (OUTPATIENT)
Dept: FAMILY MEDICINE CLINIC | Facility: OTHER | Age: 46
End: 2021-09-20
Payer: COMMERCIAL

## 2021-09-20 VITALS — TEMPERATURE: 100.5 F

## 2021-09-20 DIAGNOSIS — Z20.822 SUSPECTED 2019 NOVEL CORONAVIRUS INFECTION: Primary | ICD-10-CM

## 2021-09-20 DIAGNOSIS — R71.8 LOW MEAN CORPUSCULAR VOLUME (MCV): Primary | ICD-10-CM

## 2021-09-20 LAB — RYE IGE QN: NEGATIVE

## 2021-09-20 PROCEDURE — 0241U HB NFCT DS VIR RESP RNA 4 TRGT: CPT | Performed by: FAMILY MEDICINE

## 2021-09-20 PROCEDURE — 99213 OFFICE O/P EST LOW 20 MIN: CPT | Performed by: FAMILY MEDICINE

## 2021-09-20 NOTE — PROGRESS NOTES
COVID-19 Outpatient Progress Note    Assessment/Plan:    Problem List Items Addressed This Visit     None      Visit Diagnoses     Suspected 2019 novel coronavirus infection    -  Primary    Relevant Orders    COVID19, Influenza A/B, RSV PCR, [facility]         Disposition:     -Patient was told to come to office for COVID-19/RSV/Flu swab  -She will begin self-isolation for suspected COVID-19    -May use Tylenol (up to 4g in a 24 hour period) for body aches/headaches  -Patient informed that if she experiences any chest pain or shortness of breath, she should seek immediate medical attention  I have spent 15 minutes directly with the patient  Return if symptoms worsen or fail to improve  [address]    Your healthcare provider and/or public health staff have evaluated you and have determined that you do not need to remain in the hospital at this time  At this time you can be isolated at home where you will be monitored by staff from your local or state health department  You should carefully follow the prevention and isolation steps below until a healthcare provider or local or state health department says that you can return to your normal activities  Stay home except to get medical care    People who are mildly ill with COVID-19 are able to isolate at home during their illness  You should restrict activities outside your home, except for getting medical care  Do not go to work, school, or public areas  Avoid using public transportation, ride-sharing, or taxis  Separate yourself from other people and animals in your home    People: As much as possible, you should stay in a specific room and away from other people in your home  Also, you should use a separate bathroom, if available  Animals: You should restrict contact with pets and other animals while you are sick with COVID-19, just like you would around other people   Although there have not been reports of pets or other animals becoming sick with COVID-19, it is still recommended that people sick with COVID-19 limit contact with animals until more information is known about the virus  When possible, have another member of your household care for your animals while you are sick  If you are sick with COVID-19, avoid contact with your pet, including petting, snuggling, being kissed or licked, and sharing food  If you must care for your pet or be around animals while you are sick, wash your hands before and after you interact with pets and wear a facemask  See COVID-19 and Animals for more information  Call ahead before visiting your doctor    If you have a medical appointment, call the healthcare provider and tell them that you have or may have COVID-19  This will help the healthcare providers office take steps to keep other people from getting infected or exposed  Wear a facemask    You should wear a facemask when you are around other people (e g , sharing a room or vehicle) or pets and before you enter a healthcare providers office  If you are not able to wear a facemask (for example, because it causes trouble breathing), then people who live with you should not stay in the same room with you, or they should wear a facemask if they enter your room  Cover your coughs and sneezes    Cover your mouth and nose with a tissue when you cough or sneeze  Throw used tissues in a lined trash can  Immediately wash your hands with soap and water for at least 20 seconds or, if soap and water are not available, clean your hands with an alcohol-based hand  that contains at least 60% alcohol  Clean your hands often    Wash your hands often with soap and water for at least 20 seconds, especially after blowing your nose, coughing, or sneezing; going to the bathroom; and before eating or preparing food   If soap and water are not readily available, use an alcohol-based hand  with at least 60% alcohol, covering all surfaces of your hands and rubbing them together until they feel dry  Soap and water are the best option if hands are visibly dirty  Avoid touching your eyes, nose, and mouth with unwashed hands  Avoid sharing personal household items    You should not share dishes, drinking glasses, cups, eating utensils, towels, or bedding with other people or pets in your home  After using these items, they should be washed thoroughly with soap and water  Clean all high-touch surfaces everyday    High touch surfaces include counters, tabletops, doorknobs, bathroom fixtures, toilets, phones, keyboards, tablets, and bedside tables  Also, clean any surfaces that may have blood, stool, or body fluids on them  Use a household cleaning spray or wipe, according to the label instructions  Labels contain instructions for safe and effective use of the cleaning product including precautions you should take when applying the product, such as wearing gloves and making sure you have good ventilation during use of the product  Monitor your symptoms    Seek prompt medical attention if your illness is worsening (e g , difficulty breathing)  Before seeking care, call your healthcare provider and tell them that you have, or are being evaluated for, COVID-19  Put on a facemask before you enter the facility  These steps will help the healthcare providers office to keep other people in the office or waiting room from getting infected or exposed  Ask your healthcare provider to call the local or state health department  Persons who are placed under active monitoring or facilitated self-monitoring should follow instructions provided by their local health department or occupational health professionals, as appropriate  If you have a medical emergency and need to call 911, notify the dispatch personnel that you have, or are being evaluated for COVID-19  If possible, put on a facemask before emergency medical services arrive      Discontinuing home isolation    Patients with confirmed COVID-19 should remain under home isolation precautions until the following conditions are met:   - They have had no fever for at least 24 hours (that is one full day of no fever without the use medicine that reduces fevers)  AND  - other symptoms have improved (for example, when their cough or shortness of breath have improved)  AND  - If had mild or moderate illness, at least 10 days have passed since their symptoms first appeared or if severe illness (needed oxygen) or immunosuppressed, at least 20 days have passed since symptoms first appeared  Patients with confirmed COVID-19 should also notify close contacts (including their workplace) and ask that they self-quarantine  Currently, close contact is defined as being within 6 feet for 15 minutes or more from the period 24 hours starting 48 hours before symptom onset to the time at which the patient went into isolation  Close contacts of patients diagnosed with COVID-19 should be instructed by the patient to self-quarantine for 14 days from the last time of their last contact with the patient  Source: [employer]    The patient indicates understanding of these issues and agrees with the plan      Verification of patient location:    Patient is located in the following state in which I hold an active license [place]     Encounter provider Gina Sterling MD    Provider located at [address] [address]    Recent Visits  Date Type Provider Dept   09/16/21 Office Visit MD Presley Ortega   Showing recent visits within past 7 days and meeting all other requirements  Today's Visits  Date Type Provider Dept   09/20/21 Telemedicine MD Presley Ortega   Showing today's visits and meeting all other requirements  Future Appointments  No visits were found meeting these conditions  Showing future appointments within next 150 days and meeting all other requirements         Subjective:   Rosaura Jacobsen is a 55 y o  female who is concerned about COVID-19  Patient's symptoms include fever (  T-max 100 5 F), nasal congestion, rhinorrhea, sore throat, myalgias and headache  Patient denies chills, anosmia, loss of taste, cough, shortness of breath, chest tightness, abdominal pain, nausea, vomiting and diarrhea  COVID-19 vaccination status: Fully vaccinated    Exposure:   Contact with a person who is under investigation (PUI) for or who is positive for COVID-19 within the last 14 days?: Yes    Hospitalized recently for fever and/or lower respiratory symptoms?: No      Currently a healthcare worker that is involved in direct patient care?: No      Works in a special setting where the risk of COVID-19 transmission may be high? (this may include long-term care, correctional and group home facilities; homeless shelters; assisted-living facilities and group homes ): No      Resident in a special setting where the risk of COVID-19 transmission may be high? (this may include long-term care, correctional and group home facilities; homeless shelters; assisted-living facilities and group homes ): No      Patient was tested for COVID-19 on  due to her exposure and was negative at the time       Lab Results   Component Value Date    SARSCOV2 Negative 2021     Past Medical History:   Diagnosis Date    Abnormal Pap smear of cervix     2017    Anemia     Anxiety     Asthma     Diabetes mellitus ([facility] [address] )     History of transfusion 2018    HPV (human papilloma virus) infection     2017    Obesity     Varicella      Past Surgical History:   Procedure Laterality Date    BREAST BIOPSY Right 2012    u/s core bx    BREAST SURGERY      Incisional breast biopsy     SECTION      And     GASTRIC BYPASS  2016    ME HYSTEROSCOPY,W/ENDO BX N/A 2021    Procedure: DILATATION AND CURETTAGE (D&C) WITH HYSTEROSCOPY (MYOSURE); Surgeon: Pascale Church DO;  [facility];  Service: Gynecology    ND HYSTEROSCOPY,W/ENDO BX N/A 2/16/2021    Procedure: POLYPECTOMY;  Surgeon: Pascale Church DO;  [facility];  Service: Gynecology    TUBAL LIGATION  2009    US GUIDED BREAST BIOPSY RIGHT COMPLETE Right 8/23/2019     Current Outpatient Medications   Medication Sig Dispense Refill    albuterol (PROVENTIL HFA,VENTOLIN HFA) 90 mcg/act inhaler Inhale 2 puffs every 6 (six) hours as needed for wheezing or shortness of breath 1 Inhaler 1    artificial tear (LUBRIFRESH P M ) 83-15 % ophthalmic ointment Administer to the right eye daily at bedtime as needed (dry eye) 3 5 g 0    Calcium Citrate-Vitamin D (CALCET CREAMY BITES) 500-400 MG-UNIT CHEW take 1 tablet by oral route 3 times every day      cetirizine (ZyrTEC) 10 mg tablet Take 1 tablet (10 mg total) by mouth daily as needed for allergies 30 tablet 0    cyanocobalamin (VITAMIN B-12) 100 mcg tablet every 24 hours      erythromycin (ILOTYCIN) ophthalmic ointment Place a 1/2 inch ribbon of ointment into the lower eyelid of both eyes four times per day for 5 days  3 5 g 1    Multiple Vitamins-Minerals (MULTIVITAMIN ADULT PO) Take by mouth       No current facility-administered medications for this visit  No Known Allergies    Review of Systems   Constitutional: Positive for fever (  T-max 100 5 F)  Negative for chills  HENT: Positive for congestion, rhinorrhea and sore throat  Respiratory: Negative for cough, chest tightness and shortness of breath  Gastrointestinal: Negative for abdominal pain, diarrhea, nausea and vomiting  Musculoskeletal: Positive for myalgias  Neurological: Positive for headaches  Objective:    Vitals:    09/20/21 1155   Temp: 100 5 °F (38 1 °C)     Done through observation via video-  Physical Exam  Constitutional:       General: She is not in acute distress  Appearance: Normal appearance  She is not ill-appearing, toxic-appearing or diaphoretic  Eyes:      General: No scleral icterus  Right eye: No discharge  Left eye: No discharge  Extraocular Movements: Extraocular movements intact  Conjunctiva/sclera: Conjunctivae normal    Pulmonary:      Effort: Pulmonary effort is normal  No respiratory distress  Comments: Appears comfortable on room air (no conversational dyspnea)  Neurological:      Mental Status: She is alert and oriented to person, place, and time  Psychiatric:         Mood and Affect: Mood normal          Behavior: Behavior normal          VIRTUAL VISIT DISCLAIMER    Mikael Koyanagi verbally agrees to participate in [employer]  Pt is aware that [employer] could be limited without vital signs or the ability to perform a full hands-on physical exam  Miladis Stern understands she or the provider may request at any time to terminate the video visit and request the patient to seek care or treatment in person

## 2021-09-21 LAB
FERRITIN SERPL-MCNC: 5 NG/ML (ref 8–388)
FLUAV RNA RESP QL NAA+PROBE: NEGATIVE
FLUBV RNA RESP QL NAA+PROBE: NEGATIVE
IRON SATN MFR SERPL: 7 % (ref 15–50)
IRON SERPL-MCNC: 30 UG/DL (ref 50–170)
RSV RNA RESP QL NAA+PROBE: NEGATIVE
SARS-COV-2 RNA RESP QL NAA+PROBE: NEGATIVE
TIBC SERPL-MCNC: 456 UG/DL (ref 250–450)

## 2021-09-22 ENCOUNTER — TELEPHONE (OUTPATIENT)
Dept: FAMILY MEDICINE CLINIC | Facility: OTHER | Age: 46
End: 2021-09-22

## 2021-09-22 NOTE — TELEPHONE ENCOUNTER
----- Message from Everitt Gilford, MD sent at 9/22/2021 11:54 AM EDT -----  Sushil Ott,     All of your lab results came back normal with the exception of your iron panel  It appears you have iron deficiency  I recommend you take an iron supplement (65mg) every other day for the next 3-6 months  Iron supplements can cause your stools to be darker and can also cause constipation  If you do experience constipation, you may use Colace stool softener as needed       Dr Francesco Sandoval

## 2021-10-11 ENCOUNTER — TELEPHONE (OUTPATIENT)
Dept: FAMILY MEDICINE CLINIC | Facility: OTHER | Age: 46
End: 2021-10-11

## 2021-10-11 DIAGNOSIS — R05.9 COUGH: Primary | ICD-10-CM

## 2021-10-11 PROCEDURE — 0241U HB NFCT DS VIR RESP RNA 4 TRGT: CPT | Performed by: FAMILY MEDICINE

## 2021-10-12 ENCOUNTER — TELEPHONE (OUTPATIENT)
Dept: FAMILY MEDICINE CLINIC | Facility: OTHER | Age: 46
End: 2021-10-12

## 2021-10-22 ENCOUNTER — VBI (OUTPATIENT)
Dept: ADMINISTRATIVE | Facility: OTHER | Age: 46
End: 2021-10-22

## 2021-11-17 ENCOUNTER — HOSPITAL ENCOUNTER (OUTPATIENT)
Dept: RADIOLOGY | Age: 46
Discharge: HOME | End: 2021-11-17
Payer: COMMERCIAL

## 2021-11-17 VITALS — HEIGHT: 61 IN | WEIGHT: 180 LBS | BODY MASS INDEX: 33.99 KG/M2

## 2021-11-17 DIAGNOSIS — Z12.31 ENCOUNTER FOR SCREENING MAMMOGRAM FOR MALIGNANT NEOPLASM OF BREAST: ICD-10-CM

## 2021-11-17 PROCEDURE — 77063 BREAST TOMOSYNTHESIS BI: CPT

## 2021-11-17 PROCEDURE — 77067 SCR MAMMO BI INCL CAD: CPT

## 2021-11-19 ENCOUNTER — HOSPITAL ENCOUNTER (OUTPATIENT)
Dept: ULTRASOUND IMAGING | Facility: CLINIC | Age: 46
Discharge: HOME | End: 2021-11-19
Payer: COMMERCIAL

## 2021-11-19 ENCOUNTER — TELEPHONE (OUTPATIENT)
Dept: FAMILY MEDICINE CLINIC | Facility: OTHER | Age: 46
End: 2021-11-19

## 2021-11-19 DIAGNOSIS — R92.8 ABNORMAL MAMMOGRAM: ICD-10-CM

## 2021-11-19 PROCEDURE — 76642 ULTRASOUND BREAST LIMITED: CPT

## 2022-03-05 ENCOUNTER — IMMUNIZATIONS (OUTPATIENT)
Dept: FAMILY MEDICINE CLINIC | Facility: HOSPITAL | Age: 47
End: 2022-03-05

## 2022-03-05 PROCEDURE — 91305 COVID-19 PFIZER VACC TRIS-SUCROSE GRAY CAP 0.3 ML: CPT

## 2022-03-05 PROCEDURE — 0054A COVID-19 PFIZER VACC TRIS-SUCROSE GRAY CAP 0.3 ML: CPT

## 2022-03-09 ENCOUNTER — TELEMEDICINE (OUTPATIENT)
Dept: FAMILY MEDICINE CLINIC | Facility: OTHER | Age: 47
End: 2022-03-09
Payer: MEDICARE

## 2022-03-09 DIAGNOSIS — U07.1 COVID-19: Primary | ICD-10-CM

## 2022-03-09 PROCEDURE — 99213 OFFICE O/P EST LOW 20 MIN: CPT

## 2022-03-09 NOTE — PROGRESS NOTES
COVID-19 Outpatient Progress Note    Assessment/Plan:    Problem List Items Addressed This Visit     None      Visit Diagnoses     COVID-19    -  Primary         Disposition:     Risks and benefits of COVID-19 vaccination was discussed with patient  Patient has COVID-19 infection  Based off CDC guidelines, they were recommended to isolate for 5 days from the date of the positive test  If they remain asymptomatic, isolation may be ended followed by 5 days of wearing a mask when around othes to minimize risk of infecting others  If they have a fever, continue to stay home until fever resolves for at least 24 hours  I recommended continued isolation until at least 24 hours have passed since recovery defined as resolution of fever without the use of fever-reducing medications AND improvement in COVID symptoms AND 10 days have passed since onset of symptoms (or 10 days have passed since date of first positive viral diagnostic test for asymptomatic patients)  Discussed symptom directed medication options with patient  Patient recommended to follow-up in 2 days for reassessment of symptoms  If warranted, may consider treatment with inhaled corticosteroid therapy  I have spent 10 minutes directly with the patient  Greater than 50% of this time was spent in counseling/coordination of care regarding: diagnostic results, prognosis, instructions for management, patient and family education and impressions  Encounter provider Zuleika Gordon MD    Provider located at [address] [place] [id number] [facility] [id number]    Recent Visits  Date Type Provider Dept   03/09/22 Telemedicine MD Presley Valencia   Showing recent visits within past 7 days and meeting all other requirements  Future Appointments  No visits were found meeting these conditions    Showing future appointments within next 150 days and meeting all other requirements     This virtual check-in was done via Epic Embedded and patient was informed that this is a secure, HIPAA-compliant platform  She agrees to proceed  Patient agrees to participate in a virtual check in via telephone or video visit instead of presenting to the office to address urgent/immediate medical needs  Patient is aware this is a billable service  After connecting through [facility], the patient was identified by name and date of birth  Cristhian Paul was informed that this was a telemedicine visit and that the exam was being conducted confidentially over secure lines  My office door was closed  No one else was in the room  Cristhian Paul acknowledged consent and understanding of privacy and security of the telemedicine visit  I informed the patient that I have reviewed her record in Epic and presented the opportunity for her to ask any questions regarding the visit today  The patient agreed to participate  Verification of patient location:  Patient is located in the following state in which I hold an active license: [place]    Subjective:   Cristhian Paul is a 55 y o  female who has been screened for COVID-19  Symptom change since last report: unchanged  Patient's symptoms include fever, chills, fatigue, malaise, rhinorrhea, cough and shortness of breath (on exertion)  Patient denies congestion, sore throat, anosmia, loss of taste, chest tightness, abdominal pain, nausea, vomiting, diarrhea, myalgias and headaches  - Date of symptom onset: 3/9/2021  - Date of positive COVID-19 test: 3/9/2021  Type of test: Home antigen  Patient with typical symptoms of COVID-19 and they attest that they were positive on home rapid antigen testing  Image of positive result is not able to be uploaded into their chart  COVID-19 vaccination status: Fully vaccinated with booster    Saud Severino has been staying home and has isolated themselves in her home   She is taking care to not share personal items and is cleaning all surfaces that are touched often, like counters, tabletops, and doorknobs using household cleaning sprays or wipes  She is wearing a mask when she leaves her room  Patient reports receipt of COVID vaccine (3rd dose) on Saturday, 3/5, 4 days before onset of URI symptoms and positive at-home COVID test   Patient also with history of mild intermittent asthma, managed with an albuterol inhaler, as needed  She has had episodes of increased shortness of breath w/ exertion but not at rest, however, symptoms not significant enough to warrant use of inhaler  She also reported a fever of 101 5 ° F that improved with Tylenol  Last temp checked approximate the 15 minutes ago, was 100 3° F  She also has a dry cough, runny nose and generalized malaise  Denies chest pain, abdominal pain, headache, body aches or loss of taste/smell  Lab Results   Component Value Date    SARSCOV2 Negative 10/11/2021     Past Medical History:   Diagnosis Date    Abnormal Pap smear of cervix     2017    Anemia     Anxiety     Asthma     Diabetes mellitus ([facility] [address] )     History of transfusion 2018    HPV (human papilloma virus) infection     2017    Obesity     Varicella      Past Surgical History:   Procedure Laterality Date    BREAST BIOPSY Right 2012    u/s core bx    BREAST SURGERY      Incisional breast biopsy     SECTION      And     GASTRIC BYPASS  2016    [place] HYSTEROSCOPY,W/ENDO BX N/A 2021    Procedure: DILATATION AND CURETTAGE (D&C) WITH HYSTEROSCOPY (MYOSURE);   Surgeon: Aaliyah Arita DO;  Location: [facility];  Service: Gynecology    [place] HYSTEROSCOPY,W/ENDO BX N/A 2021    Procedure: POLYPECTOMY;  Surgeon: Aaliyah Arita DO;  Location: [facility];  Service: Gynecology    TUBAL LIGATION      US GUIDED BREAST BIOPSY RIGHT COMPLETE Right 2019     Current Outpatient Medications   Medication Sig Dispense Refill    albuterol (PROVENTIL HFA,VENTOLIN HFA) 90 mcg/act inhaler Inhale 2 puffs every 6 (six) hours as needed for wheezing or shortness of breath 1 Inhaler 1    artificial tear (LUBRIFRESH P M ) 83-15 % ophthalmic ointment Administer to the right eye daily at bedtime as needed (dry eye) 3 5 g 0    Calcium Citrate-Vitamin D (CALCET CREAMY BITES) 500-400 MG-UNIT CHEW take 1 tablet by oral route 3 times every day      cetirizine (ZyrTEC) 10 mg tablet Take 1 tablet (10 mg total) by mouth daily as needed for allergies 30 tablet 0    cyanocobalamin (VITAMIN B-12) 100 mcg tablet every 24 hours      erythromycin (ILOTYCIN) ophthalmic ointment Place a 1/2 inch ribbon of ointment into the lower eyelid of both eyes four times per day for 5 days  3 5 g 1    Multiple Vitamins-Minerals (MULTIVITAMIN ADULT PO) Take by mouth       No current facility-administered medications for this visit  No Known Allergies    Review of Systems   Constitutional: Positive for chills, fatigue and fever  HENT: Positive for rhinorrhea  Negative for congestion and sore throat  Respiratory: Positive for cough and shortness of breath (on exertion)  Negative for chest tightness and wheezing  Cardiovascular: Negative for chest pain  Gastrointestinal: Negative for abdominal pain, diarrhea, nausea and vomiting  Musculoskeletal: Negative for myalgias  Neurological: Negative for dizziness and headaches  Objective: There were no vitals filed for this visit  Physical Exam  Constitutional:       General: She is not in acute distress  Appearance: She is ill-appearing  She is not toxic-appearing or diaphoretic  HENT:      Head: Normocephalic and atraumatic  Eyes:      Extraocular Movements: Extraocular movements intact  Conjunctiva/sclera: Conjunctivae normal    Pulmonary:      Effort: Pulmonary effort is normal  No respiratory distress  Skin:     Coloration: Skin is not pale  Neurological:      Mental Status: She is alert and oriented to person, place, and time     Psychiatric:         Mood and Affect: Mood normal  VIRTUAL VISIT DISCLAIMER    Marija Diaz verbally agrees to participate in [address]  Pt is aware that [address] could be limited without vital signs or the ability to perform a full hands-on physical exam  Miladis Bernard understands she or the provider may request at any time to terminate the video visit and request the patient to seek care or treatment in person

## 2022-03-09 NOTE — PATIENT INSTRUCTIONS
COVID-19 and Chronic Health Conditions   WHAT YOU NEED TO KNOW:   Your chronic condition may increase your risk for COVID-19 or serious problems it can cause  Healthcare providers might need to make changes that affect how you usually manage your chronic health condition  Providers may change hours of operation or not have patients come in to be seen  You may not be able to make appointments to get blood drawn or to have tests or procedures  This may continue until the virus that causes COVID-19 is controlled  Until then, you can take steps to manage your condition  The steps will also lower your risk for COVID-19 or the serious problems it causes  If you do develop COVID-19, healthcare providers will tell you when it is okay to be around others after you recover  This depends on your chronic condition, any symptoms of COVID-19 that developed, and how severe the symptoms were  DISCHARGE INSTRUCTIONS:   Call your local emergency number (911 in the [address] [place] [address],[address]) or an emergency department if:   · You have trouble breathing or shortness of breath  · You have chest pain or pressure that lasts longer than 5 minutes  · You become confused or hard to wake  · Your lips or face are blue  Return to the emergency department if:   · You have a fever of 104°F (40°C) or higher  Call your doctor or healthcare provider if:   · You have symptoms of COVID-19  · You have questions or concerns about COVID-19 or your chronic condition  What you need to know about serious problems from the virus:  You may develop long-term health problems caused by the virus  Your risk is higher if you are 65 or older  A weak immune system, obesity, diabetes, chronic kidney disease, or a heart or lung condition can also increase your risk  Your risk is also higher if you are a current or former cigarette smoker   COVID-19 can lead to any of the following:  · Multisymptom inflammatory syndrome in adults (MIS-A) or in children (MIS-C), causing inflammation in the heart, digestive system, skin, or brain    · Shortness of breath, serious lower respiratory conditions, such as pneumonia or acute respiratory distress syndrome (ARDS)    · Blood clots or blood vessel damage    · Organ damage from a lack of oxygen or from blood clots    · Sleep problems    · Problems thinking clearly, remembering information, or concentrating    · Mood changes, depression, or anxiety    · Long-term problems tasting or smelling    · Loss of appetite and weight loss    · Nerve pain    · Fatigue (feeling mentally and physically tired)    How the 2019 coronavirus spreads: The virus spreads quickly and easily  The virus can be passed starting 2 to 3 days before symptoms begin or before a positive test if symptoms never begin  · Droplets are the main way all coronaviruses spread  The virus travels in droplets that form when a person talks, sings, coughs, or sneezes  The droplets can also float in the air for minutes or hours  Infection happens when you breathe in the droplets or get them in your eyes or nose  Close personal contact with an infected person increases your risk for infection  This means being within 6 feet (2 meters) of the person for at least 15 minutes over 24 hours  · Person-to-person contact can spread the virus  For example, a person with the virus on his or her hands can spread it by shaking hands with someone  · The virus can stay on objects and surfaces for up to 3 days  You may become infected by touching the object or surface and then touching your eyes or mouth  Manage your chronic health condition during this time:  If you do not have a regular healthcare provider, experts recommend you contact a local [facility] health center or health department  The following can help you manage your condition and prevent COVID-19:  · Get emergency care for your condition if needed    Talk to your healthcare providers about symptoms of your chronic condition that need immediate care  Your providers can help you create a plan or add exacerbation management to your plan  The plan will include when to go to an emergency department and when to call your local emergency number  This will depend on where you live and the services that are available during this time  · Go to dialysis appointments as scheduled  It is important to stay on schedule  You will need to have enough food to be able to follow the emergency diet plan if you must miss a session  The emergency diet needs to be part of the management plan for your condition  · Reschedule any upcoming appointments as needed  Medical facilities may be closed until the coronavirus is better controlled  This means you may need to reschedule a surgery, procedure, or check-up appointment  If you cannot have a phone or video appointment, you will need to make a new appointment  Some providers may be scheduling appointments several months in advance  Some surgeries and procedures will happen as scheduled  This depends on the medical condition and the reason for the surgery or procedure  You may need to have extra testing for COVID-19 several days before  · Follow any regular management plan you use  Your healthcare provider will tell you if you need to make any changes to your regular management plan  For example, if you have asthma, continue to follow your asthma action plan  If you have diabetes, you may need to check your blood sugar level more often  Stress and illness can make blood sugar levels go up  You may need to adjust medicine such as insulin  If you have a heart condition or high blood pressure, you may need to check your blood pressure more often  Stress and illness can also raise your blood pressure  · Talk to your healthcare providers about your medicines  You may be able to get more than 1 month of medicine at a time   This will lower the number of times you need to go to a pharmacy to get your medicines  Make sure you have enough medicine if you have a condition that can lead to an emergency  Examples include asthma medicines, insulin, or an epinephrine pen  Check the expiration dates on the medicines you currently have  Ask for refills as soon as possible, if needed  If it is not time to refill prescriptions, you may be able to get an emergency supply of some medicines  Medicine plans vary, so ask your healthcare provider or pharmacist for options  · Have supplies available in your home  If possible, get extra supplies you use regularly  Examples include absorbent pads, syringes, and wound cleaning solutions  This will limit the number of trips out of your home to get supplies  · Know the signs and symptoms of COVID-19  Signs and symptoms usually start about 5 days after infection but can take 2 to 14 days  Signs and symptoms range from mild to severe  You may feel like you have the flu or a bad cold  Your chronic health condition may cause some of the same symptoms COVID-19 causes  This can make it hard to know if a symptom is from COVID-19 or your chronic condition  Keep a record of any new or worsening symptom you have  This is especially important if you have a condition that often causes shortness of breath  Your provider can tell you if you should be tested for COVID-19  Tell your healthcare provider if you think you were infected but develop signs or symptoms not listed below:    ? A cough    ? Shortness of breath or trouble breathing that may become severe    ? A fever of at least 100 4°F, or 38°C (may be lower in adults 65 or older)    ? Chills that might include shaking    ? Muscle pain, body aches, or a headache    ? A sore throat    ? Suddenly not being able to taste or smell anything    ? Feeling very tired (fatigue)    ? Congestion (stuffy head and nose), or a runny nose    ?  Diarrhea, nausea, or vomiting    What you can do to prevent having to go out of your home during this time: · Ask your healthcare provider for other ways to have appointments  Some providers offer phone, video, or other types of appointments  · Have food, medicines, and other supplies delivered  Some pharmacies can send certain medicines to you through the mail  Grocery stores and restaurants may be able to deliver food and other items  If possible, have delivered items placed somewhere  Try not to have someone hand you an item  You will be so close to the person that the virus can spread between you  · Ask someone to get items you need  The person can get groceries, medicines, or other needed items for you  Choose a person who does not have signs or symptoms of COVID-19 or has tested negative for it  The person should not be waiting for test results  He or she should not have a condition that increases the risk for COVID-19 or serious problems it causes  What you need to know about COVID-19 vaccines: Your healthcare provider can give you more information about what to expect, depending on your chronic condition  You are considered fully vaccinated against COVID-19 two weeks after the final dose of any COVID-19 vaccine  Let your healthcare provider know when you have received the final dose of the vaccine  Make a copy of your vaccination card  Keep the original with you in case you need to show it  Keep the copy in a safe place  · COVID-19 vaccines are given as a shot in 1 or 2 doses  Vaccination is recommended for everyone 5 years or older  One 2-dose vaccine is fully approved for those 12 or older  This vaccine also has an emergency use authorization (EUA) for children 11to 13years old  No vaccine is currently available for children younger than 5 years  A booster (additional) dose is given to help the immune system continue to protect against severe COVID-19     ? A booster is recommended for all adults 18 or older    The booster can be a different brand of the COVID-19 vaccine than you originally received  The timing for the booster depends on the type of vaccine you received:    § 1-dose vaccine: The booster is given at least 2 months after you received the vaccine  § 2-dose vaccine: The booster is given at least 6 months after the second dose   ? A booster can be given to adolescents 12to 16years old  Only 1 COVID-19 vaccine has an EUA for adolescent boosters  The booster is given at least 6 months after the second dose of the original vaccine series  Continue social distancing and other measures, even after you get the vaccine  Although it is not common, you can become infected after you get the vaccine  You may also be able to pass the virus to others without knowing you are infected  After you get the vaccine, check local, national, and international travel rules  You may need to be tested before you travel  Some countries require proof of a negative test before you travel  You may also need to quarantine after you return  Lower your risk for COVID-19:   · Wash your hands often throughout the day  Use soap and water  Rub your soapy hands together, lacing your fingers, for at least 20 seconds  Rinse with warm, running water  Dry your hands with a clean towel or paper towel  Use hand  that contains alcohol if soap and water are not available  Teach children how to wash their hands and use hand   · Cover sneezes and coughs  Turn your face away and cover your mouth and nose with a tissue  Throw the tissue away  Use the bend of your arm if a tissue is not available  Then wash your hands with soap and water or use hand   Teach children how to cover a cough or sneeze  · Follow worldwide, national, and local social distancing guidelines  Keep at least 6 feet (2 meters) between you and others  · Wear a face covering (mask) around anyone who does not live in your home  Use a cloth covering with at least 2 layers   You can also create layers by putting a cloth covering over a disposable non-medical mask  Cover your mouth and your nose  · Try not to touch your face  If you get the virus on your hands, you can transfer it to your eyes, nose, or mouth and become infected  You can also transfer it to objects, surfaces, or people  · Clean and disinfect high-touch surfaces and objects in your home often  Use disinfecting wipes, or make a solution by mixing 4 teaspoons of bleach with 1 quart (4 cups) of water  Do not  use any cleaning or disinfecting products that can trigger an asthma attack or other breathing problems  Open windows or have circulating air as you clean  Do not  mix ammonia with bleach  This will create toxic fumes  How to follow social distancing guidelines:  National and local social distancing rules vary  Rules and restrictions may change over time as restrictions are lifted  The following are general guidelines:  · Stay home if you are sick or think you may have COVID-19  It is important to stay home if you are waiting for a testing appointment or for test results  · Avoid close physical contact with anyone who does not live in your home  Do not shake hands with, hug, or kiss a person as a greeting  If you must use public transportation (such as a bus or subway), try to sit or stand away from others  Wear your face covering  · Avoid in-person gatherings and crowds  Attend virtually if possible  Help strengthen your immune system:   · Ask about other vaccines you may need  Get the influenza (flu) vaccine as soon as recommended each year, usually starting in September or October  Get the pneumonia vaccine if recommended  Your healthcare provider can tell you if you should also get other vaccines, and when to get them  · Do not smoke  Nicotine and other chemicals in cigarettes and cigars can increase your risk for infection and for serious COVID-19 effects   Ask your healthcare provider for information if you currently smoke and need help to quit  E-cigarettes or smokeless tobacco still contain nicotine  Talk to your healthcare provider before you use these products  · Eat a variety of healthy foods  Examples include vegetables, fruits, whole-grain breads and cereals, lean meats and poultry, fish, low-fat dairy products, and cooked beans  Healthy foods contain nutrients that help keep your immune system strong  · Find ways to manage stress  You may be feeling more stressed than usual because of the COVID-19 outbreak  The situation is very stressful to many people  Talk to your healthcare providers about ways to manage stress during this time  Stress can lead to breathing problems or make the problems worse  Stress can trigger an attack or exacerbation of many health conditions  It is important to do things that help you feel more relaxed, such as the following:     ? Pick 1 or 2 times a day to watch the news  Constant news watching can increase your stress levels  ? Talk to a friend on the phone or through a video chat  ? Take a warm, soothing bath  ? Listen to music  ? Exercise can also help relieve stress  This may be hard if your regular gym or outdoor exercise area is closed  If you do not have exercise equipment at home, try walking inside your home  You can walk quickly or turn on music and dance  Follow up with your doctor or healthcare provider as directed: Your providers will tell you when you can come in for tests, procedures, or check-ups  Bring your symptom record with you to all appointments  Write down your questions so you remember to ask them during your visits    For more information:   · Centers for Disease Control and Prevention  [address] , [address]  Phone: 1- 013 - 0954888  Phone: 9- 458 - 8706743  Web Address: [employer] com br    © Copyright [employer] 2022 Information is for End User's use only and may not be sold, redistributed or otherwise used for commercial purposes  All illustrations and images included in CareNotes® are the copyrighted property of A D A M [employer] Inc  or [employer]  The above information is an  only  It is not intended as medical advice for individual conditions or treatments  Talk to your doctor, nurse or pharmacist before following any medical regimen to see if it is safe and effective for you

## 2022-03-09 NOTE — LETTER
March 9, 2022    Patient: Earl May  YOB: 1975  Date of Last Encounter: 11/19/2021      To whom it may concern:     Earl May has tested positive for COVID-19 (Coronavirus)  She may return to work on 3/15/21, which is 5 days from illness onset (provided symptoms are improving) and 24 hours without fever      Sincerely,         Sandro Irvin MD

## 2022-03-11 ENCOUNTER — TELEMEDICINE (OUTPATIENT)
Dept: FAMILY MEDICINE CLINIC | Facility: OTHER | Age: 47
End: 2022-03-11
Payer: MEDICARE

## 2022-03-11 DIAGNOSIS — J01.00 ACUTE MAXILLARY SINUSITIS, RECURRENCE NOT SPECIFIED: ICD-10-CM

## 2022-03-11 DIAGNOSIS — U07.1 COVID-19: Primary | ICD-10-CM

## 2022-03-11 PROCEDURE — 99213 OFFICE O/P EST LOW 20 MIN: CPT | Performed by: FAMILY MEDICINE

## 2022-03-11 RX ORDER — ONDANSETRON 2 MG/ML
4 INJECTION INTRAMUSCULAR; INTRAVENOUS ONCE AS NEEDED
Status: CANCELLED | OUTPATIENT
Start: 2022-03-12

## 2022-03-11 RX ORDER — ALBUTEROL SULFATE 90 UG/1
3 AEROSOL, METERED RESPIRATORY (INHALATION) ONCE AS NEEDED
Status: CANCELLED | OUTPATIENT
Start: 2022-03-12

## 2022-03-11 RX ORDER — FLUTICASONE PROPIONATE 50 MCG
1 SPRAY, SUSPENSION (ML) NASAL DAILY
Qty: 9.9 ML | Refills: 0 | Status: SHIPPED | OUTPATIENT
Start: 2022-03-11 | End: 2022-03-25 | Stop reason: HOSPADM

## 2022-03-11 RX ORDER — SODIUM CHLORIDE 9 MG/ML
20 INJECTION, SOLUTION INTRAVENOUS ONCE
Status: CANCELLED | OUTPATIENT
Start: 2022-03-12

## 2022-03-11 RX ORDER — AZITHROMYCIN 250 MG/1
TABLET, FILM COATED ORAL
Qty: 6 TABLET | Refills: 0 | Status: SHIPPED | OUTPATIENT
Start: 2022-03-11 | End: 2022-03-16

## 2022-03-11 RX ORDER — ACETAMINOPHEN 325 MG/1
650 TABLET ORAL ONCE AS NEEDED
Status: CANCELLED | OUTPATIENT
Start: 2022-03-12

## 2022-03-11 NOTE — PROGRESS NOTES
COVID-19 Outpatient Progress Note    Assessment/Plan:    Problem List Items Addressed This Visit        Other    COVID-19 - Primary    Relevant Medications    azithromycin (Zithromax) 250 mg tablet    fluticasone (FLONASE) 50 mcg/act nasal spray      Other Visit Diagnoses     Acute maxillary sinusitis, recurrence not specified        Relevant Medications    azithromycin (Zithromax) 250 mg tablet    fluticasone (FLONASE) 50 mcg/act nasal spray         Disposition:     Patient has COVID-19 infection  Based off CDC guidelines, they were recommended to isolate for 5 days from the date of the positive test  If they remain asymptomatic, isolation may be ended followed by 5 days of wearing a mask when around othes to minimize risk of infecting others  If they have a fever, continue to stay home until fever resolves for at least 24 hours  Discussed symptom directed medication options with patient  Did discuss Sotrovimab antibody infusion in detail, patient reports she is interested in this  Has not yet uploaded picture of positive COVID test to her chart, is doing so now  Ab tx ordered, will schedule appointment with infusion center  Discussed risks and benefits of antibody infusion  Patient reports her cough has resolving and she is not wheezing   Has albuterol at home if needed - only noticing it mostly at night time, discussed this was likely due to postnasal drip   Did discuss possible addition of steroid inhaler however patient declined at this time  As patient currently experiencing sinus headache with thick yellow nasal secretions and sinus tenderness to self palpation on virtual exam, suspect possible concurrent sinus infection, will treat with a Z-Brandan  In addition, patient can trial the following interventions for symptom management:   - Gennette Conception Med nasal rinse  - Flonase   - Humidifier   F/u on Monday 3/14/22  Discussed reasons to call office and or proceed to emergency department such as severe shortness of breath/inability to catch breath, difficulty speaking full sentences, high fever, altered mental status, chest pain, syncope, etc  Patient is at increased risk of progressing towards severe COVID-19 due to the following high risk criteria:   - Obesity or being overweight    Patient meets criteria for Sotrovimab infusion  They were counseled in regards to risks, benefits, and side effects of this infusion  Sotrovimab is an investigational medicine used to treat mild-to-moderate symptoms of COVID-19 in adults and children (15years of age and older weighing at least 80 pounds (40 kg)) with positive results of direct SARS-CoV-2 viral testing, and who are at high risk of progression to severe COVID-19, including hospitalization or death  Sotrovimab is investigational because it is still being studied  There is limited information about the safety and effectiveness of using sotrovimab to treat people with mild-to-moderate COVID-19  The FDA has authorized the emergency use of sotrovimab for the treatment of COVID-19 under an Emergency Use Authorization (EUA)  How will I receive sotrovimab?    - You will receive 1 dose of sotrovimab  - Sotrovimab will be given through a vein (intravenous or IV infusion) over 30 minutes    Possible side effects of sotrovimab: Allergic reactions can happen during and after infusion with sotrovimab  Possible reactions include: fever, chills, nausea, headache, shortness of breath, low or high blood pressure, rapid or slow heart rate, chest discomfort or pain, weakness, confusion, feeling tired, wheezing, swelling of your lips, face, or throat, rash including hives, itching, muscle aches, dizziness, and sweating  These reactions may be severe or life threatening  Worsening symptoms after treatment: You may experience new or worsening symptoms after infusion, including fever, difficulty breathing, rapid or slow heart rate, tiredness, weakness or confusion  If these occur, contact your healthcare provider or seek immediate medical attention as some of these events have required hospitalization  It is unknown if these events are related to treatment or are due to the progression of COVID19  The side effects of getting any medicine by vein may include brief pain, bleeding, bruising of the skin, soreness, swelling, and possible infection at the infusion site  These are not all the possible side effects of sotrovimab  Not a lot of people have been given sotrovimab  Serious and unexpected side effects may happen  Sotrovimab are still being studied so it is possible that all of the risks are not known at this time  It is possible that sotrovimab could interfere with your body's own ability to fight off a future infection of SARS-CoV-2  Similarly, sotrovimab may reduce your bodys immune response to a vaccine for SARS-CoV-2  Specific studies have not been conducted to address these possible risks  Talk to your healthcare provider if you have any questions  Emergency Use Authorization:    The [facility] FDA has made sotrovimab available under an emergency access mechanism called an EUA  The EUA is supported by a [place] of Health and Human Service (HHS) declaration that circumstances exist to justify the emergency use of drugs and biological products during the COVID-19 pandemic  Sotrovimab have not undergone the same type of review as an FDA-approved or cleared product  The FDA may issue an EUA when certain criteria are met, which includes that there are no adequate, approved, and available alternatives  In addition, the FDA decision is based on the totality of scientific evidence available showing that it is reasonable to believe that the product meets certain criteria for safety, performance, and labeling and may be effective in treatment of patients during the COVID-19 pandemic   All of these criteria must be met to allow for the product to be used in the treatment of patients during the COVID-19 pandemic  The EUA for sotrovimab together is in effect for the duration of the COVID-19 declaration justifying emergency use of these products, unless terminated or revoked (after which the product may no longer be used)  What if I am pregnant or breastfeeding? There is no experience treating pregnant women or breastfeeding mothers with sotrovimab  For a mother and unborn baby, the benefit of receiving sotrovimab may be greater than the risk from the treatment  If you are pregnant or breastfeeding, discuss your options and specific situation with your healthcare provider  Regarding COVID-19 Vaccination:    Currently there is no data or safety or efficacy of COVID-19 vaccination in persons who received monoclonal antibodies as part of COVID-19 treatment  Treatment should be deferred for at least 90 days to avoid interference of the treatment with vaccine-induced immune responses (this is based on estimated half-life of therapies and evidence suggesting reinfection is uncommon within 90 days of initial infection)  Full fact sheet document for patients can be found at: H5 cy    The patient consents to proceed with sotrovimab infusion  I have spent 10 minutes directly with the patient  Greater than 50% of this time was spent in counseling/coordination of care regarding: instructions for management, patient and family education and impressions        Encounter provider Gina Araya MD    Provider located at [address] [place] [address] [facility] [id number]    Recent Visits  Date Type Provider Dept   03/09/22 Telemedicine MD Presley Dunham   Showing recent visits within past 7 days and meeting all other requirements  Today's Visits  Date Type Provider Dept   03/11/22 Telemedicine MD Presley Buenrostro   Showing today's visits and meeting all other requirements  Future Appointments  No visits were found meeting these conditions  Showing future appointments within next 150 days and meeting all other requirements     This virtual check-in was done via [facility] and patient was informed that this is a secure, HIPAA-compliant platform  She agrees to proceed  Patient agrees to participate in a virtual check in via telephone or video visit instead of presenting to the office to address urgent/immediate medical needs  Patient is aware this is a billable service  After connecting through [facility], the patient was identified by name and date of birth  Charles Hernandez was informed that this was a telemedicine visit and that the exam was being conducted confidentially over secure lines  No one else was in the room  Charles Hernandez acknowledged consent and understanding of privacy and security of the telemedicine visit  I informed the patient that I have reviewed her record in Epic and presented the opportunity for her to ask any questions regarding the visit today  The patient agreed to participate  Verification of patient location:  Patient is located in the following state in which I hold an active license: [place]    Subjective:   Charles Hernandez is a 55 y o  female who has been screened for COVID-19  Patient's symptoms include nasal congestion, cough and headache  Patient denies fever (last fever tuesday, since has been 99), shortness of breath, chest tightness and vomiting      - Date of positive COVID-19 test: 3/9/2022  Type of test: Home antigen  COVID-19 vaccination status: Fully vaccinated with booster    Sade Perry has been staying home and has isolated themselves in her home  She is taking care to not share personal items and is cleaning all surfaces that are touched often, like counters, tabletops, and doorknobs using household cleaning sprays or wipes  She is wearing a mask when she leaves her room  Has been taking tylenol    Reports her cough is resolving but seems to be worse at nighttime or when she lays down  Denies shortness of breath, dizziness, cyanosis  Otherwise she remains experiencing nasal congestion with sinus headaches  Lab Results   Component Value Date    SARSCOV2 Negative 10/11/2021     Past Medical History:   Diagnosis Date    Abnormal Pap smear of cervix     2017    Anemia     Anxiety     Asthma     Diabetes mellitus ([address] )     History of transfusion 2018    HPV (human papilloma virus) infection     2017    Obesity     Varicella      Past Surgical History:   Procedure Laterality Date    BREAST BIOPSY Right 2012    u/s core bx    BREAST SURGERY      Incisional breast biopsy     SECTION      And     GASTRIC BYPASS  2016    [place] HYSTEROSCOPY,W/ENDO BX N/A 2021    Procedure: DILATATION AND CURETTAGE (D&C) WITH HYSTEROSCOPY (MYOSURE); Surgeon: Toby Evans DO;  [facility];  Service: Gynecology    [place] HYSTEROSCOPY,W/ENDO BX N/A 2021    Procedure: POLYPECTOMY;  Surgeon: Toby Evans DO;  [facility];  Service: Gynecology    TUBAL LIGATION      US GUIDED BREAST BIOPSY RIGHT COMPLETE Right 2019     Current Outpatient Medications   Medication Sig Dispense Refill    albuterol (PROVENTIL HFA,VENTOLIN HFA) 90 mcg/act inhaler Inhale 2 puffs every 6 (six) hours as needed for wheezing or shortness of breath 1 Inhaler 1    artificial tear (LUBRIFRESH P M ) 83-15 % ophthalmic ointment Administer to the right eye daily at bedtime as needed (dry eye) 3 5 g 0    azithromycin (Zithromax) 250 mg tablet Take 2 tablets (500 mg total) by mouth daily for 1 day, THEN 1 tablet (250 mg total) daily for 4 days   6 tablet 0    Calcium Citrate-Vitamin D (CALCET CREAMY BITES) 500-400 MG-UNIT CHEW take 1 tablet by oral route 3 times every day      cetirizine (ZyrTEC) 10 mg tablet Take 1 tablet (10 mg total) by mouth daily as needed for allergies 30 tablet 0    cyanocobalamin (VITAMIN B-12) 100 mcg tablet every 24 hours      erythromycin (ILOTYCIN) ophthalmic ointment Place a 1/2 inch ribbon of ointment into the lower eyelid of both eyes four times per day for 5 days  3 5 g 1    fluticasone (FLONASE) 50 mcg/act nasal spray 1 spray into each nostril daily 9 9 mL 0    Multiple Vitamins-Minerals (MULTIVITAMIN ADULT PO) Take by mouth       No current facility-administered medications for this visit  No Known Allergies    Review of Systems   Constitutional: Negative for fever (last fever tuesday, since has been 99)  HENT: Positive for congestion  Respiratory: Positive for cough  Negative for chest tightness, shortness of breath, wheezing and stridor  Gastrointestinal: Negative for vomiting  Neurological: Positive for headaches  Negative for dizziness  Psychiatric/Behavioral: The patient is not nervous/anxious  All other systems reviewed and are negative  Objective: There were no vitals filed for this visit  Physical Exam  Constitutional:       General: She is not in acute distress  Appearance: She is ill-appearing  She is not toxic-appearing  HENT:      Head: Normocephalic and atraumatic  Nose: Congestion present  Comments: Self palpation of sinus areas observed on video exam , + TTP   Eyes:      General: No scleral icterus  Pulmonary:      Effort: No respiratory distress  Comments: Patient did not appear to be in respiratory distress or with increased work of breathing on video exam   Patient is also speaking in full sentences without difficulty  Occasional dry coughing observed    Skin:     Coloration: Skin is not jaundiced or pale  Neurological:      Mental Status: She is alert and oriented to person, place, and time  Psychiatric:         Mood and Affect: Mood normal          Behavior: Behavior normal          VIRTUAL VISIT DISCLAIMER    Garo Morales verbally agrees to participate in [employer]   Pt is aware that [employer] could be limited without vital signs or the ability to perform a full hands-on physical exam  Nancy Lorin Schwab understands she or the provider may request at any time to terminate the video visit and request the patient to seek care or treatment in person

## 2022-03-12 ENCOUNTER — HOSPITAL ENCOUNTER (OUTPATIENT)
Dept: INFUSION CENTER | Facility: HOSPITAL | Age: 47
Discharge: HOME | End: 2022-03-12
Payer: MEDICARE

## 2022-03-12 VITALS
TEMPERATURE: 97.4 F | OXYGEN SATURATION: 100 % | HEART RATE: 51 BPM | DIASTOLIC BLOOD PRESSURE: 61 MMHG | RESPIRATION RATE: 16 BRPM | SYSTOLIC BLOOD PRESSURE: 105 MMHG

## 2022-03-12 DIAGNOSIS — U07.1 COVID-19: Primary | ICD-10-CM

## 2022-03-12 PROCEDURE — M0247 HB SOTROVIMAB INF ADMIN: HCPCS | Performed by: FAMILY MEDICINE

## 2022-03-12 RX ORDER — SODIUM CHLORIDE 9 MG/ML
20 INJECTION, SOLUTION INTRAVENOUS ONCE
Status: COMPLETED | OUTPATIENT
Start: 2022-03-12 | End: 2022-03-12

## 2022-03-12 RX ORDER — ACETAMINOPHEN 325 MG/1
650 TABLET ORAL ONCE AS NEEDED
Status: CANCELLED | OUTPATIENT
Start: 2022-03-12

## 2022-03-12 RX ORDER — ALBUTEROL SULFATE 90 UG/1
3 AEROSOL, METERED RESPIRATORY (INHALATION) ONCE AS NEEDED
Status: CANCELLED | OUTPATIENT
Start: 2022-03-12

## 2022-03-12 RX ORDER — ONDANSETRON 2 MG/ML
4 INJECTION INTRAMUSCULAR; INTRAVENOUS ONCE AS NEEDED
Status: COMPLETED | OUTPATIENT
Start: 2022-03-12 | End: 2022-03-12

## 2022-03-12 RX ORDER — ACETAMINOPHEN 325 MG/1
650 TABLET ORAL ONCE AS NEEDED
Status: DISCONTINUED | OUTPATIENT
Start: 2022-03-12 | End: 2022-03-15 | Stop reason: HOSPADM

## 2022-03-12 RX ORDER — SODIUM CHLORIDE 9 MG/ML
20 INJECTION, SOLUTION INTRAVENOUS ONCE
Status: CANCELLED | OUTPATIENT
Start: 2022-03-12

## 2022-03-12 RX ORDER — ONDANSETRON 2 MG/ML
4 INJECTION INTRAMUSCULAR; INTRAVENOUS ONCE AS NEEDED
Status: CANCELLED | OUTPATIENT
Start: 2022-03-12

## 2022-03-12 RX ORDER — ALBUTEROL SULFATE 90 UG/1
3 AEROSOL, METERED RESPIRATORY (INHALATION) ONCE AS NEEDED
Status: DISCONTINUED | OUTPATIENT
Start: 2022-03-12 | End: 2022-03-15 | Stop reason: HOSPADM

## 2022-03-12 RX ADMIN — SODIUM CHLORIDE 20 ML/HR: 0.9 INJECTION, SOLUTION INTRAVENOUS at 08:46

## 2022-03-12 RX ADMIN — SODIUM CHLORIDE 500 MG: 9 INJECTION, SOLUTION INTRAVENOUS at 09:06

## 2022-03-12 RX ADMIN — ONDANSETRON 4 MG: 2 INJECTION INTRAMUSCULAR; INTRAVENOUS at 09:06

## 2022-03-12 NOTE — PROGRESS NOTES
Patient tolerated sotrovimab infusion and 1 hour post observation without incident  IV discontinued, catheter intact  Gauze applied to site  Discharge instructions reviewed with patient verbally  Copy of discharge instructions given to patient  Patient verbalized understanding of all discharge instructions  Patient discharged without incident  Patient will f/u with PCP on Monday

## 2022-03-12 NOTE — PROGRESS NOTES
Infusion started, patient advised to verbalize any new symptoms  Patient c/o nausea, zofran administered

## 2022-03-14 ENCOUNTER — TELEMEDICINE (OUTPATIENT)
Dept: FAMILY MEDICINE CLINIC | Facility: OTHER | Age: 47
End: 2022-03-14
Payer: MEDICARE

## 2022-03-14 DIAGNOSIS — U07.1 COVID-19: Primary | ICD-10-CM

## 2022-03-14 PROCEDURE — 99213 OFFICE O/P EST LOW 20 MIN: CPT | Performed by: FAMILY MEDICINE

## 2022-03-14 NOTE — PROGRESS NOTES
COVID-19 Outpatient Progress Note    Assessment/Plan:    Problem List Items Addressed This Visit        Other    COVID-19 - Primary         Patient received the Sotrovimab infusion on 3/12 ( 2 days ago)  Didn't needed to take the Z-Pack  Feeling much better today with improvement in cough and fatigue and body aches  Day #6 of symptoms today  FU PRN   Work note not needed per patient  Disposition:     Patient has COVID-19 infection  Based off CDC guidelines, they were recommended to isolate for 5 days from the date of the positive test  If they remain asymptomatic, isolation may be ended followed by 5 days of wearing a mask when around othes to minimize risk of infecting others  If they have a fever, continue to stay home until fever resolves for at least 24 hours  Discussed symptom directed medication options with patient  Patient currently working from home and doing better  Cough is there but improved  No need for cough medication  I have spent 10 minutes directly with the patient  Greater than 50% of this time was spent in counseling/coordination of care regarding: diagnostic results, risks and benefits of treatment options, instructions for management, patient and family education, importance of treatment compliance and risk factor reductions  Encounter provider Dereck Jhaveri MD    Provider located at [address] [address]    Recent Visits  Date Type Provider Dept   03/11/22 Telemedicine MD Presley Calderon   03/09/22 Telemedicine MD Presley Mercedes   Showing recent visits within past 7 days and meeting all other requirements  Today's Visits  Date Type Provider Dept   03/14/22 Telemedicine MD Presley Long   Showing today's visits and meeting all other requirements  Future Appointments  No visits were found meeting these conditions    Showing future appointments within next 150 days and meeting all other requirements     This virtual check-in was done via [employer] and patient was informed that this is a secure, HIPAA-compliant platform  She agrees to proceed  Patient agrees to participate in a virtual check in via telephone or video visit instead of presenting to the office to address urgent/immediate medical needs  Patient is aware this is a billable service  After connecting through [facility], the patient was identified by name and date of birth  Agustina Piedra was informed that this was a telemedicine visit and that the exam was being conducted confidentially over secure lines  My office door was closed  No one else was in the room  Agustina Piedra acknowledged consent and understanding of privacy and security of the telemedicine visit  I informed the patient that I have reviewed her record in Epic and presented the opportunity for her to ask any questions regarding the visit today  The patient agreed to participate  Verification of patient location:  Patient is located in the following state in which I hold an active license: [place]    Subjective:   Agustina Piedra is a 55 y o  female who has been screened for COVID-19  Patient's symptoms include fatigue, cough and myalgias  Patient denies fever, chills, shortness of breath and chest tightness      - Date of symptom onset: 3/8/2021  - Date of positive COVID-19 test: 3/9/2022  Type of test: Home antigen  COVID-19 vaccination status: Fully vaccinated with booster    Lissa Cohen has been staying home and has isolated themselves in her home  She is taking care to not share personal items and is cleaning all surfaces that are touched often, like counters, tabletops, and doorknobs using household cleaning sprays or wipes  She is wearing a mask when she leaves her room       Monoclonal Antibody Follow-up Symptom Questionnaire  I feel overall: much better  My breathing is: much better  My fever is: better  My fatigue is: much better    Lab Results   Component Value Date    SARSCOV2 Negative 10/11/2021     Past Medical History:   Diagnosis Date    Abnormal Pap smear of cervix     2017    Anemia     Anxiety     Asthma     Diabetes mellitus ([address] )     History of transfusion 2018    HPV (human papilloma virus) infection     2017    Obesity     Varicella      Past Surgical History:   Procedure Laterality Date    BREAST BIOPSY Right 2012    u/s core bx    BREAST SURGERY      Incisional breast biopsy     SECTION      And     GASTRIC BYPASS  2016    [place] HYSTEROSCOPY,W/ENDO BX N/A 2021    Procedure: DILATATION AND CURETTAGE (D&C) WITH HYSTEROSCOPY (MYOSURE); Surgeon: Zachery Terrazas DO;  [facility];  Service: Gynecology    SD HYSTEROSCOPY,W/ENDO BX N/A 2021    Procedure: POLYPECTOMY;  Surgeon: Zachery Terrazas DO;  [facility];  Service: Gynecology    TUBAL LIGATION      US GUIDED BREAST BIOPSY RIGHT COMPLETE Right 2019     Current Outpatient Medications   Medication Sig Dispense Refill    albuterol (PROVENTIL HFA,VENTOLIN HFA) 90 mcg/act inhaler Inhale 2 puffs every 6 (six) hours as needed for wheezing or shortness of breath 1 Inhaler 1    artificial tear (LUBRIFRESH P M ) 83-15 % ophthalmic ointment Administer to the right eye daily at bedtime as needed (dry eye) 3 5 g 0    azithromycin (Zithromax) 250 mg tablet Take 2 tablets (500 mg total) by mouth daily for 1 day, THEN 1 tablet (250 mg total) daily for 4 days   6 tablet 0    Calcium Citrate-Vitamin D (CALCET CREAMY BITES) 500-400 MG-UNIT CHEW take 1 tablet by oral route 3 times every day      cetirizine (ZyrTEC) 10 mg tablet Take 1 tablet (10 mg total) by mouth daily as needed for allergies 30 tablet 0    cyanocobalamin (VITAMIN B-12) 100 mcg tablet every 24 hours      erythromycin (ILOTYCIN) ophthalmic ointment Place a 1/2 inch ribbon of ointment into the lower eyelid of both eyes four times per day for 5 days  3 5 g 1    fluticasone (FLONASE) 50 mcg/act nasal spray 1 spray into each nostril daily 9 9 mL 0    Multiple Vitamins-Minerals (MULTIVITAMIN ADULT PO) Take by mouth       No current facility-administered medications for this visit  Facility-Administered Medications Ordered in Other Visits   Medication Dose Route Frequency Provider Last Rate Last Admin    acetaminophen (TYLENOL) tablet 650 mg  650 mg Oral Once PRN Kamaljit Dutta MD        albuterol (PROVENTIL HFA,VENTOLIN HFA) inhaler 3 puff  3 puff Inhalation Once PRN Kamaljit Dutta MD         No Known Allergies    Review of Systems   Constitutional: Positive for fatigue  Negative for chills and fever  Respiratory: Positive for cough  Negative for chest tightness and shortness of breath  Musculoskeletal: Positive for myalgias  Objective: There were no vitals filed for this visit  Physical Exam  Constitutional:       General: She is not in acute distress  Appearance: She is well-developed  She is not diaphoretic  HENT:      Head: Normocephalic and atraumatic  Pulmonary:      Effort: Pulmonary effort is normal  No respiratory distress  Comments: Speaks full sentences with no respiratory distress  Skin:     Coloration: Skin is not pale  Findings: No rash  Neurological:      General: No focal deficit present  Mental Status: She is alert and oriented to person, place, and time  Psychiatric:         Behavior: Behavior normal          Thought Content: Thought content normal          VIRTUAL VISIT DISCLAIMER    Jazmine Dickson verbally agrees to participate in [employer]  Pt is aware that [employer] could be limited without vital signs or the ability to perform a full hands-on physical exam  Miladis Babin understands she or the provider may request at any time to terminate the video visit and request the patient to seek care or treatment in person

## 2022-03-21 ENCOUNTER — APPOINTMENT (EMERGENCY)
Dept: CT IMAGING | Facility: HOSPITAL | Age: 47
DRG: 227 | End: 2022-03-21
Payer: MEDICARE

## 2022-03-21 ENCOUNTER — HOSPITAL ENCOUNTER (INPATIENT)
Facility: HOSPITAL | Age: 47
LOS: 3 days | Discharge: HOME | DRG: 227 | End: 2022-03-25
Attending: EMERGENCY MEDICINE | Admitting: SURGERY
Payer: MEDICARE

## 2022-03-21 DIAGNOSIS — R10.30 LOWER ABDOMINAL PAIN: Primary | ICD-10-CM

## 2022-03-21 DIAGNOSIS — K43.9 VENTRAL HERNIA WITHOUT OBSTRUCTION OR GANGRENE: ICD-10-CM

## 2022-03-21 PROBLEM — R10.9 ABDOMINAL PAIN: Status: ACTIVE | Noted: 2022-03-21

## 2022-03-21 PROBLEM — D72.829 LEUKOCYTOSIS: Status: ACTIVE | Noted: 2022-03-21

## 2022-03-21 LAB
ALBUMIN SERPL BCP-MCNC: 4.3 G/DL (ref 3.5–5)
ALP SERPL-CCNC: 119 U/L (ref 34–104)
ALT SERPL W P-5'-P-CCNC: 17 U/L (ref 7–52)
ANION GAP SERPL CALCULATED.3IONS-SCNC: 10 MMOL/L (ref 4–13)
AST SERPL W P-5'-P-CCNC: 20 U/L (ref 13–39)
BACTERIA UR QL AUTO: ABNORMAL /HPF
BASOPHILS # BLD AUTO: 0.06 THOUSANDS/ΜL (ref 0–0.1)
BASOPHILS NFR BLD AUTO: 0 % (ref 0–1)
BILIRUB SERPL-MCNC: 0.41 MG/DL (ref 0.2–1)
BILIRUB UR QL STRIP: NEGATIVE
BUN SERPL-MCNC: 13 MG/DL (ref 5–25)
CALCIUM SERPL-MCNC: 9.7 MG/DL (ref 8.4–10.2)
CHLORIDE SERPL-SCNC: 98 MMOL/L (ref 96–108)
CLARITY UR: CLEAR
CO2 SERPL-SCNC: 27 MMOL/L (ref 21–32)
COLOR UR: YELLOW
CREAT SERPL-MCNC: 0.72 MG/DL (ref 0.6–1.3)
EOSINOPHIL # BLD AUTO: 0.06 THOUSAND/ΜL (ref 0–0.61)
EOSINOPHIL NFR BLD AUTO: 0 % (ref 0–6)
ERYTHROCYTE [DISTWIDTH] IN BLOOD BY AUTOMATED COUNT: 15.1 % (ref 11.6–15.1)
EXT PREG TEST URINE: NEGATIVE
EXT. CONTROL ED NAV: NORMAL
GFR SERPL CREATININE-BSD FRML MDRD: 100 ML/MIN/1.73SQ M
GLUCOSE SERPL-MCNC: 75 MG/DL (ref 65–140)
GLUCOSE UR STRIP-MCNC: NEGATIVE MG/DL
HCT VFR BLD AUTO: 38.2 % (ref 34.8–46.1)
HGB BLD-MCNC: 11.9 G/DL (ref 11.5–15.4)
HGB UR QL STRIP.AUTO: NEGATIVE
IMM GRANULOCYTES # BLD AUTO: 0.06 THOUSAND/UL (ref 0–0.2)
IMM GRANULOCYTES NFR BLD AUTO: 0 % (ref 0–2)
KETONES UR STRIP-MCNC: NEGATIVE MG/DL
LACTATE SERPL-SCNC: 1 MMOL/L (ref 0.5–2)
LEUKOCYTE ESTERASE UR QL STRIP: ABNORMAL
LIPASE SERPL-CCNC: 32 U/L (ref 11–82)
LYMPHOCYTES # BLD AUTO: 1.85 THOUSANDS/ΜL (ref 0.6–4.47)
LYMPHOCYTES NFR BLD AUTO: 14 % (ref 14–44)
MCH RBC QN AUTO: 24.5 PG (ref 26.8–34.3)
MCHC RBC AUTO-ENTMCNC: 31.2 G/DL (ref 31.4–37.4)
MCV RBC AUTO: 79 FL (ref 82–98)
MONOCYTES # BLD AUTO: 0.89 THOUSAND/ΜL (ref 0.17–1.22)
MONOCYTES NFR BLD AUTO: 7 % (ref 4–12)
NEUTROPHILS # BLD AUTO: 10.64 THOUSANDS/ΜL (ref 1.85–7.62)
NEUTS SEG NFR BLD AUTO: 79 % (ref 43–75)
NITRITE UR QL STRIP: NEGATIVE
NON-SQ EPI CELLS URNS QL MICRO: ABNORMAL /HPF
NRBC BLD AUTO-RTO: 0 /100 WBCS
PH UR STRIP.AUTO: 6 [PH]
PLATELET # BLD AUTO: 378 THOUSANDS/UL (ref 149–390)
PMV BLD AUTO: 8.9 FL (ref 8.9–12.7)
POTASSIUM SERPL-SCNC: 3.9 MMOL/L (ref 3.5–5.3)
PROT SERPL-MCNC: 8.3 G/DL (ref 6.4–8.4)
PROT UR STRIP-MCNC: NEGATIVE MG/DL
RBC # BLD AUTO: 4.86 MILLION/UL (ref 3.81–5.12)
RBC #/AREA URNS AUTO: ABNORMAL /HPF
SODIUM SERPL-SCNC: 135 MMOL/L (ref 135–147)
SP GR UR STRIP.AUTO: <=1.005 (ref 1–1.03)
UROBILINOGEN UR QL STRIP.AUTO: 0.2 E.U./DL
WBC # BLD AUTO: 13.56 THOUSAND/UL (ref 4.31–10.16)
WBC #/AREA URNS AUTO: ABNORMAL /HPF

## 2022-03-21 PROCEDURE — 74177 CT ABD & PELVIS W/CONTRAST: CPT

## 2022-03-21 PROCEDURE — C9113 INJ PANTOPRAZOLE SODIUM, VIA: HCPCS | Performed by: SURGERY

## 2022-03-21 PROCEDURE — 96375 TX/PRO/DX INJ NEW DRUG ADDON: CPT

## 2022-03-21 PROCEDURE — 83690 ASSAY OF LIPASE: CPT | Performed by: PHYSICIAN ASSISTANT

## 2022-03-21 PROCEDURE — 36415 COLL VENOUS BLD VENIPUNCTURE: CPT | Performed by: PHYSICIAN ASSISTANT

## 2022-03-21 PROCEDURE — 83605 ASSAY OF LACTIC ACID: CPT | Performed by: PHYSICIAN ASSISTANT

## 2022-03-21 PROCEDURE — 81003 URINALYSIS AUTO W/O SCOPE: CPT | Performed by: PHYSICIAN ASSISTANT

## 2022-03-21 PROCEDURE — 80053 COMPREHEN METABOLIC PANEL: CPT | Performed by: PHYSICIAN ASSISTANT

## 2022-03-21 PROCEDURE — 81025 URINE PREGNANCY TEST: CPT | Performed by: PHYSICIAN ASSISTANT

## 2022-03-21 PROCEDURE — 85025 COMPLETE CBC W/AUTO DIFF WBC: CPT | Performed by: PHYSICIAN ASSISTANT

## 2022-03-21 PROCEDURE — 99204 OFFICE O/P NEW MOD 45 MIN: CPT | Performed by: SURGERY

## 2022-03-21 PROCEDURE — 81001 URINALYSIS AUTO W/SCOPE: CPT | Performed by: PHYSICIAN ASSISTANT

## 2022-03-21 PROCEDURE — 99285 EMERGENCY DEPT VISIT HI MDM: CPT

## 2022-03-21 PROCEDURE — 96361 HYDRATE IV INFUSION ADD-ON: CPT

## 2022-03-21 PROCEDURE — 99285 EMERGENCY DEPT VISIT HI MDM: CPT | Performed by: PHYSICIAN ASSISTANT

## 2022-03-21 PROCEDURE — 99245 OFF/OP CONSLTJ NEW/EST HI 55: CPT | Performed by: INTERNAL MEDICINE

## 2022-03-21 PROCEDURE — 96374 THER/PROPH/DIAG INJ IV PUSH: CPT

## 2022-03-21 RX ORDER — ALBUTEROL SULFATE 90 UG/1
2 AEROSOL, METERED RESPIRATORY (INHALATION) EVERY 6 HOURS PRN
Status: DISCONTINUED | OUTPATIENT
Start: 2022-03-21 | End: 2022-03-25 | Stop reason: HOSPADM

## 2022-03-21 RX ORDER — ONDANSETRON 2 MG/ML
4 INJECTION INTRAMUSCULAR; INTRAVENOUS EVERY 6 HOURS PRN
Status: DISCONTINUED | OUTPATIENT
Start: 2022-03-21 | End: 2022-03-25 | Stop reason: HOSPADM

## 2022-03-21 RX ORDER — HEPARIN SODIUM 5000 [USP'U]/ML
5000 INJECTION, SOLUTION INTRAVENOUS; SUBCUTANEOUS EVERY 8 HOURS SCHEDULED
Status: DISCONTINUED | OUTPATIENT
Start: 2022-03-21 | End: 2022-03-22

## 2022-03-21 RX ORDER — MORPHINE SULFATE 4 MG/ML
4 INJECTION, SOLUTION INTRAMUSCULAR; INTRAVENOUS ONCE
Status: COMPLETED | OUTPATIENT
Start: 2022-03-21 | End: 2022-03-21

## 2022-03-21 RX ORDER — SODIUM CHLORIDE, SODIUM LACTATE, POTASSIUM CHLORIDE, CALCIUM CHLORIDE 600; 310; 30; 20 MG/100ML; MG/100ML; MG/100ML; MG/100ML
125 INJECTION, SOLUTION INTRAVENOUS CONTINUOUS
Status: DISCONTINUED | OUTPATIENT
Start: 2022-03-21 | End: 2022-03-23

## 2022-03-21 RX ORDER — HYDROMORPHONE HCL/PF 1 MG/ML
1 SYRINGE (ML) INJECTION EVERY 6 HOURS PRN
Status: DISCONTINUED | OUTPATIENT
Start: 2022-03-21 | End: 2022-03-22

## 2022-03-21 RX ORDER — PANTOPRAZOLE SODIUM 40 MG/1
40 INJECTION, POWDER, FOR SOLUTION INTRAVENOUS
Status: DISCONTINUED | OUTPATIENT
Start: 2022-03-21 | End: 2022-03-25 | Stop reason: HOSPADM

## 2022-03-21 RX ORDER — ONDANSETRON 2 MG/ML
4 INJECTION INTRAMUSCULAR; INTRAVENOUS ONCE
Status: COMPLETED | OUTPATIENT
Start: 2022-03-21 | End: 2022-03-21

## 2022-03-21 RX ADMIN — HEPARIN SODIUM 5000 UNITS: 5000 INJECTION INTRAVENOUS; SUBCUTANEOUS at 16:55

## 2022-03-21 RX ADMIN — ONDANSETRON 4 MG: 2 INJECTION INTRAMUSCULAR; INTRAVENOUS at 11:16

## 2022-03-21 RX ADMIN — SODIUM CHLORIDE 1000 ML: 0.9 INJECTION, SOLUTION INTRAVENOUS at 11:16

## 2022-03-21 RX ADMIN — MORPHINE SULFATE 4 MG: 4 INJECTION INTRAVENOUS at 13:20

## 2022-03-21 RX ADMIN — HYDROMORPHONE HYDROCHLORIDE 1 MG: 1 INJECTION, SOLUTION INTRAMUSCULAR; INTRAVENOUS; SUBCUTANEOUS at 20:25

## 2022-03-21 RX ADMIN — IOHEXOL 100 ML: 350 INJECTION, SOLUTION INTRAVENOUS at 13:03

## 2022-03-21 RX ADMIN — SODIUM CHLORIDE, SODIUM LACTATE, POTASSIUM CHLORIDE, AND CALCIUM CHLORIDE 125 ML/HR: 600; 310; 30; 20 INJECTION, SOLUTION INTRAVENOUS at 16:55

## 2022-03-21 RX ADMIN — PANTOPRAZOLE SODIUM 40 MG: 40 INJECTION, POWDER, FOR SOLUTION INTRAVENOUS at 16:55

## 2022-03-21 NOTE — ED NOTES
Patient encouraged to keep R arm straight to facilitate IVF infusion     Ward Aragon RN  03/21/22 [id number]

## 2022-03-21 NOTE — ED PROVIDER NOTES
History  Chief Complaint   Patient presents with    Abdominal Pain     Pt presents to the ED from home with complaint of "I have really bad stomach cramps for two weeks"; states it all started after she got Covid and the antibodies; states she took laxatives yesterday and today and pain has gotten worse since taking; states last bowel movement was this morning; states abdominal pain is getting worse; states pain is generalized abdominal pain however left side is worse; denies nausea, vomiting, diarrhea and/or fever at home; denies problems with urination; took tylenol with     Pt with Past Medical History: Anemia, Anxiety, Asthma, Diabetes mellitus, HPV (human papilloma virus) infection, Obesity  Past Surgical History: BREAST SURGERY- Incisional breast biopsy,  SECTION, GASTRIC BYPASS, HYSTEROSCOPY,W/ENDO BX, D&C WITH HYSTEROSCOPY, POLYPECTOMY, TUBAL LIGATION,   Home antigen Covid + on 3/9; got antibody: Sotrovimab infusion on 3/12  presents to the ED c/o 2 weeks of intermittent abd pain, cramping, worse over the past few days, now LLQ, + nausea, no vomiting, + constipation, has been using laxatives, no fever, no urinary complaints; initially started with menses, but has persisted and worsened  Prior to Admission Medications   Prescriptions Last Dose Informant Patient Reported? Taking?    Calcium Citrate-Vitamin D (CALCET CREAMY BITES) 500-400 MG-UNIT CHEW More than a month at Unknown time Self Yes No   Sig: take 1 tablet by oral route 3 times every day   Multiple Vitamins-Minerals (MULTIVITAMIN ADULT PO) 3/20/2022 at Unknown time Self Yes Yes   Sig: Take by mouth   albuterol (PROVENTIL HFA,VENTOLIN HFA) 90 mcg/act inhaler Past Month at Unknown time Self No Yes   Sig: Inhale 2 puffs every 6 (six) hours as needed for wheezing or shortness of breath   artificial tear (LUBRIFRESH P M ) 83-15 % ophthalmic ointment   No No   Sig: Administer to the right eye daily at bedtime as needed (dry eye) cetirizine (ZyrTEC) 10 mg tablet More than a month at Unknown time  No No   Sig: Take 1 tablet (10 mg total) by mouth daily as needed for allergies   cyanocobalamin (VITAMIN B-12) 100 mcg tablet More than a month at Unknown time Self Yes No   Sig: every 24 hours   fluticasone (FLONASE) 50 mcg/act nasal spray Not Taking at Unknown time  No No   Si spray into each nostril daily   Patient not taking: Reported on 3/21/2022       Facility-Administered Medications: None       Past Medical History:   Diagnosis Date    Abnormal Pap smear of cervix     2017    Anemia     Anxiety     Asthma     Diabetes mellitus ([facility] [address] )     History of transfusion 2018    HPV (human papilloma virus) infection     2017    Obesity     Varicella        Past Surgical History:   Procedure Laterality Date    BREAST BIOPSY Right 2012    u/s core bx    BREAST SURGERY      Incisional breast biopsy     SECTION      And     GASTRIC BYPASS  2016    [place] HYSTEROSCOPY,W/ENDO BX N/A 2021    Procedure: DILATATION AND CURETTAGE (D&C) WITH HYSTEROSCOPY (MYOSURE);   Surgeon: Michael Tobar DO;  [facility]: [facility];  Service: Gynecology    [place] HYSTEROSCOPY,W/ENDO BX N/A 2021    Procedure: POLYPECTOMY;  Surgeon: Michael Tobar DO;  [facility];  Service: Gynecology    TUBAL LIGATION      US GUIDED BREAST BIOPSY RIGHT COMPLETE Right 2019       Family History   Problem Relation Age of Onset    Diabetes Mother     Hypertension Mother     Cancer Mother     Arthritis Mother     Leukemia Mother     Hyperlipidemia Mother     Diabetes Father     Hypertension Father     Heart disease Father     Coronary artery disease Father     Hyperlipidemia Father     Stroke Paternal Grandfather     Thyroid cancer Sister 37    Hypertension Brother     Breast cancer Maternal Aunt 48    Breast cancer Paternal Aunt 79    No Known Problems Maternal Grandmother     No Known Problems Maternal Grandfather     No Known Problems Paternal Grandmother     No Known Problems Son     No Known Problems Son     No Known Problems Maternal Aunt     No Known Problems Maternal Aunt     No Known Problems Maternal Aunt     No Known Problems Maternal Aunt     No Known Problems Maternal Aunt     Cervical cancer Paternal Aunt 36    No Known Problems Paternal Aunt     No Known Problems Paternal Aunt     No Known Problems Paternal Aunt     No Known Problems Paternal Aunt     No Known Problems Paternal Aunt     Lung cancer Paternal Uncle 80     I have reviewed and agree with the history as documented  E-Cigarette/Vaping    E-Cigarette Use Never User      E-Cigarette/Vaping Substances    Nicotine No     THC No     CBD No     Flavoring No     Other No     Unknown No      Social History     Tobacco Use    Smoking status: Current Every Day Smoker     Packs/day: 0 50     Years: 10 00     Pack years: 5 00     Types: Cigarettes    Smokeless tobacco: Never Used    Tobacco comment: refused   Vaping Use    Vaping Use: Never used   Substance Use Topics    Alcohol use: Yes     Alcohol/week: 0 0 standard drinks     Comment: social     Drug use: Never       Review of Systems   Constitutional: Negative for chills and fever  HENT: Negative for hearing loss and sore throat  Eyes: Negative for visual disturbance  Respiratory: Negative for cough and shortness of breath  Cardiovascular: Negative for chest pain and leg swelling  Gastrointestinal: Positive for abdominal pain, constipation and nausea  Negative for diarrhea and vomiting  Genitourinary: Positive for pelvic pain and vaginal bleeding  Negative for difficulty urinating, dysuria, flank pain, frequency, genital sores and vaginal discharge  Musculoskeletal: Negative for arthralgias and myalgias  Skin: Negative for pallor and wound  Neurological: Negative for dizziness, weakness and headaches     Psychiatric/Behavioral: Negative for behavioral problems  All other systems reviewed and are negative  Physical Exam  Physical Exam  Vitals and nursing note reviewed  Constitutional:       General: She is in acute distress  Appearance: She is well-developed  She is obese  HENT:      Head: Normocephalic and atraumatic  Right Ear: External ear normal       Left Ear: External ear normal       Nose: Nose normal       Mouth/Throat:      Mouth: Mucous membranes are moist       Pharynx: Oropharynx is clear  Eyes:      Conjunctiva/sclera: Conjunctivae normal    Cardiovascular:      Rate and Rhythm: Normal rate and regular rhythm  Heart sounds: Normal heart sounds  Pulmonary:      Effort: Pulmonary effort is normal       Breath sounds: Normal breath sounds  Abdominal:      General: Bowel sounds are normal       Palpations: Abdomen is soft  Tenderness: There is abdominal tenderness in the left lower quadrant  There is guarding  There is no right CVA tenderness or left CVA tenderness  Hernia: No hernia is present  Genitourinary:     Comments: deferred  Musculoskeletal:         General: Normal range of motion  Cervical back: Normal range of motion  Skin:     General: Skin is warm and dry  Findings: No rash  Neurological:      Mental Status: She is alert and oriented to person, place, and time     Psychiatric:         Behavior: Behavior normal          Vital Signs  ED Triage Vitals [03/21/22 1047]   Temperature Pulse Respirations Blood Pressure SpO2   (!) 97 3 °F (36 3 °C) 77 18 123/70 100 %      Temp Source Heart Rate Source Patient Position - Orthostatic VS BP Location FiO2 (%)   Oral Monitor Sitting Left arm --      Pain Score       7           Vitals:    03/21/22 1047 03/21/22 1315   BP: 123/70 108/63   Pulse: 77 71   Patient Position - Orthostatic VS: Sitting Lying         Visual Acuity      ED Medications  Medications   iohexol (OMNIPAQUE) 240 MG/ML solution 50 mL (has no administration in time range) sodium chloride 0 9 % bolus 1,000 mL (1,000 mL Intravenous New Bag 3/21/22 1116)   ondansetron (ZOFRAN) injection 4 mg (4 mg Intravenous Given 3/21/22 1116)   iohexol (OMNIPAQUE) 350 MG/ML injection (SINGLE-DOSE) 100 mL (100 mL Intravenous Given 3/21/22 1303)   morphine (PF) 4 mg/mL injection 4 mg (4 mg Intravenous Given 3/21/22 1320)       Diagnostic Studies  Results Reviewed     Procedure Component Value Units Date/Time    Urine Microscopic [[id number]]  (Abnormal) Collected: 03/21/22 1236    Lab Status: Final result Specimen: Urine, Clean Catch Updated: 03/21/22 1319     RBC, UA None Seen /hpf      WBC, UA 4-10 /hpf      Epithelial Cells Occasional /hpf      Bacteria, UA Occasional /hpf     UA w Reflex to Microscopic w Reflex to Culture [[id number]]  (Abnormal) Collected: 03/21/22 1236    Lab Status: Final result Specimen: Urine, Clean Catch Updated: 03/21/22 1243     Color, UA Yellow     Clarity, UA Clear     Specific Gravity, UA <=1 005     pH, UA 6 0     Leukocytes, UA 1+     Nitrite, UA Negative     Protein, UA Negative mg/dl      Glucose, UA Negative mg/dl      Ketones, UA Negative mg/dl      Urobilinogen, UA 0 2 E U /dl      Bilirubin, UA Negative     Blood, UA Negative    POCT pregnancy, urine [[id number]]  (Normal) Resulted: 03/21/22 1239    Lab Status: Final result Updated: 03/21/22 1239     EXT PREG TEST UR (Ref: Negative) negative     Control valid    Lactic acid [[id number]]  (Normal) Collected: 03/21/22 1116    Lab Status: Final result Specimen: Blood from Arm, Right Updated: 03/21/22 1143     LACTIC ACID 1 0 mmol/L     Narrative:      Result may be elevated if tourniquet was used during collection      Lipase [[id number]]  (Normal) Collected: 03/21/22 1116    Lab Status: Final result Specimen: Blood from Arm, Right Updated: 03/21/22 1143     Lipase 32 u/L     Comprehensive metabolic panel [[id number]]  (Abnormal) Collected: 03/21/22 1116    Lab Status: Final result Specimen: Blood from Arm, Right Updated: 03/21/22 1143     Sodium 135 mmol/L      Potassium 3 9 mmol/L      Chloride 98 mmol/L      CO2 27 mmol/L      ANION GAP 10 mmol/L      BUN 13 mg/dL      Creatinine 0 72 mg/dL      Glucose 75 mg/dL      Calcium 9 7 mg/dL      AST 20 U/L      ALT 17 U/L      Alkaline Phosphatase 119 U/L      Total Protein 8 3 g/dL      Albumin 4 3 g/dL      Total Bilirubin 0 41 mg/dL      eGFR 100 ml/min/1 73sq m     Narrative:      National Kidney Disease Foundation guidelines for Chronic Kidney Disease (CKD):     Stage 1 with normal or high GFR (GFR > 90 mL/min/1 73 square meters)    Stage 2 Mild CKD (GFR = 60-89 mL/min/1 73 square meters)    Stage 3A Moderate CKD (GFR = 45-59 mL/min/1 73 square meters)    Stage 3B Moderate CKD (GFR = 30-44 mL/min/1 73 square meters)    Stage 4 Severe CKD (GFR = 15-29 mL/min/1 73 square meters)    Stage 5 End Stage CKD (GFR <15 mL/min/1 73 square meters)  Note: GFR calculation is accurate only with a steady state creatinine    CBC and differential [[id number]]  (Abnormal) Collected: 03/21/22 1116    Lab Status: Final result Specimen: Blood from Arm, Right Updated: 03/21/22 1126     WBC 13 56 Thousand/uL      RBC 4 86 Million/uL      Hemoglobin 11 9 g/dL      Hematocrit 38 2 %      MCV 79 fL      MCH 24 5 pg      MCHC 31 2 g/dL      RDW 15 1 %      MPV 8 9 fL      Platelets 656 Thousands/uL      nRBC 0 /100 WBCs      Neutrophils Relative 79 %      Immat GRANS % 0 %      Lymphocytes Relative 14 %      Monocytes Relative 7 %      Eosinophils Relative 0 %      Basophils Relative 0 %      Neutrophils Absolute 10 64 Thousands/µL      Immature Grans Absolute 0 06 Thousand/uL      Lymphocytes Absolute 1 85 Thousands/µL      Monocytes Absolute 0 89 Thousand/µL      Eosinophils Absolute 0 06 Thousand/µL      Basophils Absolute 0 06 Thousands/µL                  CT abdomen pelvis with contrast   Final Result by Jefferson Pickard MD (03/21 4048)      Cholelithiasis        Large midline ventral hernia without evidence for obstruction  Small bowel anastomosis in the right mid abdomen with associated bowel wall thickening  Postoperative changes status post gastric bypass  Workstation performed: [id number]                    Procedures  Procedures         ED Course                                             MDM  Number of Diagnoses or Management Options  Diagnosis management comments: TT surgery: Jami who will come see pt in ED, hernia appears reducible, tenderness with palpation, but abd soft  Will admit       Amount and/or Complexity of Data Reviewed  Clinical lab tests: ordered and reviewed  Tests in the radiology section of CPT®: ordered and reviewed  Review and summarize past medical records: yes  Discuss the patient with other providers: yes        Disposition  Final diagnoses:   Lower abdominal pain   Ventral hernia without obstruction or gangrene     Time reflects when diagnosis was documented in both MDM as applicable and the Disposition within this note     Time User Action Codes Description Comment    3/21/2022  2:45 PM Rama Brochure Add [R10 30] Lower abdominal pain     3/21/2022  2:54 PM Rama Brochure Add [K43 9] Ventral hernia without obstruction or gangrene       ED Disposition     ED Disposition Condition Date/Time Comment    Admit Stable Mon Mar 21, 2022  2:45 PM Case was discussed with Jami and the patient's admission status was agreed to be Admission Status: observation status to the service of Dr Janette Galicia          Follow-up Information    None         Patient's Medications   Discharge Prescriptions    No medications on file       No discharge procedures on file      PDMP Review       Value Time User    PDMP Reviewed  Yes 8/27/2021  6:52 PM Jayne Lovelace DO          ED Provider  Electronically Signed by           Abena Fabian PA-C  03/21/22 0390

## 2022-03-21 NOTE — ED TRIAGE NOTES
Pt presents to the ED from home with complaint of "I have really bad stomach cramps for two weeks"; states it all started after she got Covid and the antibodies; states she took laxatives yesterday and today and pain has gotten worse since taking; states last bowel movement was this morning; states abdominal pain is getting worse; states pain is generalized abdominal pain however left side is worse; denies nausea, vomiting, diarrhea and/or fever at home; denies problems with urination; took tylenol with relief of pain

## 2022-03-21 NOTE — H&P
H&P Exam - General Surgery   Shanice Hopkins 55 y o  female MRN: [id number]  Unit/Bed#: ED 14 Encounter: [id number]    Assessment/Plan     Assessment:  I reviewed the CT scan of the patient  It shows a large infraumbilical incisional hernia  The bowel loops are not dilated  There is no obstruction or strangulation  The gastric remnant is also not distended  Plan:  The patient has a incarcerated but nonobstructed non strangulated incisional hernia containing bowel  The size of the hernia is unchanged from 2020 CT scan  She is tender to palpation in the region  We will admit her  Keep her NPO overnight  If her pain improves then I would prefer to discharge her and do the hernia surgery electively considering her recent COVID diagnosis  If however she continues to have pain we will do the surgery as an urgent case  She verbalized understanding  History of Present Illness   History, ROS and PFSH unobtainable from any source due to   HPI:  Shanice Hopkins is a 55 y o  female who presents with lower abdominal pain which is crampy in nature  The pain is going on for last 2 weeks  She initially thought that was related to her menstrual periods  Patient had nausea but no vomiting  She had a bowel movement today and is passing flatus  No fever  Patient had the tested positive with the home antigen test for COVID 2 weeks ago  She received antibody treatment for COVID at that time  No cough  No chest pain  No blood in stools  Patient has history of gastric bypass  She also has history of        Review of Systems   Constitutional: Negative  HENT: Negative  Eyes: Negative  Respiratory: Negative  Cardiovascular: Negative  Gastrointestinal: Positive for abdominal pain  Endocrine: Negative  Genitourinary: Negative  Musculoskeletal: Negative  Skin: Negative  Allergic/Immunologic: Negative  Neurological: Negative  Hematological: Negative      Psychiatric/Behavioral: Negative  Historical Information   Past Medical History:   Diagnosis Date    Abnormal Pap smear of cervix     2017    Anemia     Anxiety     Asthma     Diabetes mellitus ([facility] [address] [facility]     History of transfusion 2018    HPV (human papilloma virus) infection     2017    Obesity     Varicella      Past Surgical History:   Procedure Laterality Date    BREAST BIOPSY Right 2012    u/s core bx    BREAST SURGERY      Incisional breast biopsy     SECTION      And     GASTRIC BYPASS  2016    [place] HYSTEROSCOPY,W/ENDO BX N/A 2021    Procedure: DILATATION AND CURETTAGE (D&C) WITH HYSTEROSCOPY (MYOSURE);   Surgeon: Beverley Mayberry DO;  Location: [facility];  Service: Gynecology    [place] HYSTEROSCOPY,W/ENDO BX N/A 2021    Procedure: POLYPECTOMY;  Surgeon: Beverley Mayberry DO;  [facility];  Service: Gynecology    TUBAL LIGATION  2009    US GUIDED BREAST BIOPSY RIGHT COMPLETE Right 2019     Social History   Social History     Substance and Sexual Activity   Alcohol Use Yes    Alcohol/week: 0 0 standard drinks    Comment: social      Social History     Substance and Sexual Activity   Drug Use Never     Social History     Tobacco Use   Smoking Status Current Every Day Smoker    Packs/day: 0 50    Years: 10 00    Pack years: 5 00    Types: Cigarettes   Smokeless Tobacco Never Used   Tobacco Comment    refused     E-Cigarette/Vaping    E-Cigarette Use Never User      E-Cigarette/Vaping Substances    Nicotine No     THC No     CBD No     Flavoring No     Other No     Unknown No      Family History:   Family History   Problem Relation Age of Onset    Diabetes Mother     Hypertension Mother     Cancer Mother     Arthritis Mother     Leukemia Mother     Hyperlipidemia Mother     Diabetes Father     Hypertension Father     Heart disease Father     Coronary artery disease Father     Hyperlipidemia Father     Stroke Paternal Segundo Jimenez Thyroid cancer Sister 37    Hypertension Brother     Breast cancer Maternal Aunt 48    Breast cancer Paternal Aunt 79    No Known Problems Maternal Grandmother     No Known Problems Maternal Grandfather     No Known Problems Paternal Grandmother     No Known Problems Son     No Known Problems Son     No Known Problems Maternal Aunt     No Known Problems Maternal Aunt     No Known Problems Maternal Aunt     No Known Problems Maternal Aunt     No Known Problems Maternal Aunt     Cervical cancer Paternal Aunt 36    No Known Problems Paternal Aunt     No Known Problems Paternal Aunt     No Known Problems Paternal Aunt     No Known Problems Paternal Aunt     No Known Problems Paternal Aunt     Lung cancer Paternal Uncle 80       Meds/Allergies   all medications and allergies reviewed  No Known Allergies    Objective   First Vitals:   Blood Pressure: 123/70 (03/21/22 1047)  Pulse: 77 (03/21/22 1047)  Temperature: (!) 97 3 °F (36 3 °C) (03/21/22 1047)  Temp Source: Oral (03/21/22 1047)  Respirations: 18 (03/21/22 1047)  Height: 5' 1" (154 9 cm) (03/21/22 1047)  Weight - Scale: 81 6 kg (180 lb) (03/21/22 1047)  SpO2: 100 % (03/21/22 1047)    Current Vitals:   Blood Pressure: 108/63 (03/21/22 1315)  Pulse: 71 (03/21/22 1315)  Temperature: (!) 97 3 °F (36 3 °C) (03/21/22 1047)  Temp Source: Oral (03/21/22 1047)  Respirations: 18 (03/21/22 1315)  Height: 5' 1" (154 9 cm) (03/21/22 1047)  Weight - Scale: 81 6 kg (180 lb) (03/21/22 1047)  SpO2: 100 % (03/21/22 1315)    No intake or output data in the 24 hours ending 03/21/22 1442    Invasive Devices  Report    Peripheral Intravenous Line            Peripheral IV 03/12/22 Right Hand 9 days    Peripheral IV 03/21/22 Right Antecubital <1 day                Physical Exam  Vitals reviewed  Constitutional:       Appearance: She is obese  HENT:      Head: Normocephalic and atraumatic        Nose: Nose normal       Mouth/Throat:      Mouth: Mucous membranes are moist  Eyes:      Pupils: Pupils are equal, round, and reactive to light  Cardiovascular:      Rate and Rhythm: Normal rate and regular rhythm  Pulses: Normal pulses  Heart sounds: Normal heart sounds  Pulmonary:      Effort: Pulmonary effort is normal       Breath sounds: Normal breath sounds  Abdominal:      General: Abdomen is flat  Bowel sounds are normal  There is no distension  Palpations: Abdomen is soft  Tenderness: There is abdominal tenderness  There is no guarding  Hernia: A hernia is present  Musculoskeletal:         General: Normal range of motion  Cervical back: Normal range of motion  Skin:     General: Skin is warm and dry  Capillary Refill: Capillary refill takes less than 2 seconds  Neurological:      General: No focal deficit present  Mental Status: She is alert and oriented to person, place, and time  Psychiatric:         Mood and Affect: Mood normal          Lab Results:   I have personally reviewed pertinent lab results    , CBC:   Lab Results   Component Value Date    WBC 13 56 (H) 03/21/2022    HGB 11 9 03/21/2022    HCT 38 2 03/21/2022    MCV 79 (L) 03/21/2022     03/21/2022    MCH 24 5 (L) 03/21/2022    MCHC 31 2 (L) 03/21/2022    RDW 15 1 03/21/2022    MPV 8 9 03/21/2022    NRBC 0 03/21/2022   , CMP:   Lab Results   Component Value Date    SODIUM 135 03/21/2022    K 3 9 03/21/2022    CL 98 03/21/2022    CO2 27 03/21/2022    BUN 13 03/21/2022    CREATININE 0 72 03/21/2022    CALCIUM 9 7 03/21/2022    AST 20 03/21/2022    ALT 17 03/21/2022    ALKPHOS 119 (H) 03/21/2022    EGFR 100 03/21/2022   , Coagulation: No results found for: PT, INR, APTT, Urinalysis:   Lab Results   Component Value Date    COLORU Yellow 03/21/2022    CLARITYU Clear 03/21/2022    SPECGRAV <=1 005 03/21/2022    PHUR 6 0 03/21/2022    LEUKOCYTESUR 1+ (A) 03/21/2022    NITRITE Negative 03/21/2022    GLUCOSEU Negative 03/21/2022    KETONESU Negative 03/21/2022 BILIRUBINUR Negative 03/21/2022    BLOODU Negative 03/21/2022   , Amylase: No results found for: AMYLASE, Lipase:   Lab Results   Component Value Date    LIPASE 32 03/21/2022     Imaging: I have personally reviewed pertinent reports  and I have personally reviewed pertinent films in PACS  EKG, Pathology, and Other Studies: I have personally reviewed pertinent reports  and I have personally reviewed pertinent films in PACS    Code Status: Prior  Advance Directive and Living Will:      Power of :    POLST:      Counseling / Coordination of Care  Total floor / unit time spent today 75 minutes  Greater than 50% of total time was spent with the patient and / or family counseling and / or coordination of care  A description of the counseling / coordination of care: 25

## 2022-03-21 NOTE — ASSESSMENT & PLAN NOTE
Lower Abdominal pain likely with incarcerated hernia-no evidence of obstruction, CT of the abdomen shows a large midline ventral hernia without evidence of obstruction, is surgical input appreciated  Patient will be kept NPO and give IV hydration and IV PPI and antiemetics and pain medication  Patient had a recent diagnosis of COVID-19 early 2022 and treated monoclonal antibody infusion, recovered, patient is afebrile  Noted to have leukocytosis  Will check urinalysis  Last menstrual period was early March currently has no vaginal bleeding  Urine pregnancy is negative  If patient continues to have pain, surgery to take for  OR in a m  Edinson Manifold   Plan was d/w Dr Bruno Matson

## 2022-03-21 NOTE — ASSESSMENT & PLAN NOTE
UA is negative, CT abdomen shows lungs clear no infiltrate noted  Patient is afebrile  Patient diagnosed with COVID-19 2 weeks ago and received monoclonal antibody

## 2022-03-21 NOTE — CONSULTS
[address] 48 1975, 55 y o  female MRN: [id number]  Unit/Bed#: -01 Encounter: [id number]  Primary Care Provider: Jayme Willams MD   Date and time admitted to hospital: 3/21/2022 10:49 AM    Consults    * Abdominal pain  Assessment & Plan  Lower Abdominal pain likely with incarcerated hernia-no evidence of obstruction, CT of the abdomen shows a large midline ventral hernia without evidence of obstruction, is surgical input appreciated  Patient will be kept NPO and give IV hydration and IV PPI and antiemetics and pain medication  Patient had a recent diagnosis of COVID-19 early 2022 and treated monoclonal antibody infusion, recovered, patient is afebrile  Noted to have leukocytosis  Will check urinalysis  Last menstrual period was early March currently has no vaginal bleeding  Urine pregnancy is negative  If patient continues to have pain, surgery to take for  OR in a m  [place] Simple Plan was d/w Dr Roshan Rene  UA is negative, CT abdomen shows lungs clear no infiltrate noted  Patient is afebrile  Patient diagnosed with COVID-19 2 weeks ago and received monoclonal antibody  COVID-19  Assessment & Plan  Patient tested positive 2 weeks ago with home test   Patient received monoclonal antibody and she feels better  Currently on room air  Denies of shortness of breath or chest pain or fever    Obesity  Assessment & Plan  Patient had gastric bypass surgery 6 years ago        VTE Prophylaxis:   Moderate Risk (Score 3-4) - Pharmacological DVT Prophylaxis Ordered: heparin  Recommendations for Discharge:  · Npo for possible surgery of ventral hernia in am if pain persists    Counseling / Coordination of Care Time: 45 minutes Greater than 50% of total time spent on patient counseling and coordination of care  Collaboration of Care:  Were Recommendations Directly Discussed with Primary Treatment Team? Yes    History of Present Illness:  Altagracia Galarza is a 55 y o  female who is originally admitted to the gen surgery  service due to abdominal pain   We are consulted for medical management   Patient presents with abdominal pain which is cramping in nature for the past 2 weeks  Patient states it is in the lower suprapubic area cramping in nature  Patient had menstrual period early March and currently has no bleeding per vaginal   Patient initially thought it was pains secondary to periods  Patient was diagnosed with COVID-19 early March with a home test and was given monoclonal antibody and she feels better  Patient denies of any fever chills or cough  Abdominal pain is cramping intensity of 7/10 nonradiating, more during movement  Denies of any nausea or vomiting  Patient also has intermittent constipation and has been taking laxative for the past  2 days  Review of Systems:  Review of Systems   Constitutional: Negative  HENT: Negative  Eyes: Negative  Respiratory: Negative  Cardiovascular: Negative  Gastrointestinal: Positive for abdominal pain and constipation  Endocrine: Negative  Genitourinary: Negative  Musculoskeletal: Negative  Skin: Negative  Allergic/Immunologic: Negative  Neurological: Negative  Hematological: Negative  Psychiatric/Behavioral: Negative          Past Medical and Surgical History:   Past Medical History:   Diagnosis Date    Abnormal Pap smear of cervix     2017    Anemia     Anxiety     Asthma     Diabetes mellitus ([facility] [address] )     History of transfusion 2018    HPV (human papilloma virus) infection     2017    Obesity     Varicella        Past Surgical History:   Procedure Laterality Date    BREAST BIOPSY Right     u/s core bx    BREAST SURGERY      Incisional breast biopsy     SECTION      And     GASTRIC BYPASS  2016    ND HYSTEROSCOPY,W/ENDO BX N/A 2021    Procedure: DILATATION AND CURETTAGE (D&C) WITH HYSTEROSCOPY (MYOSURE); Surgeon: Ann Velazquez DO;  [facility];  Service: Gynecology    CO HYSTEROSCOPY,W/ENDO BX N/A 2/16/2021    Procedure: POLYPECTOMY;  Surgeon: Ann Velazquez DO;  [facility];  Service: Gynecology    TUBAL LIGATION  2009    US GUIDED BREAST BIOPSY RIGHT COMPLETE Right 8/23/2019       Meds/Allergies:  all medications and allergies reviewed    Allergies: No Known Allergies    Social History:  Marital Status: Single  Substance Use History:   Social History     Substance and Sexual Activity   Alcohol Use Yes    Alcohol/week: 0 0 standard drinks    Comment: social      Social History     Tobacco Use   Smoking Status Current Every Day Smoker    Packs/day: 0 50    Years: 10 00    Pack years: 5 00    Types: Cigarettes   Smokeless Tobacco Never Used   Tobacco Comment    refused     Social History     Substance and Sexual Activity   Drug Use Never       Family History:  Family History   Problem Relation Age of Onset    Diabetes Mother     Hypertension Mother     Cancer Mother     Arthritis Mother     Leukemia Mother     Hyperlipidemia Mother     Diabetes Father     Hypertension Father     Heart disease Father     Coronary artery disease Father     Hyperlipidemia Father     Stroke Paternal Grandfather     Thyroid cancer Sister 37    Hypertension Brother     Breast cancer Maternal Aunt 48    Breast cancer Paternal Aunt 79    No Known Problems Maternal Grandmother     No Known Problems Maternal Grandfather     No Known Problems Paternal Grandmother     No Known Problems Son     No Known Problems Son     No Known Problems Maternal Aunt     No Known Problems Maternal Aunt     No Known Problems Maternal Aunt     No Known Problems Maternal Aunt     No Known Problems Maternal Aunt     Cervical cancer Paternal Aunt 36    No Known Problems Paternal Aunt     No Known Problems Paternal Aunt     No Known Problems Paternal Aunt     No Known Problems Paternal Aunt     No Known Problems Paternal Aunt     Lung cancer Paternal Uncle 80       Physical Exam:   Vitals:   Blood Pressure: 108/63 (03/21/22 1315)  Pulse: 71 (03/21/22 1315)  Temperature: (!) 97 3 °F (36 3 °C) (03/21/22 1047)  Temp Source: Oral (03/21/22 1047)  Respirations: 18 (03/21/22 1315)  Height: 5' 1" (154 9 cm) (03/21/22 1047)  Weight - Scale: 81 6 kg (180 lb) (03/21/22 1047)  SpO2: 100 % (03/21/22 1315)    Physical Exam   HEENT-PERRLA, moist oral mucosa  Neck-supple, no JVD elevation   Respiratory-equal air entry bilaterally, no rales or rhonchi  Cardiovascular system-S1, S2 heard, no murmur or gallops or rubs  Abdomen-soft, tenderness noted in the infraumbilical region and suprapubic region, no guarding or rigidity noted,, no guarding or rigidity, bowel sounds heard  Extremities-no pedal edema  Peripheral pulses palpable  Musculoskeletal-no contractures  Central nervous system-no acute focal neurological deficit ,no sensory or motor deficit noted    Skin-no rash noted        Additional Data:   Lab Results:    Results from last 7 days   Lab Units 03/21/22  1116   WBC Thousand/uL 13 56*   HEMOGLOBIN g/dL 11 9   HEMATOCRIT % 38 2   PLATELETS Thousands/uL 378   NEUTROS PCT % 79*   LYMPHS PCT % 14   MONOS PCT % 7   EOS PCT % 0     Results from last 7 days   Lab Units 03/21/22  1116   SODIUM mmol/L 135   POTASSIUM mmol/L 3 9   CHLORIDE mmol/L 98   CO2 mmol/L 27   BUN mg/dL 13   CREATININE mg/dL 0 72   ANION GAP mmol/L 10   CALCIUM mg/dL 9 7   ALBUMIN g/dL 4 3   TOTAL BILIRUBIN mg/dL 0 41   ALK PHOS U/L 119*   ALT U/L 17   AST U/L 20   GLUCOSE RANDOM mg/dL 75             Lab Results   Component Value Date/Time    HGBA1C 5 4 04/02/2021 11:26 AM    HGBA1C 5 2 03/25/2019 07:24 AM         Results from last 7 days   Lab Units 03/21/22  1116   LACTIC ACID mmol/L 1 0       Imaging: Reviewed radiology reports from this admission including: abdominal/pelvic CT  CT abdomen pelvis with contrast   Final Result by Adair Swan MD (03/21 1330)      Cholelithiasis  Large midline ventral hernia without evidence for obstruction  Small bowel anastomosis in the right mid abdomen with associated bowel wall thickening  Postoperative changes status post gastric bypass  Workstation performed: [id number]             EKG, Pathology, and Other Studies Reviewed on Admission:   · EKG: No EKG obtained  ** Please Note: This note may have been constructed using a voice recognition system   **

## 2022-03-21 NOTE — ASSESSMENT & PLAN NOTE
Patient tested positive 2 weeks ago with home test   Patient received monoclonal antibody and she feels better  Currently on room air    Denies of shortness of breath or chest pain or fever

## 2022-03-22 ENCOUNTER — ANESTHESIA (OUTPATIENT)
Dept: PERIOP | Facility: HOSPITAL | Age: 47
DRG: 227 | End: 2022-03-22
Payer: MEDICARE

## 2022-03-22 ENCOUNTER — ANESTHESIA EVENT (OUTPATIENT)
Dept: PERIOP | Facility: HOSPITAL | Age: 47
DRG: 227 | End: 2022-03-22
Payer: MEDICARE

## 2022-03-22 PROBLEM — I95.9 HYPOTENSION: Status: ACTIVE | Noted: 2022-03-22

## 2022-03-22 LAB
ABO GROUP BLD: NORMAL
ABO GROUP BLD: NORMAL
ANION GAP SERPL CALCULATED.3IONS-SCNC: 10 MMOL/L (ref 4–13)
BASOPHILS # BLD AUTO: 0.05 THOUSANDS/ΜL (ref 0–0.1)
BASOPHILS NFR BLD AUTO: 0 % (ref 0–1)
BLD GP AB SCN SERPL QL: NEGATIVE
BUN SERPL-MCNC: 8 MG/DL (ref 5–25)
CALCIUM SERPL-MCNC: 8.7 MG/DL (ref 8.4–10.2)
CHLORIDE SERPL-SCNC: 102 MMOL/L (ref 96–108)
CO2 SERPL-SCNC: 26 MMOL/L (ref 21–32)
CREAT SERPL-MCNC: 0.75 MG/DL (ref 0.6–1.3)
CRP SERPL QL: 107.2 MG/L
EOSINOPHIL # BLD AUTO: 0.01 THOUSAND/ΜL (ref 0–0.61)
EOSINOPHIL NFR BLD AUTO: 0 % (ref 0–6)
ERYTHROCYTE [DISTWIDTH] IN BLOOD BY AUTOMATED COUNT: 15.4 % (ref 11.6–15.1)
GFR SERPL CREATININE-BSD FRML MDRD: 95 ML/MIN/1.73SQ M
GLUCOSE P FAST SERPL-MCNC: 66 MG/DL (ref 65–99)
GLUCOSE SERPL-MCNC: 66 MG/DL (ref 65–140)
HCT VFR BLD AUTO: 32.3 % (ref 34.8–46.1)
HGB BLD-MCNC: 10.2 G/DL (ref 11.5–15.4)
IMM GRANULOCYTES # BLD AUTO: 0.05 THOUSAND/UL (ref 0–0.2)
IMM GRANULOCYTES NFR BLD AUTO: 0 % (ref 0–2)
LACTATE SERPL-SCNC: 0.8 MMOL/L (ref 0.5–2)
LYMPHOCYTES # BLD AUTO: 1.52 THOUSANDS/ΜL (ref 0.6–4.47)
LYMPHOCYTES NFR BLD AUTO: 11 % (ref 14–44)
MCH RBC QN AUTO: 24.8 PG (ref 26.8–34.3)
MCHC RBC AUTO-ENTMCNC: 31.6 G/DL (ref 31.4–37.4)
MCV RBC AUTO: 79 FL (ref 82–98)
MONOCYTES # BLD AUTO: 0.99 THOUSAND/ΜL (ref 0.17–1.22)
MONOCYTES NFR BLD AUTO: 7 % (ref 4–12)
NEUTROPHILS # BLD AUTO: 11.44 THOUSANDS/ΜL (ref 1.85–7.62)
NEUTS SEG NFR BLD AUTO: 82 % (ref 43–75)
NRBC BLD AUTO-RTO: 0 /100 WBCS
PLATELET # BLD AUTO: 319 THOUSANDS/UL (ref 149–390)
PMV BLD AUTO: 9.2 FL (ref 8.9–12.7)
POTASSIUM SERPL-SCNC: 3.5 MMOL/L (ref 3.5–5.3)
PROCALCITONIN SERPL-MCNC: 0.06 NG/ML
RBC # BLD AUTO: 4.11 MILLION/UL (ref 3.81–5.12)
RH BLD: POSITIVE
RH BLD: POSITIVE
SODIUM SERPL-SCNC: 138 MMOL/L (ref 135–147)
SPECIMEN EXPIRATION DATE: NORMAL
WBC # BLD AUTO: 14.06 THOUSAND/UL (ref 4.31–10.16)

## 2022-03-22 PROCEDURE — 84145 PROCALCITONIN (PCT): CPT | Performed by: INTERNAL MEDICINE

## 2022-03-22 PROCEDURE — 93005 ELECTROCARDIOGRAM TRACING: CPT

## 2022-03-22 PROCEDURE — 49568 PR IMPLANT MESH HERNIA REPAIR/DEBRIDEMENT CLOSURE: CPT

## 2022-03-22 PROCEDURE — 87040 BLOOD CULTURE FOR BACTERIA: CPT | Performed by: INTERNAL MEDICINE

## 2022-03-22 PROCEDURE — NC001 PR NO CHARGE: Performed by: SURGERY

## 2022-03-22 PROCEDURE — C9113 INJ PANTOPRAZOLE SODIUM, VIA: HCPCS | Performed by: SURGERY

## 2022-03-22 PROCEDURE — C9290 INJ, BUPIVACAINE LIPOSOME: HCPCS | Performed by: STUDENT IN AN ORGANIZED HEALTH CARE EDUCATION/TRAINING PROGRAM

## 2022-03-22 PROCEDURE — 86900 BLOOD TYPING SEROLOGIC ABO: CPT | Performed by: STUDENT IN AN ORGANIZED HEALTH CARE EDUCATION/TRAINING PROGRAM

## 2022-03-22 PROCEDURE — 83605 ASSAY OF LACTIC ACID: CPT

## 2022-03-22 PROCEDURE — 86901 BLOOD TYPING SEROLOGIC RH(D): CPT | Performed by: STUDENT IN AN ORGANIZED HEALTH CARE EDUCATION/TRAINING PROGRAM

## 2022-03-22 PROCEDURE — 49568 PR IMPLANT MESH HERNIA REPAIR/DEBRIDEMENT CLOSURE: CPT | Performed by: SURGERY

## 2022-03-22 PROCEDURE — 99232 SBSQ HOSP IP/OBS MODERATE 35: CPT | Performed by: INTERNAL MEDICINE

## 2022-03-22 PROCEDURE — 85025 COMPLETE CBC W/AUTO DIFF WBC: CPT | Performed by: SURGERY

## 2022-03-22 PROCEDURE — 99215 OFFICE O/P EST HI 40 MIN: CPT | Performed by: SURGERY

## 2022-03-22 PROCEDURE — C1781 MESH (IMPLANTABLE): HCPCS | Performed by: SURGERY

## 2022-03-22 PROCEDURE — 80048 BASIC METABOLIC PNL TOTAL CA: CPT | Performed by: SURGERY

## 2022-03-22 PROCEDURE — 86140 C-REACTIVE PROTEIN: CPT | Performed by: INTERNAL MEDICINE

## 2022-03-22 PROCEDURE — 88302 TISSUE EXAM BY PATHOLOGIST: CPT | Performed by: PATHOLOGY

## 2022-03-22 PROCEDURE — 86850 RBC ANTIBODY SCREEN: CPT | Performed by: STUDENT IN AN ORGANIZED HEALTH CARE EDUCATION/TRAINING PROGRAM

## 2022-03-22 PROCEDURE — 0WUF0JZ SUPPLEMENT ABDOMINAL WALL WITH SYNTHETIC SUBSTITUTE, OPEN APPROACH: ICD-10-PCS | Performed by: SURGERY

## 2022-03-22 PROCEDURE — 49561 PR REPAIR INCISIONAL HERNIA,STRANG: CPT | Performed by: SURGERY

## 2022-03-22 PROCEDURE — 49561 PR REPAIR INCISIONAL HERNIA,STRANG: CPT

## 2022-03-22 DEVICE — VENTRIO ST HERNIA PATCH
Type: IMPLANTABLE DEVICE | Site: ABDOMEN | Status: FUNCTIONAL
Brand: VENTRIO ST HERNIA PATCH

## 2022-03-22 RX ORDER — ONDANSETRON 2 MG/ML
INJECTION INTRAMUSCULAR; INTRAVENOUS AS NEEDED
Status: DISCONTINUED | OUTPATIENT
Start: 2022-03-22 | End: 2022-03-22

## 2022-03-22 RX ORDER — LIDOCAINE HYDROCHLORIDE 10 MG/ML
INJECTION, SOLUTION EPIDURAL; INFILTRATION; INTRACAUDAL; PERINEURAL AS NEEDED
Status: DISCONTINUED | OUTPATIENT
Start: 2022-03-22 | End: 2022-03-22

## 2022-03-22 RX ORDER — HEPARIN SODIUM 5000 [USP'U]/ML
5000 INJECTION, SOLUTION INTRAVENOUS; SUBCUTANEOUS EVERY 8 HOURS SCHEDULED
Status: DISCONTINUED | OUTPATIENT
Start: 2022-03-23 | End: 2022-03-22 | Stop reason: SDUPTHER

## 2022-03-22 RX ORDER — ALBUMIN, HUMAN INJ 5% 5 %
SOLUTION INTRAVENOUS CONTINUOUS PRN
Status: DISCONTINUED | OUTPATIENT
Start: 2022-03-22 | End: 2022-03-22

## 2022-03-22 RX ORDER — ALBUTEROL SULFATE 90 UG/1
2 AEROSOL, METERED RESPIRATORY (INHALATION) EVERY 6 HOURS PRN
Status: DISCONTINUED | OUTPATIENT
Start: 2022-03-22 | End: 2022-03-22 | Stop reason: SDUPTHER

## 2022-03-22 RX ORDER — MIDAZOLAM HYDROCHLORIDE 2 MG/2ML
INJECTION, SOLUTION INTRAMUSCULAR; INTRAVENOUS AS NEEDED
Status: DISCONTINUED | OUTPATIENT
Start: 2022-03-22 | End: 2022-03-22

## 2022-03-22 RX ORDER — CEFAZOLIN SODIUM 1 G/3ML
INJECTION, POWDER, FOR SOLUTION INTRAMUSCULAR; INTRAVENOUS AS NEEDED
Status: DISCONTINUED | OUTPATIENT
Start: 2022-03-22 | End: 2022-03-22

## 2022-03-22 RX ORDER — DEXAMETHASONE SODIUM PHOSPHATE 4 MG/ML
INJECTION, SOLUTION INTRA-ARTICULAR; INTRALESIONAL; INTRAMUSCULAR; INTRAVENOUS; SOFT TISSUE AS NEEDED
Status: DISCONTINUED | OUTPATIENT
Start: 2022-03-22 | End: 2022-03-22

## 2022-03-22 RX ORDER — KETOROLAC TROMETHAMINE 30 MG/ML
30 INJECTION, SOLUTION INTRAMUSCULAR; INTRAVENOUS ONCE
Status: COMPLETED | OUTPATIENT
Start: 2022-03-22 | End: 2022-03-22

## 2022-03-22 RX ORDER — BUPIVACAINE HYDROCHLORIDE 5 MG/ML
INJECTION, SOLUTION PERINEURAL
Status: DISCONTINUED | OUTPATIENT
Start: 2022-03-22 | End: 2022-03-22

## 2022-03-22 RX ORDER — HYDROMORPHONE HCL/PF 1 MG/ML
SYRINGE (ML) INJECTION AS NEEDED
Status: DISCONTINUED | OUTPATIENT
Start: 2022-03-22 | End: 2022-03-22

## 2022-03-22 RX ORDER — FENTANYL CITRATE 50 UG/ML
INJECTION, SOLUTION INTRAMUSCULAR; INTRAVENOUS AS NEEDED
Status: DISCONTINUED | OUTPATIENT
Start: 2022-03-22 | End: 2022-03-22

## 2022-03-22 RX ORDER — HEPARIN SODIUM 5000 [USP'U]/ML
5000 INJECTION, SOLUTION INTRAVENOUS; SUBCUTANEOUS EVERY 8 HOURS SCHEDULED
Status: DISCONTINUED | OUTPATIENT
Start: 2022-03-22 | End: 2022-03-25 | Stop reason: HOSPADM

## 2022-03-22 RX ORDER — ROCURONIUM BROMIDE 10 MG/ML
INJECTION, SOLUTION INTRAVENOUS AS NEEDED
Status: DISCONTINUED | OUTPATIENT
Start: 2022-03-22 | End: 2022-03-22

## 2022-03-22 RX ORDER — OXYCODONE HYDROCHLORIDE 10 MG/1
10 TABLET ORAL EVERY 4 HOURS PRN
Status: DISCONTINUED | OUTPATIENT
Start: 2022-03-22 | End: 2022-03-25 | Stop reason: HOSPADM

## 2022-03-22 RX ORDER — PROMETHAZINE HYDROCHLORIDE 25 MG/ML
12.5 INJECTION, SOLUTION INTRAMUSCULAR; INTRAVENOUS ONCE AS NEEDED
Status: DISCONTINUED | OUTPATIENT
Start: 2022-03-22 | End: 2022-03-22 | Stop reason: HOSPADM

## 2022-03-22 RX ORDER — OXYCODONE HYDROCHLORIDE 5 MG/1
5 TABLET ORAL EVERY 4 HOURS PRN
Status: DISCONTINUED | OUTPATIENT
Start: 2022-03-22 | End: 2022-03-25 | Stop reason: HOSPADM

## 2022-03-22 RX ORDER — HYDROMORPHONE HCL/PF 1 MG/ML
0.5 SYRINGE (ML) INJECTION
Status: DISCONTINUED | OUTPATIENT
Start: 2022-03-22 | End: 2022-03-25 | Stop reason: HOSPADM

## 2022-03-22 RX ORDER — ONDANSETRON 2 MG/ML
4 INJECTION INTRAMUSCULAR; INTRAVENOUS ONCE AS NEEDED
Status: DISCONTINUED | OUTPATIENT
Start: 2022-03-22 | End: 2022-03-22 | Stop reason: HOSPADM

## 2022-03-22 RX ORDER — HYDROMORPHONE HCL/PF 1 MG/ML
0.5 SYRINGE (ML) INJECTION
Status: DISCONTINUED | OUTPATIENT
Start: 2022-03-22 | End: 2022-03-22 | Stop reason: HOSPADM

## 2022-03-22 RX ORDER — ALBUTEROL SULFATE 2.5 MG/3ML
2.5 SOLUTION RESPIRATORY (INHALATION) ONCE AS NEEDED
Status: DISCONTINUED | OUTPATIENT
Start: 2022-03-22 | End: 2022-03-22 | Stop reason: HOSPADM

## 2022-03-22 RX ORDER — SUCCINYLCHOLINE/SOD CL,ISO/PF 100 MG/5ML
SYRINGE (ML) INTRAVENOUS AS NEEDED
Status: DISCONTINUED | OUTPATIENT
Start: 2022-03-22 | End: 2022-03-22

## 2022-03-22 RX ORDER — KETAMINE HCL IN NACL, ISO-OSM 100MG/10ML
SYRINGE (ML) INJECTION AS NEEDED
Status: DISCONTINUED | OUTPATIENT
Start: 2022-03-22 | End: 2022-03-22

## 2022-03-22 RX ORDER — MAGNESIUM HYDROXIDE 1200 MG/15ML
LIQUID ORAL AS NEEDED
Status: DISCONTINUED | OUTPATIENT
Start: 2022-03-22 | End: 2022-03-22 | Stop reason: HOSPADM

## 2022-03-22 RX ORDER — FENTANYL CITRATE/PF 50 MCG/ML
25 SYRINGE (ML) INJECTION
Status: DISCONTINUED | OUTPATIENT
Start: 2022-03-22 | End: 2022-03-22 | Stop reason: HOSPADM

## 2022-03-22 RX ORDER — PROPOFOL 10 MG/ML
INJECTION, EMULSION INTRAVENOUS AS NEEDED
Status: DISCONTINUED | OUTPATIENT
Start: 2022-03-22 | End: 2022-03-22

## 2022-03-22 RX ORDER — SODIUM CHLORIDE 9 MG/ML
INJECTION, SOLUTION INTRAVENOUS CONTINUOUS PRN
Status: DISCONTINUED | OUTPATIENT
Start: 2022-03-22 | End: 2022-03-22

## 2022-03-22 RX ORDER — CEFEPIME HYDROCHLORIDE 2 G/50ML
2000 INJECTION, SOLUTION INTRAVENOUS EVERY 12 HOURS
Status: COMPLETED | OUTPATIENT
Start: 2022-03-22 | End: 2022-03-24

## 2022-03-22 RX ORDER — ACETAMINOPHEN 325 MG/1
650 TABLET ORAL EVERY 4 HOURS PRN
Status: DISCONTINUED | OUTPATIENT
Start: 2022-03-22 | End: 2022-03-24

## 2022-03-22 RX ORDER — CEFEPIME HYDROCHLORIDE 2 G/50ML
2000 INJECTION, SOLUTION INTRAVENOUS EVERY 12 HOURS
Status: DISCONTINUED | OUTPATIENT
Start: 2022-03-22 | End: 2022-03-22

## 2022-03-22 RX ADMIN — DEXAMETHASONE SODIUM PHOSPHATE 4 MG: 4 INJECTION, SOLUTION INTRAMUSCULAR; INTRAVENOUS at 11:53

## 2022-03-22 RX ADMIN — LIDOCAINE HYDROCHLORIDE 50 MG: 10 INJECTION, SOLUTION EPIDURAL; INFILTRATION; INTRACAUDAL; PERINEURAL at 11:47

## 2022-03-22 RX ADMIN — Medication 100 MG: at 11:47

## 2022-03-22 RX ADMIN — HEPARIN SODIUM 5000 UNITS: 5000 INJECTION INTRAVENOUS; SUBCUTANEOUS at 05:41

## 2022-03-22 RX ADMIN — HYDROMORPHONE HYDROCHLORIDE 0.5 MG: 1 INJECTION, SOLUTION INTRAMUSCULAR; INTRAVENOUS; SUBCUTANEOUS at 14:01

## 2022-03-22 RX ADMIN — PROPOFOL 200 MG: 10 INJECTION, EMULSION INTRAVENOUS at 11:47

## 2022-03-22 RX ADMIN — SODIUM CHLORIDE 1000 ML: 9 INJECTION, SOLUTION INTRAVENOUS at 08:40

## 2022-03-22 RX ADMIN — Medication 40 MG: at 12:10

## 2022-03-22 RX ADMIN — SODIUM CHLORIDE: 9 INJECTION, SOLUTION INTRAVENOUS at 11:57

## 2022-03-22 RX ADMIN — BUPIVACAINE HYDROCHLORIDE 30 ML: 5 INJECTION, SOLUTION PERINEURAL at 14:19

## 2022-03-22 RX ADMIN — HYDROMORPHONE HYDROCHLORIDE 0.5 MG: 1 INJECTION, SOLUTION INTRAMUSCULAR; INTRAVENOUS; SUBCUTANEOUS at 19:38

## 2022-03-22 RX ADMIN — SUGAMMADEX 200 MG: 100 INJECTION, SOLUTION INTRAVENOUS at 14:19

## 2022-03-22 RX ADMIN — ROCURONIUM BROMIDE 10 MG: 10 INJECTION, SOLUTION INTRAVENOUS at 12:43

## 2022-03-22 RX ADMIN — HEPARIN SODIUM 5000 UNITS: 5000 INJECTION INTRAVENOUS; SUBCUTANEOUS at 21:10

## 2022-03-22 RX ADMIN — BUPIVACAINE 10 ML: 13.3 INJECTION, SUSPENSION, LIPOSOMAL INFILTRATION at 14:10

## 2022-03-22 RX ADMIN — ALBUMIN HUMAN: 0.05 INJECTION, SOLUTION INTRAVENOUS at 11:51

## 2022-03-22 RX ADMIN — MIDAZOLAM HYDROCHLORIDE 1 MG: 1 INJECTION, SOLUTION INTRAMUSCULAR; INTRAVENOUS at 11:40

## 2022-03-22 RX ADMIN — Medication 10 MG: at 12:46

## 2022-03-22 RX ADMIN — KETOROLAC TROMETHAMINE 30 MG: 30 INJECTION, SOLUTION INTRAMUSCULAR at 01:10

## 2022-03-22 RX ADMIN — ALBUMIN HUMAN: 0.05 INJECTION, SOLUTION INTRAVENOUS at 12:20

## 2022-03-22 RX ADMIN — HYDROMORPHONE HYDROCHLORIDE 0.6 MG: 1 INJECTION, SOLUTION INTRAMUSCULAR; INTRAVENOUS; SUBCUTANEOUS at 13:20

## 2022-03-22 RX ADMIN — MIDAZOLAM HYDROCHLORIDE 1 MG: 1 INJECTION, SOLUTION INTRAMUSCULAR; INTRAVENOUS at 11:42

## 2022-03-22 RX ADMIN — PANTOPRAZOLE SODIUM 40 MG: 40 INJECTION, POWDER, FOR SOLUTION INTRAVENOUS at 08:40

## 2022-03-22 RX ADMIN — ROCURONIUM BROMIDE 50 MG: 10 INJECTION, SOLUTION INTRAVENOUS at 12:00

## 2022-03-22 RX ADMIN — ONDANSETRON 4 MG: 2 INJECTION INTRAMUSCULAR; INTRAVENOUS at 11:53

## 2022-03-22 RX ADMIN — FENTANYL CITRATE 100 MCG: 50 INJECTION, SOLUTION INTRAMUSCULAR; INTRAVENOUS at 11:46

## 2022-03-22 RX ADMIN — SODIUM CHLORIDE, SODIUM LACTATE, POTASSIUM CHLORIDE, AND CALCIUM CHLORIDE: 600; 310; 30; 20 INJECTION, SOLUTION INTRAVENOUS at 13:50

## 2022-03-22 RX ADMIN — BUPIVACAINE 10 ML: 13.3 INJECTION, SUSPENSION, LIPOSOMAL INFILTRATION at 14:19

## 2022-03-22 RX ADMIN — CEFEPIME HYDROCHLORIDE 2000 MG: 2 INJECTION, SOLUTION INTRAVENOUS at 08:40

## 2022-03-22 RX ADMIN — HYDROMORPHONE HYDROCHLORIDE 0.5 MG: 1 INJECTION, SOLUTION INTRAMUSCULAR; INTRAVENOUS; SUBCUTANEOUS at 13:45

## 2022-03-22 RX ADMIN — CEFAZOLIN 2000 MG: 1 INJECTION, POWDER, FOR SOLUTION INTRAVENOUS at 11:46

## 2022-03-22 RX ADMIN — CEFEPIME HYDROCHLORIDE 2000 MG: 2 INJECTION, SOLUTION INTRAVENOUS at 21:10

## 2022-03-22 RX ADMIN — PHENYLEPHRINE HYDROCHLORIDE 30 MCG/MIN: 10 INJECTION INTRAVENOUS at 11:51

## 2022-03-22 RX ADMIN — HYDROMORPHONE HYDROCHLORIDE 0.4 MG: 1 INJECTION, SOLUTION INTRAMUSCULAR; INTRAVENOUS; SUBCUTANEOUS at 12:31

## 2022-03-22 NOTE — PLAN OF CARE
Problem: PAIN - ADULT  Goal: Verbalizes/displays adequate comfort level or baseline comfort level  Description: Interventions:  - Encourage patient to monitor pain and request assistance  - Assess pain using appropriate pain scale  - Administer analgesics based on type and severity of pain and evaluate response  - Implement non-pharmacological measures as appropriate and evaluate response  - Consider cultural and social influences on pain and pain management  - Notify physician/advanced practitioner if interventions unsuccessful or patient reports new pain  Outcome: Not Progressing     Problem: INFECTION - ADULT  Goal: Absence or prevention of progression during hospitalization  Description: INTERVENTIONS:  - Assess and monitor for signs and symptoms of infection  - Monitor lab/diagnostic results  - Monitor all insertion sites, i e  indwelling lines, tubes, and drains  - Monitor endotracheal if appropriate and nasal secretions for changes in amount and color  - [place] appropriate cooling/warming therapies per order  - Administer medications as ordered  - Instruct and encourage patient and family to use good hand hygiene technique  - Identify and instruct in appropriate isolation precautions for identified infection/condition  Outcome: Not Progressing  Goal: Absence of fever/infection during neutropenic period  Description: INTERVENTIONS:  - Monitor WBC    Outcome: Not Progressing     Problem: DISCHARGE PLANNING  Goal: Discharge to home or other facility with appropriate resources  Description: INTERVENTIONS:  - Identify barriers to discharge w/patient and caregiver  - Arrange for needed discharge resources and transportation as appropriate  - Identify discharge learning needs (meds, wound care, etc )  - Arrange for interpretive services to assist at discharge as needed  - Refer to Case Management Department for coordinating discharge planning if the patient needs post-hospital services based on physician/advanced practitioner order or complex needs related to functional status, cognitive ability, or social support system  Outcome: Not Progressing     Problem: Knowledge Deficit  Goal: Patient/family/caregiver demonstrates understanding of disease process, treatment plan, medications, and discharge instructions  Description: Complete learning assessment and assess knowledge base    Interventions:  - Provide teaching at level of understanding  - Provide teaching via preferred learning methods  Outcome: Not Progressing

## 2022-03-22 NOTE — PROGRESS NOTES
[address]  Progress Note - Jazmine Dickson 1975, 55 y o  female MRN: [id number]  Unit/Bed#: -[place] Encounter: [id number]  Primary Care Provider: Kacy Batres MD   Date and time admitted to hospital: 3/21/2022 10:49 AM    * Abdominal pain  Assessment & Plan  Lower Abdominal pain likely with incarcerated hernia-no evidence of obstruction, CT of the abdomen shows a large midline ventral hernia without evidence of obstruction, is surgical input appreciated  Patient will be kept NPO and give IV hydration and IV PPI and antiemetics and pain medication  Patient had a recent diagnosis of COVID-19 early 2022 and treated monoclonal antibody infusion, recovered, patient is afebrile  Noted to have leukocytosis  Will check urinalysis  Last menstrual period was early March currently has no vaginal bleeding  Urine pregnancy is negative  If patient continues to have pain, surgery to take for  OR in a [facility] Plan was d/w Dr Kera Roberson, patient having pain in the left lower quadrant which is increased and also blood pressure was low in the morning and recheck and blood pressure is 120/70s  Heart rate in 60s to 70s  Patient has elevated WBC count of 14,000 and has been afebrile  Lactic acid within normal limits  Will give IV fluid hydration including bolus  Will give IV antibiotic with cefepime and Flagyl and follow-up blood culture  Hypotension  Assessment & Plan  Hypotension with leucocytosis - will check Blood cx and lactic is normal , afberile , pt feels cold and sweaty with severe abdominal pain with tenderness in llq, will give  fluid bolus and give IV antibiotic therapy and blood cultures    Leukocytosis  Assessment & Plan  UA is negative, CT abdomen shows lungs clear no infiltrate noted  Patient is afebrile  Patient diagnosed with COVID-19 2 weeks ago and received monoclonal antibody      COVID-19  Assessment & Plan  Patient tested positive 2 weeks ago with home test   Patient received monoclonal antibody and she feels better  Currently on room air  Denies of shortness of breath or chest pain or fever    Obesity  Assessment & Plan  Patient had gastric bypass surgery 6 years ago            Subjective/Objective     Subjective:   Pt is complaining increased left lower quadrant pain  Blood pressure was lower early this morning however recheck and blood pressure improved 107 systolic  Afebrile  Objective:  Vitals: Blood pressure 108/71, pulse 76, temperature 97 6 °F (36 4 °C), temperature source Temporal, resp  rate 22, height 5' 1" (1 549 m), weight 81 6 kg (180 lb), last menstrual period 03/04/2022, SpO2 95 %, not currently breastfeeding  ,Body mass index is 34 01 kg/m²  Intake/Output Summary (Last 24 hours) at 3/22/2022 1738  Last data filed at 3/22/2022 1724  Gross per 24 hour   Intake 2200 ml   Output 1205 ml   Net 995 ml       Invasive Devices  Report    Peripheral Intravenous Line            Peripheral IV 03/21/22 Right Antecubital 1 day    Peripheral IV 03/22/22 Left;Ventral (anterior) Forearm <1 day          Drain            Closed/Suction Drain Inferior;Right Abdomen Bulb 10 Fr  <1 day    Urethral Catheter Latex 16 Fr  <1 day                Physical Exam:  HEENT-PERRLA, moist oral mucosa  Neck-supple, no JVD elevation   Respiratory-equal air entry bilaterally, no rales or rhonchi  Cardiovascular system-S1, S2 heard, no murmur or gallops or rubs  Abdomen-soft, left lower quadrant tenderness present ,  no guarding or rigidity, bowel sounds heard  Extremities-no pedal edema  Peripheral pulses palpable  Musculoskeletal-no contractures  Central nervous system-no acute focal neurological deficit ,no sensory or motor deficit noted  Skin-no rash noted        Lab, Imaging and other studies: I have personally reviewed pertinent reports      VTE Pharmacologic Prophylaxis: Heparin  VTE Mechanical Prophylaxis: sequential compression device

## 2022-03-22 NOTE — ANESTHESIA PREPROCEDURE EVALUATION
Procedure:  LAPAROTOMY EXPLORATORY W/ BOWEL RESECTION (N/A Abdomen)  REPAIR HERNIA INCISIONAL (N/A Groin)    Hx of DM2 resolved with weight loss    Hgb 11 9 --> 10 2 with resuscitation    Relevant Problems   GI/HEPATIC   (+) H/O bariatric surgery      HEMATOLOGY   (+) Anemia   (+) Iron deficiency anemia due to chronic blood loss      PULMONARY   (+) Mild intermittent asthma without complication        Physical Exam    Airway    Mallampati score: II  TM Distance: >3 FB  Neck ROM: full     Dental   No notable dental hx     Cardiovascular  Rhythm: regular, Rate: normal, Cardiovascular exam normal    Pulmonary  Pulmonary exam normal Breath sounds clear to auscultation,     Other Findings        Anesthesia Plan  ASA Score- 2     Anesthesia Type- general with ASA Monitors  Additional Monitors:   Airway Plan: ETT  Comment: Risks/benefits and alternatives discussed with patient including likely possibility of PONV and sore throat, as well as the rare possibilities of aspiration, dental/oropharyngeal/ocular injuries, or grave/life threatening anesthetic and surgical emergencies          Plan Factors-Exercise tolerance (METS): >4 METS  Patient summary reviewed  Patient instructed to abstain from smoking on day of procedure  Patient did not smoke on day of surgery  Induction- intravenous and rapid sequence induction  Postoperative Plan- Plan for postoperative opioid use  Planned trial extubation    Informed Consent- Anesthetic plan and risks discussed with patient  I personally reviewed this patient with the CRNA  Discussed and agreed on the Anesthesia Plan with the CRNA  Ethan Palacios

## 2022-03-22 NOTE — ANESTHESIA PROCEDURE NOTES
Peripheral Block    Patient location during procedure: holding area  Start time: 3/22/2022 2:19 PM  Reason for block: at surgeon's request and post-op pain management  Staffing  Performed: Anesthesiologist   Anesthesiologist: Landy Dang MD  Preanesthetic Checklist  Completed: patient identified, IV checked, site marked, risks and benefits discussed, surgical consent, monitors and equipment checked, pre-op evaluation and timeout performed  Peripheral Block  Patient position: sitting  Prep: ChloraPrep  Patient monitoring: continuous pulse ox and frequent blood pressure checks  Block type: TAP  Laterality: bilateral  Injection technique: single-shot  Procedures: ultrasound guided, Ultrasound guidance required for the procedure to increase accuracy and safety of medication placement and decrease risk of complications    Ultrasound permanent image savedbupivacaine (MARCAINE) 0 5 % perineural infiltration, 30 mL  Needle  Needle type: pencil-tip   Needle gauge: 22 G  Needle length: 10 cm  Needle localization: ultrasound guidance  Test dose: negative  Assessment  Injection assessment: incremental injection, local visualized surrounding nerve on ultrasound, negative aspiration for heme and no paresthesia on injection  Paresthesia pain: none  Heart rate change: no  Slow fractionated injection: yes  Post-procedure:  site cleaned  patient tolerated the procedure well with no immediate complications  Additional Notes  Unremarkable TAP blocks

## 2022-03-22 NOTE — OP NOTE
OPERATIVE REPORT  PATIENT NAME: Cristhian Paul    :  1975  MRN: [id number]  Pt Location: [facility] OR ROOM 02    SURGERY DATE: 3/22/2022    Surgeon(s) and Role: Carlyn Hamilton MD - Primary     * Franchesca Alvarado Fabio, [place]    Preop Diagnosis:  Lower abdominal pain [R10 30]  Ventral hernia without obstruction or gangrene [K43 9]    Post-Op Diagnosis Codes:     * Lower abdominal pain [R10 30]     * Ventral hernia without obstruction or gangrene [K43 9]    Procedure(s) (LRB):  LAPAROTOMY EXPLORATORY WITH OPEN REPAIR OF INCARCERATED INCISIONAL  HERNIA WITH MESH (N/A)  OPEN REPAIR HERNIA INCISIONAL WITH MESH (N/A)  CPT code: 59284,88818  Specimen(s):  ID Type Source Tests Collected by Time Destination   1 : hernia sac Tissue Hernia Sac, Umbilical TISSUE EXAM Anne Vasquez MD 3/22/2022 1230        Estimated Blood Loss:   Minimal    Drains:  Closed/Suction Drain Inferior;Right Abdomen Bulb 10 Fr  (Active)   Site Description Unable to view 22 145   Dressing Status Clean;Dry; Intact 22 145   Drainage Appearance Bloody 22 145   Status To bulb suction 22 145   Number of days: 0       Urethral Catheter Latex 16 Fr  (Active)   Site Assessment Clean;Skin intact 22   Collection Container Standard drainage bag 22 145   Number of days: 0       Anesthesia Type:   General    Operative Indications:  Lower abdominal pain [R10 30]  Incisional hernia with incarceration    Operative Findings:  Large infraumbilical midline incisional hernia  The size of the defect was 7 cm x 4 cm  The hernia sac itself was large extending from the right lower quadrant to the left lower quadrant  The sac contained loops of small bowel as well as sigmoid colon  Complications:   None    Procedure and Technique:  The patient was brought to the operating room and was identified correctly by myself and the operating room staff  General anesthesia was given by anesthesia team   Morejon catheter was inserted  Parts were prepped and draped in the standard fashion  Time-out was performed  Preoperative antibiotics were already given  An infraumbilical midline incision was made and was extended just above the umbilicus  The skin subcutaneous tissue was opened up using electrocautery  The the hernia sac was located and was dissected from the fascia  It was opened carefully  The hernia contents were reduced  The sac was dissected and excised  Hemostasis was ensured using electrocautery  In dissecting and  excising the sac large subcutaneous flaps were created because the sac was so large  We made sure that we preserved at least 1  on both sides to provide adequate blood supply to the flaps  The fascia was observed to be completely free of adhesions in the region of the hernia  The size of the hernia defect was measured  A 14 cm x 11 cm Ventrio ST mesh was then placed in an underlay fashion  It was sutured using U stitches so that full-thickness bites were taken on the fascia and bites were taken from the superior flap of the mesh  Multiple such stitches were taken at a distance of 1 cm apart in a circumferential manner  They were all tied down  We checked that there was no gap through which the bowel can herniate back into the defect  Two 0 PDS suture was employed for the above  The midline defect then was closed without any tension using running 1  PDS suture  Copious irrigation was done of the subcutaneous pocket  A subcutaneous drain was inserted and fixed using 3-0 nylon  The incision was closed in multiple layers  The inner most layer was 2-0 Vicryl in running fashion  The subdermal layer was 3-0 Vicryl interrupted  The skin was then closed using skin staples  A Mepilex dressing was applied over the midline incision and the drain  Tap block was given by the anesthesia team   Patient was recovered from anesthesia and taken to the PACU under stable condition     I was present for the entire procedure, A qualified resident physician was not available and A physician assistant was required during the procedure for retraction tissue handling,dissection and suturing    Patient Disposition:  PACU       SIGNATURE: Kamini Eller MD  DATE: March 22, 2022  TIME: 3:08 PM

## 2022-03-22 NOTE — PROGRESS NOTES
Progress Note - General Surgery   Shubham Browning 55 y o  female MRN: [id number]  Unit/Bed#: -01 Encounter: [id number]    Assessment:  Abdominal pain  - CT scan revealing large infraumbilical incisional hernia containing nondilated bowel loops without obstruction or strangulation   - on exam, abdomen soft with tenderness to palpation area and tenderness to palpation with guarding in left upper and left lower quadrants  - afebrile, regular heart rate, low blood pressures (lowest 87/49)  - WBC 14 (13 56 yesterday); patient states recent covid infection testing positive on 3/9/22 and receiving monoclonal treatment on 3/12/22  - positive flatus     Plan:  - Plan for surgical intervention in light of significant pain in presence of incisional hernia  - NPO  - Continue IVF  - Abx on call to OR  - Normal saline bolus for low blood pressure  - Monitor abdominal exams, vitals, and labs  - Analgesia and antiemetics prn   - Encourage ambulation    - Heparin for DVT ppx       Subjective/Objective     Subjective: Patient reports pain that is improving  States the pain is constant and describes it as stabbing  Rates the pain as 4-5/10  States that she felt some fevers and chills last night  Last BM yesterday morning prior to coming to hospital  Positive for flatus  Denies nausea, vomiting, chest pain, shortness of breath, dysuria  Objective:     Blood pressure (!) 87/49, pulse 68, temperature 98 5 °F (36 9 °C), temperature source Tympanic, resp  rate 16, height 5' 1" (1 549 m), weight 81 6 kg (180 lb), last menstrual period 03/04/2022, SpO2 92 %, not currently breastfeeding  ,Body mass index is 34 01 kg/m²        Intake/Output Summary (Last 24 hours) at 3/22/2022 0741  Last data filed at 3/22/2022 0326  Gross per 24 hour   Intake 1000 ml   Output 200 ml   Net 800 ml       Invasive Devices  Report    Peripheral Intravenous Line            Peripheral IV 03/12/22 Right Hand 9 days    Peripheral IV 03/21/22 Right Antecubital <1 day                Physical Exam:  Gen - no acute distress, lying in bed appear in discomfort   Heart - RRR  Lungs - clear to auscultation bilaterally - auscultated while patient lying flat; normal respiratory effort  Abdomen - Bowel sounds present  Soft, obese abdomen  Tenderness to palpation in left upper and lower quadrants with guarding  Tenderness to palpation in infraumbilical area  Extremities - no lower extremity edema  Lab, Imaging and other studies:I have personally reviewed pertinent lab results      VTE Pharmacologic Prophylaxis: Heparin    Inna Antunez  3/22/2022

## 2022-03-22 NOTE — PLAN OF CARE
Problem: PAIN - ADULT  Goal: Verbalizes/displays adequate comfort level or baseline comfort level  Description: Interventions:  - Encourage patient to monitor pain and request assistance  - Assess pain using appropriate pain scale  - Administer analgesics based on type and severity of pain and evaluate response  - Implement non-pharmacological measures as appropriate and evaluate response  - Consider cultural and social influences on pain and pain management  - Notify physician/advanced practitioner if interventions unsuccessful or patient reports new pain  Outcome: Progressing     Problem: INFECTION - ADULT  Goal: Absence or prevention of progression during hospitalization  Description: INTERVENTIONS:  - Assess and monitor for signs and symptoms of infection  - Monitor lab/diagnostic results  - Monitor all insertion sites, i e  indwelling lines, tubes, and drains  - Monitor endotracheal if appropriate and nasal secretions for changes in amount and color  - [place] appropriate cooling/warming therapies per order  - Administer medications as ordered  - Instruct and encourage patient and family to use good hand hygiene technique  - Identify and instruct in appropriate isolation precautions for identified infection/condition  Outcome: Progressing

## 2022-03-22 NOTE — ASSESSMENT & PLAN NOTE
Lower Abdominal pain likely with incarcerated hernia-no evidence of obstruction, CT of the abdomen shows a large midline ventral hernia without evidence of obstruction, is surgical input appreciated  Patient will be kept NPO and give IV hydration and IV PPI and antiemetics and pain medication  Patient had a recent diagnosis of COVID-19 early 2022 and treated monoclonal antibody infusion, recovered, patient is afebrile  Noted to have leukocytosis  Will check urinalysis  Last menstrual period was early March currently has no vaginal bleeding  Urine pregnancy is negative  If patient continues to have pain, surgery to take for  OR in a [facility] Plan was d/w Dr Jose R Ware, patient having pain in the left lower quadrant which is increased and also blood pressure was low in the morning and recheck and blood pressure is 120/70s  Heart rate in 60s to 70s  Patient has elevated WBC count of 14,000 and has been afebrile  Lactic acid within normal limits  Will give IV fluid hydration including bolus  Will give IV antibiotic with cefepime and Flagyl and follow-up blood culture

## 2022-03-22 NOTE — UTILIZATION REVIEW
Initial Clinical Review    Admission: Date/Time/Statement: 3/21/22 AT 1456 OBSERVATION - CONVERTED TO INPATIENT 3/22/22 AT 1716 POST OP 2ND LAPAROTOMY EXPLORATORY WITH OPEN REPAIR OF INCARCERATED INCISIONAL  HERNIA WITH MESH / OPEN REPAIR HERNIA INCISIONAL WITH MESH     Start   Ordered   22 1716  Inpatient Admission  Once        Transfer Service: Surgery-General       Question Answer Comment   Level of Care Med Surg    Estimated length of stay Not Applicable         1716   22 1456  Place in Observation  (ED Bridging Orders Panel)  Once        Question: Level of Care Answer: Med Surg    22 1456     Orders Placed This Encounter   Procedures    Place in Observation     Standing Status:   Standing     Number of Occurrences:   1     Order Specific Question:   Level of Care     Answer:   Med Surg [16]     ED Arrival Information     Expected Arrival Acuity    - 3/21/2022 10:37 Urgent    Means of arrival Escorted by Service Admission type    Walk-In Self Surgery-General Urgent    Arrival complaint    abdominal pain     Chief Complaint   Patient presents with    Abdominal Pain     Pt presents to the ED from home with complaint of "I have really bad stomach cramps for two weeks"; states it all started after she got Covid and the antibodies; states she took laxatives yesterday and today and pain has gotten worse since taking; states last bowel movement was this morning; states abdominal pain is getting worse; states pain is generalized abdominal pain however left side is worse; denies nausea, vomiting, diarrhea and/or fever at home; denies problems with urination; took tylenol with     Initial Presentation:   55year old female with PMHx gastric Bypass, , COVID-19 Infx 2022 with antibody Tx - presents urgently to [facility] ED on 3/21/21 2nd 2 week history "crampy" lower abdominal pain associated with nausea - worse on left-side that started after COVID Dx + Tx    In ED - vs wnl Exam: abdominal tenderness  CT +  large midline ventral hernia w/o obstruction   Labs:  WBC 13,560  K+ 3 9     ED Tx:  NPO, IVF, IV MS, IV Zofran  Placed in Observation 3/21/22 at 1456 2nd large infraumbilical incisional hernia - keep NPO, continue IVF, IV PPI, IV Dilaudid + IV Zofran prn, re-evaluate in am for possible surgical intervention    3/21/21 INT MED:  Lower abdominal pain likely with incarcerated hernia-no evidence of obstruction, CT of the abdomen shows a large midline ventral hernia without evidence of obstruction, is surgical input appreciated  Plan - keep NPO and give IVF, IV PPI and antiemetics and pain medication  Patient had a recent diagnosis of COVID-19 early 2022 and treated monoclonal antibody infusion, recovered, patient is afebrile  Noted to have leukocytosis  Will check urinalysis  IF continues to have pain - surgery planning OR in am     3/22/22 GENERAL SURGERY:  Assessment:  Patient has incisional hernia with significant pain  Plan would be to take the patient to the operating room for exploratory laparotomy with ventral hernia repair possible bowel resection  I took the patient's blood pressure myself  Her blood pressure was noted to be 108 x 61  Her pulse rate was 81  Plan:  Keep NPO  Give 1 L fluid bolus  Start IV antibiotics  Plan OR today for ex lap with  incisional hernia repair possible bowel resection  3/22/22 OPERATIVE REPORT  Preop + Post-Op Diagnosis Codes: *Lower abdominal pain [R10 30]     * Ventral hernia without obstruction or gangrene [K43 9]   Procedure(s) (LRB):  LAPAROTOMY EXPLORATORY WITH OPEN REPAIR OF INCARCERATED INCISIONAL  HERNIA WITH MESH (N/A)  OPEN REPAIR HERNIA INCISIONAL WITH MESH (N/A)  CPT code: 95910,22695  Anesthesia Type: General  Operative Findings: Large infraumbilical midline incisional hernia  The size of the defect was 7 cm x 4 cm    The hernia sac itself was large extending from the right lower quadrant to the left lower quadrant  The sac contained loops of small bowel as well as sigmoid colon  3/23/22 POD # 1 GENERAL SURGERY  POD#1 s/p exploratory laparotomy with open repair of incarcerated incisional hernia with mesh   - Afebrile, regular heart rate,  complaining of some shortness of breath; improved with sitting upright in recliner and 2L O2 via nasal cannula      - UO - 1795mL clear, light yellow  - drain output - 160mL serosanguinous  - WBC 15 8 (14 1 yesterday) (may have some post op inflammation and also may have leukocytosis in light of recent covid infection testing positive on 3/9/22 and receiving monoclonal treatment on 3/12/22)  - HGB 8 7 (10 2 yesterday)  - Abdomen with incisional and left sided tenderness       Plan:  - Advanced to full liquid diet  - Reduced IVF to 75 mL/hr  - Discontinued urinary catheter  - Monitor abdominal exams, vitals, and labs  - Will continue to monitor hemoglobin - patient states she was diagnosed with iron deficiency anemia approximately 2-3 years ago and at one point in time required iron infusions for several months; in light of this and with her history of gastric bypass surgery she will be started on supplemental iron at discharge and will continue her home multi vitamins and B12  - Monitor ROSALVA drain output and appearance of output  - Analgesia and antiemetics prn   - PT consult   - Encourage ambulation   - Incentive spirometer   - DVT ppx     ED Triage Vitals [03/21/22 1047]   Temperature Pulse Respirations Blood Pressure SpO2   (!) 97 3 °F (36 3 °C) 77 18 123/70 100 %      Temp Source Heart Rate Source Patient Position - Orthostatic VS BP Location FiO2 (%)   Oral Monitor Sitting Left arm --      Pain Score       7          Wt Readings from Last 1 Encounters:   03/21/22 81 6 kg (180 lb)     Additional Vital Signs:   Date/Time Temp Pulse Resp BP MAP (mmHg) SpO2 Calculated FIO2 (%) - Nasal Cannula O2 Flow Rate (L/min) Nasal Cannula O2(L/min) O2 Device Cardiac (WDL) Patient Position - Orthostatic VS   03/23/22 1630 97 5 °F (36 4 °C) 89 17 96/66 -- 97 % -- -- -- -- -- Sitting   03/23/22 0834 -- -- -- -- -- -- -- -- -- Nasal cannula -- --   03/23/22 0757 98 1 °F (36 7 °C) 63 20 101/74 -- 100 % -- -- -- -- -- Sitting   03/23/22 0300 98 °F (36 7 °C) 76 20 112/70 -- 95 % 28 -- 2 L/min Nasal cannula -- Lying   03/22/22 2245 97 1 °F (36 2 °C) Abnormal  76 20 115/65 -- 94 % -- -- -- -- -- Lying   03/22/22 1603 -- -- -- -- -- 95 % -- 2 L/min -- Nasal cannula -- --   03/22/22 1600 97 6 °F (36 4 °C) 76 22 108/71 -- 86 % Abnormal  -- -- -- None (Room air) -- Lying   03/22/22 1530 97 5 °F (36 4 °C) 73 16 110/70 -- 97 % -- -- -- None (Room air) WDL --   03/22/22 1510 97 2 °F (36 2 °C) Abnormal  78 17 108/67 -- 95 % -- -- -- None (Room air) WDL --   03/22/22 1455 97 6 °F (36 4 °C) 86 14 106/65 -- 94 % -- 5 L/min -- Simple mask WDL --   03/22/22 1439 96 8 °F (36 °C) Abnormal  -- 24 Abnormal  87/53 Abnormal  -- 91 % -- 5 L/min -- Simple mask WDL --   03/22/22 1057 98 9 °F (37 2 °C) 71 18 110/62 -- 95 % -- -- -- None (Room air) -- --   03/22/22 0845 -- -- -- -- -- 95 % -- -- -- None (Room air) -- --   03/22/22 0736 -- -- -- 87/49 Abnormal  -- -- -- -- -- -- -- Lying   03/22/22 0735 98 5 °F (36 9 °C) 68 16 88/53 Abnormal  -- 92 % -- -- -- -- -- Lying   03/22/22 0542 -- 72 16 96/58 71 93 % -- -- -- None (Room air) -- Lying   03/22/22 0440 98 3 °F (36 8 °C) 73 17 94/57  71 96 % -- -- -- None (Room air) -- Lying   03/21/22 2300 99 °F (37 2 °C) 99 18 114/58 74 98 % -- -- -- None (Room air) -- Lying   03/21/22 2020 98 8 °F (37 1 °C) 85 18 112/62 83 98 % -- -- -- None (Room air) -- Lying   03/21/22 1700 -- -- -- -- -- 100 % -- -- -- None (Room air) -- --   03/21/22 1315 -- 71 18 108/63 -- 100 % -- -- -- None (Room air) -- Lying      03/21 0701   03/22 0700 03/22 0701   03/23 0700 03/23 0701   -   P  O  0     I V  (mL/kg)  3318 8 (40 7)    IV Piggyback 1000 500    Total Intake(mL/kg) 1000 (12 3) 3818 8 (46 8)    Urine (mL/kg/hr) 200 1795 (0 9) 200 (0 2)   Drains  160 30   Stool 0     Blood  150    Total Output 200 2105 230   Net +800 +1713 8 -230         Unmeasured Urine Occurrence 1 x     Unmeasured Stool Occurrence 0 x          Invasive Devices  Report     Peripheral Intravenous Line               Peripheral IV 03/21/22 Right Antecubital 1 day      Peripheral IV 03/22/22 Left;Ventral (anterior) Forearm 1 day         Drain               Closed/Suction Drain Inferior;Right Abdomen Bulb 10 Fr  <1 day      Urethral Catheter Latex 16 Fr  <1 day         Pertinent Labs/Diagnostic Test Results:   CT abdomen pelvis with contrast   Cholelithiasis  Large midline ventral hernia without evidence for obstruction  Small bowel anastomosis in the right mid abdomen with associated bowel wall thickening  Postoperative changes status post gastric bypass       Results from last 7 days   Lab Units 03/23/22  0516 03/22/22  0457 03/21/22  1116   WBC Thousand/uL 15 76* 14 06* 13 56*   HEMOGLOBIN g/dL 8 7* 10 2* 11 9   HEMATOCRIT % 28 2* 32 3* 38 2   PLATELETS Thousands/uL 279 319 378   NEUTROS ABS Thousands/µL 13 50* 11 44* 10 64*     Results from last 7 days   Lab Units 03/23/22  0516 03/22/22  0457 03/21/22  1116   SODIUM mmol/L 136 138 135   POTASSIUM mmol/L 4 0 3 5 3 9   CHLORIDE mmol/L 105 102 98   CO2 mmol/L 22 26 27   ANION GAP mmol/L 9 10 10   BUN mg/dL 7 8 13   CREATININE mg/dL 0 57* 0 75 0 72   EGFR ml/min/1 73sq m 111 95 100   CALCIUM mg/dL 8 4 8 7 9 7     Results from last 7 days   Lab Units 03/21/22  1116   AST U/L 20   ALT U/L 17   ALK PHOS U/L 119*   TOTAL PROTEIN g/dL 8 3   ALBUMIN g/dL 4 3   TOTAL BILIRUBIN mg/dL 0 41     Results from last 7 days   Lab Units 03/23/22  0516 03/22/22  0457 03/21/22  1116   GLUCOSE RANDOM mg/dL 73 66 75     Results from last 7 days   Lab Units 03/22/22  0925   PROCALCITONIN ng/ml 0 06     Results from last 7 days   Lab Units 03/22/22  0537 03/21/22  1116   LACTIC ACID mmol/L 0 8 1 0     Results from last 7 days   Lab Units 03/21/22  1116   LIPASE u/L 32     Results from last 7 days   Lab Units 03/22/22  0457   CRP mg/L 107 2*       Results from last 7 days   Lab Units 03/21/22  1236   CLARITY UA  Clear   COLOR UA  Yellow   SPEC GRAV UA  <=1 005   PH UA  6 0   GLUCOSE UA mg/dl Negative   KETONES UA mg/dl Negative   BLOOD UA  Negative   PROTEIN UA mg/dl Negative   NITRITE UA  Negative   BILIRUBIN UA  Negative   UROBILINOGEN UA E U /dl 0 2   LEUKOCYTES UA  1+*   WBC UA /hpf 4-10*   RBC UA /hpf None Seen   BACTERIA UA /hpf Occasional   EPITHELIAL CELLS WET PREP /hpf Occasional     ED Treatment:   Medication Administration from 03/21/2022 1037 to 03/21/2022 1553       Date/Time Order Dose Route Action     03/21/2022 1116 sodium chloride 0 9 % bolus 1,000 mL 1,000 mL Intravenous New Bag     03/21/2022 1116 ondansetron (ZOFRAN) injection 4 mg 4 mg Intravenous Given     03/21/2022 1303 iohexol (OMNIPAQUE) 350 MG/ML injection (SINGLE-DOSE) 100 mL 100 mL Intravenous Given     03/21/2022 1320 morphine (PF) 4 mg/mL injection 4 mg 4 mg Intravenous Given     Past Medical History:   Diagnosis Date    Abnormal Pap smear of cervix     2017    Anemia     Anxiety     Asthma     Diabetes mellitus ([facility] [address] )     History of transfusion 2018    HPV (human papilloma virus) infection     2017    Obesity     Varicella      Present on Admission:   Obesity   COVID-19   Anemia    Admitting Diagnosis: Ventral hernia without obstruction or gangrene [K43 9]  Abdominal pain [R10 9]  Lower abdominal pain [R10 30]    Age/Sex: 55 y o  female    Admission Orders:  VS q4hrs  NPO  SCD  OOB    Scheduled Medications:  IV cefepime, 2,000 mg, Intravenous, Q12H  heparin (porcine), 5,000 Units, Subcutaneous, Q8H NATI  IV metroNIDAZOLE, 500 mg, Intravenous, Q8H  IV pantoprazole, 40 mg, Intravenous, Q24H [address]  IVF sodium chloride, 1,000 mL, Intravenous, Once  IVF sodium chloride, 1,000 mL, Intravenous, Once    Continuous IV Infusions:  IVF lactated ringers, 125 mL/hr, Intravenous, Continuous    PRN Meds:  albuterol, 2 puff, Inhalation, Q6H PRN  IV HYDROmorphone, 1 mg, Intravenous, Q6H PRN - 3/21 X 1  iohexol, 50 mL, Oral, Once in imaging  ondansetron, 4 mg, Intravenous, Q6H PRN    Network Utilization Review Department  ATTENTION: Please call with any questions or concerns to [phone] and carefully listen to the prompts so that you are directed to the right person  All voicemails are confidential   Nayla Skipper all requests for admission clinical reviews, approved or denied determinations and any other requests to dedicated fax number below belonging to the campus where the patient is receiving treatment   List of dedicated fax numbers for the Facilities:  [address] DENIALS (Administrative/Medical Necessity) [phone]   [address] (Maternity/NICU/Pediatrics) [phone]   [address] [place] [address] [address] [place] [address]  [address] [address] [facility] [place] [address] [id number] [place] [address] Yousif Liana Samay[place] [address] [facility] [place] [address] [phone]

## 2022-03-22 NOTE — ANESTHESIA POSTPROCEDURE EVALUATION
Post-Op Assessment Note    CV Status:  Stable  Pain Score: 0    Pain management: adequate     Mental Status:  Arousable and sleepy   Hydration Status:  Euvolemic and stable   PONV Controlled:  Controlled   Airway Patency:  Patent      Post Op Vitals Reviewed: Yes      Staff: CRNA   Comments: Repeat BP = 108/68, HR 91, SPO2 94%, RR 16 at 1442        No complications documented      BP   87/53   Temp   96 2   Pulse 82   Resp 14   SpO2 94 FM 4lpm

## 2022-03-22 NOTE — NURSING NOTE
At 01:10 pt was reassessed  Pt is awake,alertx 4  Pt c/o b/l headache-7/10  As per pt,she has history of migraine  At the same time pt c/o abdominal pain -4/10 which gets worse with mobility in bed and walking-pain gets to 9/10,pt is crying with it  Arthur Osei  ,PAC is aware of pt's conditions  Toradol was prescribed,was given as ordered

## 2022-03-22 NOTE — ASSESSMENT & PLAN NOTE
Hypotension with leucocytosis - will check Blood cx and lactic is normal , afberile , pt feels cold and sweaty with severe abdominal pain with tenderness in llq, will give  fluid bolus and give IV antibiotic therapy and blood cultures

## 2022-03-22 NOTE — PROGRESS NOTES
Progress Note - General Surgery   Shubham Browning 55 y o  female MRN: [id number]  Unit/Bed#: -01 Encounter: [id number]    Assessment:  Patient has incisional hernia with significant pain  Plan would be to take the patient to the operating room for exploratory laparotomy with ventral hernia repair possible bowel resection  I took the patient's blood pressure myself  Her blood pressure was noted to be 108 x 61  Her pulse rate was 81  Plan:  Keep the patient NPO  Give 1 L fluid bolus  Start IV antibiotics  Patient will go to the OR today for ex lap with  incisional hernia repair possible bowel resection  Subjective/Objective   Chief Complaint:  Lower abdominal pain    Subjective:  Lower abdominal pain    Objective:  Lower abdominal pain    Blood pressure (!) 87/49, pulse 68, temperature 98 5 °F (36 9 °C), temperature source Tympanic, resp  rate 16, height 5' 1" (1 549 m), weight 81 6 kg (180 lb), last menstrual period 03/04/2022, SpO2 92 %, not currently breastfeeding  ,Body mass index is 34 01 kg/m²  Intake/Output Summary (Last 24 hours) at 3/22/2022 0854  Last data filed at 3/22/2022 0326  Gross per 24 hour   Intake 1000 ml   Output 200 ml   Net 800 ml       Invasive Devices  Report    Peripheral Intravenous Line            Peripheral IV 03/12/22 Right Hand 9 days    Peripheral IV 03/21/22 Right Antecubital <1 day                Physical Exam:  Patient is in some distress  I took her blood pressure myself blood pressure was 100 and 8 x 61  Her pulse was 81  Mucous membranes were moist   Abdomen soft  Tender in the left lower quadrant and suprapubic region  Large pannus  Lab, Imaging and other studies:  I have personally reviewed pertinent lab results    , CBC:   Lab Results   Component Value Date    WBC 14 06 (H) 03/22/2022    HGB 10 2 (L) 03/22/2022    HCT 32 3 (L) 03/22/2022    MCV 79 (L) 03/22/2022     03/22/2022    MCH 24 8 (L) 03/22/2022    MCHC 31 6 03/22/2022    RDW 15 4 (H) 03/22/2022    MPV 9 2 03/22/2022    NRBC 0 03/22/2022   , CMP:   Lab Results   Component Value Date    SODIUM 138 03/22/2022    K 3 5 03/22/2022     03/22/2022    CO2 26 03/22/2022    BUN 8 03/22/2022    CREATININE 0 75 03/22/2022    CALCIUM 8 7 03/22/2022    AST 20 03/21/2022    ALT 17 03/21/2022    ALKPHOS 119 (H) 03/21/2022    EGFR 95 03/22/2022   , Coagulation: No results found for: PT, INR, APTT, Urinalysis:   Lab Results   Component Value Date    COLORU Yellow 03/21/2022    CLARITYU Clear 03/21/2022    SPECGRAV <=1 005 03/21/2022    PHUR 6 0 03/21/2022    LEUKOCYTESUR 1+ (A) 03/21/2022    NITRITE Negative 03/21/2022    GLUCOSEU Negative 03/21/2022    KETONESU Negative 03/21/2022    BILIRUBINUR Negative 03/21/2022    BLOODU Negative 03/21/2022   , Amylase: No results found for: AMYLASE, Lipase:   Lab Results   Component Value Date    LIPASE 32 03/21/2022     VTE Pharmacologic Prophylaxis: Sequential compression device (Venodyne)  and Heparin  VTE Mechanical Prophylaxis: sequential compression device

## 2022-03-23 PROBLEM — R65.10 SIRS (SYSTEMIC INFLAMMATORY RESPONSE SYNDROME) (HCC): Status: ACTIVE | Noted: 2022-03-21

## 2022-03-23 LAB
ANION GAP SERPL CALCULATED.3IONS-SCNC: 9 MMOL/L (ref 4–13)
ATRIAL RATE: 69 BPM
BASOPHILS # BLD AUTO: 0.03 THOUSANDS/ΜL (ref 0–0.1)
BASOPHILS NFR BLD AUTO: 0 % (ref 0–1)
BUN SERPL-MCNC: 7 MG/DL (ref 5–25)
CALCIUM SERPL-MCNC: 8.4 MG/DL (ref 8.4–10.2)
CHLORIDE SERPL-SCNC: 105 MMOL/L (ref 96–108)
CO2 SERPL-SCNC: 22 MMOL/L (ref 21–32)
CREAT SERPL-MCNC: 0.57 MG/DL (ref 0.6–1.3)
EOSINOPHIL # BLD AUTO: 0 THOUSAND/ΜL (ref 0–0.61)
EOSINOPHIL NFR BLD AUTO: 0 % (ref 0–6)
ERYTHROCYTE [DISTWIDTH] IN BLOOD BY AUTOMATED COUNT: 15.3 % (ref 11.6–15.1)
GFR SERPL CREATININE-BSD FRML MDRD: 111 ML/MIN/1.73SQ M
GLUCOSE SERPL-MCNC: 73 MG/DL (ref 65–140)
HCT VFR BLD AUTO: 28.2 % (ref 34.8–46.1)
HGB BLD-MCNC: 8.7 G/DL (ref 11.5–15.4)
IMM GRANULOCYTES # BLD AUTO: 0.08 THOUSAND/UL (ref 0–0.2)
IMM GRANULOCYTES NFR BLD AUTO: 1 % (ref 0–2)
LYMPHOCYTES # BLD AUTO: 1.27 THOUSANDS/ΜL (ref 0.6–4.47)
LYMPHOCYTES NFR BLD AUTO: 8 % (ref 14–44)
MCH RBC QN AUTO: 25.1 PG (ref 26.8–34.3)
MCHC RBC AUTO-ENTMCNC: 30.9 G/DL (ref 31.4–37.4)
MCV RBC AUTO: 81 FL (ref 82–98)
MONOCYTES # BLD AUTO: 0.88 THOUSAND/ΜL (ref 0.17–1.22)
MONOCYTES NFR BLD AUTO: 6 % (ref 4–12)
NEUTROPHILS # BLD AUTO: 13.5 THOUSANDS/ΜL (ref 1.85–7.62)
NEUTS SEG NFR BLD AUTO: 85 % (ref 43–75)
NRBC BLD AUTO-RTO: 0 /100 WBCS
P AXIS: 30 DEGREES
PLATELET # BLD AUTO: 279 THOUSANDS/UL (ref 149–390)
PMV BLD AUTO: 10.1 FL (ref 8.9–12.7)
POTASSIUM SERPL-SCNC: 4 MMOL/L (ref 3.5–5.3)
PR INTERVAL: 133 MS
QRS AXIS: 20 DEGREES
QRSD INTERVAL: 90 MS
QT INTERVAL: 386 MS
QTC INTERVAL: 417 MS
RBC # BLD AUTO: 3.47 MILLION/UL (ref 3.81–5.12)
SODIUM SERPL-SCNC: 136 MMOL/L (ref 135–147)
T WAVE AXIS: 24 DEGREES
VENTRICULAR RATE: 70 BPM
WBC # BLD AUTO: 15.76 THOUSAND/UL (ref 4.31–10.16)

## 2022-03-23 PROCEDURE — 99232 SBSQ HOSP IP/OBS MODERATE 35: CPT

## 2022-03-23 PROCEDURE — 80048 BASIC METABOLIC PNL TOTAL CA: CPT

## 2022-03-23 PROCEDURE — 85025 COMPLETE CBC W/AUTO DIFF WBC: CPT

## 2022-03-23 PROCEDURE — C9113 INJ PANTOPRAZOLE SODIUM, VIA: HCPCS

## 2022-03-23 PROCEDURE — 97163 PT EVAL HIGH COMPLEX 45 MIN: CPT

## 2022-03-23 PROCEDURE — 99024 POSTOP FOLLOW-UP VISIT: CPT | Performed by: SURGERY

## 2022-03-23 PROCEDURE — 97116 GAIT TRAINING THERAPY: CPT

## 2022-03-23 PROCEDURE — 93010 ELECTROCARDIOGRAM REPORT: CPT | Performed by: INTERNAL MEDICINE

## 2022-03-23 RX ORDER — SODIUM CHLORIDE, SODIUM LACTATE, POTASSIUM CHLORIDE, CALCIUM CHLORIDE 600; 310; 30; 20 MG/100ML; MG/100ML; MG/100ML; MG/100ML
75 INJECTION, SOLUTION INTRAVENOUS CONTINUOUS
Status: DISCONTINUED | OUTPATIENT
Start: 2022-03-23 | End: 2022-03-24

## 2022-03-23 RX ADMIN — PANTOPRAZOLE SODIUM 40 MG: 40 INJECTION, POWDER, FOR SOLUTION INTRAVENOUS at 08:37

## 2022-03-23 RX ADMIN — OXYCODONE HYDROCHLORIDE 5 MG: 5 TABLET ORAL at 08:34

## 2022-03-23 RX ADMIN — SODIUM CHLORIDE, SODIUM LACTATE, POTASSIUM CHLORIDE, AND CALCIUM CHLORIDE 125 ML/HR: 600; 310; 30; 20 INJECTION, SOLUTION INTRAVENOUS at 03:20

## 2022-03-23 RX ADMIN — ONDANSETRON 4 MG: 2 INJECTION INTRAMUSCULAR; INTRAVENOUS at 15:18

## 2022-03-23 RX ADMIN — HYDROMORPHONE HYDROCHLORIDE 0.5 MG: 1 INJECTION, SOLUTION INTRAMUSCULAR; INTRAVENOUS; SUBCUTANEOUS at 01:45

## 2022-03-23 RX ADMIN — HEPARIN SODIUM 5000 UNITS: 5000 INJECTION INTRAVENOUS; SUBCUTANEOUS at 21:54

## 2022-03-23 RX ADMIN — OXYCODONE HYDROCHLORIDE 10 MG: 10 TABLET ORAL at 13:06

## 2022-03-23 RX ADMIN — HEPARIN SODIUM 5000 UNITS: 5000 INJECTION INTRAVENOUS; SUBCUTANEOUS at 05:26

## 2022-03-23 RX ADMIN — CEFEPIME HYDROCHLORIDE 2000 MG: 2 INJECTION, SOLUTION INTRAVENOUS at 08:34

## 2022-03-23 RX ADMIN — SODIUM CHLORIDE, SODIUM LACTATE, POTASSIUM CHLORIDE, AND CALCIUM CHLORIDE 75 ML/HR: .6; .31; .03; .02 INJECTION, SOLUTION INTRAVENOUS at 15:18

## 2022-03-23 RX ADMIN — HYDROMORPHONE HYDROCHLORIDE 0.5 MG: 1 INJECTION, SOLUTION INTRAMUSCULAR; INTRAVENOUS; SUBCUTANEOUS at 17:14

## 2022-03-23 RX ADMIN — HYDROMORPHONE HYDROCHLORIDE 0.5 MG: 1 INJECTION, SOLUTION INTRAMUSCULAR; INTRAVENOUS; SUBCUTANEOUS at 05:56

## 2022-03-23 RX ADMIN — HEPARIN SODIUM 5000 UNITS: 5000 INJECTION INTRAVENOUS; SUBCUTANEOUS at 13:06

## 2022-03-23 RX ADMIN — HYDROMORPHONE HYDROCHLORIDE 0.5 MG: 1 INJECTION, SOLUTION INTRAMUSCULAR; INTRAVENOUS; SUBCUTANEOUS at 10:59

## 2022-03-23 RX ADMIN — OXYCODONE HYDROCHLORIDE 10 MG: 10 TABLET ORAL at 21:53

## 2022-03-23 NOTE — PLAN OF CARE
Problem: PAIN - ADULT  Goal: Verbalizes/displays adequate comfort level or baseline comfort level  Description: Interventions:  - Encourage patient to monitor pain and request assistance  - Assess pain using appropriate pain scale  - Administer analgesics based on type and severity of pain and evaluate response  - Implement non-pharmacological measures as appropriate and evaluate response  - Consider cultural and social influences on pain and pain management  - Notify physician/advanced practitioner if interventions unsuccessful or patient reports new pain  Outcome: Progressing     Problem: INFECTION - ADULT  Goal: Absence or prevention of progression during hospitalization  Description: INTERVENTIONS:  - Assess and monitor for signs and symptoms of infection  - Monitor lab/diagnostic results  - Monitor all insertion sites, i e  indwelling lines, tubes, and drains  - Monitor endotracheal if appropriate and nasal secretions for changes in amount and color  - [place] appropriate cooling/warming therapies per order  - Administer medications as ordered  - Instruct and encourage patient and family to use good hand hygiene technique  - Identify and instruct in appropriate isolation precautions for identified infection/condition  Outcome: Progressing     Problem: SAFETY ADULT  Goal: Patient will remain free of falls  Description: INTERVENTIONS:  - Educate patient/family on patient safety including physical limitations  - Instruct patient to call for assistance with activity   - Consult OT/PT to assist with strengthening/mobility   - Keep Call bell within reach  - Keep bed low and locked with side rails adjusted as appropriate  - Keep care items and personal belongings within reach  - Initiate and maintain comfort rounds  - Make Fall Risk Sign visible to staff  - Offer Toileting every 2 Hours, in advance of need  - Initiate/Maintain alarm  - Obtain necessary fall risk management equipment:   - Apply yellow socks and bracelet for high fall risk patients  - Consider moving patient to room near nurses station  Outcome: Progressing  Goal: Maintain or return to baseline ADL function  Description: INTERVENTIONS:  -  Assess patient's ability to carry out ADLs; assess patient's baseline for ADL function and identify physical deficits which impact ability to perform ADLs (bathing, care of mouth/teeth, toileting, grooming, dressing, etc )  - Assess/evaluate cause of self-care deficits   - Assess range of motion  - Assess patient's mobility; develop plan if impaired  - Assess patient's need for assistive devices and provide as appropriate  - Encourage maximum independence but intervene and supervise when necessary  - Involve family in performance of ADLs  - Assess for home care needs following discharge   - Consider OT consult to assist with ADL evaluation and planning for discharge  - Provide patient education as appropriate  Outcome: Progressing  Goal: Maintains/Returns to pre admission functional level  Description: INTERVENTIONS:  - Perform BMAT or MOVE assessment daily    - Set and communicate daily mobility goal to care team and patient/family/caregiver     - Collaborate with rehabilitation services on mobility goals if consulted  - Out of bed for toileting  - Record patient progress and toleration of activity level   Outcome: Progressing     Problem: DISCHARGE PLANNING  Goal: Discharge to home or other facility with appropriate resources  Description: INTERVENTIONS:  - Identify barriers to discharge w/patient and caregiver  - Arrange for needed discharge resources and transportation as appropriate  - Identify discharge learning needs (meds, wound care, etc )  - Arrange for interpretive services to assist at discharge as needed  - Refer to Case Management Department for coordinating discharge planning if the patient needs post-hospital services based on physician/advanced practitioner order or complex needs related to functional status, cognitive ability, or social support system  Outcome: Progressing     Problem: Knowledge Deficit  Goal: Patient/family/caregiver demonstrates understanding of disease process, treatment plan, medications, and discharge instructions  Description: Complete learning assessment and assess knowledge base  Interventions:  - Provide teaching at level of understanding  - Provide teaching via preferred learning methods  Outcome: Progressing     Problem: MOBILITY - ADULT  Goal: Maintain or return to baseline ADL function  Description: INTERVENTIONS:  -  Assess patient's ability to carry out ADLs; assess patient's baseline for ADL function and identify physical deficits which impact ability to perform ADLs (bathing, care of mouth/teeth, toileting, grooming, dressing, etc )  - Assess/evaluate cause of self-care deficits   - Assess range of motion  - Assess patient's mobility; develop plan if impaired  - Assess patient's need for assistive devices and provide as appropriate  - Encourage maximum independence but intervene and supervise when necessary  - Involve family in performance of ADLs  - Assess for home care needs following discharge   - Consider OT consult to assist with ADL evaluation and planning for discharge  - Provide patient education as appropriate  Outcome: Progressing  Goal: Maintains/Returns to pre admission functional level  Description: INTERVENTIONS:  - Perform BMAT or MOVE assessment daily    - Set and communicate daily mobility goal to care team and patient/family/caregiver  - Collaborate with rehabilitation services on mobility goals if consulted  - Perform Range of Motion 4 times a day  - Reposition patient every 2 hours    - Dangle patient 3 times a day  - Stand patient 3 times a day  - Ambulate patient 3 times a day  - Out of bed to chair 3 times a day   - Out of bed for meals 3 times a day  - Out of bed for toileting  - Record patient progress and toleration of activity level   Outcome: Progressing

## 2022-03-23 NOTE — PROGRESS NOTES
Progress Note - General Surgery   Elner Covert 55 y o  female MRN: [id number]  Unit/Bed#: -01 Encounter: [id number]    Assessment:  55 y o  female POD#1 s/p exploratory laparotomy with open repair of incarcerated incisional hernia with mesh   - Afebrile, regular heart rate   - patient complaining of some shortness of breath; improved with sitting upright in recliner and 2L O2 via nasal cannula   - UO - 1795mL clear, light yellow  - drain output - 160mL serosanguinous  - WBC 15 8 (14 1 yesterday) (may have some post op inflammation and also may have leukocytosis in light of recent covid infection testing positive on 3/9/22 and receiving monoclonal treatment on 3/12/22)  - HGB 8 7 (10 2 yesterday)  - Abdomen with incisional and left sided tenderness      Plan:  - Advanced to full liquid diet  - Reduced IVF to 75 mL/hr  - Discontinued urinary catheter  - Monitor abdominal exams, vitals, and labs  - Will continue to monitor hemoglobin - patient states she was diagnosed with iron deficiency anemia approximately 2-3 years ago and at one point in time required iron infusions for several months; in light of this and with her history of gastric bypass surgery she will be started on supplemental iron at discharge and will continue her home multi vitamins and B12  - Monitor ROSALVA drain output and appearance of output  - Analgesia and antiemetics prn   - PT consult   - Encourage ambulation   - Incentive spirometer   - DVT ppx   - Hospitalist team following      Subjective/Objective   Subjective: Patient states she is tired but doing better and says her pain is better  Patient states that she is passing gas  Denies BM  Denies nausea, vomiting  Denies fever, dysuria  Patient states that she does have some shortness of breath that was improved but sitting up  Also given oxygen  Noted some chest pressure when she felt short of breath  Patient states she is going to ambulate with her nurse today     Patient noted that she has had a headache and felt some lightheadedness  She states she does have a history of iron deficiency anemia and with her history of gastric bypass surgery she will be started on iron and resume on her multivitamins and B12 upon discharge  Objective:     Blood pressure 101/74, pulse 63, temperature 98 1 °F (36 7 °C), temperature source Oral, resp  rate 20, height 5' 1" (1 549 m), weight 81 6 kg (180 lb), last menstrual period 03/04/2022, SpO2 100 %, not currently breastfeeding  ,Body mass index is 34 01 kg/m²  Intake/Output Summary (Last 24 hours) at 3/23/2022 0910  Last data filed at 3/23/2022 0601  Gross per 24 hour   Intake 3818 75 ml   Output 2105 ml   Net 1713 75 ml       Invasive Devices  Report    Peripheral Intravenous Line            Peripheral IV 03/21/22 Right Antecubital 1 day    Peripheral IV 03/22/22 Left;Ventral (anterior) Forearm 1 day          Drain            Closed/Suction Drain Inferior;Right Abdomen Bulb 10 Fr  <1 day    Urethral Catheter Latex 16 Fr  <1 day                Physical Exam:  Gen - no acute distress, sitting comfortably upright in recliner   Heart - RRR  Lungs - Clear to auscultation b/l  Some diminished breath sounds at bases, some swallow breathing  On 2L O2 via NC  Abdomen - Bowel sounds present  Soft, tenderness to palpation over midline incision and LUQ  Mepilex dressing in place over midline incision  Drain in RLQ with dark serosanginuous output  Extremities - 2+ radial pulses b/l  No lower extremity edema  Lab, Imaging and other studies:I have personally reviewed pertinent lab results      VTE Pharmacologic Prophylaxis: Heparin    Franchesca Morris [place], [place]  3/23/2022

## 2022-03-23 NOTE — PLAN OF CARE
Problem: PAIN - ADULT  Goal: Verbalizes/displays adequate comfort level or baseline comfort level  Description: Interventions:  - Encourage patient to monitor pain and request assistance  - Assess pain using appropriate pain scale  - Administer analgesics based on type and severity of pain and evaluate response  - Implement non-pharmacological measures as appropriate and evaluate response  - Consider cultural and social influences on pain and pain management  - Notify physician/advanced practitioner if interventions unsuccessful or patient reports new pain  Outcome: Progressing     Problem: INFECTION - ADULT  Goal: Absence or prevention of progression during hospitalization  Description: INTERVENTIONS:  - Assess and monitor for signs and symptoms of infection  - Monitor lab/diagnostic results  - Monitor all insertion sites, i e  indwelling lines, tubes, and drains  - Monitor endotracheal if appropriate and nasal secretions for changes in amount and color  - [place] appropriate cooling/warming therapies per order  - Administer medications as ordered  - Instruct and encourage patient and family to use good hand hygiene technique  - Identify and instruct in appropriate isolation precautions for identified infection/condition  Outcome: Progressing     Problem: DISCHARGE PLANNING  Goal: Discharge to home or other facility with appropriate resources  Description: INTERVENTIONS:  - Identify barriers to discharge w/patient and caregiver  - Arrange for needed discharge resources and transportation as appropriate  - Identify discharge learning needs (meds, wound care, etc )  - Arrange for interpretive services to assist at discharge as needed  - Refer to Case Management Department for coordinating discharge planning if the patient needs post-hospital services based on physician/advanced practitioner order or complex needs related to functional status, cognitive ability, or social support system  Outcome: Progressing Problem: Knowledge Deficit  Goal: Patient/family/caregiver demonstrates understanding of disease process, treatment plan, medications, and discharge instructions  Description: Complete learning assessment and assess knowledge base    Interventions:  - Provide teaching at level of understanding  - Provide teaching via preferred learning methods  Outcome: Progressing     Problem: MOBILITY - ADULT  Goal: Maintain or return to baseline ADL function  Description: INTERVENTIONS:  -  Assess patient's ability to carry out ADLs; assess patient's baseline for ADL function and identify physical deficits which impact ability to perform ADLs (bathing, care of mouth/teeth, toileting, grooming, dressing, etc )  - Assess/evaluate cause of self-care deficits   - Assess range of motion  - Assess patient's mobility; develop plan if impaired  - Assess patient's need for assistive devices and provide as appropriate  - Encourage maximum independence but intervene and supervise when necessary  - Involve family in performance of ADLs  - Assess for home care needs following discharge   - Consider OT consult to assist with ADL evaluation and planning for discharge  - Provide patient education as appropriate  Outcome: Progressing

## 2022-03-23 NOTE — ASSESSMENT & PLAN NOTE
· Patient with Hypotension and Leukocytosis on 3/22  · Of note, Patient diagnosed with COVID-19 2 weeks ago and received monoclonal antibody  · Upon assessment Patient is afebrile with notable abdominal pain  · UA is negative  · CT abdomen shows lungs clear no infiltrate noted     · BC x2: Pending  · Fluids: S/P 2L NSS Bolus, C/w LR @ 75 ml/hr  · S/P IV abx with Cefepime and Flagyl  · Monitor CBC/Fever Curve

## 2022-03-23 NOTE — NURSING NOTE
Pt was feeling uncomfortable in bed and had trouble breathing sats 95% on oxygen @ 2 lts lungs very diminished at bases S/B provider pt encouraged to do incentive spirometry and made to sit in recliner ,feels better now will cont to monitor

## 2022-03-23 NOTE — PLAN OF CARE
Problem: PHYSICAL THERAPY ADULT  Goal: Performs mobility at highest level of function for planned discharge setting  See evaluation for individualized goals  Description: Treatment/Interventions: Functional transfer training,Elevations,Endurance training,Equipment eval/education,Bed mobility,Gait training,Spoke to nursing,Spoke to advanced practitioner  Equipment Recommended:  (possibly RW, pending progress w/ gait training &pain mgmt)     See flowsheet documentation for full assessment, interventions and recommendations  Outcome: Progressing  Note: Prognosis: Good  Problem List: Decreased endurance,Impaired balance,Decreased mobility,Pain,Decreased skin integrity,Decreased strength,Obesity  Assessment: Pt seen for PT evaluation, cleared by RN Hugo Rodriguez, activity orders of "up as tolerated"  PT consult received for mobility assessment & discharge needs  Pt admitted 3/21/2022 w/ abdominal cramping/pain x2 wks, underwent exlap w/ open repair of incarcerated incisional hernia w/ mesh by Dr Nelida Jiménez  Comorbidities affecting pt's fnxl performance include: anemia, anxiety, asthma, COVID-19, DM, obesity s/p gastric bypass surgery, HPV  Personal factors affecting pt include: lives in 2 story house, stairs to enter home, limited home support and inability to perform ADLS  PTA, pt was independent with functional mobility without assistive device, independent with ADLS, independent with IADLS and works full time  AM-PAC objective tool in combination w/ Barthel Index outcome tool were used to assist in determining pt safety w/ mobility/ADLs & appropriate d/c recommendations, see above for scores  Pt is at risk of falls d/t multiple comorbidities, impaired balance, use of ambulatory aid, varying levels of pain , acuity of medical illness and ongoing medical treatment of primary dx   Pt's clinical presentation is currently unstable/unpredictable seen in pt's presentation of vital sign response, changing level of pain, increased fall risk, new onset of impairment of functional mobility and decreased endurance  PT IE requires high complexity clinical decision making, based on multiple factors which affect pt's performance w/ evaluation & therapist judgement for d/c recommendations  Pt will benefit from continued PT treatment in order to address impairments, decrease risk of falls, maximize independence w/ fnxl mobility, & ensure safety w/ mobility for transition to next level of care  Based on pt presentation & impairments, pt would most appropriately benefit from return to home with home health PT services  Barriers to Discharge: Decreased caregiver support    PT Discharge Recommendation: Home with home health rehabilitation     See flowsheet documentation for full assessment

## 2022-03-23 NOTE — ASSESSMENT & PLAN NOTE
· Patient presented with Lower Abdominal pain   · Likely with incarcerated hernia-no evidence of obstruction  · CT of the abdomen shows a large midline ventral hernia without evidence of obstruction  · C/w  IV hydration and IV PPI   · Continue antiemetics and pain medication  · Last menstrual period was early March currently has no vaginal bleeding  · Urine pregnancy: Negative  · General Surgery Following, appreciate recommendations:  · S/P Ex Lap with reduction of incarcerated hernia with repair of incisional hernia mesh 3/22  · Advanced to full liquid diet  · Encourage a OOB, Chest PT and incentive spirometry

## 2022-03-23 NOTE — QUICK NOTE
77-year-old female who is postop day 1 status post exploratory laparotomy with reduction of incarcerated hernia with repair of the incisional hernia with mesh  She is doing well  Pain is improving  She is passing flatus  No vomiting  Tolerating clears  Incision is covered in dressing  The dressing is not soaked  The drain has serosanguineous output about 20 cc  Abdomen is soft  Plan:    Remove [place] catheter  Reduce fluid to 75 mL/hour  Advanced to full liquid diet  Continue incentive spirometry  Out of bed   Chest physical therapy  Pain control  Heparin for DVT prophylaxis

## 2022-03-23 NOTE — ASSESSMENT & PLAN NOTE
· Patient tested positive 2 weeks ago with home test     · Patient received monoclonal antibody and she feels better  · Upon assessment, patient with no respiratory complaints, with O2 sats >92% on room air     · Continue to monitor respiratory status

## 2022-03-23 NOTE — PHYSICAL THERAPY NOTE
PHYSICAL THERAPY EVALUATION & TREATMENT    NAME:  Sima Waller  DATE: 22    AGE:   55 y o  Mrn:   [id number]  Length Of Stay: 1    ADMIT DX:  Ventral hernia without obstruction or gangrene [K43 9]  Abdominal pain [R10 9]  Lower abdominal pain [R10 30]    Past Medical History:   Diagnosis Date    Abnormal Pap smear of cervix     2017    Anemia     Anxiety     Asthma     Diabetes mellitus ([address] )     History of transfusion 2018    HPV (human papilloma virus) infection     2017    Obesity     Varicella      Past Surgical History:   Procedure Laterality Date    BREAST BIOPSY Right 2012    u/s core bx    BREAST SURGERY      Incisional breast biopsy     SECTION      And     GASTRIC BYPASS  2016    WI HYSTEROSCOPY,W/ENDO BX N/A 2021    Procedure: DILATATION AND CURETTAGE (D&C) WITH HYSTEROSCOPY (MYOSURE); Surgeon: Aaliyah Arita DO;  [facility];  Service: Gynecology    WI HYSTEROSCOPY,W/ENDO BX N/A 2021    Procedure: POLYPECTOMY;  Surgeon: Aaliyah Arita DO;  [facility];  Service: Gynecology    TUBAL LIGATION      US GUIDED BREAST BIOPSY RIGHT COMPLETE Right 2019       Performed at least 2 patient identifiers during session: Name and ID bracelet        22 1318   PT Last Visit   PT Visit Date 22   Note Type   Note type Evaluation  (& treatment)   Pain Assessment   Pain Assessment Tool 0-10   Pain Score 6   Pain Location/Orientation Location: Abdomen   Pain Radiating Towards non radiating   Pain Onset/Description Onset: Ongoing; Descriptor: Sore  (incisional)   Effect of Pain on Daily Activities limits tolerance of functional mobility, limits comfort and positioning, limits gait mechanics and speed   Patient's Stated Pain Goal No pain   Hospital Pain Intervention(s) Repositioned; Ambulation/increased activity; Emotional support;Medication (See MAR)   Multiple Pain Sites No   Restrictions/Precautions   Weight Bearing Precautions Per Order No   Other Precautions Multiple lines;Telemetry; Fall Risk;Pain  (+ROSALVA drain)   Home Living   Type of [address] Two level;Bed/bath upstairs;Stairs to enter with rails;1/2 bath on main level  (1+2 MYLENE through front door, FOS to 2nd fl bedroom/bathroom)   Home Equipment Other (Comment)  (NONE)   Prior Function   Level of [place] Independent with ADLs and functional mobility   Lives With Other (Comment)  (children: 11yo & 17yo (both in school during daytime))   Receives Help From Family  (help from children only, no local family/friends)   ADL Assistance Independent   IADLs Independent   Falls in the last 6 months 0   Vocational Full time employment  ( for [employer] - travels to [place])   Comments Pt lives at home with her two children (15 & 25 yr old), in [facility] with 3 total MYLENE and FOS to 2nd floor bedroom/bathroom  Pt reports planning for FFSU upon return to home with couch and half bathroom  Pt is fully independent with all ADLs and IADLs at baseline, has no AD/DME  General   Additional Pertinent History Pt admitted 3/21/2022 with c/o abdominal cramping x2 wks   3/22/2022 pt underwent exlap with open repair of incarcerated incisional hernia w/ mesh by Dr Aditi Jones    Family/Caregiver Present No   Cognition   Overall Cognitive Status WFL   Arousal/Participation Alert   Orientation Level Oriented X4   Memory Within functional limits   Following Commands Follows all commands and directions without difficulty   Subjective   Subjective "I am just having so much pain but I'm motivated, I know I need to do this "   RUE Assessment   RUE Assessment WFL   LUE Assessment   LUE Assessment WFL   RLE Assessment   RLE Assessment WFL   LLE Assessment   LLE Assessment WFL   Vision-Basic Assessment   Current Vision Wears glasses all the time   Coordination   Movements are Fluid and Coordinated 1   Coordination and Movement Description WFL coordination however hypokinetic due to pain  Sensation WFL   Light Touch   RLE Light Touch Grossly intact   LLE Light Touch Grossly intact   Proprioception   RLE Proprioception Grossly intact   LLE Proprioception Grossly Intact   Self Selected Walking Speed   SSWS Trial 1 (Seconds) 27 31 Seconds   SSWS Trial 2 (Seconds) 27 4 Seconds   SSWS Trial 3 (Seconds) 27 14 Seconds   SSWS Average Score (m/sec) 0 2 m/sec   Bed Mobility   Additional Comments Bed mobility not completed on this date as pt presents and was left seated in bedside recliner chair  Will f/u next visit for assessment of bed mobility  Transfers   Sit to Stand 4  Minimal assistance  (CGA)   Additional items Assist x 1; Armrests; Increased time required;Verbal cues   Stand to Sit 4  Minimal assistance  (CGA)   Additional items Assist x 1; Armrests; Increased time required;Verbal cues   Stand pivot 4  Minimal assistance  (CGA)   Additional items Assist x 1; Increased time required;Verbal cues   Ambulation/Elevation   Gait pattern Decreased foot clearance; Short stride; Excessively slow; Foward flexed  (GAIT SPEED 0 2m/s)   Gait Assistance 5  Supervision  (mostly S, intermittent CGA for steadying)   Additional items Assist x 1;Verbal cues   Assistive Device Rolling walker  (baseline uses no AD, RW for support + upright posture)   Distance 20ft x2 (addt'l 110ft x1 during treatment session)   Stair Management Assistance Not tested  (pt has 1+3 MYLENE and FOS to [facility] bedroom)   Balance   Static Sitting Good   Dynamic Sitting Fair   Static Standing Fair +   Dynamic Standing Fair   Ambulatory Fair   Endurance Deficit   Endurance Deficit Yes   Endurance Deficit Description Significantly limited endurance due to pain and acuity of illness  Activity Tolerance   Activity Tolerance Patient limited by pain; Patient limited by fatigue   Medical Staff Made Aware Spoke with SLIM, OT   Nurse Made Aware Spoke with HUNTER Bone pre/post session   Assessment   Prognosis Good   Problem List Decreased endurance; Impaired balance;Decreased mobility;Pain;Decreased skin integrity; Decreased strength;Obesity   Assessment Pt seen for PT evaluation, cleared by RN Brenden John, activity orders of "up as tolerated"  PT consult received for mobility assessment & discharge needs  Pt admitted 3/21/2022 w/ abdominal cramping/pain x2 wks, underwent exlap w/ open repair of incarcerated incisional hernia w/ mesh by Dr Eric Mcmillan  Comorbidities affecting pt's fnxl performance include: anemia, anxiety, asthma, COVID-19, DM, obesity s/p gastric bypass surgery, HPV  Personal factors affecting pt include: lives in 2 story house, stairs to enter home, limited home support and inability to perform ADLS  PTA, pt was independent with functional mobility without assistive device, independent with ADLS, independent with IADLS and works full time  AM-PAC objective tool in combination w/ Barthel Index outcome tool were used to assist in determining pt safety w/ mobility/ADLs & appropriate d/c recommendations, see above for scores  Pt is at risk of falls d/t multiple comorbidities, impaired balance, use of ambulatory aid, varying levels of pain , acuity of medical illness and ongoing medical treatment of primary dx  Pt's clinical presentation is currently unstable/unpredictable seen in pt's presentation of vital sign response, changing level of pain, increased fall risk, new onset of impairment of functional mobility and decreased endurance  PT IE requires high complexity clinical decision making, based on multiple factors which affect pt's performance w/ evaluation & therapist judgement for d/c recommendations  Pt will benefit from continued PT treatment in order to address impairments, decrease risk of falls, maximize independence w/ fnxl mobility, & ensure safety w/ mobility for transition to next level of care  Based on pt presentation & impairments, pt would most appropriately benefit from return to home with home health PT services      Barriers to Discharge Decreased caregiver support   Goals   Patient Goals "to keep moving and heal"   [facility] Expiration Date 04/02/22   Short Term Goal #1 Patient PT goals established in order to address patient self reported goal of "to keep moving and heal"  Pt will: complete all bed mobility in flat bed with S in order to promote increased OOB functional mobility to improve overall activity tolerance; complete all transfers with LRAD at KAUR/Indep level in order to increase safety with functional mobility; ambulate >150ft with LRAD at KAUR/indep level in order to increase safety with household and short community functional mobility; negotiate 10-12 stairs with HR assist and S in order to access all areas of her home; improve unsupported ambulatory balance to >/= good grade in order to promote safety and increased independence with mobility; improve AM-PAC score to >/= 22/24 in order to increase independence with mobility and decrease burden of care; improve Barthel Index score to >/= 75/100 in order to increase independence and decrease risk of falls; improve gait speed to >/= 0 8m/s to display a decreased risk of falls and improved community mobility tolerance  PT Treatment Day 1   Plan   Treatment/Interventions Functional transfer training;Elevations; Endurance training;Equipment eval/education; Bed mobility;Gait training;Spoke to nursing;Spoke to advanced practitioner   PT Frequency 3-5x/wk   Recommendation   PT Discharge Recommendation Home with home health rehabilitation   Equipment Recommended   (possibly RW, pending progress w/ gait training &pain mgmt)   AM-PAC Basic Mobility Inpatient   Turning in Bed Without Bedrails 3   Lying on Back to Sitting on Edge of Flat Bed 2   Moving Bed to Chair 3   Standing Up From Chair 3   Walk in Room 3   Climb 3-5 Stairs 3   Basic Mobility Inpatient Raw Score 17   Basic Mobility Standardized Score 39 67   Highest Level Of Mobility   -[facility] Goal 5: Stand one or more mins   -[facility] Highest Level of Mobility 7: Walk 25 feet or more   [facility] Goal Achieved Yes   Modified [place] Scale   Modified Mic Scale 4   Barthel Index   Feeding 10   Bathing 0   Grooming Score 0   Dressing Score 5   Bladder Score 10   Bowels Score 10   Toilet Use Score 5   Transfers (Bed/Chair) Score 10   Mobility (Level Surface) Score 10   Stairs Score 5   Barthel Index Score 65   Additional Treatment Session   Start Time 1341   End Time 1412   Treatment Assessment Pt is agreeable to participate in additional mobility training post IE  Pt continues to require [facility] for transfers from recliner chair, cues for improved body mechanics for safety and comfort  Pt then ambulates 110ft with RW and S/intermittent CGA  Pt with EXCESSIVELY slowed gait speed of 0 2m/s, due to pain in abdomen, indicating pt remains at a high risk of falls  Pain begins at 6/10 pain but decreases to 3/10 during ambulation  Pt reports feeling unsafe for trial of ambulation w/o use of RW at this time, reports it assists in pain relief and improved posture  Stair training deferred at this time due to ongoing pain, will f/u next session for assessment  At end of session pt was left seated in bedside recliner chair with LEs elevated and needs in place, +cold pack for pain relief  Care returned to RN  Continue to recommend return to home with home health PT services once medically cleared for d/c from the acute care setting  Pt with limited home support, is home alone during the daytime as her children are in school  Will continue skilled PT POC as able and appropriate to address functional impairments and progress towards therapy goals  Equipment Use Recommend RN staffing SBA/DALI for all aspects of pt mobility at this time  Recommend pt use of RW for ambulation and improve to transition to ambulation with no AD  Additional Treatment Day 1   End of Consult   Patient Position at End of Consult Bedside chair;Bed/Chair alarm activated; All needs within reach   End of Consult Comments Based on patient's [facility] Level of Mobility scores today, patient currently has a goal of [facility] Levels: 7: WALK 25 FEET OR MORE, to be completed with RN staffing each shift, in order to improve overall activity tolerance and mobility, combat hospital related deconditioning, and maximize outcomes for d/c from the acute care setting  The patient's AM-PAC Basic Mobility Inpatient Short Form Raw Score is 17  A Raw score of greater than 16 suggests the patient may benefit from discharge to home  Please also refer to the recommendation of the Physical Therapist for safe discharge planning      Gait Speed Interpretation:  Gain of 0 1 m/s is a predictor of well-being in those w/ abnormal walking speed compared to age-patched peers  <0 7m/s is at an increased risk for falls  Household ambulator: <0 4 m/s  Limited community ambulator: 0 4-0 8 m/s  Community ambulator: 0 8-1 2 m/s  Able to safely cross streets: >1 2 m/s      Arnold Lopez PT, DPT   Available via [employer]  [facility] # [id number]  [place] License - [id number]  6/78/1767

## 2022-03-23 NOTE — ASSESSMENT & PLAN NOTE
· Likely in the setting of acute blood loss secondary to recent OR procedure  · No acute signs of bleeding  · Transfuse for HGB less than 7

## 2022-03-23 NOTE — PROGRESS NOTES
[address]  66   Progress Note - Josh Scriver 1975, 55 y o  female MRN: [id number]  Unit/Bed#: -[place] Encounter: [id number]  Primary Care Provider: Bernard Rincon MD   Date and time admitted to hospital: 3/21/2022 10:49 AM    * Abdominal pain  Assessment & Plan  · Patient presented with Lower Abdominal pain   · Likely with incarcerated hernia-no evidence of obstruction  · CT of the abdomen shows a large midline ventral hernia without evidence of obstruction  · C/w  IV hydration and IV PPI   · Continue antiemetics and pain medication  · Last menstrual period was early March currently has no vaginal bleeding  · Urine pregnancy: Negative  · General Surgery Following, appreciate recommendations:  · S/P Ex Lap with reduction of incarcerated hernia with repair of incisional hernia mesh 3/22  · Advanced to full liquid diet  · Encourage a OOB, Chest PT and incentive spirometry  SIRS (systemic inflammatory response syndrome) (HCC)  Assessment & Plan  · Patient with Hypotension and Leukocytosis on 3/22  · Of note, Patient diagnosed with COVID-19 2 weeks ago and received monoclonal antibody  · Upon assessment Patient is afebrile with notable abdominal pain  · UA is negative  · CT abdomen shows lungs clear no infiltrate noted  · BC x2: Pending  · Fluids: S/P 2L NSS Bolus, C/w LR @ 75 ml/hr  · S/P IV abx with Cefepime and Flagyl  · Monitor CBC/Fever Curve    COVID-19  Assessment & Plan  · Patient tested positive 2 weeks ago with home test     · Patient received monoclonal antibody and she feels better  · Upon assessment, patient with no respiratory complaints, with O2 sats >92% on room air     · Continue to monitor respiratory status    Obesity  Assessment & Plan  · Hx of gastric bypass surgery 6 years ago  · Encouraged lifestyle modifications    Anemia  Assessment & Plan  · Likely in the setting of acute blood loss secondary to recent OR procedure  · No acute signs of bleeding  · Transfuse for HGB less than 7      VTE Pharmacologic Prophylaxis: VTE Score: 4 Moderate Risk (Score 3-4) - Pharmacological DVT Prophylaxis Ordered: heparin  Patient Centered Rounds: I performed bedside rounds with nursing staff today  Discussions with Specialists or Other Care Team Provider: General Surgery, Physical therapy, Case management    Education and Discussions with Family / Patient: Patient declined call to   Time Spent for Care: 30 minutes  More than 50% of total time spent on counseling and coordination of care as described above  Current Length of Stay: 1 day(s)  Current Patient Status: Inpatient   Certification Statement: The patient will continue to require additional inpatient hospital stay due to Postop day 1, with poor oral intake requiring IV hydration and pain management  Discharge Plan: Anticipate discharge in 24-48 hrs to home  Code Status: Prior    Subjective:   Patient states she is having severe abdominal pain  Patient denies any active chest pain, shortness of breath, nausea, vomiting, or diarrhea  Objective:     Vitals:   Temp (24hrs), Av 5 °F (36 4 °C), Min:96 8 °F (36 °C), Max:98 1 °F (36 7 °C)    Temp:  [96 8 °F (36 °C)-98 1 °F (36 7 °C)] 98 1 °F (36 7 °C)  HR:  [63-86] 63  Resp:  [14-24] 20  BP: ()/(53-74) 101/74  SpO2:  [86 %-100 %] 100 %  Body mass index is 34 01 kg/m²  Input and Output Summary (last 24 hours): Intake/Output Summary (Last 24 hours) at 3/23/2022 1349  Last data filed at 3/23/2022 1301  Gross per 24 hour   Intake 2918 75 ml   Output 2335 ml   Net 583 75 ml       Physical Exam:   Physical Exam  Vitals and nursing note reviewed  Constitutional:       General: She is not in acute distress  Appearance: She is obese  She is not ill-appearing  HENT:      Head: Normocephalic  Nose: Nose normal  No congestion  Mouth/Throat:      Mouth: Mucous membranes are moist       Pharynx: Oropharynx is clear  Cardiovascular:      Rate and Rhythm: Normal rate and regular rhythm  Pulses: Normal pulses  Heart sounds: Normal heart sounds  No murmur heard  Pulmonary:      Effort: Pulmonary effort is normal  No respiratory distress  Breath sounds: Decreased breath sounds present  Abdominal:      General: Bowel sounds are decreased  There is no distension  Palpations: Abdomen is soft  Tenderness: There is abdominal tenderness  Comments: Mid Abd incision, Dressing CDI  RLQ ROSALVA Drain, Dressing CDI  Musculoskeletal:         General: Normal range of motion  Cervical back: Normal range of motion  Right lower leg: No edema  Left lower leg: No edema  Skin:     General: Skin is warm and dry  Capillary Refill: Capillary refill takes less than 2 seconds  Neurological:      Mental Status: She is alert and oriented to person, place, and time  Mental status is at baseline  Motor: Weakness (B/L LE) present  Psychiatric:         Mood and Affect: Mood normal          Speech: Speech normal          Behavior: Behavior is cooperative           Cognition and Memory: Cognition normal          Judgment: Judgment normal          Additional Data:     Labs:  Results from last 7 days   Lab Units 03/23/22  0516   WBC Thousand/uL 15 76*   HEMOGLOBIN g/dL 8 7*   HEMATOCRIT % 28 2*   PLATELETS Thousands/uL 279   NEUTROS PCT % 85*   LYMPHS PCT % 8*   MONOS PCT % 6   EOS PCT % 0     Results from last 7 days   Lab Units 03/23/22  0516 03/22/22  0457 03/21/22  1116   SODIUM mmol/L 136   < > 135   POTASSIUM mmol/L 4 0   < > 3 9   CHLORIDE mmol/L 105   < > 98   CO2 mmol/L 22   < > 27   BUN mg/dL 7   < > 13   CREATININE mg/dL 0 57*   < > 0 72   ANION GAP mmol/L 9   < > 10   CALCIUM mg/dL 8 4   < > 9 7   ALBUMIN g/dL  --   --  4 3   TOTAL BILIRUBIN mg/dL  --   --  0 41   ALK PHOS U/L  --   --  119*   ALT U/L  --   --  17   AST U/L  --   --  20   GLUCOSE RANDOM mg/dL 73   < > 75    < > = values in this interval not displayed  Results from last 7 days   Lab Units 03/22/22  0925 03/22/22  0537 03/21/22  1116   LACTIC ACID mmol/L  --  0 8 1 0   PROCALCITONIN ng/ml 0 06  --   --        Lines/Drains:  Invasive Devices  Report    Peripheral Intravenous Line            Peripheral IV 03/21/22 Right Antecubital 2 days    Peripheral IV 03/22/22 Left;Ventral (anterior) Forearm 1 day          Drain            Closed/Suction Drain Inferior;Right Abdomen Bulb 10 Fr  1 day    Urethral Catheter Latex 16 Fr  1 day              Urinary Catheter:  Goal for removal: No longer needed  Will place order to discontinue               Imaging: Reviewed radiology reports from this admission including: abdominal/pelvic CT    Recent Cultures (last 7 days):   Results from last 7 days   Lab Units 03/22/22  0927 03/22/22  0926   BLOOD CULTURE  Received in Microbiology Lab  Culture in Progress  Received in Microbiology Lab  Culture in Progress         Last 24 Hours Medication List:   Current Facility-Administered Medications   Medication Dose Route Frequency Provider Last Rate    acetaminophen  650 mg Oral Q4H PRN Franchesca May [place], [place]      albuterol  2 puff Inhalation Q6H PRN Franchesca May [place], [place]      heparin (porcine)  5,000 Units Subcutaneous [facility] [address] [place]      HYDROmorphone  0 5 mg Intravenous Q3H PRN Franchesca May [place], [place]      lactated ringers  1,000 mL Intravenous Once PRN Franchesca May Kina Torres PA-C      And    lactated ringers  1,000 mL Intravenous Once PRN Franchesca May Kina Torres PA-C      lactated ringers  75 mL/hr Intravenous Continuous Franchesca May [place], [place] 75 mL/hr (03/23/22 0840)    ondansetron  4 mg Intravenous Q6H PRN Franchesca May [place], [place]      oxyCODONE  10 mg Oral Q4H PRN Franchesca May [place], [place]      oxyCODONE  5 mg Oral Q4H PRN Franchesca May Constantino Cuellar      pantoprazole  40 mg Intravenous Q24H [address] Franchesca May [place], [place]      sodium chloride  1,000 mL Intravenous Once PRN Franchesca May Ike Medina PA-C      And    sodium chloride  1,000 mL Intravenous Once PRN Franchesca Reina PA-C          Today, Patient Was Seen By: Lieutenant Hatchet, CRNP    **Please Note: This note may have been constructed using a voice recognition system  **

## 2022-03-24 LAB
ANION GAP SERPL CALCULATED.3IONS-SCNC: 8 MMOL/L (ref 4–13)
BASOPHILS # BLD AUTO: 0.04 THOUSANDS/ΜL (ref 0–0.1)
BASOPHILS NFR BLD AUTO: 0 % (ref 0–1)
BUN SERPL-MCNC: 7 MG/DL (ref 5–25)
CALCIUM SERPL-MCNC: 8.6 MG/DL (ref 8.4–10.2)
CHLORIDE SERPL-SCNC: 105 MMOL/L (ref 96–108)
CO2 SERPL-SCNC: 25 MMOL/L (ref 21–32)
CREAT SERPL-MCNC: 0.61 MG/DL (ref 0.6–1.3)
EOSINOPHIL # BLD AUTO: 0.08 THOUSAND/ΜL (ref 0–0.61)
EOSINOPHIL NFR BLD AUTO: 1 % (ref 0–6)
ERYTHROCYTE [DISTWIDTH] IN BLOOD BY AUTOMATED COUNT: 15.4 % (ref 11.6–15.1)
GFR SERPL CREATININE-BSD FRML MDRD: 109 ML/MIN/1.73SQ M
GLUCOSE SERPL-MCNC: 102 MG/DL (ref 65–140)
HCT VFR BLD AUTO: 32.2 % (ref 34.8–46.1)
HGB BLD-MCNC: 9.9 G/DL (ref 11.5–15.4)
IMM GRANULOCYTES # BLD AUTO: 0.06 THOUSAND/UL (ref 0–0.2)
IMM GRANULOCYTES NFR BLD AUTO: 1 % (ref 0–2)
LYMPHOCYTES # BLD AUTO: 1.99 THOUSANDS/ΜL (ref 0.6–4.47)
LYMPHOCYTES NFR BLD AUTO: 21 % (ref 14–44)
MCH RBC QN AUTO: 24.8 PG (ref 26.8–34.3)
MCHC RBC AUTO-ENTMCNC: 30.7 G/DL (ref 31.4–37.4)
MCV RBC AUTO: 81 FL (ref 82–98)
MONOCYTES # BLD AUTO: 0.64 THOUSAND/ΜL (ref 0.17–1.22)
MONOCYTES NFR BLD AUTO: 7 % (ref 4–12)
NEUTROPHILS # BLD AUTO: 6.92 THOUSANDS/ΜL (ref 1.85–7.62)
NEUTS SEG NFR BLD AUTO: 70 % (ref 43–75)
NRBC BLD AUTO-RTO: 0 /100 WBCS
PLATELET # BLD AUTO: 318 THOUSANDS/UL (ref 149–390)
PMV BLD AUTO: 8.8 FL (ref 8.9–12.7)
POTASSIUM SERPL-SCNC: 3.7 MMOL/L (ref 3.5–5.3)
RBC # BLD AUTO: 3.99 MILLION/UL (ref 3.81–5.12)
SODIUM SERPL-SCNC: 138 MMOL/L (ref 135–147)
WBC # BLD AUTO: 9.73 THOUSAND/UL (ref 4.31–10.16)

## 2022-03-24 PROCEDURE — 80048 BASIC METABOLIC PNL TOTAL CA: CPT

## 2022-03-24 PROCEDURE — C9113 INJ PANTOPRAZOLE SODIUM, VIA: HCPCS

## 2022-03-24 PROCEDURE — 97530 THERAPEUTIC ACTIVITIES: CPT

## 2022-03-24 PROCEDURE — 99232 SBSQ HOSP IP/OBS MODERATE 35: CPT

## 2022-03-24 PROCEDURE — 97167 OT EVAL HIGH COMPLEX 60 MIN: CPT

## 2022-03-24 PROCEDURE — 99024 POSTOP FOLLOW-UP VISIT: CPT | Performed by: SURGERY

## 2022-03-24 PROCEDURE — 85025 COMPLETE CBC W/AUTO DIFF WBC: CPT

## 2022-03-24 RX ORDER — ACETAMINOPHEN 325 MG/1
975 TABLET ORAL EVERY 8 HOURS SCHEDULED
Status: DISCONTINUED | OUTPATIENT
Start: 2022-03-24 | End: 2022-03-25 | Stop reason: HOSPADM

## 2022-03-24 RX ORDER — SENNOSIDES 8.6 MG
1 TABLET ORAL
Status: DISCONTINUED | OUTPATIENT
Start: 2022-03-24 | End: 2022-03-25 | Stop reason: HOSPADM

## 2022-03-24 RX ORDER — DOCUSATE SODIUM 100 MG/1
100 CAPSULE, LIQUID FILLED ORAL 2 TIMES DAILY
Status: DISCONTINUED | OUTPATIENT
Start: 2022-03-24 | End: 2022-03-25 | Stop reason: HOSPADM

## 2022-03-24 RX ADMIN — STANDARDIZED SENNA CONCENTRATE 8.6 MG: 8.6 TABLET ORAL at 21:43

## 2022-03-24 RX ADMIN — ACETAMINOPHEN 975 MG: 325 TABLET, FILM COATED ORAL at 14:24

## 2022-03-24 RX ADMIN — SODIUM CHLORIDE, SODIUM LACTATE, POTASSIUM CHLORIDE, AND CALCIUM CHLORIDE 75 ML/HR: .6; .31; .03; .02 INJECTION, SOLUTION INTRAVENOUS at 09:20

## 2022-03-24 RX ADMIN — HEPARIN SODIUM 5000 UNITS: 5000 INJECTION INTRAVENOUS; SUBCUTANEOUS at 05:58

## 2022-03-24 RX ADMIN — OXYCODONE HYDROCHLORIDE 10 MG: 10 TABLET ORAL at 12:20

## 2022-03-24 RX ADMIN — PANTOPRAZOLE SODIUM 40 MG: 40 INJECTION, POWDER, FOR SOLUTION INTRAVENOUS at 09:17

## 2022-03-24 RX ADMIN — DOCUSATE SODIUM 100 MG: 100 CAPSULE ORAL at 18:22

## 2022-03-24 RX ADMIN — OXYCODONE HYDROCHLORIDE 5 MG: 5 TABLET ORAL at 18:22

## 2022-03-24 RX ADMIN — DOCUSATE SODIUM 100 MG: 100 CAPSULE ORAL at 12:21

## 2022-03-24 RX ADMIN — HEPARIN SODIUM 5000 UNITS: 5000 INJECTION INTRAVENOUS; SUBCUTANEOUS at 14:24

## 2022-03-24 RX ADMIN — ACETAMINOPHEN 975 MG: 325 TABLET, FILM COATED ORAL at 21:43

## 2022-03-24 RX ADMIN — OXYCODONE HYDROCHLORIDE 10 MG: 10 TABLET ORAL at 05:58

## 2022-03-24 RX ADMIN — HEPARIN SODIUM 5000 UNITS: 5000 INJECTION INTRAVENOUS; SUBCUTANEOUS at 21:43

## 2022-03-24 NOTE — ASSESSMENT & PLAN NOTE
· Patient tested positive 2 weeks ago with home test     · Patient received monoclonal antibody and she feels better  · Upon assessment, patient with no respiratory complaints, with O2 sats >92% on room air     · Continue to monitor respiratory status  · Encourage IS/ Flutter Valve

## 2022-03-24 NOTE — PROGRESS NOTES
Екат[address]ина [address]  66   Progress Note - Earl May 1975, 55 y o  female MRN: [id number]  Unit/Bed#: -[facility] Encounter: [id number]  Primary Care Provider: Alejandro Kumar MD   Date and time admitted to hospital: 3/21/2022 10:49 AM    * Abdominal pain  Assessment & Plan  · Patient presented with Lower Abdominal pain   · Likely with incarcerated hernia-no evidence of obstruction  · CT of the abdomen shows a large midline ventral hernia without evidence of obstruction  · Continue antiemetics   · Continue pain management  · Will add Tylenol scheduled  · Last menstrual period was early March currently has no vaginal bleeding  · Urine pregnancy: Negative  · General Surgery Following, appreciate recommendations:  · S/P Ex Lap with reduction of incarcerated hernia with repair of incisional hernia mesh 3/22  · Advanced to full liquid diet  · Encourage a OOB, Chest PT and incentive spirometry  SIRS (systemic inflammatory response syndrome) (HCC)  Assessment & Plan  · Patient with Hypotension and Leukocytosis on 3/22  · Of note, Patient diagnosed with COVID-19 2 weeks ago and received monoclonal antibody  · Upon assessment Patient is afebrile with mild Leukocytosis  · UA is negative  · CT abdomen shows lungs clear no infiltrate noted  · BC x2: NGTD  · Fluids: S/P 2L NSS Bolus,S/P IV hydration with LR  D/C'd 3/24  · S/P IV abx with Cefepime and Flagyl  · Monitor CBC/Fever Curve    COVID-19  Assessment & Plan  · Patient tested positive 2 weeks ago with home test     · Patient received monoclonal antibody and she feels better  · Upon assessment, patient with no respiratory complaints, with O2 sats >92% on room air     · Continue to monitor respiratory status  · Encourage IS/ Flutter Valve    Obesity  Assessment & Plan  · Hx of gastric bypass surgery 6 years ago  · Encourage continued lifestyle modifications    Anemia  Assessment & Plan  · Likely in the setting of acute blood loss secondary to recent OR procedure  · No acute signs of bleeding  · Hgb 8 7-->9 9  · Transfuse for HGB less than 7        VTE Pharmacologic Prophylaxis: VTE Score: 4 Moderate Risk (Score 3-4) - Pharmacological DVT Prophylaxis Ordered: heparin  Patient Centered Rounds: I performed bedside rounds with nursing staff today  Discussions with Specialists or Other Care Team Provider:  General surgery, Case Management    Education and Discussions with Family / Patient: Patient declined call to   Time Spent for Care: 30 minutes  More than 50% of total time spent on counseling and coordination of care as described above  Current Length of Stay: 2 day(s)  Current Patient Status: Inpatient   Discharge Plan: Anticipate discharge in 24-48 hrs to home  Code Status: Prior    Subjective:   Patient states she is feeling better than yesterday  Patient still complaining of postoperative pain  Patient states she is unable to do her breathing exercises as well as she did yesterday  Patient denies any active chest pain, shortness of breath, nausea, vomiting or diarrhea  Objective:     Vitals:   Temp (24hrs), Av 2 °F (36 8 °C), Min:97 5 °F (36 4 °C), Max:98 6 °F (37 °C)    Temp:  [97 5 °F (36 4 °C)-98 6 °F (37 °C)] 98 4 °F (36 9 °C)  HR:  [79-89] 79  Resp:  [16-17] 16  BP: ()/(52-77) 115/77  SpO2:  [92 %-97 %] 92 %  Body mass index is 34 01 kg/m²  Input and Output Summary (last 24 hours): Intake/Output Summary (Last 24 hours) at 3/24/2022 1250  Last data filed at 3/24/2022 0920  Gross per 24 hour   Intake 950 ml   Output 1845 ml   Net -895 ml       Physical Exam:   Physical Exam  Vitals and nursing note reviewed  Constitutional:       General: She is not in acute distress  Appearance: She is not ill-appearing  HENT:      Head: Normocephalic  Nose: Nose normal  No congestion  Mouth/Throat:      Mouth: Mucous membranes are dry  Pharynx: Oropharynx is clear     Cardiovascular: Rate and Rhythm: Normal rate and regular rhythm  Pulses: Normal pulses  Heart sounds: No murmur heard  Pulmonary:      Effort: Pulmonary effort is normal  No respiratory distress  Breath sounds: Decreased breath sounds present  Abdominal:      General: Bowel sounds are normal       Palpations: Abdomen is soft  Tenderness: There is abdominal tenderness  Comments: Mid Abd Incision, Dressing CDI  RLQ ROSALVA drain, Dressing CDI  Musculoskeletal:         General: Normal range of motion  Cervical back: Normal range of motion  Right lower leg: No edema  Left lower leg: No edema  Skin:     General: Skin is warm and dry  Capillary Refill: Capillary refill takes less than 2 seconds  Neurological:      Mental Status: She is alert and oriented to person, place, and time  Mental status is at baseline  Motor: Weakness (B/L LE) present  Psychiatric:         Mood and Affect: Mood normal          Speech: Speech normal          Behavior: Behavior is cooperative  Cognition and Memory: Cognition normal          Judgment: Judgment normal           Additional Data:     Labs:  Results from last 7 days   Lab Units 03/24/22  1213   WBC Thousand/uL 9 73   HEMOGLOBIN g/dL 9 9*   HEMATOCRIT % 32 2*   PLATELETS Thousands/uL 318   NEUTROS PCT % 70   LYMPHS PCT % 21   MONOS PCT % 7   EOS PCT % 1     Results from last 7 days   Lab Units 03/24/22  0856 03/22/22  0457 03/21/22  1116   SODIUM mmol/L 138   < > 135   POTASSIUM mmol/L 3 7   < > 3 9   CHLORIDE mmol/L 105   < > 98   CO2 mmol/L 25   < > 27   BUN mg/dL 7   < > 13   CREATININE mg/dL 0 61   < > 0 72   ANION GAP mmol/L 8   < > 10   CALCIUM mg/dL 8 6   < > 9 7   ALBUMIN g/dL  --   --  4 3   TOTAL BILIRUBIN mg/dL  --   --  0 41   ALK PHOS U/L  --   --  119*   ALT U/L  --   --  17   AST U/L  --   --  20   GLUCOSE RANDOM mg/dL 102   < > 75    < > = values in this interval not displayed                   Results from last 7 days Lab Units 03/22/22  0925 03/22/22  0537 03/21/22  1116   LACTIC ACID mmol/L  --  0 8 1 0   PROCALCITONIN ng/ml 0 06  --   --        Lines/Drains:  Invasive Devices  Report    Peripheral Intravenous Line            Peripheral IV 03/21/22 Right Antecubital 3 days    Peripheral IV 03/22/22 Left;Ventral (anterior) Forearm 2 days          Drain            Closed/Suction Drain Inferior;Right Abdomen Bulb 10 Fr  1 day                      Imaging: No pertinent imaging reviewed  Recent Cultures (last 7 days):   Results from last 7 days   Lab Units 03/22/22  0927 03/22/22  0926   BLOOD CULTURE  No Growth at 24 hrs  No Growth at 24 hrs  Last 24 Hours Medication List:   Current Facility-Administered Medications   Medication Dose Route Frequency Provider Last Rate    acetaminophen  975 mg Oral Q8H LAVELLE Obando      albuterol  2 puff Inhalation Q6H PRN [place], [place]      docusate sodium  100 mg Oral BID Franc[place]sca May [place], [place]      heparin (porcine)  5,000 Units Subcutaneous [facility] [address] [place]      HYDROmorphone  0 5 mg Intravenous Q3H PRN Franchesca May [place], [place]      ondansetron  4 mg Intravenous Q6H PRN Franchesca May [place], [place]      oxyCODONE  10 mg Oral Q4H PRN Franchesca May [place], [place]      oxyCODONE  5 mg Oral Q4H PRN Franchesca May [place], [place]      pantoprazole  40 mg Intravenous Q24H [address] [place] May [place], [place]      senna  1 tablet Oral HS LAVELLE Jeff          Today, Patient Was Seen By: LAVELLE Jeff    **Please Note: This note may have been constructed using a voice recognition system  **

## 2022-03-24 NOTE — ASSESSMENT & PLAN NOTE
· Patient with Hypotension and Leukocytosis on 3/22  · Of note, Patient diagnosed with COVID-19 2 weeks ago and received monoclonal antibody  · Upon assessment Patient is afebrile with mild Leukocytosis  · UA is negative  · CT abdomen shows lungs clear no infiltrate noted  · BC x2: NGTD  · Fluids: S/P 2L NSS Bolus,S/P IV hydration with LR  D/C'd 3/24    · S/P IV abx with Cefepime and Flagyl  · Monitor CBC/Fever Curve

## 2022-03-24 NOTE — ASSESSMENT & PLAN NOTE
· Patient presented with Lower Abdominal pain   · Likely with incarcerated hernia-no evidence of obstruction  · CT of the abdomen shows a large midline ventral hernia without evidence of obstruction  · Continue antiemetics   · Continue pain management  · Will add Tylenol scheduled  · Last menstrual period was early March currently has no vaginal bleeding  · Urine pregnancy: Negative  · General Surgery Following, appreciate recommendations:  · S/P Ex Lap with reduction of incarcerated hernia with repair of incisional hernia mesh 3/22  · Advanced to full liquid diet  · Encourage a OOB, Chest PT and incentive spirometry

## 2022-03-24 NOTE — PLAN OF CARE
Problem: OCCUPATIONAL THERAPY ADULT  Goal: Performs self-care activities at highest level of function for planned discharge setting  See evaluation for individualized goals  Description: Treatment Interventions: ADL retraining,Functional transfer training,UE strengthening/ROM,Endurance training,Equipment evaluation/education,Patient/family training          See flowsheet documentation for full assessment, interventions and recommendations  Note: Limitation: Decreased ADL status,Decreased Safe judgement during ADL,Decreased endurance,Decreased self-care trans,Decreased high-level ADLs,Decreased UE strength  Prognosis: Good  Assessment: Patient is a 55 y o  female seen for OT evaluation + treatment at [place] [id number] following admission on 3/21/2022  s/p Abdominal pain  Comorbidities impacting functional performance include: anemia, obesity, Hx of COVID-19, SIRS, s/p Ex Lap for hernia repair  Patient presents with active orders for OT eval and treat and up as tolerated  Performed at least 2 patient identifiers during session including name and wristband  PTA, pt reports being (I) with ADL/IADLs, lives with 2 sons in 2 story home with 2 MYLENE  (+) driving, (+) works full time (-) AD  Upon initial evaluation, patient is independent for UB ADLs, min assistance for LB ADLs, and supervision for transfers and functional mobility within room and bathroom with the use of with RW  Based on functional eval, patient presents A&Ox4 with intact  attention, intact  safety awareness, and intact  short term and long term memory  Occupational performance is affected by the following deficits: decreased tolerance for trunk motion, endurance ,  decreased muscular strength , decreased standing tolerance for self care tasks , decreased functional reach  and (+) pain  Based on these findings, functional performance deficits, and assessment findings, pt has been identified as a high complexity evaluation   Personal factors impacting performance at the time of evaluation include: decreased (+) Hx of falls , steps to enter home and FOS to second floor  Patient would benefit from OT services to maximize level of functional independence and safety in self-care and transfers  Occupational areas to address include:bathing/showering, toileting, dressing , bed mobility , functional mobility, transfer to all surfaces, meal preparation, household management and fall prevention   From OT standpoint, recommendation at time of D/C would be return to previous environment with outpatient rehabilitation        OT Discharge Recommendation: Home with outpatient rehabilitation  OT - OK to Discharge: Yes (once medically clear )     Fanny Coley, OT

## 2022-03-24 NOTE — NURSING NOTE
RN spoke with lab, CBC was canceled due to clotting  RN made provider aware via Tiger text, order required for redraw

## 2022-03-24 NOTE — OCCUPATIONAL THERAPY NOTE
Occupational Therapy Evaluation + Treatment       [facility]    3/24/2022    Patient Active Problem List   Diagnosis    Anemia    Sprain of coronary ligament of knee, left, sequela    Abnormal uterine bleeding (AUB)    ASCUS with positive high risk HPV cervical    Bradycardia    Dizziness    H/O bariatric surgery    Obesity    History of gastric bypass    Iron deficiency anemia due to chronic blood loss    Mild intermittent asthma without complication    Viral infection, unspecified    COVID-19    Abdominal pain    SIRS (systemic inflammatory response syndrome) ([facility] [address] )    Hypotension       Past Medical History:   Diagnosis Date    Abnormal Pap smear of cervix     2017    Anemia     Anxiety     Asthma     Diabetes mellitus ([facility] [address] )     History of transfusion 2018    HPV (human papilloma virus) infection     2017    Obesity     Varicella        Past Surgical History:   Procedure Laterality Date    BREAST BIOPSY Right 2012    u/s core bx    BREAST SURGERY      Incisional breast biopsy     SECTION      And     EXPLORATORY LAPAROTOMY W/ BOWEL RESECTION N/A 3/22/2022    Procedure: LAPAROTOMY EXPLORATORY WITH OPEN REPAIR OF INCARCERATED INCISIONAL  HERNIA WITH MESH;  Surgeon: Mely Anderson MD;  Location: [facility];  Service: General    GASTRIC BYPASS  2016    INCISIONAL HERNIA REPAIR N/A 3/22/2022    Procedure: OPEN REPAIR HERNIA INCISIONAL WITH MESH;  Surgeon: Mely Anderson MD;  [facility];  Service: General    NE HYSTEROSCOPY,W/ENDO BX N/A 2021    Procedure: DILATATION AND CURETTAGE (D&C) WITH HYSTEROSCOPY (MYOSURE);   Surgeon: Carolyn Santoyo DO;  [facility];  Service: Gynecology    NE HYSTEROSCOPY,W/ENDO BX N/A 2021    Procedure: POLYPECTOMY;  Surgeon: Carolyn Santoyo DO;  [facility];  Service: Gynecology    TUBAL LIGATION      US GUIDED BREAST BIOPSY RIGHT COMPLETE Right 22 1447   OT Last Visit   OT Visit Date 03/24/22   Note Type   Note type Evaluation  (+ treatment )   Restrictions/Precautions   Weight Bearing Precautions Per Order No   Other Precautions Multiple lines;Telemetry; Fall Risk;Pain  (ROSALVA drain )   Pain Assessment   Pain Assessment Tool 0-10   Pain Score 4  (6/10 following mobility )   Pain Location/Orientation Location: Abdomen   Pain Onset/Description Onset: Ongoing;Frequency: Constant/Continuous   Hospital Pain Intervention(s) Cold applied;Elevated; Ambulation/increased activity;Repositioned   Home Living   Type of [address] [place] [address] Two level;Bed/bath upstairs;Stairs to enter with rails;1/2 bath on main level  (1+2 MYLENE through front door, FOS to 2nd floor bed/bath )   Bathroom Shower/Tub Tub/shower unit   Bathroom Toilet Standard   Bathroom Equipment   (no DME at baseline )   Bathroom Accessibility Accessible  (1/2 bath on main, full bath upstairs )   Home Equipment   (no AD at baseline )   Prior Function   Level of [place] Independent with ADLs and functional mobility   Lives With Son  (children: 17yo & 17yo (both in school during daytime))   Receives Help From Family  (assist from children only, no local family/friends )   ADL Assistance Independent   IADLs Independent   Falls in the last 6 months 0   Vocational Full time employment  ( for [employer], travels to homes )   Comments (+) driving, pt's son does drive as well  Pt reports she will be unable to return to work for at least 4 weeks per Drs, reports plan to arrange home working    Lifestyle   Autonomy PTA, pt reports being (I) with ADL/IADLs, lives with 2 sons in 2 story home with 2 MYLENE  (+) driving, (+) works full time (-) AD      Reciprocal Relationships 2 children, 25and 15years old    Service to Others  for [employer], travels to homes    [address] enjoys spending time with her children    ADL   Eating Assistance 7  Independent   Grooming Assistance 7  Independent   UB Bathing Assistance 5  Supervision/Setup    [address] 5  Supervision/Setup   Additional Comments Pt limited by pain and decreased activity tolerance  Abdominal pain impacting tolerance for trunk flexion/rotation during self care tasks and functional reach    Bed Mobility   Additional Comments DNT: pt seated OOB in recliner upon arrival/end of session  Transfers   Sit to Stand 4  Minimal assistance   Additional items Assist x 1; Increased time required;Armrests   Stand to Sit 4  Minimal assistance   Additional items Assist x 1; Armrests; Increased time required   Additional Comments denied lightheaded/dizziness c positional changes  Increased time during all mobility and transfers 2/2 pain    Functional Mobility   Functional Mobility 5  Supervision   Additional Comments functional mobility within room, increased time required d/t pain, but no LOB or SOB noted   Additional items Rolling walker   Balance   Static Sitting Good   Dynamic Sitting Fair   Static Standing Fair +   Dynamic Standing Fair   Activity Tolerance   Activity Tolerance Patient limited by pain; Patient limited by fatigue   Medical Staff Made Aware SLIM, PT    Nurse Made Aware HUNTER Mcqueen verbalized pt appropriate to see, made aware outcomes      RUE Assessment   RUE Assessment WFL  (Full AROM, MMT 4/5 based on functional assessment )   LUE Assessment   LUE Assessment WFL  (Full AROM, MMT 4/5 based on functional assessment)   Hand Function   Gross Motor Coordination Functional   Fine Motor Coordination Impaired   Sensation   Light Touch No apparent deficits   Vision-Basic Assessment   Current Vision Wears glasses all the time   Patient Visual Report   (denies acute changes in vision )   Cognition   Overall Cognitive Status [facility]   Arousal/Participation Alert; Cooperative   Attention Within functional limits   Orientation Level Oriented X4   Memory Within functional limits   Following Commands Follows all commands and directions without difficulty   Comments Pt agreeable to OT session, pleasant  Very motivated to continue improving   Assessment   Limitation Decreased ADL status; Decreased Safe judgement during ADL;Decreased endurance;Decreased self-care trans;Decreased high-level ADLs; Decreased UE strength   Prognosis Good   Assessment Patient is a 55 y o  female seen for OT evaluation + treatment at [address] [place] [address] following admission on 3/21/2022  s/p Abdominal pain  Comorbidities impacting functional performance include: anemia, obesity, Hx of COVID-19, SIRS, s/p Ex Lap for hernia repair  Patient presents with active orders for OT eval and treat and up as tolerated  Performed at least 2 patient identifiers during session including name and wristband  PTA, pt reports being (I) with ADL/IADLs, lives with 2 sons in 2 story home with 2 MYLENE  (+) driving, (+) works full time (-) AD  Upon initial evaluation, patient is independent for UB ADLs, min assistance for LB ADLs, and supervision for transfers and functional mobility within room and bathroom with the use of with RW  Based on functional eval, patient presents A&Ox4 with intact  attention, intact  safety awareness, and intact  short term and long term memory  Occupational performance is affected by the following deficits: decreased tolerance for trunk motion, endurance ,  decreased muscular strength , decreased standing tolerance for self care tasks , decreased functional reach  and (+) pain  Based on these findings, functional performance deficits, and assessment findings, pt has been identified as a high complexity evaluation  Personal factors impacting performance at the time of evaluation include: decreased (+) Hx of falls , steps to enter home and FOS to second floor   Patient would benefit from OT services to maximize level of functional independence and safety in self-care and transfers  Occupational areas to address include:bathing/showering, toileting, dressing , bed mobility , functional mobility, transfer to all surfaces, meal preparation, household management and fall prevention   From OT standpoint, recommendation at time of D/C would be return to previous environment with outpatient rehabilitation  Goals   Patient Goals to increase ADL independence, specifically toileting and LB dressing  "to take care of myself on my own"    Plan   Treatment Interventions ADL retraining;Functional transfer training;UE strengthening/ROM; Endurance training;Equipment evaluation/education;Patient/family training   Goal Expiration Date 04/03/22   OT Treatment Day 0   OT Frequency 2-3x/wk   Additional Treatment Session   Start Time 1500   End Time 6493   Treatment Assessment Pt participated in additional OT tx session following IE to further challenge activity tolerance  Functional mobility with patient community distance in hallway  Increased time required d/t pain, pt reporting minor nausea but did not worsen  Total functional standing time ~12 minutes, min s/s of exertion noted, pain 6/10 following mobility  Ice applied and patient properly positioned in recliner with LE elevated      Additional Treatment Day 1   Recommendation   OT Discharge Recommendation Home with outpatient rehabilitation   OT - OK to Discharge Yes  (once medically clear )   Additional Comments  Pt resting in recliner at end of session with ice applied and all needs met    AM-PAC Daily Activity Inpatient   Lower Body Dressing 3   Bathing 3   Toileting 3   Upper Body Dressing 4   Grooming 4   Eating 4   Daily Activity Raw Score 21   Daily Activity Standardized Score (Calc for Raw Score >=11) 44 27   AM-PAC Applied Cognition Inpatient   Following a Speech/Presentation 4   Understanding Ordinary Conversation 4   Taking Medications 4   Remembering Where Things Are Placed or Put Away 4   Remembering List of 4-5 Errands 4   Taking Care of Complicated Tasks 4   Applied Cognition Raw Score 24   Applied Cognition Standardized Score 62 21       Pt will complete LB ADLs Independent   with use of AE as needed for increased ADL independence within 10 days  Pt will complete toileting Independent   with use of DME for increased ADL independence within 10 days  Pt will demonstrate proper body mechanics to complete transfers and functional mobility with Mod independent  and use of AD for increased safety and functional mobility within 10 days  Pt will demonstrate standing tolerance of 15 minutes with Mod independent  and use of AD for increased endurance and activity tolerance within 10 days  Pt will demonstrate OOB sitting tolerance of 2-4 hr/day for increased activity tolerance and engagement within 10 days  Pt will participate in 10 min of BUE therapeutic exercise to increase strength, ROM, and endurance during ADL/IADLs within 10 days  Pt will demonstrate proper body mechanics and fall prevention strategies during 100% of tx sessions for increased safety awareness during ADL/IADLs     Pt will verbalize and demonstrate understanding of energy conservation/deep breathing techniques for increased activity tolerance and endurance during meaningful activities  Pt will verbalize and demonstrate understanding in use of compensatory techniques and use of long handled AE for increased safety and independence during LB ADL tasks  Pt will demonstrate use of pain management techniques PRN for increased activity tolerance and engagement       Darlene Velez, OT

## 2022-03-24 NOTE — PROGRESS NOTES
Progress Note - General Surgery   [facility] 55 y o  female MRN: [id number]  Unit/Bed#: -01 Encounter: [id number]    Assessment:  55 y o  female POD#2 s/p exploratory laparotomy with open repair of incarcerated incisional hernia with mesh   - AVSS  - UO - 1750 cc  - Drain output - 125 serosanguinous   - +Flatus   - HGB 9 9 (8 7 yesterday)      Plan:  - Advanced to surgical soft diet  - Discontinued IVF  - Colace started  - Monitor abdominal exams, vitals, and labs  - Will continue to monitor hemoglobin - patient states she was diagnosed with iron deficiency anemia approximately 2-3 years ago and at one point in time required iron infusions for several months; in light of this and with her history of gastric bypass surgery she will be started on supplemental iron at discharge and will continue her home multi vitamins and B12  - Monitor ROSALVA drain output and appearance of output  - Analgesia and antiemetics prn   - Encourage ambulation   - Incentive spirometer   - DVT ppx with heparin  - Hospitalist team following  - PT evaluated patient and recommended home with home health  Patient is refusing per case management  Will place ambulatory referral to PT at time of discharge  Subjective/Objective     Subjective: Patient states she is passing gas  No BM  Patient states she does not have much appetite but denies nausea, vomiting  Patient states pain is improving  Denies fevers, chills, chest pain, shortness of breath  Objective:     Blood pressure 115/77, pulse 79, temperature 98 4 °F (36 9 °C), temperature source Oral, resp  rate 16, height 5' 1" (1 549 m), weight 81 6 kg (180 lb), last menstrual period 03/04/2022, SpO2 92 %, not currently breastfeeding  ,Body mass index is 34 01 kg/m²        Intake/Output Summary (Last 24 hours) at 3/24/2022 0924  Last data filed at 3/24/2022 0920  Gross per 24 hour   Intake 950 ml   Output 1845 ml   Net -895 ml       Invasive Devices  Report    Peripheral Intravenous Line            Peripheral IV 03/21/22 Right Antecubital 2 days    Peripheral IV 03/22/22 Left;Ventral (anterior) Forearm 2 days          Drain            Closed/Suction Drain Inferior;Right Abdomen Bulb 10 Fr  1 day                Physical Exam:   Gen - no acute distress  Heart - RRR  Lungs - Clear to auscultation b/l  Abdomen - Soft, improved mild to moderate tenderness to palpation in LUQ and over midline incision  Mepilex dressing in place over midline incision  Drain with serosanguinous output  Extremities - no lower extremity edema    Lab, Imaging and other studies:I have personally reviewed pertinent lab results      VTE Pharmacologic Prophylaxis: Heparin      Franchesca Hernandez, [place]  3/24/2022

## 2022-03-24 NOTE — PLAN OF CARE
Problem: PAIN - ADULT  Goal: Verbalizes/displays adequate comfort level or baseline comfort level  Description: Interventions:  - Encourage patient to monitor pain and request assistance  - Assess pain using appropriate pain scale  - Administer analgesics based on type and severity of pain and evaluate response  - Implement non-pharmacological measures as appropriate and evaluate response  - Consider cultural and social influences on pain and pain management  - Notify physician/advanced practitioner if interventions unsuccessful or patient reports new pain  Outcome: Progressing     Problem: INFECTION - ADULT  Goal: Absence or prevention of progression during hospitalization  Description: INTERVENTIONS:  - Assess and monitor for signs and symptoms of infection  - Monitor lab/diagnostic results  - Monitor all insertion sites, i e  indwelling lines, tubes, and drains  - Monitor endotracheal if appropriate and nasal secretions for changes in amount and color  - [place] appropriate cooling/warming therapies per order  - Administer medications as ordered  - Instruct and encourage patient and family to use good hand hygiene technique  - Identify and instruct in appropriate isolation precautions for identified infection/condition  Outcome: Progressing     Problem: SAFETY ADULT  Goal: Patient will remain free of falls  Description: INTERVENTIONS:  - Educate patient/family on patient safety including physical limitations  - Instruct patient to call for assistance with activity   - Consult OT/PT to assist with strengthening/mobility   - Keep Call bell within reach  - Keep bed low and locked with side rails adjusted as appropriate  - Keep care items and personal belongings within reach  - Initiate and maintain comfort rounds  - Apply yellow socks and bracelet for high fall risk patients  - Consider moving patient to room near nurses station  Outcome: Progressing     Problem: MOBILITY - ADULT  Goal: Maintain or return to baseline ADL function  Description: INTERVENTIONS:  -  Assess patient's ability to carry out ADLs; assess patient's baseline for ADL function and identify physical deficits which impact ability to perform ADLs (bathing, care of mouth/teeth, toileting, grooming, dressing, etc )  - Assess/evaluate cause of self-care deficits   - Assess range of motion  - Assess patient's mobility; develop plan if impaired  - Assess patient's need for assistive devices and provide as appropriate  - Encourage maximum independence but intervene and supervise when necessary  - Involve family in performance of ADLs  - Assess for home care needs following discharge   - Consider OT consult to assist with ADL evaluation and planning for discharge  - Provide patient education as appropriate  Outcome: Progressing

## 2022-03-24 NOTE — PLAN OF CARE
Problem: PAIN - ADULT  Goal: Verbalizes/displays adequate comfort level or baseline comfort level  Description: Interventions:  - Encourage patient to monitor pain and request assistance  - Assess pain using appropriate pain scale  - Administer analgesics based on type and severity of pain and evaluate response  - Implement non-pharmacological measures as appropriate and evaluate response  - Consider cultural and social influences on pain and pain management  - Notify physician/advanced practitioner if interventions unsuccessful or patient reports new pain  Outcome: Progressing     Problem: INFECTION - ADULT  Goal: Absence or prevention of progression during hospitalization  Description: INTERVENTIONS:  - Assess and monitor for signs and symptoms of infection  - Monitor lab/diagnostic results  - Monitor all insertion sites, i e  indwelling lines, tubes, and drains  - Monitor endotracheal if appropriate and nasal secretions for changes in amount and color  - [place] appropriate cooling/warming therapies per order  - Administer medications as ordered  - Instruct and encourage patient and family to use good hand hygiene technique  - Identify and instruct in appropriate isolation precautions for identified infection/condition  Outcome: Progressing  Goal: Absence of fever/infection during neutropenic period  Description: INTERVENTIONS:  - Monitor WBC    Outcome: Progressing     Problem: SAFETY ADULT  Goal: Patient will remain free of falls  Description: INTERVENTIONS:  - Educate patient/family on patient safety including physical limitations  - Instruct patient to call for assistance with activity   - Consult OT/PT to assist with strengthening/mobility   - Keep Call bell within reach  - Keep bed low and locked with side rails adjusted as appropriate  - Keep care items and personal belongings within reach  - Initiate and maintain comfort rounds  - Make Fall Risk Sign visible to staff    - Obtain necessary fall risk management equipment: Bed alarm  - Apply yellow socks and bracelet for high fall risk patients  - Consider moving patient to room near nurses station  Outcome: Progressing  Goal: Maintain or return to baseline ADL function  Description: INTERVENTIONS:  -  Assess patient's ability to carry out ADLs; assess patient's baseline for ADL function and identify physical deficits which impact ability to perform ADLs (bathing, care of mouth/teeth, toileting, grooming, dressing, etc )  - Assess/evaluate cause of self-care deficits   - Assess range of motion  - Assess patient's mobility; develop plan if impaired  - Assess patient's need for assistive devices and provide as appropriate  - Encourage maximum independence but intervene and supervise when necessary  - Involve family in performance of ADLs  - Assess for home care needs following discharge   - Consider OT consult to assist with ADL evaluation and planning for discharge  - Provide patient education as appropriate  Outcome: Progressing  Goal: Maintains/Returns to pre admission functional level  Description: INTERVENTIONS:  - Perform BMAT or MOVE assessment daily    - Set and communicate daily mobility goal to care team and patient/family/caregiver     - Collaborate with rehabilitation services on mobility goals if consulted    - Ambulate patient 2 times a day    - Out of bed for toileting  - Record patient progress and toleration of activity level   Outcome: Progressing

## 2022-03-24 NOTE — ASSESSMENT & PLAN NOTE
· Likely in the setting of acute blood loss secondary to recent OR procedure  · No acute signs of bleeding  · Hgb 8 7-->9 9  · Transfuse for HGB less than 7

## 2022-03-24 NOTE — CASE MANAGEMENT
Case Management Assessment & Discharge Planning Note    Patient name Josh Scriver  Location /-01 MRN [id number]  : 1975 Date 3/24/2022       Current Admission Date: 3/21/2022  Current Admission Diagnosis:Abdominal pain   Patient Active Problem List    Diagnosis Date Noted    Hypotension 2022    Abdominal pain 2022    SIRS (systemic inflammatory response syndrome) ([facility] [address] ) 2022    COVID-19 2022    Viral infection, unspecified 2021    Mild intermittent asthma without complication     Iron deficiency anemia due to chronic blood loss 2019    History of gastric bypass 2019    Anemia 2019    ASCUS with positive high risk HPV cervical 10/12/2017    Abnormal uterine bleeding (AUB) 2017    Bradycardia 2017    Dizziness 2017    H/O bariatric surgery 2016    Obesity 2015    Sprain of coronary ligament of knee, left, sequela 2015      LOS (days): 2  Geometric Mean LOS (GMLOS) (days): 2 40  Days to GMLOS:0 8     OBJECTIVE:    Risk of Unplanned Readmission Score: 10         Current admission status: Inpatient       [facility] [address] [place] [address], [address] [facility] [address] [facility]  [place] [address] [id number]  Phone: [phone] Fax: [phone]    Primary Care Provider: Bernard Rincon MD    Primary Insurance: Emiliano Doran MA MELECIO  Secondary Insurance:     ASSESSMENT:  [address] [facility], [address] [facility] [address] Representative - Relative   Primary Phone: [phone] (Home)            Readmission Root Cause  30 Day Readmission: No    Patient Information  Admitted from[de-identified] Home  Mental Status: Alert  During Assessment patient was accompanied by: Not accompanied during assessment  Assessment information provided by[de-identified] Patient  Primary Caregiver: Self  Support Systems: Patel Bullard,[address]Th Floor of Residence: [address] [place] [address] do you live in?: [facility] entry access options   Select all that apply : Stairs  Number of steps to enter home : 3 (2 steps to porch, 1 step to enter front door)  Do the steps have railings?: Yes  Type of Current Residence: 2 story home  Upon entering residence, is there a bedroom on the main floor (no further steps)?: Yes  Upon entering residence, is there a bathroom on the main floor (no further steps)?: Yes  In the last 12 months, how many places have you lived?: 1  In the last 12 months, was there a time when you did not have a steady place to sleep or slept in a shelter (including now)?: No  Homeless/housing insecurity resource given?: No  Living Arrangements: Lives w/ Extended Family  Is patient a ?: No    Activities of Daily Living Prior to Admission  Functional Status: Independent  Completes ADLs independently?: Yes  Ambulates independently?: Yes  Does patient use assisted devices?: No  Does patient currently own DME?: No  Does patient have a history of Outpatient Therapy (PT/OT)?: No  Does the patient have a history of Short-Term Rehab?: No  Does patient have a history of HHC?: No  Does patient currently have [address]?: No    Patient Information Continued  Income Source: Employed  Does patient have prescription coverage?: Yes  Within the past 12 months, you worried that your food would run out before you got the money to buy more : Never true  Within the past 12 months, the food you bought just didnt last and you didnt have money to get more : Never true  Food insecurity resource given?: No  Does patient receive dialysis treatments?: No  Does patient have a history of substance abuse?: No  Does patient have a history of Mental Health Diagnosis?: No    PHQ 2/9 Screening   Reviewed PHQ 2/9 Depression Screening Score?: No    Means of Transportation  Means of Transport to Appts[de-identified] Drives Self  In the past 12 months, has lack of transportation kept you from medical appointments or from getting medications?: No  In the past 12 months, has lack of transportation kept you from meetings, work, or from getting things needed for daily living?: No  Was application for public transport provided?: No    DISCHARGE DETAILS:    Discharge planning discussed with[de-identified] Patient  Freedom of Choice: Yes     CM contacted family/caregiver?: No- see comments (Patient declined)  Were Treatment Team discharge recommendations reviewed with patient/caregiver?: Yes  Did patient/caregiver verbalize understanding of patient care needs?: Yes  Were patient/caregiver advised of the risks associated with not following Treatment Team discharge recommendations?: Yes    [address] [place] [address]         Is the patient interested in [place] [address] at discharge?: No (REFUSING )    DME Referral Provided  Referral made for DME?: No    Would you like to participate in our [address] service program?  : No - Declined    Treatment Team Recommendation: Home with [address] [place] [address] (REFUSING)  Discharge Destination Plan[de-identified] Home  Transport at Discharge : [facility] Cel[facility] (Patient reports family will transport home @ D/C or may need Lyft  CM will follow for transport needs @ D/C )     Additional Comments: CM s/w patient regarding recommendation for HHPT  Patient refusing at this time stating that she feels once her pain is improved, she will be better  Patient preferring to go to OP follow-up and does not meet homebound criteria for HHPT/VNA at this time

## 2022-03-25 VITALS
DIASTOLIC BLOOD PRESSURE: 78 MMHG | SYSTOLIC BLOOD PRESSURE: 115 MMHG | HEIGHT: 61 IN | WEIGHT: 180 LBS | BODY MASS INDEX: 33.99 KG/M2 | HEART RATE: 61 BPM | OXYGEN SATURATION: 95 % | TEMPERATURE: 98.2 F | RESPIRATION RATE: 16 BRPM

## 2022-03-25 LAB
ANION GAP SERPL CALCULATED.3IONS-SCNC: 7 MMOL/L (ref 4–13)
BASOPHILS # BLD AUTO: 0.04 THOUSANDS/ΜL (ref 0–0.1)
BASOPHILS NFR BLD AUTO: 1 % (ref 0–1)
BUN SERPL-MCNC: 7 MG/DL (ref 5–25)
CALCIUM SERPL-MCNC: 8.4 MG/DL (ref 8.4–10.2)
CHLORIDE SERPL-SCNC: 103 MMOL/L (ref 96–108)
CO2 SERPL-SCNC: 28 MMOL/L (ref 21–32)
CREAT SERPL-MCNC: 0.57 MG/DL (ref 0.6–1.3)
EOSINOPHIL # BLD AUTO: 0.19 THOUSAND/ΜL (ref 0–0.61)
EOSINOPHIL NFR BLD AUTO: 3 % (ref 0–6)
ERYTHROCYTE [DISTWIDTH] IN BLOOD BY AUTOMATED COUNT: 15.4 % (ref 11.6–15.1)
GFR SERPL CREATININE-BSD FRML MDRD: 111 ML/MIN/1.73SQ M
GLUCOSE SERPL-MCNC: 80 MG/DL (ref 65–140)
HCT VFR BLD AUTO: 27.9 % (ref 34.8–46.1)
HGB BLD-MCNC: 8.3 G/DL (ref 11.5–15.4)
IMM GRANULOCYTES # BLD AUTO: 0.05 THOUSAND/UL (ref 0–0.2)
IMM GRANULOCYTES NFR BLD AUTO: 1 % (ref 0–2)
LYMPHOCYTES # BLD AUTO: 1.76 THOUSANDS/ΜL (ref 0.6–4.47)
LYMPHOCYTES NFR BLD AUTO: 24 % (ref 14–44)
MAGNESIUM SERPL-MCNC: 1.5 MG/DL (ref 1.9–2.7)
MCH RBC QN AUTO: 24 PG (ref 26.8–34.3)
MCHC RBC AUTO-ENTMCNC: 29.7 G/DL (ref 31.4–37.4)
MCV RBC AUTO: 81 FL (ref 82–98)
MONOCYTES # BLD AUTO: 0.58 THOUSAND/ΜL (ref 0.17–1.22)
MONOCYTES NFR BLD AUTO: 8 % (ref 4–12)
NEUTROPHILS # BLD AUTO: 4.76 THOUSANDS/ΜL (ref 1.85–7.62)
NEUTS SEG NFR BLD AUTO: 63 % (ref 43–75)
NRBC BLD AUTO-RTO: 0 /100 WBCS
PLATELET # BLD AUTO: 301 THOUSANDS/UL (ref 149–390)
PMV BLD AUTO: 9.4 FL (ref 8.9–12.7)
POTASSIUM SERPL-SCNC: 3.4 MMOL/L (ref 3.5–5.3)
RBC # BLD AUTO: 3.46 MILLION/UL (ref 3.81–5.12)
SODIUM SERPL-SCNC: 138 MMOL/L (ref 135–147)
WBC # BLD AUTO: 7.38 THOUSAND/UL (ref 4.31–10.16)

## 2022-03-25 PROCEDURE — 99024 POSTOP FOLLOW-UP VISIT: CPT | Performed by: SURGERY

## 2022-03-25 PROCEDURE — 80048 BASIC METABOLIC PNL TOTAL CA: CPT

## 2022-03-25 PROCEDURE — 97116 GAIT TRAINING THERAPY: CPT

## 2022-03-25 PROCEDURE — 99232 SBSQ HOSP IP/OBS MODERATE 35: CPT | Performed by: PHYSICIAN ASSISTANT

## 2022-03-25 PROCEDURE — 83735 ASSAY OF MAGNESIUM: CPT | Performed by: PHYSICIAN ASSISTANT

## 2022-03-25 PROCEDURE — NC001 PR NO CHARGE: Performed by: SURGERY

## 2022-03-25 PROCEDURE — C9113 INJ PANTOPRAZOLE SODIUM, VIA: HCPCS

## 2022-03-25 PROCEDURE — 97530 THERAPEUTIC ACTIVITIES: CPT

## 2022-03-25 PROCEDURE — 85025 COMPLETE CBC W/AUTO DIFF WBC: CPT

## 2022-03-25 RX ORDER — MAGNESIUM SULFATE HEPTAHYDRATE 40 MG/ML
2 INJECTION, SOLUTION INTRAVENOUS ONCE
Status: COMPLETED | OUTPATIENT
Start: 2022-03-25 | End: 2022-03-25

## 2022-03-25 RX ORDER — POTASSIUM CHLORIDE 20 MEQ/1
40 TABLET, EXTENDED RELEASE ORAL ONCE
Status: COMPLETED | OUTPATIENT
Start: 2022-03-25 | End: 2022-03-25

## 2022-03-25 RX ORDER — OXYCODONE HYDROCHLORIDE 5 MG/1
5 TABLET ORAL EVERY 4 HOURS PRN
Qty: 30 TABLET | Refills: 0 | Status: SHIPPED | OUTPATIENT
Start: 2022-03-25 | End: 2022-04-04

## 2022-03-25 RX ADMIN — OXYCODONE HYDROCHLORIDE 10 MG: 10 TABLET ORAL at 03:10

## 2022-03-25 RX ADMIN — HEPARIN SODIUM 5000 UNITS: 5000 INJECTION INTRAVENOUS; SUBCUTANEOUS at 05:36

## 2022-03-25 RX ADMIN — ACETAMINOPHEN 975 MG: 325 TABLET, FILM COATED ORAL at 12:58

## 2022-03-25 RX ADMIN — PANTOPRAZOLE SODIUM 40 MG: 40 INJECTION, POWDER, FOR SOLUTION INTRAVENOUS at 10:06

## 2022-03-25 RX ADMIN — OXYCODONE HYDROCHLORIDE 10 MG: 10 TABLET ORAL at 16:44

## 2022-03-25 RX ADMIN — POTASSIUM CHLORIDE 40 MEQ: 1500 TABLET, EXTENDED RELEASE ORAL at 10:07

## 2022-03-25 RX ADMIN — OXYCODONE HYDROCHLORIDE 5 MG: 5 TABLET ORAL at 10:06

## 2022-03-25 RX ADMIN — HEPARIN SODIUM 5000 UNITS: 5000 INJECTION INTRAVENOUS; SUBCUTANEOUS at 13:00

## 2022-03-25 RX ADMIN — DOCUSATE SODIUM 100 MG: 100 CAPSULE ORAL at 10:06

## 2022-03-25 RX ADMIN — DOCUSATE SODIUM 100 MG: 100 CAPSULE ORAL at 16:43

## 2022-03-25 RX ADMIN — MAGNESIUM SULFATE HEPTAHYDRATE 2 G: 40 INJECTION, SOLUTION INTRAVENOUS at 13:54

## 2022-03-25 RX ADMIN — ACETAMINOPHEN 975 MG: 325 TABLET, FILM COATED ORAL at 05:36

## 2022-03-25 NOTE — ASSESSMENT & PLAN NOTE
· Patient with Hypotension and Leukocytosis on 3/22  · Of note, Patient diagnosed with COVID-19 2 weeks ago and received monoclonal antibody  · VSS, leukocytosis resolved  · UA is negative  · CT abdomen shows lungs clear no infiltrate noted  · BC x2: NGTD  · Fluids: S/P 2L NSS Bolus,S/P IV hydration with LR  IVFs D/C'd 3/24, responding well    · S/P IV abx with Cefepime and Flagyl  · Replete electrolytes p r n , mild hypokalemia and hypomagnesemia today, likely in setting of decreased oral intake  · Monitor CBC/Fever Curve

## 2022-03-25 NOTE — DISCHARGE SUMMARY
Discharge diagnosis:  Incisional hernia    Procedure performed:  Incisional herniorrhaphy with mesh, lysis of adhesions    Date of procedure 3/22/2022    Patient underwent repair of a large incarcerated incisional hernia with lysis of adhesions  Mesh was used  Patient was nursed on the surgical floor mainly for pain control  Patient was discharged home on postop day 3 after an uncomplicated postop recovery with prescription for pain medications  Patient will be followed up in the office next week

## 2022-03-25 NOTE — ASSESSMENT & PLAN NOTE
· Likely in the setting of acute blood loss secondary to recent OR procedure  · No acute signs of bleeding, hemoglobin still remains stable, patient asymptomatic    · Hgb 8 7--> 9 9 --> 8 3    · Transfuse for HGB less than 7

## 2022-03-25 NOTE — PLAN OF CARE
Problem: PHYSICAL THERAPY ADULT  Goal: Performs mobility at highest level of function for planned discharge setting  See evaluation for individualized goals  Description: Treatment/Interventions: Functional transfer training,Elevations,Endurance training,Equipment eval/education,Bed mobility,Gait training,Spoke to nursing,Spoke to advanced practitioner  Equipment Recommended:  (possibly RW, pending progress w/ gait training &pain mgmt)     See flowsheet documentation for full assessment, interventions and recommendations  Outcome: Progressing  Note: Prognosis: Good  Problem List: Decreased strength,Decreased endurance,Impaired balance,Decreased mobility,Obesity,Decreased skin integrity,Pain  Assessment: Pt seen for PT treatment today with focus on gait and stair training  Pt presents semi-supine in bed, reports 3/10 pain and is eager/motivated for session  Pt completes all aspects of functional mobility with S, however significantly extended time needed due to pain  Pt completes lower body dressing in preparation of gait, + toileting prior to gait, all with variable independence to [facility], increased difficulty during fwd bending  Pt displays improved ambulation mechanics and gait speed however remains excessively slow as compared to age matched norms  Pt ambulates 160ft with RW and S, and negotiates 10 stairs with HR assist/S/increased time  With mobility training, pt reports pain increases from 3/10 to 6/10, however is also in tears due to pain, decreases with rest  Pt continues to make progress with functional mobility however remains limited due to pain  Pt reported feeling unsteady w/o use of RW, CM contacted for DME supply for return to home  Recommend return to home with OPPT, + family support upon d/c  Will continue skilled PT POC as able and appropriate     Barriers to Discharge: Decreased caregiver support        PT Discharge Recommendation: Home with outpatient rehabilitation          See flowsheet documentation for full assessment

## 2022-03-25 NOTE — ASSESSMENT & PLAN NOTE
· Patient tested positive 2 weeks ago with home test     · Patient received monoclonal antibody and she feels better  · Upon assessment, patient with no respiratory complaints, with O2 sats >95% on room air     · Continue to monitor respiratory status  · Encourage IS/ Flutter Valve

## 2022-03-25 NOTE — PLAN OF CARE
Problem: PAIN - ADULT  Goal: Verbalizes/displays adequate comfort level or baseline comfort level  Description: Interventions:  - Encourage patient to monitor pain and request assistance  - Assess pain using appropriate pain scale  - Administer analgesics based on type and severity of pain and evaluate response  - Implement non-pharmacological measures as appropriate and evaluate response  - Consider cultural and social influences on pain and pain management  - Notify physician/advanced practitioner if interventions unsuccessful or patient reports new pain  Outcome: Progressing     Problem: INFECTION - ADULT  Goal: Absence or prevention of progression during hospitalization  Description: INTERVENTIONS:  - Assess and monitor for signs and symptoms of infection  - Monitor lab/diagnostic results  - Monitor all insertion sites, i e  indwelling lines, tubes, and drains  - Monitor endotracheal if appropriate and nasal secretions for changes in amount and color  - [place] appropriate cooling/warming therapies per order  - Administer medications as ordered  - Instruct and encourage patient and family to use good hand hygiene technique  - Identify and instruct in appropriate isolation precautions for identified infection/condition  Outcome: Progressing  Goal: Absence of fever/infection during neutropenic period  Description: INTERVENTIONS:  - Monitor WBC    Outcome: Progressing     Problem: SAFETY ADULT  Goal: Patient will remain free of falls  Description: INTERVENTIONS:  - Educate patient/family on patient safety including physical limitations  - Instruct patient to call for assistance with activity   - Consult OT/PT to assist with strengthening/mobility   - Keep Call bell within reach  - Keep bed low and locked with side rails adjusted as appropriate  - Keep care items and personal belongings within reach  - Initiate and maintain comfort rounds  - Make Fall Risk Sign visible to staff  - Offer Toileting every 2 Hours, in advance of need  - Initiate/Maintain bed alarm  - Obtain necessary fall risk management equipment  - Apply yellow socks and bracelet for high fall risk patients  - Consider moving patient to room near nurses station  Outcome: Progressing  Goal: Maintain or return to baseline ADL function  Description: INTERVENTIONS:  -  Assess patient's ability to carry out ADLs; assess patient's baseline for ADL function and identify physical deficits which impact ability to perform ADLs (bathing, care of mouth/teeth, toileting, grooming, dressing, etc )  - Assess/evaluate cause of self-care deficits   - Assess range of motion  - Assess patient's mobility; develop plan if impaired  - Assess patient's need for assistive devices and provide as appropriate  - Encourage maximum independence but intervene and supervise when necessary  - Involve family in performance of ADLs  - Assess for home care needs following discharge   - Consider OT consult to assist with ADL evaluation and planning for discharge  - Provide patient education as appropriate  Outcome: Progressing  Goal: Maintains/Returns to pre admission functional level  Description: INTERVENTIONS:  - Perform BMAT or MOVE assessment daily    - Set and communicate daily mobility goal to care team and patient/family/caregiver  - Collaborate with rehabilitation services on mobility goals if consulted    - Reposition patient every 3 hours      - Out of bed for meals 3 times a day  - Out of bed for toileting  - Record patient progress and toleration of activity level   Outcome: Progressing     Problem: DISCHARGE PLANNING  Goal: Discharge to home or other facility with appropriate resources  Description: INTERVENTIONS:  - Identify barriers to discharge w/patient and caregiver  - Arrange for needed discharge resources and transportation as appropriate  - Identify discharge learning needs (meds, wound care, etc )  - Arrange for interpretive services to assist at discharge as needed  - Refer to Case Management Department for coordinating discharge planning if the patient needs post-hospital services based on physician/advanced practitioner order or complex needs related to functional status, cognitive ability, or social support system  Outcome: Progressing     Problem: Knowledge Deficit  Goal: Patient/family/caregiver demonstrates understanding of disease process, treatment plan, medications, and discharge instructions  Description: Complete learning assessment and assess knowledge base  Interventions:  - Provide teaching at level of understanding  - Provide teaching via preferred learning methods  Outcome: Progressing     Problem: MOBILITY - ADULT  Goal: Maintain or return to baseline ADL function  Description: INTERVENTIONS:  -  Assess patient's ability to carry out ADLs; assess patient's baseline for ADL function and identify physical deficits which impact ability to perform ADLs (bathing, care of mouth/teeth, toileting, grooming, dressing, etc )  - Assess/evaluate cause of self-care deficits   - Assess range of motion  - Assess patient's mobility; develop plan if impaired  - Assess patient's need for assistive devices and provide as appropriate  - Encourage maximum independence but intervene and supervise when necessary  - Involve family in performance of ADLs  - Assess for home care needs following discharge   - Consider OT consult to assist with ADL evaluation and planning for discharge  - Provide patient education as appropriate  Outcome: Progressing  Goal: Maintains/Returns to pre admission functional level  Description: INTERVENTIONS:  - Perform BMAT or MOVE assessment daily    - Set and communicate daily mobility goal to care team and patient/family/caregiver  - Collaborate with rehabilitation services on mobility goals if consulted    - Reposition patient every 3 hours    -  - Out of bed for meals 3 times a day  - Out of bed for toileting  - Record patient progress and toleration of activity level   Outcome: Progressing

## 2022-03-25 NOTE — ASSESSMENT & PLAN NOTE
· Patient presented with Lower Abdominal pain 2/2 incarcerated hernia - no evidence of obstruction  · Last menstrual period was early March currently has no vaginal bleeding  · Urine pregnancy: Negative  · CT of the abdomen shows a large midline ventral hernia without evidence of obstruction  · Continue antiemetics   · Continue pain management, still with moderate to severe abdominal pain, although improved, ambulated well with PT   · Patient states she is passing gas, still no BM  Some drainage noted on dressing on evaluation  · General Surgery Following:  · S/P Ex Lap with reduction of incarcerated hernia with repair of incisional hernia mesh 3/22  · Advanced to surgical soft diet, discontinue IV fluids, responding well  · Monitor ROSALVA drain output  · Encourage a OOB, Chest PT and incentive spirometry    · Stable for discharge from IM perspective, discharge per primary team  · Recommend discharge with short course of oxycodone and scheduled Tylenol for pain management

## 2022-03-25 NOTE — NURSING NOTE
This writer reviewed with pt her discharge instructions and importance of making all her follow up visits  Education and demonstration was also given to patient on how to measure and empty ROSALVA Drain, Pt demonstrated task with no complications  Educated pt on keeping log of out put and should there be an increase of input or change in color of drainage to call her surgeon for further instructions  Reviewed pts pain medications and frequencies, also educated pt on the side effects especially constipation and make sure to document her bowel movements, should she not have bowel movements she is to call her surgeon and or primary MD   Pt verbalized understanding of all her education and demonstrated learning

## 2022-03-25 NOTE — CASE MANAGEMENT
Case Management Discharge Planning Note    Patient name [facility]  Location /-01 MRN [id number]  : 1975 Date 3/25/2022       Current Admission Date: 3/21/2022  Current Admission Diagnosis:Abdominal pain   Patient Active Problem List    Diagnosis Date Noted    Hypotension 2022    Abdominal pain 2022    SIRS (systemic inflammatory response syndrome) ([facility] [address] ) 2022    COVID-19 2022    Viral infection, unspecified 2021    Mild intermittent asthma without complication     Iron deficiency anemia due to chronic blood loss 2019    History of gastric bypass 2019    Anemia 2019    ASCUS with positive high risk HPV cervical 10/12/2017    Abnormal uterine bleeding (AUB) 2017    Bradycardia 2017    Dizziness 2017    H/O bariatric surgery 2016    Obesity 2015    Sprain of coronary ligament of knee, left, sequela 2015      LOS (days): 3  Geometric Mean LOS (GMLOS) (days): 3 60  Days to GMLOS:0 9     OBJECTIVE:  Risk of Unplanned Readmission Score: 10         Current admission status: Inpatient   Preferred Pharmacy:   [address] [place] [address] [address] [facility] [address] [id number]  Phone: [phone] Fax: [phone]    Primary Care Provider: Benedict May MD    Primary Insurance: Katherine Robles MA MELECIO  Secondary Insurance:     DISCHARGE DETAILS:    Discharge planning discussed with[de-identified] patient  Freedom of Choice: Yes  Comments - Freedom of Choice: Chose [employer] as DME supplier  Patient refusing [facility] at this time    CM contacted family/caregiver?: No- see comments (DECLINED)  Were Treatment Team discharge recommendations reviewed with patient/caregiver?: Yes  Did patient/caregiver verbalize understanding of patient care needs?: Yes  Were patient/caregiver advised of the risks associated with not following Treatment Team discharge recommendations?: Yes    Requested [address]         Is the patient interested in [address] at discharge?: No (REFUSING AND DOES NOT MEET HOMEBOUND CRITERIA )    DME Referral Provided  Referral made for DME?: Yes  DME referral completed for the following items[de-identified] Obie Castleman  DME Supplier Name[de-identified] [employer] (DELIVERED BEDSIDE )    Other Referral/Resources/Interventions Provided:  Interventions: DME    Would you like to participate in our [address] service program?  : No - Declined    Treatment Team Recommendation: Home with [address] (REFUSING)  Discharge Destination Plan[de-identified] Home  Transport at Discharge : Julieta Potts (Patient reports family will transport home @ D/C or may need Ly[employer]   CM will follow for transport needs @ D/C )

## 2022-03-25 NOTE — PLAN OF CARE
Problem: PAIN - ADULT  Goal: Verbalizes/displays adequate comfort level or baseline comfort level  Description: Interventions:  - Encourage patient to monitor pain and request assistance  - Assess pain using appropriate pain scale  - Administer analgesics based on type and severity of pain and evaluate response  - Implement non-pharmacological measures as appropriate and evaluate response  - Consider cultural and social influences on pain and pain management  - Notify physician/advanced practitioner if interventions unsuccessful or patient reports new pain  3/25/2022 1606 by Devon Borjas RN  Outcome: Adequate for Discharge  3/25/2022 0831 by Devon Borjas RN  Outcome: Progressing     Problem: INFECTION - ADULT  Goal: Absence or prevention of progression during hospitalization  Description: INTERVENTIONS:  - Assess and monitor for signs and symptoms of infection  - Monitor lab/diagnostic results  - Monitor all insertion sites, i e  indwelling lines, tubes, and drains  - Monitor endotracheal if appropriate and nasal secretions for changes in amount and color  - [place] appropriate cooling/warming therapies per order  - Administer medications as ordered  - Instruct and encourage patient and family to use good hand hygiene technique  - Identify and instruct in appropriate isolation precautions for identified infection/condition  3/25/2022 1606 by Devon Borjas RN  Outcome: Adequate for Discharge  3/25/2022 0831 by Devon Borjas RN  Outcome: Progressing  Goal: Absence of fever/infection during neutropenic period  Description: INTERVENTIONS:  - Monitor WBC    3/25/2022 1606 by Devon Borjas RN  Outcome: Adequate for Discharge  3/25/2022 0831 by Devon Borjas RN  Outcome: Progressing     Problem: SAFETY ADULT  Goal: Patient will remain free of falls  Description: INTERVENTIONS:  - Educate patient/family on patient safety including physical limitations  - Instruct patient to call for assistance with activity   - Consult OT/PT to assist with strengthening/mobility   - Keep Call bell within reach  - Keep bed low and locked with side rails adjusted as appropriate  - Keep care items and personal belongings within reach  - Initiate and maintain comfort rounds  - Make Fall Risk Sign visible to staff  - Offer Toileting every 2 Hours, in advance of need  - Initiate/Maintain bed alarm  - Obtain necessary fall risk management equipment  - Apply yellow socks and bracelet for high fall risk patients  - Consider moving patient to room near nurses station  3/25/2022 1606 by Candace Barrera RN  Outcome: Adequate for Discharge  3/25/2022 0831 by Candace Barrera RN  Outcome: Progressing  Goal: Maintain or return to baseline ADL function  Description: INTERVENTIONS:  -  Assess patient's ability to carry out ADLs; assess patient's baseline for ADL function and identify physical deficits which impact ability to perform ADLs (bathing, care of mouth/teeth, toileting, grooming, dressing, etc )  - Assess/evaluate cause of self-care deficits   - Assess range of motion  - Assess patient's mobility; develop plan if impaired  - Assess patient's need for assistive devices and provide as appropriate  - Encourage maximum independence but intervene and supervise when necessary  - Involve family in performance of ADLs  - Assess for home care needs following discharge   - Consider OT consult to assist with ADL evaluation and planning for discharge  - Provide patient education as appropriate  3/25/2022 1606 by Cadnace Barrera RN  Outcome: Adequate for Discharge  3/25/2022 0831 by Candace Barrera RN  Outcome: Progressing  Goal: Maintains/Returns to pre admission functional level  Description: INTERVENTIONS:  - Perform BMAT or MOVE assessment daily    - Set and communicate daily mobility goal to care team and patient/family/caregiver     - Collaborate with rehabilitation services on mobility goals if consulted  - Perform Range of Motion 3 times a day  - Reposition patient every 2 hours  - Dangle patient 3 times a day  - Stand patient 3 times a day  - Ambulate patient 3 times a day  - Out of bed to chair 3 times a day   - Out of bed for meals 3 times a day  - Out of bed for toileting  - Record patient progress and toleration of activity level   3/25/2022 1606 by Kye Jon RN  Outcome: Adequate for Discharge  3/25/2022 0831 by Kye Jon RN  Outcome: Progressing     Problem: DISCHARGE PLANNING  Goal: Discharge to home or other facility with appropriate resources  Description: INTERVENTIONS:  - Identify barriers to discharge w/patient and caregiver  - Arrange for needed discharge resources and transportation as appropriate  - Identify discharge learning needs (meds, wound care, etc )  - Arrange for interpretive services to assist at discharge as needed  - Refer to Case Management Department for coordinating discharge planning if the patient needs post-hospital services based on physician/advanced practitioner order or complex needs related to functional status, cognitive ability, or social support system  3/25/2022 1606 by Kye Jon RN  Outcome: Adequate for Discharge  3/25/2022 0831 by Kye Jon RN  Outcome: Progressing     Problem: Knowledge Deficit  Goal: Patient/family/caregiver demonstrates understanding of disease process, treatment plan, medications, and discharge instructions  Description: Complete learning assessment and assess knowledge base    Interventions:  - Provide teaching at level of understanding  - Provide teaching via preferred learning methods  3/25/2022 1606 by Kye Jon RN  Outcome: Adequate for Discharge  3/25/2022 0831 by Kye Jon RN  Outcome: Progressing     Problem: MOBILITY - ADULT  Goal: Maintain or return to baseline ADL function  Description: INTERVENTIONS:  -  Assess patient's ability to carry out ADLs; assess patient's baseline for ADL function and identify physical deficits which impact ability to perform ADLs (bathing, care of mouth/teeth, toileting, grooming, dressing, etc )  - Assess/evaluate cause of self-care deficits   - Assess range of motion  - Assess patient's mobility; develop plan if impaired  - Assess patient's need for assistive devices and provide as appropriate  - Encourage maximum independence but intervene and supervise when necessary  - Involve family in performance of ADLs  - Assess for home care needs following discharge   - Consider OT consult to assist with ADL evaluation and planning for discharge  - Provide patient education as appropriate  3/25/2022 1606 by Mitchell Baig RN  Outcome: Adequate for Discharge  3/25/2022 0831 by Mitchell Baig RN  Outcome: Progressing  Goal: Maintains/Returns to pre admission functional level  Description: INTERVENTIONS:  - Perform BMAT or MOVE assessment daily    - Set and communicate daily mobility goal to care team and patient/family/caregiver  - Collaborate with rehabilitation services on mobility goals if consulted  - Perform Range of Motion 3 times a day  - Reposition patient every 2 hours    - Dangle patient 3 times a day  - Stand patient 3 times a day  - Ambulate patient 3 times a day  - Out of bed to chair 3 times a day   - Out of bed for meals 3 times a day  - Out of bed for toileting  - Record patient progress and toleration of activity level   3/25/2022 1606 by Mitchell Baig RN  Outcome: Adequate for Discharge  3/25/2022 0831 by Mitchell Baig RN  Outcome: Progressing

## 2022-03-25 NOTE — PHYSICAL THERAPY NOTE
PHYSICAL THERAPY TREATMENT    NAME:  Tati Mirza  DATE: 22    AGE:   55 y o  Mrn:   [id number]  Length Of Stay: 3    ADMIT DX:  Ventral hernia without obstruction or gangrene [K43 9]  Abdominal pain [R10 9]  Lower abdominal pain [R10 30]    Past Medical History:   Diagnosis Date    Abnormal Pap smear of cervix     2017    Anemia     Anxiety     Asthma     Diabetes mellitus ([address] )     History of transfusion 2018    HPV (human papilloma virus) infection     2017    Obesity     Varicella      Past Surgical History:   Procedure Laterality Date    BREAST BIOPSY Right 2012    u/s core bx    BREAST SURGERY      Incisional breast biopsy     SECTION      And     EXPLORATORY LAPAROTOMY W/ BOWEL RESECTION N/A 3/22/2022    Procedure: LAPAROTOMY EXPLORATORY WITH OPEN REPAIR OF INCARCERATED INCISIONAL  HERNIA WITH MESH;  Surgeon: Leonard Hope MD;  [facility];  Service: General    GASTRIC BYPASS  2016    INCISIONAL HERNIA REPAIR N/A 3/22/2022    Procedure: OPEN REPAIR HERNIA INCISIONAL WITH MESH;  Surgeon: Leonard Hope MD;  [facility];  Service: General    UT HYSTEROSCOPY,W/ENDO BX N/A 2021    Procedure: DILATATION AND CURETTAGE (D&C) WITH HYSTEROSCOPY (MYOSURE); Surgeon: Carmel Merrill DO;  [facility];  Service: Gynecology    UT HYSTEROSCOPY,W/ENDO BX N/A 2021    Procedure: POLYPECTOMY;  Surgeon: Carmel Merrill DO;  [facility];  Service: Gynecology    TUBAL LIGATION      US GUIDED BREAST BIOPSY RIGHT COMPLETE Right 2019       Performed at least 2 patient identifiers during session: Name, Redell Guise and ID bracelet        22 0859   PT Last Visit   PT Visit Date 22   Note Type   Note Type Treatment   Pain Assessment   Pain Assessment Tool 0-10   Pain Score 6   Pain Location/Orientation Orientation: Lower; Location: Abdomen; Location: Incision   Pain Radiating Towards non radiating   Pain Onset/Description Onset: Ongoing; Descriptor: Discomfort   Effect of Pain on Daily Activities limits speed and tolerance of functional mobility   Patient's Stated Pain Goal 2   Hospital Pain Intervention(s) Cold applied;Repositioned; Ambulation/increased activity; Elevated; Emotional support   Multiple Pain Sites No   Restrictions/Precautions   Weight Bearing Precautions Per Order No   Other Precautions Bed Alarm;Multiple lines; Fall Risk;Pain  (ROSALVA drain)   General   Chart Reviewed Yes   Additional Pertinent History Pt admitted 3/21/2022 with c/o abdominal cramping x2 wks  3/22/2022 pt underwent exlap with open repair of incarcerated incisional hernia w/ mesh by Dr Britney Jeff    Response to Previous Treatment Patient with no complaints from previous session  Family/Caregiver Present No   Cognition   Overall Cognitive Status WFL   Arousal/Participation Alert; Cooperative   Attention Within functional limits   Orientation Level Oriented X4   Memory Within functional limits   Following Commands Follows all commands and directions without difficulty   Subjective   Subjective "I'm having pain but I want to be able to do this myself "   Bed Mobility   Supine to Sit 5  Supervision   Additional items Assist x 1;HOB elevated; Bedrails; Increased time required   Sit to Supine Unable to assess   Additional Comments Pt was left seated OOB in recliner chair at end of session  Transfers   Sit to Stand 5  Supervision   Additional items Assist x 1; Increased time required;Verbal cues   Stand to Sit 5  Supervision   Additional items Assist x 1; Armrests; Increased time required;Verbal cues   Toilet transfer 5  Supervision   Additional items Assist x 1; Increased time required;Verbal cues;Standard toilet   Ambulation/Elevation   Gait pattern Decreased foot clearance; Forward Flexion; Short stride; Excessively slow   Gait Assistance 5  Supervision   Additional items Assist x 1;Verbal cues   Assistive Device Rolling walker   Distance 160ft x1 with stair negotiation training MCFP   Stair Management Assistance 5  Supervision   Additional items Assist x 1;Verbal cues; Tactile cues; Increased time required   Stair Management Technique One rail R;Foreward;Nonreciprocal   Number of Stairs 10   Balance   Static Sitting Good   Dynamic Sitting Fair +   Static Standing Good   Dynamic Standing Fair +   Ambulatory Fair +   Endurance Deficit   Endurance Deficit Yes   Activity Tolerance   Activity Tolerance Patient limited by pain; Patient limited by fatigue   Medical Staff Made Aware Spoke with JOSI NG   Nurse Made Aware Spoke with HUNTER Avalos   Assessment   Prognosis Good   Problem List Decreased strength;Decreased endurance; Impaired balance;Decreased mobility;Obesity; Decreased skin integrity;Pain   Assessment Pt seen for PT treatment today with focus on gait and stair training  Pt presents semi-supine in bed, reports 3/10 pain and is eager/motivated for session  Pt completes all aspects of functional mobility with S, however significantly extended time needed due to pain  Pt completes lower body dressing in preparation of gait, + toileting prior to gait, all with variable independence to [facility], increased difficulty during fwd bending  Pt displays improved ambulation mechanics and gait speed however remains excessively slow as compared to age matched norms  Pt ambulates 160ft with RW and S, and negotiates 10 stairs with HR assist/S/increased time  With mobility training, pt reports pain increases from 3/10 to 6/10, however is also in tears due to pain, decreases with rest  Pt continues to make progress with functional mobility however remains limited due to pain  Pt reported feeling unsteady w/o use of RW, CM contacted for DME supply for return to home  Recommend return to home with OPPT, + family support upon d/c  Will continue skilled PT POC as able and appropriate      Barriers to Discharge Decreased caregiver support   Goals   Patient Goals "to keep moving and heal" STG Expiration Date 04/02/22   Short Term Goal #1 Patient PT goals established in order to address patient self reported goal of "to keep moving and heal"  Pt will: complete all bed mobility in flat bed with S in order to promote increased OOB functional mobility to improve overall activity tolerance; complete all transfers with LRAD at KAUR/Indep level in order to increase safety with functional mobility; ambulate >150ft with LRAD at KAUR/indep level in order to increase safety with household and short community functional mobility; negotiate 10-12 stairs with HR assist and S in order to access all areas of her home; improve unsupported ambulatory balance to >/= good grade in order to promote safety and increased independence with mobility; improve AM-PAC score to >/= 22/24 in order to increase independence with mobility and decrease burden of care; improve Barthel Index score to >/= 75/100 in order to increase independence and decrease risk of falls; improve gait speed to >/= 0 8m/s to display a decreased risk of falls and improved community mobility tolerance  PT Treatment Day 2   Plan   Treatment/Interventions Functional transfer training;Elevations; Endurance training;Bed mobility;Gait training;Spoke to nursing;Spoke to case management;Spoke to advanced practitioner;OT   Progress Progressing toward goals   PT Frequency 3-5x/wk   Recommendation   PT Discharge Recommendation Home with outpatient rehabilitation   Equipment Recommended [address] [facility] Recommended Wheeled walker   Change/add to [place]?  No   AM-PAC Basic Mobility Inpatient   Turning in Bed Without Bedrails 3   Lying on Back to Sitting on Edge of Flat Bed 3   Moving Bed to Chair 3   Standing Up From Chair 3   Walk in Room 3   Climb 3-5 Stairs 3   Basic Mobility Inpatient Raw Score 18   Basic Mobility Standardized Score 41 05   Highest Level Of Mobility   -HL Goal 6: Walk 10 steps or more   -[facility] Highest Level of Mobility 7: Walk 25 feet or more   JH-HLM Goal Achieved Yes   Education   Education Provided Mobility training;Assistive device   Patient Explanation/teachback used;Demonstrates acceptance/verbal understanding   End of Consult   Patient Position at End of Consult Bedside chair; All needs within reach   End of Consult Comments Based on patient's [facility] Level of Mobility scores today, patient currently has a goal of JH-HLM Levels: 7: WALK 25 FEET OR MORE, to be completed with RN staffing each shift, in order to improve overall activity tolerance and mobility, combat hospital related deconditioning, and maximize outcomes for d/c from the acute care setting  The patient's AM-PAC Basic Mobility Inpatient Short Form Raw Score is 18  A Raw score of greater than 16 suggests the patient may benefit from discharge to home  Please also refer to the recommendation of the Physical Therapist for safe discharge planning          Annemarie Valenzuela PT, DPT    Available via [employer]  [facility] # [id number]  [place] License - [id number]  3/95/1440

## 2022-03-25 NOTE — PLAN OF CARE
Problem: PAIN - ADULT  Goal: Verbalizes/displays adequate comfort level or baseline comfort level  Description: Interventions:  - Encourage patient to monitor pain and request assistance  - Assess pain using appropriate pain scale  - Administer analgesics based on type and severity of pain and evaluate response  - Implement non-pharmacological measures as appropriate and evaluate response  - Consider cultural and social influences on pain and pain management  - Notify physician/advanced practitioner if interventions unsuccessful or patient reports new pain  Outcome: Progressing     Problem: INFECTION - ADULT  Goal: Absence or prevention of progression during hospitalization  Description: INTERVENTIONS:  - Assess and monitor for signs and symptoms of infection  - Monitor lab/diagnostic results  - Monitor all insertion sites, i e  indwelling lines, tubes, and drains  - Monitor endotracheal if appropriate and nasal secretions for changes in amount and color  - [place] appropriate cooling/warming therapies per order  - Administer medications as ordered  - Instruct and encourage patient and family to use good hand hygiene technique  - Identify and instruct in appropriate isolation precautions for identified infection/condition  Outcome: Progressing  Goal: Absence of fever/infection during neutropenic period  Description: INTERVENTIONS:  - Monitor WBC    Outcome: Progressing     Problem: SAFETY ADULT  Goal: Patient will remain free of falls  Description: INTERVENTIONS:  - Educate patient/family on patient safety including physical limitations  - Instruct patient to call for assistance with activity   - Consult OT/PT to assist with strengthening/mobility   - Keep Call bell within reach  - Keep bed low and locked with side rails adjusted as appropriate  - Keep care items and personal belongings within reach  - Initiate and maintain comfort rounds  - Make Fall Risk Sign visible to staff  - Apply yellow socks and bracelet for high fall risk patients  - Consider moving patient to room near nurses station  Outcome: Progressing  Goal: Maintain or return to baseline ADL function  Description: INTERVENTIONS:  -  Assess patient's ability to carry out ADLs; assess patient's baseline for ADL function and identify physical deficits which impact ability to perform ADLs (bathing, care of mouth/teeth, toileting, grooming, dressing, etc )  - Assess/evaluate cause of self-care deficits   - Assess range of motion  - Assess patient's mobility; develop plan if impaired  - Assess patient's need for assistive devices and provide as appropriate  - Encourage maximum independence but intervene and supervise when necessary  - Involve family in performance of ADLs  - Assess for home care needs following discharge   - Consider OT consult to assist with ADL evaluation and planning for discharge  - Provide patient education as appropriate  Outcome: Progressing  Goal: Maintains/Returns to pre admission functional level  Description: INTERVENTIONS:  - Perform BMAT or MOVE assessment daily    - Set and communicate daily mobility goal to care team and patient/family/caregiver     - Collaborate with rehabilitation services on mobility goals if consulted  - Out of bed for toileting  - Record patient progress and toleration of activity level   Outcome: Progressing     Problem: DISCHARGE PLANNING  Goal: Discharge to home or other facility with appropriate resources  Description: INTERVENTIONS:  - Identify barriers to discharge w/patient and caregiver  - Arrange for needed discharge resources and transportation as appropriate  - Identify discharge learning needs (meds, wound care, etc )  - Arrange for interpretive services to assist at discharge as needed  - Refer to Case Management Department for coordinating discharge planning if the patient needs post-hospital services based on physician/advanced practitioner order or complex needs related to functional status, cognitive ability, or social support system  Outcome: Progressing     Problem: Knowledge Deficit  Goal: Patient/family/caregiver demonstrates understanding of disease process, treatment plan, medications, and discharge instructions  Description: Complete learning assessment and assess knowledge base  Interventions:  - Provide teaching at level of understanding  - Provide teaching via preferred learning methods  Outcome: Progressing     Problem: MOBILITY - ADULT  Goal: Maintain or return to baseline ADL function  Description: INTERVENTIONS:  -  Assess patient's ability to carry out ADLs; assess patient's baseline for ADL function and identify physical deficits which impact ability to perform ADLs (bathing, care of mouth/teeth, toileting, grooming, dressing, etc )  - Assess/evaluate cause of self-care deficits   - Assess range of motion  - Assess patient's mobility; develop plan if impaired  - Assess patient's need for assistive devices and provide as appropriate  - Encourage maximum independence but intervene and supervise when necessary  - Involve family in performance of ADLs  - Assess for home care needs following discharge   - Consider OT consult to assist with ADL evaluation and planning for discharge  - Provide patient education as appropriate  Outcome: Progressing  Goal: Maintains/Returns to pre admission functional level  Description: INTERVENTIONS:  - Perform BMAT or MOVE assessment daily    - Set and communicate daily mobility goal to care team and patient/family/caregiver     - Collaborate with rehabilitation services on mobility goals if consulted  - Out of bed for toileting  - Record patient progress and toleration of activity level   Outcome: Progressing

## 2022-03-25 NOTE — PROGRESS NOTES
Progress Note - General Surgery   Michael Starr 55 y o  female MRN: [id number]  Unit/Bed#: -01 Encounter: [id number]    Assessment:  Postop day 3  Status post repair of incisional hernia with mesh    Plan:  Discharge home today    Subjective/Objective   Chief Complaint:  Incisional pain but controlled    Subjective:  Patient states that her pain is controlled on oral pain medications  She denies any nausea or vomiting  She is passing flatus  She is tolerating regular diet  Objective:  Patient is afebrile    Blood pressure 115/78, pulse 61, temperature 98 2 °F (36 8 °C), temperature source Oral, resp  rate 16, height 5' 1" (1 549 m), weight 81 6 kg (180 lb), last menstrual period 03/04/2022, SpO2 95 %, not currently breastfeeding  ,Body mass index is 34 01 kg/m²  Intake/Output Summary (Last 24 hours) at 3/25/2022 1346  Last data filed at 3/24/2022 1825  Gross per 24 hour   Intake 600 ml   Output 770 ml   Net -170 ml       Invasive Devices  Report    Peripheral Intravenous Line            Peripheral IV 03/21/22 Right Antecubital 4 days    Peripheral IV 03/22/22 Left;Ventral (anterior) Forearm 3 days          Drain            Closed/Suction Drain Inferior;Right Abdomen Bulb 10 Fr  3 days                Physical Exam:  Vital signs are stable  Heart sounds are normal  Chest is clear to auscultation  Abdominal exam reveals clean dry dressing  ROSALVA drainage is serosanguineous  Bowel sounds are active  Lab, Imaging and other studies:I have personally reviewed pertinent lab results      VTE Pharmacologic Prophylaxis: Sequential compression device (Venodyne)   VTE Mechanical Prophylaxis: foot pump applied

## 2022-03-25 NOTE — PROGRESS NOTES
[address]  66   Progress Note - Jan Campuzano 1975, 55 y o  female MRN: [id number]  Unit/Bed#: -01 Encounter: [id number]  Primary Care Provider: Mayra Pickett MD   Date and time admitted to hospital: 3/21/2022 10:49 AM    * Abdominal pain  Assessment & Plan  · Patient presented with Lower Abdominal pain 2/2 incarcerated hernia - no evidence of obstruction  · Last menstrual period was early March currently has no vaginal bleeding  · Urine pregnancy: Negative  · CT of the abdomen shows a large midline ventral hernia without evidence of obstruction  · Continue antiemetics   · Continue pain management, still with moderate to severe abdominal pain, although improved, ambulated well with PT   · Patient states she is passing gas, still no BM  Some drainage noted on dressing on evaluation  · General Surgery Following:  · S/P Ex Lap with reduction of incarcerated hernia with repair of incisional hernia mesh 3/22  · Advanced to surgical soft diet, discontinue IV fluids, responding well  · Monitor ROSALVA drain output  · Encourage a OOB, Chest PT and incentive spirometry  · Stable for discharge from IM perspective, discharge per primary team  · Recommend discharge with short course of oxycodone and scheduled Tylenol for pain management    SIRS (systemic inflammatory response syndrome) ([address] )  Assessment & Plan  · Patient with Hypotension and Leukocytosis on 3/22  · Of note, Patient diagnosed with COVID-19 2 weeks ago and received monoclonal antibody  · VSS, leukocytosis resolved  · UA is negative  · CT abdomen shows lungs clear no infiltrate noted  · BC x2: NGTD  · Fluids: S/P 2L NSS Bolus,S/P IV hydration with LR  IVFs D/C'd 3/24, responding well    · S/P IV abx with Cefepime and Flagyl  · Replete electrolytes p r n , mild hypokalemia and hypomagnesemia today, likely in setting of decreased oral intake  · Monitor CBC/Fever Curve    COVID-19  Assessment & Plan  · Patient tested positive 2 weeks ago with home test     · Patient received monoclonal antibody and she feels better  · Upon assessment, patient with no respiratory complaints, with O2 sats >95% on room air  · Continue to monitor respiratory status  · Encourage IS/ Flutter Valve    Obesity  Assessment & Plan  · Hx of gastric bypass surgery 6 years ago  · Encourage continued lifestyle modifications    Anemia  Assessment & Plan  · Likely in the setting of acute blood loss secondary to recent OR procedure  · No acute signs of bleeding, hemoglobin still remains stable, patient asymptomatic  · Hgb 8 7--> 9 9 --> 8 3    · Transfuse for HGB less than 7        VTE Pharmacologic Prophylaxis: VTE Score: 4 Moderate Risk (Score 3-4) - Pharmacological DVT Prophylaxis Ordered: heparin  Patient Centered Rounds: I performed bedside rounds with nursing staff today  Discussions with Specialists or Other Care Team Provider:  Case Management    Education and Discussions with Family / Patient: Patient declined call to   Time Spent for Care: 30 minutes  More than 50% of total time spent on counseling and coordination of care as described above  Current Length of Stay: 3 day(s)  Current Patient Status: Inpatient   Certification Statement: The patient will continue to require additional inpatient hospital stay due to Discharge per primary team  Discharge Plan: Anticipate discharge later today or tomorrow to home  Code Status: Prior    Subjective:   Patient seen at bedside this a m , still reports moderate abdominal pain mainly with ambulation, just completed working with PT  Patient states that pain is much improved, tolerating diet well although decreased appetite, denies nausea/vomiting, states she is passing gas but still no BM      Objective:     Vitals:   Temp (24hrs), Av 7 °F (36 5 °C), Min:97 1 °F (36 2 °C), Max:98 2 °F (36 8 °C)    Temp:  [97 1 °F (36 2 °C)-98 2 °F (36 8 °C)] 98 2 °F (36 8 °C)  HR:  [58-61] 61  Resp: [16] 16  BP: ()/(68-78) 115/78  SpO2:  [95 %-96 %] 95 %  Body mass index is 34 01 kg/m²  Input and Output Summary (last 24 hours): Intake/Output Summary (Last 24 hours) at 3/25/2022 1137  Last data filed at 3/24/2022 1825  Gross per 24 hour   Intake 600 ml   Output 770 ml   Net -170 ml       Physical Exam:   Physical Exam  Constitutional:       General: She is not in acute distress  Appearance: Normal appearance  She is not ill-appearing, toxic-appearing or diaphoretic  Cardiovascular:      Rate and Rhythm: Normal rate and regular rhythm  Pulses: Normal pulses  Heart sounds: Normal heart sounds  Pulmonary:      Effort: Pulmonary effort is normal  No respiratory distress  Breath sounds: Normal breath sounds  Abdominal:      General: Bowel sounds are normal  There is no distension  Palpations: Abdomen is soft  Tenderness: There is abdominal tenderness  Comments: Dressing on abdomen intact, with some mild drainage through dressing  ROSALVA drain in place, small amounts of blood collected  Tenderness to palpation surrounding incisions  Musculoskeletal:         General: No swelling or tenderness  Skin:     General: Skin is warm  Neurological:      General: No focal deficit present  Mental Status: She is alert and oriented to person, place, and time     Psychiatric:         Mood and Affect: Mood normal          Behavior: Behavior normal           Additional Data:     Labs:  Results from last 7 days   Lab Units 03/25/22  0533   WBC Thousand/uL 7 38   HEMOGLOBIN g/dL 8 3*   HEMATOCRIT % 27 9*   PLATELETS Thousands/uL 301   NEUTROS PCT % 63   LYMPHS PCT % 24   MONOS PCT % 8   EOS PCT % 3     Results from last 7 days   Lab Units 03/25/22  0533 03/22/22  0457 03/21/22  1116   SODIUM mmol/L 138   < > 135   POTASSIUM mmol/L 3 4*   < > 3 9   CHLORIDE mmol/L 103   < > 98   CO2 mmol/L 28   < > 27   BUN mg/dL 7   < > 13   CREATININE mg/dL 0 57*   < > 0 72   ANION GAP mmol/L 7   < > 10   CALCIUM mg/dL 8 4   < > 9 7   ALBUMIN g/dL  --   --  4 3   TOTAL BILIRUBIN mg/dL  --   --  0 41   ALK PHOS U/L  --   --  119*   ALT U/L  --   --  17   AST U/L  --   --  20   GLUCOSE RANDOM mg/dL 80   < > 75    < > = values in this interval not displayed  Results from last 7 days   Lab Units 03/22/22  0925 03/22/22  0537 03/21/22  1116   LACTIC ACID mmol/L  --  0 8 1 0   PROCALCITONIN ng/ml 0 06  --   --        Lines/Drains:  Invasive Devices  Report    Peripheral Intravenous Line            Peripheral IV 03/21/22 Right Antecubital 4 days    Peripheral IV 03/22/22 Left;Ventral (anterior) Forearm 3 days          Drain            Closed/Suction Drain Inferior;Right Abdomen Bulb 10 Fr  2 days                      Imaging: Reviewed radiology reports from this admission including: abdominal/pelvic CT    Recent Cultures (last 7 days):   Results from last 7 days   Lab Units 03/22/22  0927 03/22/22  0926   BLOOD CULTURE  No Growth at 48 hrs  No Growth at 48 hrs  Last 24 Hours Medication List:   Current Facility-Administered Medications   Medication Dose Route Frequency Provider Last Rate    acetaminophen  975 mg Oral Q8H LAVELLE Obando      albuterol  2 puff Inhalation Q6H PRN Franchesca May Reina, [place]      docusate sodium  100 mg Oral BID Franchesca May Reina, [place]      heparin (porcine)  5,000 Units Subcutaneous [facility] [address] Franchesca[place], [place]      HYDROmorphone  0 5 mg Intravenous Q3H PRN Franchesca May Reina, [place]      ondansetron  4 mg Intravenous Q6H PRN Franchesca May Reina, [place]      oxyCODONE  10 mg Oral Q4H PRN Franchesca May Reina, [place]      oxyCODONE  5 mg Oral Q4H PRN Franchesca May Reina, [place]      pantoprazole  40 mg Intravenous Q24H [address] Franchesca May Reina, [place]      senna  1 tablet Oral HS LAVELLE Shah          Today, Patient Was Seen By: Eric Landaverde PA-C    **Please Note: This note may have been constructed using a voice recognition system  **

## 2022-03-27 LAB
BACTERIA BLD CULT: NORMAL
BACTERIA BLD CULT: NORMAL

## 2022-03-28 ENCOUNTER — OFFICE VISIT (OUTPATIENT)
Dept: SURGERY | Facility: CLINIC | Age: 47
End: 2022-03-28

## 2022-03-28 VITALS
BODY MASS INDEX: 35.12 KG/M2 | WEIGHT: 186 LBS | OXYGEN SATURATION: 98 % | TEMPERATURE: 97.5 F | RESPIRATION RATE: 20 BRPM | DIASTOLIC BLOOD PRESSURE: 74 MMHG | HEART RATE: 68 BPM | HEIGHT: 61 IN | SYSTOLIC BLOOD PRESSURE: 122 MMHG

## 2022-03-28 DIAGNOSIS — K43.2 INCISIONAL HERNIA: Primary | ICD-10-CM

## 2022-03-28 PROCEDURE — 99024 POSTOP FOLLOW-UP VISIT: CPT | Performed by: SURGERY

## 2022-03-28 NOTE — PROGRESS NOTES
Assessment/Plan:  I removed the drain today  I also changed the midline wound dressing  I told her to avoid lifting over 10-15 lb for another 2-3 weeks  I am going to see her next week to remove the staples  No problem-specific Assessment & Plan notes found for this encounter  Diagnoses and all orders for this visit:    Incisional hernia          Subjective:      Patient ID: Michael Starr is a 55 y o  female  54-year-old female patient who is 6 days status post open repair of a large incarcerated incisional hernia with mesh  She says that her drain output is 50 cc over 24 hours  She still has pain however it is improving  She is trying to stop taking oxycodone and stick to Tylenol for pain  She is able to tolerate diet  Some nausea but no vomiting  She is having bowel movements and is passing flatus  The following portions of the patient's history were reviewed and updated as appropriate: allergies, current medications, past family history, past medical history, past social history, past surgical history and problem list     Review of Systems   All other systems reviewed and are negative  Objective:      /74 (BP Location: Left arm, Patient Position: Sitting, Cuff Size: Adult)   Pulse 68   Temp 97 5 °F (36 4 °C)   Resp 20   Ht 5' 1" (1 549 m)   Wt 84 4 kg (186 lb)   LMP 03/04/2022 (Approximate)   SpO2 98%   BMI 35 14 kg/m²          Physical Exam  Constitutional:       Appearance: She is obese  Cardiovascular:      Rate and Rhythm: Normal rate and regular rhythm  Pulmonary:      Effort: Pulmonary effort is normal       Breath sounds: Normal breath sounds  Abdominal:      General: Bowel sounds are normal  There is no distension  Palpations: Abdomen is soft  Tenderness: There is no abdominal tenderness  Comments: The midline incision looks healthy  Staples are inside 2  The right lower quadrant drain was mostly serous       Neurological:      Mental Status: She is alert

## 2022-03-28 NOTE — LETTER
March 28, 2022     Patient: Bhanu Romo   YOB: 1975   Date of Visit: 3/28/2022       To Whom it May Concern:    Bhanu Romo is under my professional care  She was seen in my office on 3/28/2022  She will be returning to the office on 04/04/2022, and will be reassessed for her return to work date  If you have any questions or concerns, please don't hesitate to call           Sincerely,          Erin Jacobs MD        CC: No Recipients

## 2022-03-28 NOTE — UTILIZATION REVIEW
Notification of Discharge   This is a Notification of Discharge from our facility [address]  Please be advised that this patient has been discharge from our facility  Below you will find the admission and discharge date and time including the patients disposition  UTILIZATION REVIEW CONTACT:  [address] [place]  Utilization   Network Utilization Review Department  Phone: [phone] x carefully listen to the prompts  All voicemails are confidential   Email: [email or web address]     PHYSICIAN ADVISORY SERVICES:  FOR QBMD-YD-BLZC REVIEW - MEDICAL NECESSITY DENIAL  Phone: [phone]  Fax: [phone]  Email: [email or web address]     PRESENTATION DATE: 3/21/2022 10:49 AM  OBERVATION ADMISSION DATE:   INPATIENT ADMISSION DATE: 3/22/22  5:16 PM   DISCHARGE DATE: 3/25/2022  6:34 PM  DISPOSITION: Home/Self Care Home/Self Care      IMPORTANT INFORMATION:  Send all requests for admission clinical reviews, approved or denied determinations and any other requests to dedicated fax number below belonging to the campus where the patient is receiving treatment   List of dedicated fax numbers:  [address] DENIALS (Administrative/Medical Necessity) [phone]   [address] (Maternity/NICU/Pediatrics) [phone]   Vangie Perrin [phone]   130 [facility] [phone]   [address] [place] [address] [phone]   [address] [phone]   [facility]  [phone]   [address] [place] [address], [facility]  2401 [facility] 1000 [place] [facility] [phone]

## 2022-03-29 ENCOUNTER — TRANSITIONAL CARE MANAGEMENT (OUTPATIENT)
Dept: FAMILY MEDICINE CLINIC | Facility: OTHER | Age: 47
End: 2022-03-29

## 2022-04-04 ENCOUNTER — OFFICE VISIT (OUTPATIENT)
Dept: SURGERY | Facility: CLINIC | Age: 47
End: 2022-04-04

## 2022-04-04 VITALS
HEART RATE: 75 BPM | DIASTOLIC BLOOD PRESSURE: 80 MMHG | BODY MASS INDEX: 34.17 KG/M2 | TEMPERATURE: 97.7 F | WEIGHT: 181 LBS | SYSTOLIC BLOOD PRESSURE: 124 MMHG | RESPIRATION RATE: 18 BRPM | OXYGEN SATURATION: 99 % | HEIGHT: 61 IN

## 2022-04-04 DIAGNOSIS — L98.7 EXCESSIVE SKIN AND SUBCUTANEOUS TISSUE: ICD-10-CM

## 2022-04-04 DIAGNOSIS — K43.2 INCISIONAL HERNIA: Primary | ICD-10-CM

## 2022-04-04 PROCEDURE — 99024 POSTOP FOLLOW-UP VISIT: CPT | Performed by: SURGERY

## 2022-04-04 NOTE — LETTER
April 4, 2022     Patient: Daniel Abreu   YOB: 1975   Date of Visit: 4/4/2022       To Whom it May Concern:    Daniel Abreu is under my professional care  She was seen in my office on 4/4/2022  She may return to work on 04/05/2022  She may work from home  If you have any questions or concerns, please don't hesitate to call           Sincerely,          Brayan Puente MD        CC: No Recipients

## 2022-04-04 NOTE — PROGRESS NOTES
Assessment/Plan:  I removed her staples  She can start working from home  I told her that she would benefit from a abdominal plasty/panniculectomy  She wants to be seen by a plastic surgeon and a referral has been placed  I am going to see her in a month or p r n  No problem-specific Assessment & Plan notes found for this encounter  Diagnoses and all orders for this visit:    Incisional hernia    Excessive skin and subcutaneous tissue  -     Ambulatory Referral to Plastic Surgery; Future          Subjective:      Patient ID: Victorino Mckeon is a 55 y o  female  19-year-old who is 2 weeks status post open repair of incisional hernia with mesh  Doing well  Her pain has improved considerably  She is tolerating diet and having regular bowel movements  The following portions of the patient's history were reviewed and updated as appropriate: allergies, current medications, past family history, past medical history, past social history, past surgical history and problem list     Review of Systems   All other systems reviewed and are negative  Objective:      /80 (BP Location: Left arm, Patient Position: Sitting, Cuff Size: Adult)   Pulse 75   Temp 97 7 °F (36 5 °C)   Resp 18   Ht 5' 1" (1 549 m)   Wt 82 1 kg (181 lb)   SpO2 99%   BMI 34 20 kg/m²          Physical Exam  Vitals reviewed  Constitutional:       Appearance: She is obese  Abdominal:      General: Abdomen is flat  Bowel sounds are normal       Palpations: Abdomen is soft  Hernia: No hernia is present  Comments: Incision is healthy  Staples insitu    Neurological:      Mental Status: She is alert

## 2022-04-07 ENCOUNTER — TELEMEDICINE (OUTPATIENT)
Dept: FAMILY MEDICINE CLINIC | Facility: OTHER | Age: 47
End: 2022-04-07
Payer: MEDICARE

## 2022-04-07 DIAGNOSIS — K43.2 POSTOPERATIVE INCISIONAL HERNIA: Primary | ICD-10-CM

## 2022-04-07 DIAGNOSIS — E83.42 HYPOMAGNESEMIA: ICD-10-CM

## 2022-04-07 DIAGNOSIS — E87.6 HYPOKALEMIA: ICD-10-CM

## 2022-04-07 DIAGNOSIS — D64.9 LOW HEMOGLOBIN: ICD-10-CM

## 2022-04-07 PROCEDURE — 99495 TRANSJ CARE MGMT MOD F2F 14D: CPT | Performed by: FAMILY MEDICINE

## 2022-04-07 NOTE — PROGRESS NOTES
Assessment/Plan:    No problem-specific Assessment & Plan notes found for this encounter  Diagnoses and all orders for this visit:    Postoperative incisional hernia    Low hemoglobin  - Recent lab work noted from 3/25/22 post-op low Hg of 8 3, will recheck at this time  - CBC and differential; Future    Hypokalemia        - K+ slightly decreased to 3 4, will repeat now and assess if any need for repletion is required      - Basic metabolic panel; Future    Hypomagnesemia  - Mag significantly decreased on recent lab work to 1 5, will repeat now and  assess if any need for repletion is required         - Magnesium; Future        Subjective:      Patient ID: Yvette Puckett is a 55 y o  female  Patient presents for TCM after admission to [facility] from 3/21-3/25 for worsening abdominal pain and subsequent finding of obstructed incisional hernia which required surgical intervention  Patient underwent incisional herniorrhaphy with General surgery on 3/22  Patient had follow-up on 04/04 with General surgery s/p 2 weeks from open repair of incisional hernia with mesh, with no notable complications and good wound healing noted  Upon review of lab work on day of discharge, patient noted to have a decreased Hg to 8 3, slightly decreased K+ to 3 4 and hypomagnesemia 1 5       The following portions of the patient's history were reviewed and updated as appropriate: allergies, current medications, past family history, past medical history, past social history, past surgical history and problem list     Review of Systems   Constitutional: Negative for chills and fever  Eyes: Negative for visual disturbance  Respiratory: Negative for chest tightness and shortness of breath  Cardiovascular: Negative for chest pain and palpitations  Gastrointestinal: Negative for nausea and vomiting  Neurological: Negative for dizziness and light-headedness  Objective: There were no vitals taken for this visit  Physical Exam  Constitutional:       Appearance: Normal appearance  HENT:      Head: Normocephalic and atraumatic  Eyes:      Conjunctiva/sclera: Conjunctivae normal    Neurological:      Mental Status: She is alert and oriented to person, place, and time  Please see images below for pics sent by patient to assess wound healing, as TCM was completed virtually                 TCM Call (since 3/7/2022)     Hospital care reviewed  Records reviewed        Patient was hospitial[facility] at  [facility]         Date of Admission  03/21/22    Date of discharge  03/25/22    Diagnosis  Abdominal pain    Disposition  Home    Were the patients medications reviewed and updated  Yes    Current Symptoms  None      TCM Call (since 3/7/2022)     Post hospital issues  Reduced activity    Should patient be enrolled in anticoag monitoring? No    Scheduled for follow up?   Yes    Did you obtain your prescribed medications  Yes    Do you need help managing your prescriptions or medications  No    Is transportation to your appointment needed  No    I have advised the patient to call PCP with any new or worsening symptoms  [place]  Family    The type of support provided  Emotional; Physical    Do you have social support  Yes, as much as I need    Are you recieving any outpatient services  No    Are you recieving home care services  No    Are you using any community resources  No    Current waiver services  No    Have you fallen in the last 12 months  Yes    Interperter language line needed  No    Counseling  Patient

## 2022-04-07 NOTE — PROGRESS NOTES
Virtual TCM Visit:    Verification of patient location:    Patient is located in the following state in which I hold an active license [place]        Assessment/Plan:        Problem List Items Addressed This Visit        Other    Postoperative incisional hernia - Primary  -  Patient presents for TCM after [facility] for worsening abdominal pain and subsequent finding of obstructed incisional hernia which required surgical intervention  - Wound healing appropriately ( only seen via telemedicine video visit), she denies any fevers/chills/ purulence or discharge from the wound site      Other Visit Diagnoses     Low hemoglobin      - Recent lab work noted from 3/25/22 post-op low Hg of 8 3, will recheck at this time    Relevant Orders    CBC and differential    Hypokalemia          - K+ slightly decreased to 3 4, will repeat now and assess if any need for repletion is required        Relevant Orders    Basic metabolic panel    Hypomagnesemia      - Mag significantly decreased on recent lab work to 1 5, will repeat now and  assess if any need for repletion is required          Relevant Orders    Magnesium            Reason for visit is TCM    Encounter provider Yudy Muhammad MD       Provider located at [address] [address]      Recent Visits  No visits were found meeting these conditions  Showing recent visits within past 7 days and meeting all other requirements  Today's Visits  Date Type Provider Dept   04/07/22 Telemedicine MD Presley Heard   Showing today's visits and meeting all other requirements  Future Appointments  No visits were found meeting these conditions  Showing future appointments within next 150 days and meeting all other requirements       After connecting through [employer], the patient was identified by name and date of birth   Yesenia De La Torre was informed that this is a telemedicine visit and that the visit is being conducted through [address] and patient was informed this is a secure, HIPAA-complaint platform  She agrees to proceed     My office door was closed  No one else was in the room  She acknowledged consent and understanding of privacy and security of the video platform  The patient has agreed to participate and understands they can discontinue the visit at any time  Patient is aware this is a billable service  Subjective:     Patient ID: Annmarie Buchanan is a 55 y o  female        Patient presents for TCM after admission to [facility] from 3/21-3/25 for worsening abdominal pain and subsequent finding of obstructed incisional hernia which required surgical intervention  Patient underwent incisional herniorrhaphy with General surgery on 3/22  Patient had follow-up on 04/04 with General surgery s/p 2 weeks from open repair of incisional hernia with mesh, with no notable complications and good wound healing noted  Upon review of lab work on day of discharge, patient noted to have a decreased Hg to 8 3, slightly decreased K+ to 3 4 and hypomagnesemia 1 5       Review of Systems   Constitutional: Negative for chills and fever  Eyes: Negative for visual disturbance  Respiratory: Negative for chest tightness and shortness of breath  Cardiovascular: Negative for chest pain and palpitations  Gastrointestinal: Negative for nausea and vomiting  Neurological: Negative for dizziness and light-headedness  Objective: There were no vitals filed for this visit  Physical Exam  Constitutional:       Appearance: Normal appearance  HENT:      Head: Normocephalic and atraumatic  Eyes:      Conjunctiva/sclera: Conjunctivae normal    Musculoskeletal:      Cervical back: Normal range of motion and neck supple     Skin:     Comments: Please see images below for pics sent by patient to assess wound healing, as TCM was completed virtually    Neurological:      Mental Status: She is alert and oriented to person, place, and time  Transitional Care Management Review:  Yesenia De La Torre is a 55 y o  female here for TCM follow up  During the TCM phone call patient stated:    TCM Call (since 3/7/2022)     Hospital care reviewed  Records reviewed        Patient was hospitialized at  [address] [place] [address]        Date of Admission  03/21/22    Date of discharge  03/25/22    Diagnosis  Abdominal pain    Disposition  Home    Were the patients medications reviewed and updated  Yes    Current Symptoms  None      TCM Call (since 3/7/2022)     Post hospital issues  Reduced activity    Should patient be enrolled in anticoag monitoring? No    Scheduled for follow up? Yes    Did you obtain your prescribed medications  Yes    Do you need help managing your prescriptions or medications  No    Is transportation to your appointment needed  No    I have advised the patient to call PCP with any new or worsening symptoms  [place]  Family    The type of support provided  Emotional; Physical    Do you have social support  Yes, as much as I need    Are you recieving any outpatient services  No    Are you recieving home care services  No    Are you using any community resources  No    Current waiver services  No    Have you fallen in the last 12 months  Yes    Interperter language line needed  No    Counseling  Patient          I spent 35 minutes with the patient during this visit  Yudy Muhammad MD      VIRTUAL VISIT DISCLAIMER    Yesenia De La Torre verbally agrees to participate in [employer]  Pt is aware that [employer] could be limited without vital signs or the ability to perform a full hands-on physical exam  Miladis Coffman understands she or the provider may request at any time to terminate the video visit and request the patient to seek care or treatment in person

## 2022-04-08 ENCOUNTER — TELEPHONE (OUTPATIENT)
Dept: FAMILY MEDICINE CLINIC | Facility: OTHER | Age: 47
End: 2022-04-08

## 2022-04-08 ENCOUNTER — APPOINTMENT (OUTPATIENT)
Dept: LAB | Facility: HOSPITAL | Age: 47
End: 2022-04-08
Payer: MEDICARE

## 2022-04-08 DIAGNOSIS — E83.42 HYPOMAGNESEMIA: ICD-10-CM

## 2022-04-08 DIAGNOSIS — D64.9 LOW HEMOGLOBIN: ICD-10-CM

## 2022-04-08 DIAGNOSIS — E87.6 HYPOKALEMIA: ICD-10-CM

## 2022-04-08 LAB
ANION GAP SERPL CALCULATED.3IONS-SCNC: 8 MMOL/L (ref 4–13)
BASOPHILS # BLD AUTO: 0.1 THOUSANDS/ΜL (ref 0–0.1)
BASOPHILS NFR BLD AUTO: 1 % (ref 0–1)
BUN SERPL-MCNC: 16 MG/DL (ref 5–25)
CALCIUM SERPL-MCNC: 9.5 MG/DL (ref 8.4–10.2)
CHLORIDE SERPL-SCNC: 102 MMOL/L (ref 96–108)
CO2 SERPL-SCNC: 28 MMOL/L (ref 21–32)
CREAT SERPL-MCNC: 0.74 MG/DL (ref 0.6–1.3)
EOSINOPHIL # BLD AUTO: 0.2 THOUSAND/ΜL (ref 0–0.61)
EOSINOPHIL NFR BLD AUTO: 2 % (ref 0–6)
ERYTHROCYTE [DISTWIDTH] IN BLOOD BY AUTOMATED COUNT: 15.4 % (ref 11.6–15.1)
GFR SERPL CREATININE-BSD FRML MDRD: 97 ML/MIN/1.73SQ M
GLUCOSE P FAST SERPL-MCNC: 75 MG/DL (ref 65–99)
HCT VFR BLD AUTO: 34.3 % (ref 34.8–46.1)
HGB BLD-MCNC: 10.7 G/DL (ref 11.5–15.4)
IMM GRANULOCYTES # BLD AUTO: 0.03 THOUSAND/UL (ref 0–0.2)
IMM GRANULOCYTES NFR BLD AUTO: 0 % (ref 0–2)
LYMPHOCYTES # BLD AUTO: 2.55 THOUSANDS/ΜL (ref 0.6–4.47)
LYMPHOCYTES NFR BLD AUTO: 30 % (ref 14–44)
MAGNESIUM SERPL-MCNC: 1.8 MG/DL (ref 1.9–2.7)
MCH RBC QN AUTO: 24.6 PG (ref 26.8–34.3)
MCHC RBC AUTO-ENTMCNC: 31.2 G/DL (ref 31.4–37.4)
MCV RBC AUTO: 79 FL (ref 82–98)
MONOCYTES # BLD AUTO: 0.51 THOUSAND/ΜL (ref 0.17–1.22)
MONOCYTES NFR BLD AUTO: 6 % (ref 4–12)
NEUTROPHILS # BLD AUTO: 5.21 THOUSANDS/ΜL (ref 1.85–7.62)
NEUTS SEG NFR BLD AUTO: 61 % (ref 43–75)
NRBC BLD AUTO-RTO: 0 /100 WBCS
PLATELET # BLD AUTO: 503 THOUSANDS/UL (ref 149–390)
PMV BLD AUTO: 9.3 FL (ref 8.9–12.7)
POTASSIUM SERPL-SCNC: 4.1 MMOL/L (ref 3.5–5.3)
RBC # BLD AUTO: 4.35 MILLION/UL (ref 3.81–5.12)
SODIUM SERPL-SCNC: 138 MMOL/L (ref 135–147)
WBC # BLD AUTO: 8.6 THOUSAND/UL (ref 4.31–10.16)

## 2022-04-08 PROCEDURE — 36415 COLL VENOUS BLD VENIPUNCTURE: CPT

## 2022-04-08 PROCEDURE — 80048 BASIC METABOLIC PNL TOTAL CA: CPT

## 2022-04-08 PROCEDURE — 83735 ASSAY OF MAGNESIUM: CPT

## 2022-04-08 PROCEDURE — 85025 COMPLETE CBC W/AUTO DIFF WBC: CPT

## 2022-04-08 NOTE — TELEPHONE ENCOUNTER
[facility] would like to know what they mean from dr    Please advise   Thank you

## 2022-04-12 LAB
DME PARACHUTE DELIVERY DATE ACTUAL: NORMAL
DME PARACHUTE DELIVERY DATE REQUESTED: NORMAL
DME PARACHUTE ITEM DESCRIPTION: NORMAL
DME PARACHUTE ORDER STATUS: NORMAL
DME PARACHUTE SUPPLIER NAME: NORMAL
DME PARACHUTE SUPPLIER PHONE: NORMAL

## 2022-04-14 ENCOUNTER — TELEPHONE (OUTPATIENT)
Dept: SURGERY | Facility: CLINIC | Age: 47
End: 2022-04-14

## 2022-04-14 NOTE — TELEPHONE ENCOUNTER
Patient has stabbing pain in her incision site  No discharge, does not look red, feels warm to touch, no fever

## 2022-04-19 ENCOUNTER — APPOINTMENT (EMERGENCY)
Dept: CT IMAGING | Facility: HOSPITAL | Age: 47
End: 2022-04-19
Payer: MEDICARE

## 2022-04-19 ENCOUNTER — HOSPITAL ENCOUNTER (EMERGENCY)
Facility: HOSPITAL | Age: 47
Discharge: HOME | End: 2022-04-19
Attending: INTERNAL MEDICINE | Admitting: INTERNAL MEDICINE
Payer: MEDICARE

## 2022-04-19 VITALS
HEART RATE: 66 BPM | RESPIRATION RATE: 16 BRPM | SYSTOLIC BLOOD PRESSURE: 101 MMHG | DIASTOLIC BLOOD PRESSURE: 60 MMHG | OXYGEN SATURATION: 100 % | TEMPERATURE: 98 F

## 2022-04-19 DIAGNOSIS — R10.30 LOWER ABDOMINAL PAIN: ICD-10-CM

## 2022-04-19 DIAGNOSIS — T79.2XXA POST-TRAUMATIC SEROMA (HCC): Primary | ICD-10-CM

## 2022-04-19 LAB
ALBUMIN SERPL BCP-MCNC: 3.6 G/DL (ref 3.5–5)
ALP SERPL-CCNC: 106 U/L (ref 34–104)
ALT SERPL W P-5'-P-CCNC: 13 U/L (ref 7–52)
ANION GAP SERPL CALCULATED.3IONS-SCNC: 7 MMOL/L (ref 4–13)
AST SERPL W P-5'-P-CCNC: 40 U/L (ref 13–39)
BASOPHILS # BLD AUTO: 0.07 THOUSANDS/ΜL (ref 0–0.1)
BASOPHILS NFR BLD AUTO: 1 % (ref 0–1)
BILIRUB SERPL-MCNC: 0.29 MG/DL (ref 0.2–1)
BUN SERPL-MCNC: 10 MG/DL (ref 5–25)
CALCIUM SERPL-MCNC: 9.1 MG/DL (ref 8.4–10.2)
CHLORIDE SERPL-SCNC: 102 MMOL/L (ref 96–108)
CO2 SERPL-SCNC: 25 MMOL/L (ref 21–32)
CREAT SERPL-MCNC: 0.63 MG/DL (ref 0.6–1.3)
EOSINOPHIL # BLD AUTO: 0.11 THOUSAND/ΜL (ref 0–0.61)
EOSINOPHIL NFR BLD AUTO: 1 % (ref 0–6)
ERYTHROCYTE [DISTWIDTH] IN BLOOD BY AUTOMATED COUNT: 15.1 % (ref 11.6–15.1)
GFR SERPL CREATININE-BSD FRML MDRD: 107 ML/MIN/1.73SQ M
GLUCOSE SERPL-MCNC: 76 MG/DL (ref 65–140)
HCT VFR BLD AUTO: 33 % (ref 34.8–46.1)
HGB BLD-MCNC: 9.9 G/DL (ref 11.5–15.4)
IMM GRANULOCYTES # BLD AUTO: 0.04 THOUSAND/UL (ref 0–0.2)
IMM GRANULOCYTES NFR BLD AUTO: 1 % (ref 0–2)
LIPASE SERPL-CCNC: 32 U/L (ref 11–82)
LYMPHOCYTES # BLD AUTO: 1.48 THOUSANDS/ΜL (ref 0.6–4.47)
LYMPHOCYTES NFR BLD AUTO: 18 % (ref 14–44)
MCH RBC QN AUTO: 24 PG (ref 26.8–34.3)
MCHC RBC AUTO-ENTMCNC: 30 G/DL (ref 31.4–37.4)
MCV RBC AUTO: 80 FL (ref 82–98)
MONOCYTES # BLD AUTO: 0.54 THOUSAND/ΜL (ref 0.17–1.22)
MONOCYTES NFR BLD AUTO: 7 % (ref 4–12)
NEUTROPHILS # BLD AUTO: 6.13 THOUSANDS/ΜL (ref 1.85–7.62)
NEUTS SEG NFR BLD AUTO: 72 % (ref 43–75)
NRBC BLD AUTO-RTO: 0 /100 WBCS
PLATELET # BLD AUTO: 265 THOUSANDS/UL (ref 149–390)
PMV BLD AUTO: 9.7 FL (ref 8.9–12.7)
POTASSIUM SERPL-SCNC: 4.7 MMOL/L (ref 3.5–5.3)
PROT SERPL-MCNC: 7.7 G/DL (ref 6.4–8.4)
RBC # BLD AUTO: 4.13 MILLION/UL (ref 3.81–5.12)
SODIUM SERPL-SCNC: 134 MMOL/L (ref 135–147)
WBC # BLD AUTO: 8.37 THOUSAND/UL (ref 4.31–10.16)

## 2022-04-19 PROCEDURE — 99283 EMERGENCY DEPT VISIT LOW MDM: CPT | Performed by: PHYSICIAN ASSISTANT

## 2022-04-19 PROCEDURE — 96360 HYDRATION IV INFUSION INIT: CPT

## 2022-04-19 PROCEDURE — 36415 COLL VENOUS BLD VENIPUNCTURE: CPT | Performed by: INTERNAL MEDICINE

## 2022-04-19 PROCEDURE — 80053 COMPREHEN METABOLIC PANEL: CPT | Performed by: INTERNAL MEDICINE

## 2022-04-19 PROCEDURE — 99285 EMERGENCY DEPT VISIT HI MDM: CPT | Performed by: INTERNAL MEDICINE

## 2022-04-19 PROCEDURE — 74177 CT ABD & PELVIS W/CONTRAST: CPT

## 2022-04-19 PROCEDURE — 99284 EMERGENCY DEPT VISIT MOD MDM: CPT

## 2022-04-19 PROCEDURE — 85025 COMPLETE CBC W/AUTO DIFF WBC: CPT | Performed by: INTERNAL MEDICINE

## 2022-04-19 PROCEDURE — 96361 HYDRATE IV INFUSION ADD-ON: CPT

## 2022-04-19 PROCEDURE — G1004 CDSM NDSC: HCPCS

## 2022-04-19 PROCEDURE — 83690 ASSAY OF LIPASE: CPT | Performed by: INTERNAL MEDICINE

## 2022-04-19 RX ORDER — CEPHALEXIN 250 MG/1
500 CAPSULE ORAL ONCE
Status: COMPLETED | OUTPATIENT
Start: 2022-04-19 | End: 2022-04-19

## 2022-04-19 RX ORDER — SODIUM CHLORIDE 9 MG/ML
125 INJECTION, SOLUTION INTRAVENOUS CONTINUOUS
Status: DISCONTINUED | OUTPATIENT
Start: 2022-04-19 | End: 2022-04-19 | Stop reason: HOSPADM

## 2022-04-19 RX ORDER — CEPHALEXIN 500 MG/1
500 CAPSULE ORAL EVERY 6 HOURS SCHEDULED
Qty: 28 CAPSULE | Refills: 0 | Status: SHIPPED | OUTPATIENT
Start: 2022-04-19 | End: 2022-04-26

## 2022-04-19 RX ADMIN — SODIUM CHLORIDE 125 ML/HR: 0.9 INJECTION, SOLUTION INTRAVENOUS at 11:28

## 2022-04-19 RX ADMIN — IOHEXOL 100 ML: 350 INJECTION, SOLUTION INTRAVENOUS at 13:01

## 2022-04-19 RX ADMIN — CEPHALEXIN 500 MG: 250 CAPSULE ORAL at 14:55

## 2022-04-19 NOTE — DISCHARGE INSTRUCTIONS
Follow up with dr Uche Church surgeon  Dr Uche Church office will contact you to drain fluid  Labs Reviewed   CBC AND DIFFERENTIAL - Abnormal       Result Value Ref Range Status    WBC 8 37  4 31 - 10 16 Thousand/uL Final    RBC 4 13  3 81 - 5 12 Million/uL Final    Hemoglobin 9 9 (*) 11 5 - 15 4 g/dL Final    Hematocrit 33 0 (*) 34 8 - 46 1 % Final    MCV 80 (*) 82 - 98 fL Final    MCH 24 0 (*) 26 8 - 34 3 pg Final    MCHC 30 0 (*) 31 4 - 37 4 g/dL Final    RDW 15 1  11 6 - 15 1 % Final    MPV 9 7  8 9 - 12 7 fL Final    Platelets 350  510 - 390 Thousands/uL Final    nRBC 0  /100 WBCs Final    Neutrophils Relative 72  43 - 75 % Final    Immat GRANS % 1  0 - 2 % Final    Lymphocytes Relative 18  14 - 44 % Final    Monocytes Relative 7  4 - 12 % Final    Eosinophils Relative 1  0 - 6 % Final    Basophils Relative 1  0 - 1 % Final    Neutrophils Absolute 6 13  1 85 - 7 62 Thousands/µL Final    Immature Grans Absolute 0 04  0 00 - 0 20 Thousand/uL Final    Lymphocytes Absolute 1 48  0 60 - 4 47 Thousands/µL Final    Monocytes Absolute 0 54  0 17 - 1 22 Thousand/µL Final    Eosinophils Absolute 0 11  0 00 - 0 61 Thousand/µL Final    Basophils Absolute 0 07  0 00 - 0 10 Thousands/µL Final   COMPREHENSIVE METABOLIC PANEL - Abnormal    Sodium 134 (*) 135 - 147 mmol/L Final    Potassium 4 7  3 5 - 5 3 mmol/L Final    Comment: Slightly Hemolyzed:Results may be affected  Chloride 102  96 - 108 mmol/L Final    CO2 25  21 - 32 mmol/L Final    ANION GAP 7  4 - 13 mmol/L Final    BUN 10  5 - 25 mg/dL Final    Creatinine 0 63  0 60 - 1 30 mg/dL Final    Comment: Standardized to IDMS reference method    Glucose 76  65 - 140 mg/dL Final    Comment: If the patient is fasting, the ADA then defines impaired fasting glucose as > 100 mg/dL and diabetes as > or equal to 123 mg/dL  Specimen collection should occur prior to Sulfasalazine administration due to the potential for falsely depressed results   Specimen collection should occur prior to Sulfapyridine administration due to the potential for falsely elevated results  Calcium 9 1  8 4 - 10 2 mg/dL Final    AST 40 (*) 13 - 39 U/L Final    Comment: Slightly Hemolyzed:Results may be affected  Specimen collection should occur prior to Sulfasalazine administration due to the potential for falsely depressed results  ALT 13  7 - 52 U/L Final    Comment: Specimen collection should occur prior to Sulfasalazine administration due to the potential for falsely depressed results  Alkaline Phosphatase 106 (*) 34 - 104 U/L Final    Total Protein 7 7  6 4 - 8 4 g/dL Final    Albumin 3 6  3 5 - 5 0 g/dL Final    Total Bilirubin 0 29  0 20 - 1 00 mg/dL Final    eGFR 107  ml/min/1 73sq m Final    Narrative:     [place] guidelines for Chronic Kidney Disease (CKD):     Stage 1 with normal or high GFR (GFR > 90 mL/min/1 73 square meters)    Stage 2 Mild CKD (GFR = 60-89 mL/min/1 73 square meters)    Stage 3A Moderate CKD (GFR = 45-59 mL/min/1 73 square meters)    Stage 3B Moderate CKD (GFR = 30-44 mL/min/1 73 square meters)    Stage 4 Severe CKD (GFR = 15-29 mL/min/1 73 square meters)    Stage 5 End Stage CKD (GFR <15 mL/min/1 73 square meters)  Note: GFR calculation is accurate only with a steady state creatinine   LIPASE - Normal    Lipase 32  11 - 82 u/L Final     CT abdomen pelvis w contrast   Final Result   11 6 x 7 cm fluid collections in the lower anterior abdominal wall at the level of the surgical bed suggest seroma, if concern for infected fluid collection correlate clinically      No bowel obstruction      No evidence of  recurrent hernia      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

## 2022-04-19 NOTE — CONSULTS
Consultation - General Surgery   Valentino Chol 55 y o  female MRN: [id number]  Unit/Bed#: ED 12 Encounter: [id number]    Reason for Consult: Seroma   Attending Physician: Matilda Figueredo MD      Assessment/Plan   54 y/o F who is POD 28 s/p ex-lap open repair of incarcerated incisional hernia (performed on 3/22/22 by Dr J Carlos Perez at [facility]) now presenting with abdominal pain  CT of the abdomen pelvis reveals 11 6 x 7 cm fluid collection of the lower anterior abdominal wall, suggestive of post-op surgical seroma  CT imaging reviewed by myself and Dr Shubham Mandel  There is no air within the fluid collection and no inflammatory stranding suggestive of infection  Pt is afebrile, VSS  No leukocytosis WBC 8 37  Would recommend drainage of this fluid collection 2/2 its size and pt's correlating symptoms  Dr J Carlos Perez is able to do a bedside US-guided drainage in the outpatient surgical office  This was discussed with the pt and she is understanding/willing to follow up after discharge  The surgical office information will be provided in discharge instructions and she should call for an appointment  Subjective     Chief Complaint: Abdominal discomfort     History of Present Illness   HPI:  Valentino Chol is a 55 y o  female with PMH anxiety, anemia, asthma, DM, obesity, who presents with incidioous abdominal pain over the last 24 hours  She also describes associated nausea 2/2 to the pain, no vomiting  Eating/drinking well  Having bowel function, last BM this am but decribes that she feel like she was "not completely emptied" after defecation  No urinary complaints  Denies f/c, CP, SOB, cough  Did have palpitations over the weekend which she thinks is 2/2 anxiety and relating life stressors, but she is no longer having this symptom  Reports she did feel like she had flu-like symptoms over the weekend as well, just not feeling herself, more fatigued with body aches       Review of Systems   Constitutional: Negative for appetite change, chills and fever  HENT: Negative  Eyes: Negative  Respiratory: Negative  Cardiovascular: Negative for chest pain and palpitations  Gastrointestinal: Negative for abdominal distention, constipation, diarrhea, nausea and vomiting  Genitourinary: Negative  Musculoskeletal: Negative  Skin: Negative  Neurological: Negative  Psychiatric/Behavioral: The patient is nervous/anxious  Historical Information   Past Medical History:   Diagnosis Date    Abnormal Pap smear of cervix     2017    Anemia     Anxiety     Asthma     Diabetes mellitus ([address] )     History of transfusion 2018    HPV (human papilloma virus) infection     2017    Obesity     Varicella      Past Surgical History:   Procedure Laterality Date    BREAST BIOPSY Right 2012    u/s core bx    BREAST SURGERY      Incisional breast biopsy     SECTION      And     EXPLORATORY LAPAROTOMY W/ BOWEL RESECTION N/A 3/22/2022    Procedure: LAPAROTOMY EXPLORATORY WITH OPEN REPAIR OF INCARCERATED INCISIONAL  HERNIA WITH MESH;  Surgeon: Davion Prasad MD;  Location: [facility];  Service: General    GASTRIC BYPASS  2016    INCISIONAL HERNIA REPAIR N/A 3/22/2022    Procedure: OPEN REPAIR HERNIA INCISIONAL WITH MESH;  Surgeon: Davion Prasad MD;  Location: [facility];  Service: General    ME HYSTEROSCOPY,W/ENDO BX N/A 2021    Procedure: DILATATION AND CURETTAGE (D&C) WITH HYSTEROSCOPY (MYOSURE);   Surgeon: Daniel Niño DO;  [facility];  Service: Gynecology    ME HYSTEROSCOPY,W/ENDO BX N/A 2021    Procedure: POLYPECTOMY;  Surgeon: Daniel Niño DO;  [facility];  Service: Gynecology    TUBAL LIGATION      US GUIDED BREAST BIOPSY RIGHT COMPLETE Right 2019     Social History   Social History     Substance and Sexual Activity   Alcohol Use Yes    Alcohol/week: 0 0 standard drinks    Comment: social      Social History Substance and Sexual Activity   Drug Use Never     Social History     Tobacco Use   Smoking Status Current Every Day Smoker    Packs/day: 0 50    Years: 10 00    Pack years: 5 00    Types: Cigarettes   Smokeless Tobacco Never Used   Tobacco Comment    refused     Family History:   Family History   Problem Relation Age of Onset    Diabetes Mother     Hypertension Mother     Cancer Mother     Arthritis Mother     Leukemia Mother     Hyperlipidemia Mother     Diabetes Father     Hypertension Father     Heart disease Father     Coronary artery disease Father     Hyperlipidemia Father     Stroke Paternal Grandfather     Thyroid cancer Sister 37    Hypertension Brother     Breast cancer Maternal Aunt 48    Breast cancer Paternal Aunt 79    No Known Problems Maternal Grandmother     No Known Problems Maternal Grandfather     No Known Problems Paternal Grandmother     No Known Problems Son     No Known Problems Son     No Known Problems Maternal Aunt     No Known Problems Maternal Aunt     No Known Problems Maternal Aunt     No Known Problems Maternal Aunt     No Known Problems Maternal Aunt     Cervical cancer Paternal Aunt 36    No Known Problems Paternal Aunt     No Known Problems Paternal Aunt     No Known Problems Paternal Aunt     No Known Problems Paternal Aunt     No Known Problems Paternal Aunt     Lung cancer Paternal Uncle 80       Meds/Allergies   all medications and allergies reviewed  No Known Allergies        Objective   First Vitals:   Blood Pressure: 106/59 (04/19/22 1023)  Pulse: 69 (04/19/22 1023)  Temperature: 98 °F (36 7 °C) (04/19/22 1023)  Temp Source: Oral (04/19/22 1023)  Respirations: 18 (04/19/22 1023)  SpO2: 99 % (04/19/22 1023) There is no height or weight on file to calculate BMI    Current Vitals:   Blood Pressure: 101/60 (04/19/22 1318)  Pulse: 66 (04/19/22 1318)  Temperature: 98 °F (36 7 °C) (04/19/22 1023)  Temp Source: Oral (04/19/22 1023)  Respirations: 16 (04/19/22 1318)  SpO2: 100 % (04/19/22 1318)   I/Os:    Intake/Output Summary (Last 24 hours) at 4/19/2022 1454  Last data filed at 4/19/2022 1440  Gross per 24 hour   Intake 375 ml   Output --   Net 375 ml     Lines/Drains:  Invasive Devices  Report    Drain            Closed/Suction Drain Inferior;Right Abdomen Bulb 10 Fr  28 days                Physical Exam  Vitals reviewed  Constitutional:       General: She is not in acute distress  HENT:      Head: Normocephalic and atraumatic  Eyes:      Extraocular Movements: Extraocular movements intact  Cardiovascular:      Rate and Rhythm: Normal rate and regular rhythm  Heart sounds: No murmur heard  Pulmonary:      Effort: Pulmonary effort is normal       Breath sounds: Normal breath sounds  Abdominal:      General: Bowel sounds are normal  There is no distension  Palpations: Abdomen is soft  Tenderness: There is abdominal tenderness (at apex of midline incision scar and around mass)  There is no guarding or rebound  Comments: Large palpable superficial, soft mass at lower anterior abdomen    Musculoskeletal:         General: No tenderness  Cervical back: Neck supple  Right lower leg: No edema  Left lower leg: No edema  Skin:     General: Skin is warm and dry  Neurological:      General: No focal deficit present  Mental Status: She is alert and oriented to person, place, and time  Lab Results: I have personally reviewed pertinent lab results      Recent Results (from the past 36 hour(s))   CBC and differential    Collection Time: 04/19/22 11:02 AM   Result Value Ref Range    WBC 8 37 4 31 - 10 16 Thousand/uL    RBC 4 13 3 81 - 5 12 Million/uL    Hemoglobin 9 9 (L) 11 5 - 15 4 g/dL    Hematocrit 33 0 (L) 34 8 - 46 1 %    MCV 80 (L) 82 - 98 fL    MCH 24 0 (L) 26 8 - 34 3 pg    MCHC 30 0 (L) 31 4 - 37 4 g/dL    RDW 15 1 11 6 - 15 1 %    MPV 9 7 8 9 - 12 7 fL    Platelets 947 486 - 843 Thousands/uL    nRBC 0 /100 WBCs    Neutrophils Relative 72 43 - 75 %    Immat GRANS % 1 0 - 2 %    Lymphocytes Relative 18 14 - 44 %    Monocytes Relative 7 4 - 12 %    Eosinophils Relative 1 0 - 6 %    Basophils Relative 1 0 - 1 %    Neutrophils Absolute 6 13 1 85 - 7 62 Thousands/µL    Immature Grans Absolute 0 04 0 00 - 0 20 Thousand/uL    Lymphocytes Absolute 1 48 0 60 - 4 47 Thousands/µL    Monocytes Absolute 0 54 0 17 - 1 22 Thousand/µL    Eosinophils Absolute 0 11 0 00 - 0 61 Thousand/µL    Basophils Absolute 0 07 0 00 - 0 10 Thousands/µL   Comprehensive metabolic panel    Collection Time: 04/19/22 11:02 AM   Result Value Ref Range    Sodium 134 (L) 135 - 147 mmol/L    Potassium 4 7 3 5 - 5 3 mmol/L    Chloride 102 96 - 108 mmol/L    CO2 25 21 - 32 mmol/L    ANION GAP 7 4 - 13 mmol/L    BUN 10 5 - 25 mg/dL    Creatinine 0 63 0 60 - 1 30 mg/dL    Glucose 76 65 - 140 mg/dL    Calcium 9 1 8 4 - 10 2 mg/dL    AST 40 (H) 13 - 39 U/L    ALT 13 7 - 52 U/L    Alkaline Phosphatase 106 (H) 34 - 104 U/L    Total Protein 7 7 6 4 - 8 4 g/dL    Albumin 3 6 3 5 - 5 0 g/dL    Total Bilirubin 0 29 0 20 - 1 00 mg/dL    eGFR 107 ml/min/1 73sq m   Lipase    Collection Time: 04/19/22 11:02 AM   Result Value Ref Range    Lipase 32 11 - 82 u/L     Imaging: I have personally reviewed pertinent reports  CT abdomen pelvis w contrast    Result Date: 4/19/2022  Impression: 11 6 x 7 cm fluid collections in the lower anterior abdominal wall at the level of the surgical bed suggest seroma, if concern for infected fluid collection correlate clinically No bowel obstruction No evidence of  recurrent hernia Postoperative changes from Robyn-en-Y gastric bypass  The distal anastomosis lies in the left upper abdomen as compared to its position in the right upper abdomen on the previous study   Mild telescoping of the jejunal loops at the level of distal anastomosis without bowel obstruction No bowel wall thickening, pneumatosis or free Workstation performed: [id number]     EKG, Pathology, and Other Studies: I have personally reviewed pertinent reports           DEREK Mart  4/19/2022

## 2022-04-19 NOTE — ED PROVIDER NOTES
History  Chief Complaint   Patient presents with    Abdominal Pain     Patient states had hernia repair surgery 3/22/22 and new onset of lower abdominal pain x one week,  patient states feels like bowels are not emptying during BM's     This is 55y old came for having abdominal pain, which is lower one  Pt had repair of incarcerated incidional hernia on 3/22/22 by dr Vane Nolasco   And surgery went well and pt discharged next day  Pt has no fever, vomiting, diarrhea  Pt stated has periumbilical pain Pt moved her bowel today and felt like it is incomplete emptying  Pt contact dr Vane Nolasco office and told to go to ER  Pt has h/o of asthma but has no respiratory distress  Pt denies urinary symptoms  pt ate her breakfast this is am            Prior to Admission Medications   Prescriptions Last Dose Informant Patient Reported? Taking?    Calcium Citrate-Vitamin D (CALCET CREAMY BITES) 500-400 MG-UNIT CHEW Not Taking at Unknown time Self Yes No   Sig: take 1 tablet by oral route 3 times every day   Patient not taking: Reported on 4/19/2022   Multiple Vitamins-Minerals (MULTIVITAMIN ADULT PO) 4/18/2022 at Unknown time Self Yes Yes   Sig: Take by mouth   albuterol (PROVENTIL HFA,VENTOLIN HFA) 90 mcg/act inhaler Past Month at Unknown time Self No Yes   Sig: Inhale 2 puffs every 6 (six) hours as needed for wheezing or shortness of breath   cetirizine (ZyrTEC) 10 mg tablet Not Taking at Unknown time Self No No   Sig: Take 1 tablet (10 mg total) by mouth daily as needed for allergies   Patient not taking: Reported on 4/19/2022    cyanocobalamin (VITAMIN B-12) 100 mcg tablet 4/18/2022 at Unknown time Self Yes Yes   Sig: every 24 hours      Facility-Administered Medications: None       Past Medical History:   Diagnosis Date    Abnormal Pap smear of cervix     2017    Anemia     Anxiety     Asthma     Diabetes mellitus ([facility] [address] )     History of transfusion 2018    HPV (human papilloma virus) infection     2017    Obesity     Varicella Past Surgical History:   Procedure Laterality Date    BREAST BIOPSY Right     u/s core bx    BREAST SURGERY      Incisional breast biopsy     SECTION      And     EXPLORATORY LAPAROTOMY W/ BOWEL RESECTION N/A 3/22/2022    Procedure: LAPAROTOMY EXPLORATORY WITH OPEN REPAIR OF INCARCERATED INCISIONAL  HERNIA WITH MESH;  Surgeon: Saniya Ambrose MD;  Location[facility];  Service: General    GASTRIC BYPASS  2016    INCISIONAL HERNIA REPAIR N/A 3/22/2022    Procedure: OPEN REPAIR HERNIA INCISIONAL WITH MESH;  Surgeon: Saniya Ambrose MD;  Location[facility];  Service: General    OK HYSTEROSCOPY,W/ENDO BX N/A 2021    Procedure: DILATATION AND CURETTAGE (D&C) WITH HYSTEROSCOPY (MYOSURE);   Surgeon: Rob Mauricio DO;  [facility];  Service: Gynecology    OK HYSTEROSCOPY,W/ENDO BX N/A 2021    Procedure: POLYPECTOMY;  Surgeon: Rob Mauricio DO;  [facility];  Service: Gynecology    TUBAL LIGATION      US GUIDED BREAST BIOPSY RIGHT COMPLETE Right 2019       Family History   Problem Relation Age of Onset    Diabetes Mother     Hypertension Mother     Cancer Mother     Arthritis Mother     Leukemia Mother     Hyperlipidemia Mother     Diabetes Father     Hypertension Father     Heart disease Father     Coronary artery disease Father     Hyperlipidemia Father     Stroke Paternal Grandfather     Thyroid cancer Sister 37    Hypertension Brother     Breast cancer Maternal Aunt 48    Breast cancer Paternal Aunt 79    No Known Problems Maternal Grandmother     No Known Problems Maternal Grandfather     No Known Problems Paternal Grandmother     No Known Problems Son     No Known Problems Son     No Known Problems Maternal Aunt     No Known Problems Maternal Aunt     No Known Problems Maternal Aunt     No Known Problems Maternal Aunt     No Known Problems Maternal Aunt     Cervical cancer Paternal Aunt 36    No Known Problems Paternal Aunt     No Known Problems Paternal Aunt     No Known Problems Paternal Aunt     No Known Problems Paternal Aunt     No Known Problems Paternal Aunt     Lung cancer Paternal Uncle 80     I have reviewed and agree with the history as documented  E-Cigarette/Vaping    E-Cigarette Use Never User      E-Cigarette/Vaping Substances    Nicotine No     THC No     CBD No     Flavoring No     Other No     Unknown No      Social History     Tobacco Use    Smoking status: Current Every Day Smoker     Packs/day: 0 50     Years: 10 00     Pack years: 5 00     Types: Cigarettes    Smokeless tobacco: Never Used    Tobacco comment: refused   Vaping Use    Vaping Use: Never used   Substance Use Topics    Alcohol use: Yes     Alcohol/week: 0 0 standard drinks     Comment: social     Drug use: Never       Review of Systems   Constitutional: Negative for fatigue and fever  HENT: Negative for dental problem, ear discharge, ear pain, sinus pressure, sinus pain, sneezing, sore throat and trouble swallowing  Eyes: Negative for visual disturbance  Respiratory: Negative for cough and shortness of breath  Cardiovascular: Negative for chest pain and palpitations  Gastrointestinal: Positive for abdominal pain  Negative for abdominal distention, anal bleeding, blood in stool, constipation, diarrhea, nausea and vomiting  Endocrine: Negative for polydipsia, polyphagia and polyuria  Genitourinary: Negative for difficulty urinating, dysuria, flank pain, frequency and hematuria  Musculoskeletal: Negative for back pain, myalgias, neck pain and neck stiffness  Skin: Negative for color change, pallor and rash  Neurological: Negative for dizziness, light-headedness and headaches  Psychiatric/Behavioral: Negative for agitation and behavioral problems  Physical Exam  Physical Exam  Vitals reviewed  Constitutional:       General: She is not in acute distress       Appearance: She is well-developed  She is not ill-appearing, toxic-appearing or diaphoretic  HENT:      Head: Normocephalic and atraumatic  Mouth/Throat:      Pharynx: No pharyngeal swelling or oropharyngeal exudate  Eyes:      General: No scleral icterus  Cardiovascular:      Rate and Rhythm: Normal rate and regular rhythm  Heart sounds: Normal heart sounds  No murmur heard  No friction rub  No gallop  Pulmonary:      Effort: Pulmonary effort is normal  No respiratory distress  Breath sounds: Normal breath sounds  No wheezing, rhonchi or rales  Chest:      Chest wall: No tenderness  Abdominal:      General: Abdomen is flat  A surgical scar is present  Bowel sounds are normal  There is no distension or abdominal bruit  There are no signs of injury  Palpations: Abdomen is soft  There is no shifting dullness, fluid wave, hepatomegaly, splenomegaly, mass or pulsatile mass  Tenderness: There is abdominal tenderness in the right lower quadrant and suprapubic area  There is no right CVA tenderness, left CVA tenderness, guarding or rebound  Negative signs include Babb's sign, Rovsing's sign, McBurney's sign, psoas sign and obturator sign  Hernia: No hernia is present  Comments: Long midline scare  The wound looks healed well, no signs of erythema , redness, or discharge  Musculoskeletal:         General: No tenderness or deformity  Normal range of motion  Cervical back: Normal range of motion and neck supple  Skin:     General: Skin is warm and dry  Capillary Refill: Capillary refill takes less than 2 seconds  Neurological:      Mental Status: She is alert and oriented to person, place, and time     Psychiatric:         Behavior: Behavior normal          Vital Signs  ED Triage Vitals [04/19/22 1023]   Temperature Pulse Respirations Blood Pressure SpO2   98 °F (36 7 °C) 69 18 106/59 99 %      Temp Source Heart Rate Source Patient Position - Orthostatic VS BP Location FiO2 (%) Oral Monitor Lying Left arm --      Pain Score       --           Vitals:    04/19/22 1023 04/19/22 1318   BP: 106/59 101/60   Pulse: 69 66   Patient Position - Orthostatic VS: Lying Lying         Visual Acuity      ED Medications  Medications   iohexol (OMNIPAQUE) 350 MG/ML injection (MULTI-DOSE) 100 mL (100 mL Intravenous Given 4/19/22 1301)   cephalexin (KEFLEX) capsule 500 mg (500 mg Oral Given 4/19/22 1455)       Diagnostic Studies  Results Reviewed     Procedure Component Value Units Date/Time    Comprehensive metabolic panel [[id number]]  (Abnormal) Collected: 04/19/22 1102    Lab Status: Final result Specimen: Blood from Arm, Right Updated: 04/19/22 1150     Sodium 134 mmol/L      Potassium 4 7 mmol/L      Chloride 102 mmol/L      CO2 25 mmol/L      ANION GAP 7 mmol/L      BUN 10 mg/dL      Creatinine 0 63 mg/dL      Glucose 76 mg/dL      Calcium 9 1 mg/dL      AST 40 U/L      ALT 13 U/L      Alkaline Phosphatase 106 U/L      Total Protein 7 7 g/dL      Albumin 3 6 g/dL      Total Bilirubin 0 29 mg/dL      eGFR 107 ml/min/1 73sq m     Narrative:      [place] guidelines for Chronic Kidney Disease (CKD):     Stage 1 with normal or high GFR (GFR > 90 mL/min/1 73 square meters)    Stage 2 Mild CKD (GFR = 60-89 mL/min/1 73 square meters)    Stage 3A Moderate CKD (GFR = 45-59 mL/min/1 73 square meters)    Stage 3B Moderate CKD (GFR = 30-44 mL/min/1 73 square meters)    Stage 4 Severe CKD (GFR = 15-29 mL/min/1 73 square meters)    Stage 5 End Stage CKD (GFR <15 mL/min/1 73 square meters)  Note: GFR calculation is accurate only with a steady state creatinine    Lipase [[id number]]  (Normal) Collected: 04/19/22 1102    Lab Status: Final result Specimen: Blood from Arm, Right Updated: 04/19/22 1150     Lipase 32 u/L     CBC and differential [[id number]]  (Abnormal) Collected: 04/19/22 1102    Lab Status: Final result Specimen: Blood from Arm, Right Updated: 04/19/22 1108     WBC 8 37 Thousand/uL      RBC 4 13 Million/uL      Hemoglobin 9 9 g/dL      Hematocrit 33 0 %      MCV 80 fL      MCH 24 0 pg      MCHC 30 0 g/dL      RDW 15 1 %      MPV 9 7 fL      Platelets 791 Thousands/uL      nRBC 0 /100 WBCs      Neutrophils Relative 72 %      Immat GRANS % 1 %      Lymphocytes Relative 18 %      Monocytes Relative 7 %      Eosinophils Relative 1 %      Basophils Relative 1 %      Neutrophils Absolute 6 13 Thousands/µL      Immature Grans Absolute 0 04 Thousand/uL      Lymphocytes Absolute 1 48 Thousands/µL      Monocytes Absolute 0 54 Thousand/µL      Eosinophils Absolute 0 11 Thousand/µL      Basophils Absolute 0 07 Thousands/µL                  CT abdomen pelvis w contrast   Final Result by Caterina Castellon MD (04/19 5612)   11 6 x 7 cm fluid collections in the lower anterior abdominal wall at the level of the surgical bed suggest seroma, if concern for infected fluid collection correlate clinically      No bowel obstruction      No evidence of  recurrent hernia      Postoperative changes from Robyn-en-Y gastric bypass  The distal anastomosis lies in the left upper abdomen as compared to its position in the right upper abdomen on the previous study  Mild telescoping of the jejunal loops at the level of distal anastomosis without bowel obstruction      No bowel wall thickening, pneumatosis or free      Workstation performed: [id number]                    Procedures  Procedures         ED Course  ED Course as of 04/20/22 0916   Tue Apr 19, 2022   1412 Contact her surgeon dr Carline Rene  who text me and stated; If her wbc is normal and she is tolerating diet then she can go with pain meds and 1 week of antibiotics  I can see her tomorrow in the office and arrange for the seroma to be drained by IR  If she is in too much pain then she will need to be admitted and IR will need to drain it as inpatient  [id number] Dr Dolores Talbot surgeon came to see pt at er                                 SBIRT 20yo+      Most Recent Value   SBIRT (22 yo +)    In order to provide better care to our patients, we are screening all of our patients for alcohol and drug use  Would it be okay to ask you these screening questions? Yes Filed at: 04/19/2022 1030   Initial Alcohol Screen: US AUDIT-C     1  How often do you have a drink containing alcohol? 0 Filed at: 04/19/2022 1030   2  How many drinks containing alcohol do you have on a typical day you are drinking? 0 Filed at: 04/19/2022 1030   3a  Male UNDER 65: How often do you have five or more drinks on one occasion? 0 Filed at: 04/19/2022 1030   3b  FEMALE Any Age, or MALE 65+: How often do you have 4 or more drinks on one occassion? 0 Filed at: 04/19/2022 1030   Audit-C Score 0 Filed at: 04/19/2022 1030   DAVID: How many times in the past year have you    Used an illegal drug or used a prescription medication for non-medical reasons? Never Filed at: 04/19/2022 1030                    MDM  Number of Diagnoses or Management Options  Diagnosis management comments: This is 55y came for abdominal pain , as she has repair of incarcerated incisional hernia repaired on 3/22  Came with tenderness and fullness at the suprapubic and RLQ areas , pt has no fever,normal WBC, has CT abdomen pelvis which shows seroma 11x6 cm  No obstructions  Case discussed with dr Sri Quinones and dr Lexy Carter came and saw pt at er , and told pt his office will get in touch with her to drain seroma  Offered pain meds , but pt stated she take tylenol at home and it helps her    Case discussed with pt in details        Amount and/or Complexity of Data Reviewed  Clinical lab tests: ordered and reviewed  Tests in the radiology section of CPT®: ordered and reviewed    Risk of Complications, Morbidity, and/or Mortality  Presenting problems: moderate  Diagnostic procedures: moderate  Management options: moderate        Disposition  Final diagnoses:   Post-traumatic seroma ([address] )   Lower abdominal pain     Time reflects when diagnosis was documented in both MDM as applicable and the Disposition within this note     Time User Action Codes Description Comment    4/19/2022  2:30 PM Reees Higgins Add [T79  2XXA] Post-traumatic seroma ([address] )     4/19/2022  2:30 PM Reese Higgins Add [R10 30] Lower abdominal pain       ED Disposition     ED Disposition Condition Date/Time Comment    Discharge Stable Tue Apr 19, 2022  2:29 PM Inezmaddie Morgan discharge to home/self care  Follow-up Information     Follow up With Specialties Details Why Contact Info    Stephen Malik MD General Surgery In 1 day  [address]  [facility] [place] [id number] [facility]  [phone]            Discharge Medication List as of 4/19/2022  2:31 PM      CONTINUE these medications which have NOT CHANGED    Details   albuterol (PROVENTIL HFA,VENTOLIN HFA) 90 mcg/act inhaler Inhale 2 puffs every 6 (six) hours as needed for wheezing or shortness of breath, Starting Sat 1/23/2021, Normal      cyanocobalamin (VITAMIN B-12) 100 mcg tablet every 24 hours, Starting Wed 2/3/2016, Historical Med      Multiple Vitamins-Minerals (MULTIVITAMIN ADULT PO) Take by mouth, Starting Thu 10/12/2017, Historical Med      Calcium Citrate-Vitamin D (CALCET CREAMY BITES) 500-400 MG-UNIT CHEW take 1 tablet by oral route 3 times every day, Historical Med      cetirizine (ZyrTEC) 10 mg tablet Take 1 tablet (10 mg total) by mouth daily as needed for allergies, Starting Thu 6/10/2021, Normal             No discharge procedures on file      PDMP Review       Value Time User    PDMP Reviewed  Yes 3/25/2022  1:51 PM Daniel Wise MD          ED Provider  Electronically Signed by           Grady Braden MD  04/20/22 7883

## 2022-04-19 NOTE — TELEPHONE ENCOUNTER
Please advise patient that the lab results show:  Improved blood count and improved anemia  Normal electrolytes and renal function and low magnesium level  For now recommend she take magnesium supplement OTC and will recheck labs in a month  I will order magnesium and CBC today  Also schedule a visit for physical as she is overdue and we can schedule her physical in one month with labs done prior to visit  thanks!

## 2022-04-19 NOTE — TELEPHONE ENCOUNTER
Patient called and stated she was still experiencing a lot of pain  Per Dr Ester Izquierdo previous notes, instructed her to visit the ED for evaluation and to request a CT  Scheduled an appointment for her to see Dr Renita Terrazas on 04/25, but advised her to call the office once she is D/C from the ED so that we can change her appt time with Dr Renita Terrazas sooner if schedule allows  Patient verbalized understanding

## 2022-04-19 NOTE — ED NOTES
Unsuccessful attempt to start IV    Lab work obtained and sent     Juliana Christine RN  04/19/22 2011

## 2022-04-20 ENCOUNTER — PROCEDURE VISIT (OUTPATIENT)
Dept: SURGERY | Facility: CLINIC | Age: 47
End: 2022-04-20

## 2022-04-20 VITALS — HEIGHT: 61 IN | WEIGHT: 181 LBS | BODY MASS INDEX: 34.17 KG/M2 | RESPIRATION RATE: 18 BRPM

## 2022-04-20 DIAGNOSIS — L76.34 POSTOPERATIVE SEROMA OF SUBCUTANEOUS TISSUE AFTER NON-DERMATOLOGIC PROCEDURE: Primary | ICD-10-CM

## 2022-04-20 DIAGNOSIS — R10.30 LOWER ABDOMINAL PAIN: ICD-10-CM

## 2022-04-20 PROCEDURE — 99024 POSTOP FOLLOW-UP VISIT: CPT | Performed by: SURGERY

## 2022-04-20 NOTE — PROGRESS NOTES
Assessment/Plan:  With full sterile precautions under local anesthesia I aspirated the seroma  300 mL of serous fluid was aspirated  Patient felt comfortable  I told her to continue antibiotics as I am worried that I multiple interventions can lead to introduction of infection around the mesh  I told her that I am going to see her in 1 week for follow-up  No problem-specific Assessment & Plan notes found for this encounter  Diagnoses and all orders for this visit:    Postoperative seroma of subcutaneous tissue after non-dermatologic procedure    Lower abdominal pain          Subjective:      Patient ID: Juan A Dickens is a 55 y o  female  51-year-old female patient who is 1 month status post repair of a large incisional hernia came with complaints of abdominal pain  She had gone to the emergency department yesterday  She had a CT scan done which showed a large subcutaneous seroma however there was no recurrence of hernia  She has no fever  She is tolerating diet and having bowel movement as well as flatus  She was started on oral antibiotics yesterday  Patient has come here today because she is having pressure effects of the seroma  The following portions of the patient's history were reviewed and updated as appropriate: allergies, current medications, past family history, past medical history, past social history, past surgical history and problem list     Review of Systems   All other systems reviewed and are negative  Objective:      Resp 18   Ht 5' 1" (1 549 m)   Wt 82 1 kg (181 lb)   LMP 04/12/2022 (Approximate)   BMI 34 20 kg/m²          Physical Exam  Vitals reviewed  Constitutional:       Appearance: She is obese  HENT:      Head: Normocephalic  Mouth/Throat:      Mouth: Mucous membranes are moist    Cardiovascular:      Rate and Rhythm: Normal rate and regular rhythm  Pulses: Normal pulses  Heart sounds: Normal heart sounds     Pulmonary:      Effort: Pulmonary effort is normal       Breath sounds: Normal breath sounds  Abdominal:      General: Bowel sounds are normal  There is no distension  Palpations: Abdomen is soft  Tenderness: There is abdominal tenderness  Hernia: No hernia is present  Comments: Lower abdominal incision is completely healed  There is fullness in the lower abdomen  Mild tenderness with the seroma is palpable  Neurological:      Mental Status: She is alert

## 2022-04-27 ENCOUNTER — OFFICE VISIT (OUTPATIENT)
Dept: SURGERY | Facility: CLINIC | Age: 47
End: 2022-04-27

## 2022-04-27 VITALS
BODY MASS INDEX: 33.79 KG/M2 | RESPIRATION RATE: 18 BRPM | OXYGEN SATURATION: 97 % | DIASTOLIC BLOOD PRESSURE: 80 MMHG | HEART RATE: 68 BPM | HEIGHT: 61 IN | SYSTOLIC BLOOD PRESSURE: 118 MMHG | WEIGHT: 179 LBS | TEMPERATURE: 97.2 F

## 2022-04-27 DIAGNOSIS — L76.34 POSTOPERATIVE SEROMA OF SUBCUTANEOUS TISSUE AFTER NON-DERMATOLOGIC PROCEDURE: Primary | ICD-10-CM

## 2022-04-27 PROCEDURE — 99024 POSTOP FOLLOW-UP VISIT: CPT | Performed by: SURGERY

## 2022-04-27 NOTE — PROGRESS NOTES
Assessment/Plan:  She is doing very well  Follow-up with me p r n  No problem-specific Assessment & Plan notes found for this encounter  Diagnoses and all orders for this visit:    Postoperative seroma of subcutaneous tissue after non-dermatologic procedure          Subjective:      Patient ID: Torres Thomas is a 55 y o  female  35-year-old female patient who is 1 week status post aspiration of subcutaneous seroma  She has no pain  No fever  Tolerating diet  No other complaints  The following portions of the patient's history were reviewed and updated as appropriate: allergies, current medications, past family history, past medical history, past social history, past surgical history and problem list     Review of Systems   All other systems reviewed and are negative  Objective:      /80 (BP Location: Left arm, Patient Position: Sitting, Cuff Size: Adult)   Pulse 68   Temp (!) 97 2 °F (36 2 °C)   Resp 18   Ht 5' 1" (1 549 m)   Wt 81 2 kg (179 lb)   LMP 04/12/2022 (Approximate)   SpO2 97%   BMI 33 82 kg/m²          Physical Exam  Vitals reviewed  Constitutional:       Appearance: She is obese  Abdominal:      General: Abdomen is flat  Palpations: Abdomen is soft  Tenderness: There is no abdominal tenderness  Comments: Incision is completely healed   Neurological:      Mental Status: She is alert

## 2022-06-30 ENCOUNTER — TELEPHONE (OUTPATIENT)
Dept: PLASTIC SURGERY | Facility: CLINIC | Age: 47
End: 2022-06-30

## 2022-07-02 ENCOUNTER — APPOINTMENT (OUTPATIENT)
Dept: LAB | Facility: HOSPITAL | Age: 47
End: 2022-07-02
Payer: MEDICARE

## 2022-07-02 DIAGNOSIS — D64.9 ANEMIA, UNSPECIFIED TYPE: ICD-10-CM

## 2022-07-02 DIAGNOSIS — R79.0 LOW MAGNESIUM LEVEL: ICD-10-CM

## 2022-07-02 LAB
BASOPHILS # BLD AUTO: 0.09 THOUSANDS/ΜL (ref 0–0.1)
BASOPHILS NFR BLD AUTO: 1 % (ref 0–1)
EOSINOPHIL # BLD AUTO: 0.09 THOUSAND/ΜL (ref 0–0.61)
EOSINOPHIL NFR BLD AUTO: 1 % (ref 0–6)
ERYTHROCYTE [DISTWIDTH] IN BLOOD BY AUTOMATED COUNT: 16.6 % (ref 11.6–15.1)
HCT VFR BLD AUTO: 33.9 % (ref 34.8–46.1)
HGB BLD-MCNC: 10.4 G/DL (ref 11.5–15.4)
IMM GRANULOCYTES # BLD AUTO: 0.08 THOUSAND/UL (ref 0–0.2)
IMM GRANULOCYTES NFR BLD AUTO: 1 % (ref 0–2)
LYMPHOCYTES # BLD AUTO: 2.19 THOUSANDS/ΜL (ref 0.6–4.47)
LYMPHOCYTES NFR BLD AUTO: 29 % (ref 14–44)
MAGNESIUM SERPL-MCNC: 1.8 MG/DL (ref 1.9–2.7)
MCH RBC QN AUTO: 22.3 PG (ref 26.8–34.3)
MCHC RBC AUTO-ENTMCNC: 30.7 G/DL (ref 31.4–37.4)
MCV RBC AUTO: 73 FL (ref 82–98)
MONOCYTES # BLD AUTO: 0.51 THOUSAND/ΜL (ref 0.17–1.22)
MONOCYTES NFR BLD AUTO: 7 % (ref 4–12)
NEUTROPHILS # BLD AUTO: 4.49 THOUSANDS/ΜL (ref 1.85–7.62)
NEUTS SEG NFR BLD AUTO: 61 % (ref 43–75)
NRBC BLD AUTO-RTO: 0 /100 WBCS
PLATELET # BLD AUTO: 397 THOUSANDS/UL (ref 149–390)
PMV BLD AUTO: 8.8 FL (ref 8.9–12.7)
RBC # BLD AUTO: 4.66 MILLION/UL (ref 3.81–5.12)
WBC # BLD AUTO: 7.45 THOUSAND/UL (ref 4.31–10.16)

## 2022-07-02 PROCEDURE — 85025 COMPLETE CBC W/AUTO DIFF WBC: CPT

## 2022-07-02 PROCEDURE — 36415 COLL VENOUS BLD VENIPUNCTURE: CPT

## 2022-07-02 PROCEDURE — 83735 ASSAY OF MAGNESIUM: CPT

## 2022-07-05 ENCOUNTER — TELEPHONE (OUTPATIENT)
Dept: FAMILY MEDICINE CLINIC | Facility: OTHER | Age: 47
End: 2022-07-05

## 2022-07-05 NOTE — TELEPHONE ENCOUNTER
----- Message from Kar Reddy MD sent at 7/5/2022  3:54 PM EDT -----  The blood work shows low magnesium level and stable anemia as she had in the past   I would recommend she take OTC iron supplement and magnesium supplement daily  Set up a visit to discuss more as she is overdue for office visit and physical     Thanks!

## 2022-07-06 NOTE — TELEPHONE ENCOUNTER
Spoke with patient she was notified of below message from Dr Rizwan Reynolds and she also   Scheduled a physical with Dr Kehinde Roberts on 7/22/2022

## 2022-07-29 ENCOUNTER — OFFICE VISIT (OUTPATIENT)
Dept: FAMILY MEDICINE CLINIC | Facility: OTHER | Age: 47
End: 2022-07-29
Payer: MEDICARE

## 2022-07-29 VITALS
TEMPERATURE: 97.8 F | SYSTOLIC BLOOD PRESSURE: 108 MMHG | BODY MASS INDEX: 33.08 KG/M2 | OXYGEN SATURATION: 98 % | WEIGHT: 175.2 LBS | HEART RATE: 59 BPM | DIASTOLIC BLOOD PRESSURE: 70 MMHG | RESPIRATION RATE: 18 BRPM | HEIGHT: 61 IN

## 2022-07-29 DIAGNOSIS — Z00.00 ANNUAL PHYSICAL EXAM: Primary | ICD-10-CM

## 2022-07-29 DIAGNOSIS — Z23 ENCOUNTER FOR IMMUNIZATION: ICD-10-CM

## 2022-07-29 DIAGNOSIS — Z11.59 NEED FOR HEPATITIS C SCREENING TEST: ICD-10-CM

## 2022-07-29 DIAGNOSIS — J45.20 MILD INTERMITTENT ASTHMA WITHOUT COMPLICATION: ICD-10-CM

## 2022-07-29 DIAGNOSIS — L65.9 ALOPECIA: ICD-10-CM

## 2022-07-29 DIAGNOSIS — Z98.84 H/O BARIATRIC SURGERY: ICD-10-CM

## 2022-07-29 DIAGNOSIS — L03.316 CELLULITIS OF UMBILICUS: ICD-10-CM

## 2022-07-29 DIAGNOSIS — F41.9 ANXIETY: ICD-10-CM

## 2022-07-29 DIAGNOSIS — Z12.11 SCREENING FOR COLON CANCER: ICD-10-CM

## 2022-07-29 PROCEDURE — 90677 PCV20 VACCINE IM: CPT

## 2022-07-29 PROCEDURE — 90715 TDAP VACCINE 7 YRS/> IM: CPT

## 2022-07-29 PROCEDURE — 90471 IMMUNIZATION ADMIN: CPT

## 2022-07-29 PROCEDURE — 99396 PREV VISIT EST AGE 40-64: CPT | Performed by: FAMILY MEDICINE

## 2022-07-29 PROCEDURE — 90472 IMMUNIZATION ADMIN EACH ADD: CPT

## 2022-07-29 RX ORDER — ESCITALOPRAM OXALATE 10 MG/1
10 TABLET ORAL DAILY
Qty: 90 TABLET | Refills: 3 | Status: SHIPPED | OUTPATIENT
Start: 2022-07-29

## 2022-07-29 RX ORDER — ALBUTEROL SULFATE 90 UG/1
2 AEROSOL, METERED RESPIRATORY (INHALATION) EVERY 6 HOURS PRN
Qty: 18 G | Refills: 1 | Status: SHIPPED | OUTPATIENT
Start: 2022-07-29

## 2022-07-29 RX ORDER — HYDROXYZINE HYDROCHLORIDE 25 MG/1
25 TABLET, FILM COATED ORAL EVERY 6 HOURS PRN
Qty: 30 TABLET | Refills: 0 | Status: CANCELLED | OUTPATIENT
Start: 2022-07-29

## 2022-07-29 NOTE — PATIENT INSTRUCTIONS

## 2022-07-29 NOTE — PROGRESS NOTES
ADULT ANNUAL [facility] PRACTICE FAJARDO    NAME: Cristhian Paul  AGE: 52 y o  SEX: female  : 1975     DATE: 2022     Assessment and Plan:     Problem List Items Addressed This Visit        Respiratory    Mild intermittent asthma without complication    Relevant Medications    albuterol (PROVENTIL HFA,VENTOLIN HFA) 90 mcg/act inhaler       Other    H/O bariatric surgery    Relevant Orders    Ambulatory Referral to Plastic Surgery      Other Visit Diagnoses     Annual physical exam    -  Primary    Alopecia   - Patient reports diffuse hair loss with large clump of hair loss 22  Hair has since been growing back, though thinner than before as per patient    - No patches of baldness or evidence of pulled hair on exam    - Will evaluate with labs for nutritional deficiency causes given history of gastric bypass, as well as potential hormonal/autoimmune etiology and discuss at next visit  Relevant Medications    mupirocin (BACTROBAN) 2 % ointment    Other Relevant Orders    TSH, 3rd generation with Free T4 reflex (Completed)    CBC and differential (Completed)    Iron Panel (Includes Ferritin, Iron Sat%, Iron, and TIBC) (Completed)    Vitamin D 25 hydroxy (Completed)    Testosterone, free, total (Completed)    DHEA-sulfate (Completed)    Vitamin B12 (Completed)    HUMBERTO Screen w/ Reflex to Titer/Pattern (Completed)    Sedimentation rate, automated (Completed)    C-reactive protein (Completed)    Encounter for immunization        Relevant Orders    TDAP VACCINE GREATER THAN OR EQUAL TO 8YO IM (Completed)    Pneumococcal Conjugate Vaccine 20-valent (PCV20) (Completed)    Cellulitis of umbilicus  - Patient with history of 1 month of malodorous creamy discharge from umbilical scar with occasional erythema and pain  - On exam, appears non-erythematous and is non-tender with small amount of white discharge     - Possible infection of surgical wound, advised to apply mupirocin ointment TID for 7-10 days      Relevant Medications    mupirocin (BACTROBAN) 2 % ointment    Anxiety   - Patient reports anxiety over the past 7 months due to difficult life events  - Prior history of depression treated with Zoloft,  however d/c 2/2 adverse reactions   - Currently seeing counselor but is amenable to trial of pharmacologic treatment as well  - Will start on Lexapro 5 mg for 2 weeks then titrate up to 10 mg for following 2 weeks  Relevant Medications    escitalopram (Lexapro) 10 mg tablet    Screening for colon cancer        Relevant Orders    Occult Blood, Fecal Immunochemical    Need for hepatitis C screening test        Relevant Orders    Hepatitis C antibody (Completed)    BMI 33 0-33 9,adult           RTO in 1 month to reassess cellulitis, anxiety and response to SSRI, labs for hair loss complaint       Immunizations and preventive care screenings were discussed with patient today  Appropriate education was printed on patient's after visit summary  Counseling:  Alcohol/drug use: discussed moderation in alcohol intake, the recommendations for healthy alcohol use, and avoidance of illicit drug use  Dental Health: discussed importance of regular tooth brushing, flossing, and dental visits  Injury prevention: discussed safety/seat belts, safety helmets, smoke detectors, carbon dioxide detectors, and smoking near bedding or upholstery  Sexual health: discussed sexually transmitted diseases, partner selection, use of condoms, avoidance of unintended pregnancy, and contraceptive alternatives  · Exercise: the importance of regular exercise/physical activity was discussed  Recommend exercise 3-5 times per week for at least 30 minutes  BMI Counseling: Body mass index is 33 1 kg/m²   The BMI is above normal  Nutrition recommendations include encouraging healthy choices of fruits and vegetables, moderation in carbohydrate intake, reducing intake of saturated and trans fat and reducing intake of cholesterol  Exercise recommendations include moderate physical activity 150 minutes/week and strength training exercises  Rationale for BMI follow-up plan is due to patient being overweight or obese  Depression Screening and Follow-up Plan: Patient was screened for depression during today's encounter  They screened negative with a PHQ-2 score of 0  Return in about 1 month (around 8/29/2022) for Recheck labs, umbilical cellulitis, etc      Chief Complaint:     Chief Complaint   Patient presents with    Physical Exam    Alopecia      History of Present Illness:     Adult Annual Physical   Patient here for a comprehensive physical exam  The patient reports intermittent pain and redness at her navel at the site of a surgical scar for the past month with occasional cream colored, malodorous discharge  She describes the pain as 2-3/10, "annoying" and occasionally stabbing but mild  She has a history of gastric bypass in 2016, incisional hernia repair in March 2022  Patient also reports that she has had several difficult life events occur since January this year, including the passing of both parents, and has since experienced daily stress/anxiety  She reports difficulty falling and staying asleep, sleeping only 3-6 hours per night, as well as hair loss which occurred in May 2022  She states she had been losing hair throughout the month and reports on May 30th she woke to find a large clump of hair on her pillow, she subsequently shaved the remainder of her hair off which has since been regrowing though less thick than before per patient  Denies pulling or plucking hair  She reports a history of depression/anxiety 10 years ago and was treated with Zoloft which she stopped taking as she did not like how it made her feel  Patient currently speaking with a counselor which is helpful to her   Patient also requesting referral to plastic surgery for excess skin removal as it is causing significant discomfort/rash when exercising  Denies nausea, vomiting, diarrhea, chest pain, or palpitations  Diet and Physical Activity  · Diet/Nutrition: well balanced diet, limited junk food, low carb diet, consuming 3-5 servings of fruits/vegetables daily and adequate fiber intake  · Exercise: walking, strength training exercises, 5-7 times a week on average and 1-2 hours on average  Depression Screening  PHQ-2/9 Depression Screening    Little interest or pleasure in doing things: 0 - not at all  Feeling down, depressed, or hopeless: 0 - not at all  PHQ-2 Score: 0  PHQ-2 Interpretation: Negative depression screen       General Health  · Sleep: sleeps well, gets 4-6 hours of sleep on average and unrefreshing sleep  · Hearing: normal - bilateral   · Vision: goes for regular eye exams, most recent eye exam <1 year ago and wears contacts  · Dental: regular dental visits, brushes teeth twice daily and flosses teeth occasionally  /GYN Health  · Patient is: premenopausal  · Last menstrual period: 7/1/22, regular/monthly with intermittent heavy flow, follows with OB/GYN  · Contraceptive method: barrier methods  Review of Systems:     Review of Systems   Constitutional: Negative for chills and fever  HENT: Negative for congestion, hearing loss, rhinorrhea and sore throat  Eyes: Negative for itching and visual disturbance  Respiratory: Negative for cough and shortness of breath  Cardiovascular: Negative for chest pain and palpitations  Gastrointestinal: Positive for abdominal pain (Intermittent navel pain with associated redness and "milky" drainage)  Negative for constipation, diarrhea, nausea and vomiting  Genitourinary: Negative for difficulty urinating, dysuria and frequency  Musculoskeletal: Negative for arthralgias and myalgias  Skin: Positive for rash (Intermittent redness around surgical scar at navel)  Negative for pallor          Reports losing large clumps of hair 2 months ago, shaved off remaining hair, now growing back but thinner  Neurological: Positive for numbness (Occasional tingling of lower extremities, especially after exercise/at night)  Negative for dizziness, syncope, weakness and light-headedness  Psychiatric/Behavioral: Positive for sleep disturbance (Difficulty falling and staying asleep)  Negative for agitation, self-injury and suicidal ideas  The patient is nervous/anxious  Past Medical History:     Past Medical History:   Diagnosis Date    Abnormal Pap smear of cervix     2017    Anemia     Anxiety     Asthma     Diabetes mellitus ([facility] [address] )     History of transfusion 2018    HPV (human papilloma virus) infection     2017    Obesity     Varicella       Past Surgical History:     Past Surgical History:   Procedure Laterality Date    BREAST BIOPSY Right 2012    u/s core bx    BREAST SURGERY      Incisional breast biopsy     SECTION      And     EXPLORATORY LAPAROTOMY W/ BOWEL RESECTION N/A 3/22/2022    Procedure: LAPAROTOMY EXPLORATORY WITH OPEN REPAIR OF INCARCERATED INCISIONAL  HERNIA WITH MESH;  Surgeon: Brayan Bass MD;  Location: [facility] OR;  Service: General    GASTRIC BYPASS  2016    INCISIONAL HERNIA REPAIR N/A 3/22/2022    Procedure: OPEN REPAIR HERNIA INCISIONAL WITH MESH;  Surgeon: Brayan Bass MD;  Location: [facility];  Service: General    MI HYSTEROSCOPY,W/ENDO BX N/A 2021    Procedure: DILATATION AND CURETTAGE (D&C) WITH HYSTEROSCOPY (MYOSURE);   Surgeon: Wendie Booker DO;  [facility];  Service: Gynecology    MI HYSTEROSCOPY,W/ENDO BX N/A 2021    Procedure: POLYPECTOMY;  Surgeon: Wendie Booker DO;  [facility];  Service: Gynecology    TUBAL LIGATION      US GUIDED BREAST BIOPSY RIGHT COMPLETE Right 2019      Social History:     Social History     Socioeconomic History    Marital status: Single     Spouse name: None    Number of children: 2    Years of education: None    Highest education level: None   Occupational History    None   Tobacco Use    Smoking status: Current Every Day Smoker     Packs/day: 0 50     Years: 10 00     Pack years: 5 00     Types: Cigarettes    Smokeless tobacco: Never Used    Tobacco comment: refused   Vaping Use    Vaping Use: Never used   Substance and Sexual Activity    Alcohol use: Yes     Alcohol/week: 0 0 standard drinks     Comment: social     Drug use: Never    Sexual activity: Yes     Partners: Male     Birth control/protection: Condom Male, None   Other Topics Concern    None   Social History Narrative    Current every day smoker - As per [employer]     Exercises daily    Sleeps 6-7 hours a day      Social Determinants of Health     Financial Resource Strain: Not on file   Food Insecurity: No Food Insecurity    Worried About Running Out of Food in the Last Year: Never true    Ward of Food in the Last Year: Never true   Transportation Needs: No Transportation Needs    Lack of Transportation (Medical): No    Lack of Transportation (Non-Medical):  No   Physical Activity: Not on file   Stress: Not on file   Social Connections: Not on file   Intimate Partner Violence: Not on file   Housing Stability: Unknown    Unable to Pay for Housing in the Last Year: Not on file    Number of Places Lived in the Last Year: 1    Unstable Housing in the Last Year: No      Family History:     Family History   Problem Relation Age of Onset    Diabetes Mother     Hypertension Mother     Cancer Mother     Arthritis Mother     Leukemia Mother     Hyperlipidemia Mother     Diabetes Father     Hypertension Father     Heart disease Father     Coronary artery disease Father     Hyperlipidemia Father     Stroke Paternal Grandfather     Thyroid cancer Sister 37    Hypertension Brother     Breast cancer Maternal Aunt 48    Breast cancer Paternal Aunt 79    No Known Problems Maternal Grandmother     No Known Problems Maternal Grandfather     No Known Problems Paternal Grandmother     No Known Problems Son     No Known Problems Son     No Known Problems Maternal Aunt     No Known Problems Maternal Aunt     No Known Problems Maternal Aunt     No Known Problems Maternal Aunt     No Known Problems Maternal Aunt     Cervical cancer Paternal Aunt 36    No Known Problems Paternal Aunt     No Known Problems Paternal Aunt     No Known Problems Paternal Aunt     No Known Problems Paternal Aunt     No Known Problems Paternal Aunt     Lung cancer Paternal Uncle 80      Current Medications:     Current Outpatient Medications   Medication Sig Dispense Refill    albuterol (PROVENTIL HFA,VENTOLIN HFA) 90 mcg/act inhaler Inhale 2 puffs every 6 (six) hours as needed for wheezing or shortness of breath 18 g 1    Calcium Citrate-Vitamin D 500-400 MG-UNIT CHEW take 1 tablet by oral route 3 times every day      cyanocobalamin (VITAMIN B-12) 100 mcg tablet every 24 hours      escitalopram (Lexapro) 10 mg tablet Take 1 tablet (10 mg total) by mouth daily 90 tablet 3    Ferrous Sulfate Dried (FERROUS SULFATE CR PO) Take by mouth      Multiple Vitamins-Minerals (MULTIVITAMIN ADULT PO) Take by mouth      mupirocin (BACTROBAN) 2 % ointment Apply topically 3 (three) times a day 22 g 0     No current facility-administered medications for this visit  Allergies:     No Known Allergies   Physical Exam:     /70   Pulse 59   Temp 97 8 °F (36 6 °C)   Resp 18   Ht 5' 1" (1 549 m)   Wt 79 5 kg (175 lb 3 2 oz)   SpO2 98%   BMI 33 10 kg/m²     Physical Exam  Constitutional:       General: She is not in acute distress  Appearance: Normal appearance  HENT:      Head: Normocephalic  Comments: Hair is short with no patches or areas of baldness, no evidence of plucked hairs  Ears:      Comments: Bilateral TM's not visualized due to cerumen   Bilateral canals and external ears normal      Nose: Nose normal  No congestion  Mouth/Throat:      Mouth: Mucous membranes are moist       Pharynx: No oropharyngeal exudate or posterior oropharyngeal erythema  Eyes:      General:         Right eye: No discharge  Left eye: No discharge  Pupils: Pupils are equal, round, and reactive to light  Cardiovascular:      Rate and Rhythm: Normal rate and regular rhythm  Heart sounds: Normal heart sounds  Pulmonary:      Effort: Pulmonary effort is normal  No respiratory distress  Breath sounds: Normal breath sounds  Abdominal:      General: Bowel sounds are normal       Palpations: Abdomen is soft  There is no mass  Tenderness: There is no abdominal tenderness  There is no guarding  Musculoskeletal:         General: No swelling  Cervical back: Normal range of motion and neck supple  Right lower leg: No edema  Left lower leg: No edema  Skin:     General: Skin is warm and dry  Findings: No erythema  Comments: Mid-abdominal vertical surgical scar seen, well healed  Navel appears non-erythematous and is non-tender, application of Q-tip reveals small amount of milky white discharge  No fluctuance or palpable mass  Neurological:      General: No focal deficit present  Mental Status: She is alert and oriented to person, place, and time  Psychiatric:         Mood and Affect: Mood is anxious (Appears slightly anxious, appropriately tearful at times when discussing difficult topics  )  Affect is not blunt  Speech: Speech normal          Behavior: Behavior normal          Thought Content:  Thought content normal           Jaguar Clarke MD  [facility]

## 2022-07-30 ENCOUNTER — APPOINTMENT (OUTPATIENT)
Dept: LAB | Facility: HOSPITAL | Age: 47
End: 2022-07-30
Payer: MEDICARE

## 2022-07-30 DIAGNOSIS — L65.9 ALOPECIA: ICD-10-CM

## 2022-07-30 DIAGNOSIS — Z11.59 NEED FOR HEPATITIS C SCREENING TEST: ICD-10-CM

## 2022-07-30 LAB
25(OH)D3 SERPL-MCNC: 33.4 NG/ML (ref 30–100)
BASOPHILS # BLD AUTO: 0.05 THOUSANDS/ΜL (ref 0–0.1)
BASOPHILS NFR BLD AUTO: 1 % (ref 0–1)
CRP SERPL QL: 1.1 MG/L
EOSINOPHIL # BLD AUTO: 0.08 THOUSAND/ΜL (ref 0–0.61)
EOSINOPHIL NFR BLD AUTO: 1 % (ref 0–6)
ERYTHROCYTE [DISTWIDTH] IN BLOOD BY AUTOMATED COUNT: 17.6 % (ref 11.6–15.1)
ERYTHROCYTE [SEDIMENTATION RATE] IN BLOOD: 17 MM/HOUR (ref 0–20)
FERRITIN SERPL-MCNC: 5 NG/ML (ref 8–388)
HCT VFR BLD AUTO: 32.6 % (ref 34.8–46.1)
HCV AB SER QL: NORMAL
HGB BLD-MCNC: 9.9 G/DL (ref 11.5–15.4)
IMM GRANULOCYTES # BLD AUTO: 0.03 THOUSAND/UL (ref 0–0.2)
IMM GRANULOCYTES NFR BLD AUTO: 0 % (ref 0–2)
IRON SATN MFR SERPL: 9 % (ref 15–50)
IRON SERPL-MCNC: 43 UG/DL (ref 50–170)
LYMPHOCYTES # BLD AUTO: 1.37 THOUSANDS/ΜL (ref 0.6–4.47)
LYMPHOCYTES NFR BLD AUTO: 19 % (ref 14–44)
MCH RBC QN AUTO: 22.2 PG (ref 26.8–34.3)
MCHC RBC AUTO-ENTMCNC: 30.4 G/DL (ref 31.4–37.4)
MCV RBC AUTO: 73 FL (ref 82–98)
MONOCYTES # BLD AUTO: 0.47 THOUSAND/ΜL (ref 0.17–1.22)
MONOCYTES NFR BLD AUTO: 6 % (ref 4–12)
NEUTROPHILS # BLD AUTO: 5.34 THOUSANDS/ΜL (ref 1.85–7.62)
NEUTS SEG NFR BLD AUTO: 73 % (ref 43–75)
NRBC BLD AUTO-RTO: 0 /100 WBCS
PLATELET # BLD AUTO: 346 THOUSANDS/UL (ref 149–390)
PMV BLD AUTO: 9.6 FL (ref 8.9–12.7)
RBC # BLD AUTO: 4.46 MILLION/UL (ref 3.81–5.12)
TIBC SERPL-MCNC: 460 UG/DL (ref 250–450)
TSH SERPL DL<=0.05 MIU/L-ACNC: 0.9 UIU/ML (ref 0.45–4.5)
VIT B12 SERPL-MCNC: 592 PG/ML (ref 100–900)
WBC # BLD AUTO: 7.34 THOUSAND/UL (ref 4.31–10.16)

## 2022-07-30 PROCEDURE — 83550 IRON BINDING TEST: CPT

## 2022-07-30 PROCEDURE — 82306 VITAMIN D 25 HYDROXY: CPT

## 2022-07-30 PROCEDURE — 84402 ASSAY OF FREE TESTOSTERONE: CPT

## 2022-07-30 PROCEDURE — 85025 COMPLETE CBC W/AUTO DIFF WBC: CPT

## 2022-07-30 PROCEDURE — 82607 VITAMIN B-12: CPT

## 2022-07-30 PROCEDURE — 86038 ANTINUCLEAR ANTIBODIES: CPT

## 2022-07-30 PROCEDURE — 86140 C-REACTIVE PROTEIN: CPT

## 2022-07-30 PROCEDURE — 86803 HEPATITIS C AB TEST: CPT

## 2022-07-30 PROCEDURE — 82728 ASSAY OF FERRITIN: CPT

## 2022-07-30 PROCEDURE — 36415 COLL VENOUS BLD VENIPUNCTURE: CPT

## 2022-07-30 PROCEDURE — 82627 DEHYDROEPIANDROSTERONE: CPT

## 2022-07-30 PROCEDURE — 83540 ASSAY OF IRON: CPT

## 2022-07-30 PROCEDURE — 84403 ASSAY OF TOTAL TESTOSTERONE: CPT

## 2022-07-30 PROCEDURE — 85652 RBC SED RATE AUTOMATED: CPT

## 2022-07-30 PROCEDURE — 84443 ASSAY THYROID STIM HORMONE: CPT

## 2022-07-31 LAB — DHEA-S SERPL-MCNC: 72.6 UG/DL (ref 41.2–243.7)

## 2022-08-01 LAB
RYE IGE QN: NEGATIVE
TESTOST FREE SERPL-MCNC: 0.6 PG/ML (ref 0–4.2)
TESTOST SERPL-MCNC: <3 NG/DL (ref 4–50)

## 2022-08-08 ENCOUNTER — TELEPHONE (OUTPATIENT)
Dept: HEMATOLOGY ONCOLOGY | Facility: CLINIC | Age: 47
End: 2022-08-08

## 2022-08-08 NOTE — TELEPHONE ENCOUNTER
Scheduling Appointment     Who Is Calling to Schedule  Patient   Doctor Margoth Quiroz   Date and Time 08/17 8:30AM   Reason for scheduling appointment Advised to follow up by her doctor   Patient verbalized understanding   Yes

## 2022-08-17 ENCOUNTER — TELEPHONE (OUTPATIENT)
Dept: HEMATOLOGY ONCOLOGY | Facility: CLINIC | Age: 47
End: 2022-08-17

## 2022-08-17 ENCOUNTER — OFFICE VISIT (OUTPATIENT)
Dept: HEMATOLOGY ONCOLOGY | Facility: CLINIC | Age: 47
End: 2022-08-17
Payer: MEDICARE

## 2022-08-17 VITALS
BODY MASS INDEX: 32.28 KG/M2 | DIASTOLIC BLOOD PRESSURE: 78 MMHG | TEMPERATURE: 97.4 F | WEIGHT: 171 LBS | RESPIRATION RATE: 17 BRPM | SYSTOLIC BLOOD PRESSURE: 120 MMHG | HEART RATE: 59 BPM | OXYGEN SATURATION: 98 % | HEIGHT: 61 IN

## 2022-08-17 DIAGNOSIS — Z98.84 HISTORY OF GASTRIC BYPASS: ICD-10-CM

## 2022-08-17 DIAGNOSIS — D64.89 ANEMIA DUE TO OTHER CAUSE, NOT CLASSIFIED: Primary | ICD-10-CM

## 2022-08-17 DIAGNOSIS — D50.0 IRON DEFICIENCY ANEMIA DUE TO CHRONIC BLOOD LOSS: ICD-10-CM

## 2022-08-17 DIAGNOSIS — R11.0 NAUSEA: ICD-10-CM

## 2022-08-17 PROCEDURE — 99214 OFFICE O/P EST MOD 30 MIN: CPT | Performed by: PHYSICIAN ASSISTANT

## 2022-08-17 RX ORDER — SODIUM CHLORIDE 9 MG/ML
20 INJECTION, SOLUTION INTRAVENOUS ONCE
Status: CANCELLED | OUTPATIENT
Start: 2022-09-12

## 2022-08-17 NOTE — TELEPHONE ENCOUNTER
I kept patient's first appt on Monday 09/12 at 1pm because very first appt for venofer is not allowed on saturdays  Her appts following that are scheduled for every Saturday

## 2022-08-17 NOTE — TELEPHONE ENCOUNTER
Spoke to patient to go over updated iron treatment schedule  She is aware and will check her MyChart for more details

## 2022-08-17 NOTE — TELEPHONE ENCOUNTER
While we try to accommodate patient requests, our priority is to schedule treatment according to Doctor's orders and site availability  1  Does the Provider use the intake sheet or checkout note? CHECKOUT NOTES  2  What would be a preferred day of the week that would work best for your infusion appointment? Monday   3  Do you prefer mornings or afternoons for your appointments? MORNING  4  Are there any days or dates that do not work for your schedule, including any upcoming vacations? NONE  5  We are going to try our best to schedule you at the infusion center closest to your home  In the event that we are unable to what would be your next preferred infusion site or sites? [facility]  TRY TO GET HER BACK TO [facility] THANKS  1  AN  2  BE  3  AL    6  Do you have transportation to take you to all of your appointments? YES  7  Would you like the infusion center to draw labs from your port? NO (disregard if patient doesn't have a port or need labs for infusion appointment)

## 2022-08-17 NOTE — PROGRESS NOTES
Hematology/Oncology Outpatient Follow- up Note  Ishaan Feldman 52 y o  female MRN: @ Encounter: [id number]        Date:  8/17/2022      Assessment / Plan:    1  Frances Barber   Secondary to poor absorption from history of gastric bypass surgery as well as blood loss from heavy menses       2/2019 hemoglobin was 6 6 g/dL  She received 2 units of packed red blood cells  Hemoglobin improved to 9 4 November 2019  Venofer 300 milligrams x6 doses given 12/2019 4/24/2020 hemoglobin 15 8, MCV of 90, white blood cell count 9 5 with platelet count 419  Iron saturation 20%  Ferritin 101  Vitamin B12 1083  Recurrent FACUNDO  7/30/2022 hemoglobin 9 9, MCV 73  Ferritin 5, iron saturation 9%  She had history of nausea with Venofer that lasted entire day of treatment  Due to profound nausea, will pre treat with Zofran  She is going to reach out to her gynecologist 2nd to her menorrhagia  Oral contraceptives have been discussed  Patient does not wish to go on those  She would be in favor of hysterectomy             HPI:  Miladis James is a 52 y o  seen for initial consultation 12/24/2019 at the referral of Jesus Jensen MD regarding chronic anemia      11/25/2019 hemoglobin 9 4, MCV of 76, white blood cell count 7 56 with 67% neutrophils, 26% lymphocytes, 5% monocytes  Vitamin B12 1288   HIV normal        2/20/2019:  Hemoglobin 6 6, MCV 67   FOBT negative   Iron saturation 3%   Ferritin 2      7/31/2017:  Hemoglobin 11 2 with MCV 82        She takes oral iron bid since 7/2019        Patient was admitted 2/20/2019 which presented to the ED due to shortness of breath and fatigue   She received 2 units of packed red blood cells as well as IV iron      Patient status post gastric bypass surgery in January 2016  She also has heavy menses   She changes pad/ tampon hourly   Her periods are irregular and heavy   Progestins, IUD, ablation were discussed at her 11/25/2019 office visit with gynecology  Bang Reddy is undecided if she wishes to pursue any of these options      She denies any melena or hematochezia  Nay Lombardi has not take NSAIDs regularly  Denies dyspepsia   Denies any dizziness or lightheadedness          She had ausea from Venofer  Hair is growing back better  Menses have increased even more in flow  Energy is improved  Interval History:    Represented 8/22    10/2020 iron saturation 26%  In September 2021 hemoglobin 12 2, MCV 79  Iron saturation 7%, ferritin 5  Admitted 3/22 - 3/25/22 for repair of a large incarcerated incisional hernia with lysis of adhesions  Mesh was used  Hemoglobin was 11 9 with MCV 79 at time of admission  On discharge hemoglobin 8 3 with MCV 81  April 2022 hemoglobin 9 9 MCV 80     7/30/2022 hemoglobin 9 9, MCV 73  Ferritin 5, iron saturation 9%  B12 592  Menses are very heavy  She is tired, hair is thin  She is experiencing leg cramps  Denies any melena, hematochezia, hematuria  She has been taking oral iron  She is taking supplemental B12  Test Results:        Labs:   Lab Results   Component Value Date    HGB 9 9 (L) 07/30/2022    HCT 32 6 (L) 07/30/2022    MCV 73 (L) 07/30/2022     07/30/2022    WBC 7 34 07/30/2022    NRBC 0 07/30/2022     Lab Results   Component Value Date    K 4 7 04/19/2022     04/19/2022    CO2 25 04/19/2022    BUN 10 04/19/2022    CREATININE 0 63 04/19/2022    GLUF 75 04/08/2022    CALCIUM 9 1 04/19/2022    AST 40 (H) 04/19/2022    ALT 13 04/19/2022    ALKPHOS 106 (H) 04/19/2022    EGFR 107 04/19/2022       Imaging: No results found  ROS:  As mentioned in HPI & Interval History otherwise 14 point ROS negative  Allergies: No Known Allergies  Current Medications: Reviewed  PMH/FH/SH:  Reviewed      Physical Exam:    There is no height or weight on file to calculate BSA      Ht Readings from Last 3 Encounters:   07/29/22 5' 1" (1 549 m)   04/27/22 5' 1" (1 549 m)   04/20/22 5' 1" (1 549 m)        Wt Readings from Last 3 Encounters:   07/29/22 79 5 kg (175 lb 3 2 oz)   04/27/22 81 2 kg (179 lb)   04/20/22 82 1 kg (181 lb)        Temp Readings from Last 3 Encounters:   07/29/22 97 8 °F (36 6 °C)   04/27/22 (!) 97 2 °F (36 2 °C)   04/19/22 98 °F (36 7 °C) (Oral)        BP Readings from Last 3 Encounters:   07/29/22 108/70   04/27/22 118/80   04/19/22 101/60           Physical Exam  Constitutional:       Appearance: She is well-developed  HENT:      Head: Normocephalic and atraumatic  Cardiovascular:      Rate and Rhythm: Normal rate and regular rhythm  Heart sounds: Normal heart sounds  Pulmonary:      Effort: Pulmonary effort is normal  No respiratory distress  Breath sounds: Normal breath sounds  Abdominal:      Palpations: Abdomen is soft  Musculoskeletal:      Cervical back: Neck supple  Skin:     General: Skin is warm and dry  Neurological:      Mental Status: She is alert     Psychiatric:         Behavior: Behavior normal            Emergency Contacts:    Extended Emergency Contact Information  Primary Emergency Contact: [address] [place] [address] Phone: [phone]  Relation: Relative  Secondary Emergency Contact: Amor Gatica  Mobile Phone: [phone]  Relation: Son

## 2022-09-01 ENCOUNTER — OFFICE VISIT (OUTPATIENT)
Dept: FAMILY MEDICINE CLINIC | Facility: OTHER | Age: 47
End: 2022-09-01
Payer: MEDICARE

## 2022-09-01 VITALS
TEMPERATURE: 98 F | WEIGHT: 171.2 LBS | SYSTOLIC BLOOD PRESSURE: 110 MMHG | RESPIRATION RATE: 18 BRPM | DIASTOLIC BLOOD PRESSURE: 76 MMHG | HEIGHT: 61 IN | BODY MASS INDEX: 32.32 KG/M2 | OXYGEN SATURATION: 98 % | HEART RATE: 57 BPM

## 2022-09-01 DIAGNOSIS — F41.9 ANXIETY: ICD-10-CM

## 2022-09-01 DIAGNOSIS — L03.316 CELLULITIS OF UMBILICUS: ICD-10-CM

## 2022-09-01 DIAGNOSIS — Z98.84 H/O BARIATRIC SURGERY: ICD-10-CM

## 2022-09-01 DIAGNOSIS — D50.0 IRON DEFICIENCY ANEMIA DUE TO CHRONIC BLOOD LOSS: Primary | ICD-10-CM

## 2022-09-01 PROCEDURE — 99213 OFFICE O/P EST LOW 20 MIN: CPT

## 2022-09-01 NOTE — PATIENT INSTRUCTIONS
Iron Deficiency Anemia   AMBULATORY CARE:   Iron deficiency anemia (FACUNDO)  means you have low red blood cell and hemoglobin levels  Hemoglobin is part of red blood cells and helps carry oxygen to your body  Iron helps make hemoglobin  FACUNDO is caused by a lack of iron in the blood  Blood loss and not enough iron in the foods you eat are the most common causes of low iron  Common symptoms include the following:   Feeling weak, tired, or irritable    Pale skin    Headache, dizziness    Shortness of breath with activity    Fast or uneven heartbeat    Sore or swollen tongue and mouth    Nails that break easily    An urge to eat ice, paint, starch, or dirt    Seek care immediately if:   You have dark or bloody bowel movements  You vomit blood  You are too dizzy to stand up  You have trouble swallowing because of the pain in your mouth and throat  Call your doctor or hematologist if:   You have heartburn, constipation, or diarrhea  You have nausea or are vomiting  You are dizzy or very tired  You have questions or concerns about your condition or care  Treatment for FACUNDO  may take 3 to 6 months  You may need medicines and supplements to increase the amount of iron in your blood  Ask your healthcare provider how much iron you should take each day  A blood transfusion may be needed if your anemia is severe  This will help replace the blood and iron you have lost   Eat foods rich in iron and protein:  Nuts, meat, dark leafy green vegetables, and beans are high in iron and protein  Limit milk to 2 cups a day  The calcium in milk can interfere with how your body absorbs iron  Take the iron supplement with food or a drink that is high in vitamin C  This helps your body absorb the iron  You may need to meet with a dietitian to create the right food plan for you  Drink liquids as directed:  Iron supplements may cause constipation  Liquids help prevent constipation   Ask how much liquid to drink each day and which liquids are best for you  Follow up with your doctor or hematologist as directed: You may need to see a specialist to help find the cause of your FACUNDO  Write down your questions so you remember to ask them during your visits  © Copyright [employer] 2022 Information is for End User's use only and may not be sold, redistributed or otherwise used for commercial purposes  All illustrations and images included in CareNotes® are the copyrighted property of [employer]  or Acacia Obregon  The above information is an  only  It is not intended as medical advice for individual conditions or treatments  Talk to your doctor, nurse or pharmacist before following any medical regimen to see if it is safe and effective for you

## 2022-09-01 NOTE — PROGRESS NOTES
Assessment/Plan:     Problem List Items Addressed This Visit        Other    H/O bariatric surgery    Relevant Orders    Ambulatory Referral to Plastic Surgery    Iron deficiency anemia due to chronic blood loss - Primary  Lab Results   Component Value Date    IRON 43 (L) 07/30/2022    FERRITIN 5 (L) 07/30/2022    TIBC 460 (H) 07/30/2022    CONCFE 9 (L) 07/30/2022   - patient needed iron infusions in the past because oral supplementation was not sufficient  - patient reconnected with hem/onc and is set to begin another round of iron infusions on 9/12 for 6 weeks  - suspect leg pain can be attributed to low iron  - discussed with patient; she is agreeable to see how her symptoms improve following iron infusions  - will consider gabapentin if symptoms persist      Other Visit Diagnoses     Cellulitis of umbilicus   - RESOLVED      Anxiety     - Patient was started on Lexapro 1 month ago  - Stopped after 1 week because it made her feel "too mellow"  - Will continue meditation for her symptoms now            Subjective:      Patient ID: Curt Schmidt is a 52 y o  female who returns for follow up  She was started on Lexapro after her last visit for anxiety, however stopped after a week because it made her feel "too mellow"  She prefers to continue meditation exercises but now in will revisit meds if her symptoms worsen  Drainage from the umbilicus has resolved following use of topical antibiotic ointment  The patient continues to report pain in the legs and feet b/l  Lab work revealed severely low iron  Following bariatric surgery, patient has a history chronic iron deficiency that required iron infusion in the past because her iron stores were not correcting with just oral iron symptoms    The patient is scheduled to start weekly iron infusions on 09/12 for 6 weeks         Will revisit ffoot pain  Consider gabapentin    The following portions of the patient's history were reviewed and updated as appropriate:   She has a past medical history of Abnormal Pap smear of cervix, Anemia, Anxiety, Asthma, Diabetes mellitus ([address] ), History of transfusion (2018), HPV (human papilloma virus) infection, Obesity, and Varicella  Current Outpatient Medications   Medication Sig Dispense Refill    albuterol (PROVENTIL HFA,VENTOLIN HFA) 90 mcg/act inhaler Inhale 2 puffs every 6 (six) hours as needed for wheezing or shortness of breath 18 g 1    Calcium Citrate-Vitamin D 500-400 MG-UNIT CHEW take 1 tablet by oral route 3 times every day      cyanocobalamin (VITAMIN B-12) 100 mcg tablet every 24 hours      escitalopram (Lexapro) 10 mg tablet Take 1 tablet (10 mg total) by mouth daily 90 tablet 3    Ferrous Sulfate Dried (FERROUS SULFATE CR PO) Take by mouth      Multiple Vitamins-Minerals (MULTIVITAMIN ADULT PO) Take by mouth      mupirocin (BACTROBAN) 2 % ointment Apply topically 3 (three) times a day 22 g 0     No current facility-administered medications for this visit  She has No Known Allergies       Review of Systems   Constitutional: Negative for chills and fever  HENT: Negative for congestion and rhinorrhea  Eyes: Negative for visual disturbance  Respiratory: Negative for shortness of breath  Cardiovascular: Negative for chest pain  Gastrointestinal: Negative for abdominal pain  Genitourinary: Negative for dysuria  Musculoskeletal: Positive for arthralgias and myalgias  Skin: Negative for rash  Neurological: Negative for dizziness and headaches  Psychiatric/Behavioral: Positive for dysphoric mood  Negative for sleep disturbance  The patient is nervous/anxious  Objective:      /76   Pulse 57   Temp 98 °F (36 7 °C)   Resp 18   Ht 5' 1" (1 549 m)   Wt 77 7 kg (171 lb 3 2 oz)   SpO2 98%   BMI 32 35 kg/m²          Physical Exam  Vitals and nursing note reviewed  Constitutional:       General: She is not in acute distress  Appearance: Normal appearance     HENT:      Head: Normocephalic and atraumatic  Nose: Nose normal  No congestion  Mouth/Throat:      Mouth: Mucous membranes are moist       Pharynx: Oropharynx is clear  Eyes:      Extraocular Movements: Extraocular movements intact  Conjunctiva/sclera: Conjunctivae normal    Pulmonary:      Effort: Pulmonary effort is normal  No respiratory distress  Musculoskeletal:         General: Normal range of motion  Cervical back: Normal range of motion and neck supple  Skin:     General: Skin is warm and dry  Neurological:      General: No focal deficit present  Mental Status: She is alert and oriented to person, place, and time  Cranial Nerves: No cranial nerve deficit  Sensory: No sensory deficit  Motor: No weakness  Psychiatric:         Mood and Affect: Mood is not anxious           Behavior: Behavior normal

## 2022-09-12 ENCOUNTER — HOSPITAL ENCOUNTER (OUTPATIENT)
Dept: INFUSION CENTER | Facility: CLINIC | Age: 47
Discharge: HOME | End: 2022-09-12
Payer: MEDICARE

## 2022-09-12 VITALS
HEART RATE: 59 BPM | OXYGEN SATURATION: 96 % | RESPIRATION RATE: 18 BRPM | TEMPERATURE: 97.1 F | DIASTOLIC BLOOD PRESSURE: 67 MMHG | SYSTOLIC BLOOD PRESSURE: 99 MMHG

## 2022-09-12 DIAGNOSIS — R11.0 NAUSEA: Primary | ICD-10-CM

## 2022-09-12 DIAGNOSIS — D50.0 IRON DEFICIENCY ANEMIA DUE TO CHRONIC BLOOD LOSS: ICD-10-CM

## 2022-09-12 PROCEDURE — 96375 TX/PRO/DX INJ NEW DRUG ADDON: CPT

## 2022-09-12 PROCEDURE — 96365 THER/PROPH/DIAG IV INF INIT: CPT

## 2022-09-12 RX ORDER — SODIUM CHLORIDE 9 MG/ML
20 INJECTION, SOLUTION INTRAVENOUS ONCE
Status: COMPLETED | OUTPATIENT
Start: 2022-09-12 | End: 2022-09-12

## 2022-09-12 RX ORDER — SODIUM CHLORIDE 9 MG/ML
20 INJECTION, SOLUTION INTRAVENOUS ONCE
Status: CANCELLED | OUTPATIENT
Start: 2022-09-19

## 2022-09-12 RX ADMIN — SODIUM CHLORIDE 20 ML/HR: 0.9 INJECTION, SOLUTION INTRAVENOUS at 13:14

## 2022-09-12 RX ADMIN — ONDANSETRON 8 MG: 2 INJECTION INTRAMUSCULAR; INTRAVENOUS at 13:20

## 2022-09-12 RX ADMIN — SODIUM CHLORIDE 300 MG: 0.9 INJECTION, SOLUTION INTRAVENOUS at 13:53

## 2022-09-24 ENCOUNTER — HOSPITAL ENCOUNTER (OUTPATIENT)
Dept: INFUSION CENTER | Facility: CLINIC | Age: 47
Discharge: HOME | End: 2022-09-24
Payer: MEDICARE

## 2022-09-24 VITALS
HEART RATE: 56 BPM | TEMPERATURE: 97 F | DIASTOLIC BLOOD PRESSURE: 76 MMHG | RESPIRATION RATE: 16 BRPM | SYSTOLIC BLOOD PRESSURE: 116 MMHG | OXYGEN SATURATION: 98 %

## 2022-09-24 DIAGNOSIS — D50.0 IRON DEFICIENCY ANEMIA DUE TO CHRONIC BLOOD LOSS: ICD-10-CM

## 2022-09-24 DIAGNOSIS — R11.0 NAUSEA: Primary | ICD-10-CM

## 2022-09-24 PROCEDURE — 96375 TX/PRO/DX INJ NEW DRUG ADDON: CPT

## 2022-09-24 PROCEDURE — 96365 THER/PROPH/DIAG IV INF INIT: CPT

## 2022-09-24 RX ORDER — SODIUM CHLORIDE 9 MG/ML
20 INJECTION, SOLUTION INTRAVENOUS ONCE
Status: CANCELLED | OUTPATIENT
Start: 2022-10-01

## 2022-09-24 RX ORDER — SODIUM CHLORIDE 9 MG/ML
20 INJECTION, SOLUTION INTRAVENOUS ONCE
Status: COMPLETED | OUTPATIENT
Start: 2022-09-24 | End: 2022-09-24

## 2022-09-24 RX ADMIN — SODIUM CHLORIDE 20 ML/HR: 0.9 INJECTION, SOLUTION INTRAVENOUS at 13:48

## 2022-09-24 RX ADMIN — SODIUM CHLORIDE 300 MG: 9 INJECTION, SOLUTION INTRAVENOUS at 14:06

## 2022-09-24 RX ADMIN — ONDANSETRON 8 MG: 2 INJECTION INTRAMUSCULAR; INTRAVENOUS at 13:48

## 2022-10-01 ENCOUNTER — HOSPITAL ENCOUNTER (OUTPATIENT)
Dept: INFUSION CENTER | Facility: CLINIC | Age: 47
Discharge: HOME | End: 2022-10-01
Payer: MEDICARE

## 2022-10-01 VITALS
OXYGEN SATURATION: 98 % | TEMPERATURE: 97.3 F | SYSTOLIC BLOOD PRESSURE: 112 MMHG | HEART RATE: 56 BPM | RESPIRATION RATE: 16 BRPM | DIASTOLIC BLOOD PRESSURE: 79 MMHG

## 2022-10-01 DIAGNOSIS — R11.0 NAUSEA: Primary | ICD-10-CM

## 2022-10-01 DIAGNOSIS — D50.0 IRON DEFICIENCY ANEMIA DUE TO CHRONIC BLOOD LOSS: ICD-10-CM

## 2022-10-01 PROCEDURE — 96365 THER/PROPH/DIAG IV INF INIT: CPT

## 2022-10-01 PROCEDURE — 96367 TX/PROPH/DG ADDL SEQ IV INF: CPT

## 2022-10-01 RX ORDER — SODIUM CHLORIDE 9 MG/ML
20 INJECTION, SOLUTION INTRAVENOUS ONCE
Status: COMPLETED | OUTPATIENT
Start: 2022-10-01 | End: 2022-10-01

## 2022-10-01 RX ORDER — SODIUM CHLORIDE 9 MG/ML
20 INJECTION, SOLUTION INTRAVENOUS ONCE
Status: CANCELLED | OUTPATIENT
Start: 2022-10-08

## 2022-10-01 RX ADMIN — SODIUM CHLORIDE 20 ML/HR: 0.9 INJECTION, SOLUTION INTRAVENOUS at 10:14

## 2022-10-01 RX ADMIN — SODIUM CHLORIDE 300 MG: 9 INJECTION, SOLUTION INTRAVENOUS at 10:42

## 2022-10-01 RX ADMIN — ONDANSETRON 8 MG: 2 INJECTION INTRAMUSCULAR; INTRAVENOUS at 10:14

## 2022-10-08 ENCOUNTER — HOSPITAL ENCOUNTER (OUTPATIENT)
Dept: INFUSION CENTER | Facility: CLINIC | Age: 47
Discharge: HOME | End: 2022-10-08
Payer: MEDICARE

## 2022-10-08 VITALS
SYSTOLIC BLOOD PRESSURE: 88 MMHG | TEMPERATURE: 96.2 F | HEART RATE: 61 BPM | RESPIRATION RATE: 16 BRPM | OXYGEN SATURATION: 98 % | DIASTOLIC BLOOD PRESSURE: 58 MMHG

## 2022-10-08 DIAGNOSIS — D50.0 IRON DEFICIENCY ANEMIA DUE TO CHRONIC BLOOD LOSS: ICD-10-CM

## 2022-10-08 DIAGNOSIS — R11.0 NAUSEA: Primary | ICD-10-CM

## 2022-10-08 PROCEDURE — 96375 TX/PRO/DX INJ NEW DRUG ADDON: CPT

## 2022-10-08 PROCEDURE — 96365 THER/PROPH/DIAG IV INF INIT: CPT

## 2022-10-08 RX ORDER — SODIUM CHLORIDE 9 MG/ML
20 INJECTION, SOLUTION INTRAVENOUS ONCE
Status: CANCELLED | OUTPATIENT
Start: 2022-10-15

## 2022-10-08 RX ORDER — SODIUM CHLORIDE 9 MG/ML
20 INJECTION, SOLUTION INTRAVENOUS ONCE
Status: COMPLETED | OUTPATIENT
Start: 2022-10-08 | End: 2022-10-08

## 2022-10-08 RX ADMIN — ONDANSETRON 8 MG: 2 INJECTION INTRAMUSCULAR; INTRAVENOUS at 08:43

## 2022-10-08 RX ADMIN — SODIUM CHLORIDE 20 ML/HR: 0.9 INJECTION, SOLUTION INTRAVENOUS at 08:43

## 2022-10-08 RX ADMIN — SODIUM CHLORIDE 300 MG: 0.9 INJECTION, SOLUTION INTRAVENOUS at 09:04

## 2022-10-15 ENCOUNTER — HOSPITAL ENCOUNTER (OUTPATIENT)
Dept: INFUSION CENTER | Facility: CLINIC | Age: 47
Discharge: HOME | End: 2022-10-15
Payer: MEDICARE

## 2022-10-15 VITALS
TEMPERATURE: 97.1 F | SYSTOLIC BLOOD PRESSURE: 105 MMHG | RESPIRATION RATE: 16 BRPM | HEART RATE: 59 BPM | DIASTOLIC BLOOD PRESSURE: 71 MMHG | OXYGEN SATURATION: 96 %

## 2022-10-15 DIAGNOSIS — D50.0 IRON DEFICIENCY ANEMIA DUE TO CHRONIC BLOOD LOSS: ICD-10-CM

## 2022-10-15 DIAGNOSIS — R11.0 NAUSEA: Primary | ICD-10-CM

## 2022-10-15 PROCEDURE — 96365 THER/PROPH/DIAG IV INF INIT: CPT

## 2022-10-15 PROCEDURE — 96375 TX/PRO/DX INJ NEW DRUG ADDON: CPT

## 2022-10-15 RX ORDER — SODIUM CHLORIDE 9 MG/ML
20 INJECTION, SOLUTION INTRAVENOUS ONCE
Status: CANCELLED | OUTPATIENT
Start: 2022-10-15

## 2022-10-15 RX ORDER — SODIUM CHLORIDE 9 MG/ML
20 INJECTION, SOLUTION INTRAVENOUS ONCE
Status: COMPLETED | OUTPATIENT
Start: 2022-10-15 | End: 2022-10-15

## 2022-10-15 RX ADMIN — ONDANSETRON 8 MG: 2 INJECTION INTRAMUSCULAR; INTRAVENOUS at 08:47

## 2022-10-15 RX ADMIN — SODIUM CHLORIDE 20 ML/HR: 0.9 INJECTION, SOLUTION INTRAVENOUS at 08:45

## 2022-10-15 RX ADMIN — SODIUM CHLORIDE 300 MG: 0.9 INJECTION, SOLUTION INTRAVENOUS at 09:16

## 2023-03-03 NOTE — LETTER
April 20, 2022     Patient: Marlene Malik  YOB: 1975  Date of Visit: 4/20/2022      To Whom it May Concern:    Marlene Malik is under my professional care  Ke Cheryl was seen in my office on 4/20/2022  Kemick Luna may return to work on 04/21/2022  If you have any questions or concerns, please don't hesitate to call           Sincerely,          Tevin White MD        CC: Marlene King DISCHARGE

## 2023-03-19 ENCOUNTER — APPOINTMENT (EMERGENCY)
Dept: RADIOLOGY | Facility: HOSPITAL | Age: 48
End: 2023-03-19

## 2023-03-19 ENCOUNTER — HOSPITAL ENCOUNTER (EMERGENCY)
Facility: HOSPITAL | Age: 48
Discharge: HOME | End: 2023-03-19
Attending: EMERGENCY MEDICINE | Admitting: EMERGENCY MEDICINE

## 2023-03-19 VITALS
TEMPERATURE: 97.4 F | DIASTOLIC BLOOD PRESSURE: 79 MMHG | OXYGEN SATURATION: 98 % | RESPIRATION RATE: 17 BRPM | SYSTOLIC BLOOD PRESSURE: 107 MMHG | HEART RATE: 72 BPM

## 2023-03-19 DIAGNOSIS — S93.402A SPRAIN OF LEFT ANKLE, UNSPECIFIED LIGAMENT, INITIAL ENCOUNTER: Primary | ICD-10-CM

## 2023-03-19 NOTE — Clinical Note
Jazmine Dickson was seen and treated in our emergency department on 3/19/2023  Diagnosis:     Camelia Schwab    She may return on this date:     Please allow the patient to wear sneakers while at work  If you have any questions or concerns, please don't hesitate to call        Juaquin Collins PA-C    ______________________________           _______________          _______________  Hospital Representative                              Date                                Time

## 2023-03-19 NOTE — DISCHARGE INSTRUCTIONS
Please return to the emergency department for worsening symptoms including chest pain, shortness of breath, dizziness, lightheadedness, fever greater than 103, severe pain, inability to walk, fainting episodes, etc  Please follow-up with your family practice provider as soon as possible  I recommend rest, ice, compression, and elevation of the left lower extremity to help with pain and swelling  You may use Tylenol and Motrin as needed for pain and swelling relief  Please see the back of the bottle for dosing instructions  You have been given an Aircast while you are here in the emergency department  You may continue to wear this for stability and for comfort  If your symptoms do not improve within 2 weeks, please follow-up with orthopedics  I have given you information to follow-up with orthopedics on these discharge instructions

## 2023-03-19 NOTE — ED PROVIDER NOTES
History  Chief Complaint   Patient presents with   • Ankle Pain     Pt presents after rolling  left ankle on a step and falling, complaints of pain to affected ankle (pt ambulated to room)     This is a 49-year-old female with no significant past medical history presenting to the emergency department today for left ankle pain  The patient notes that she missed a step yesterday and fell resulting in an injury to her left ankle  She notes she is still able to ambulate but notes pain  She is pain specifically to the lateral aspect of the left ankle  She denies any knee pain, shin pain, or foot pain  She has no numbness or tingling  She has no prior injuries to this region  No prior surgeries to this ankle  The patient denies other complaints at this time  History provided by:  Patient   used: No    Ankle Pain  Location:  Ankle  Time since incident:  1 day  Injury: yes    Ankle location:  L ankle  Chronicity:  New  Dislocation: no    Tetanus status:  Unknown  Prior injury to area:  No  Relieved by:  Nothing  Exacerbated by: movement  Ineffective treatments:  None tried  Associated symptoms: decreased ROM (secondary to pain) and swelling    Associated symptoms: no back pain, no fatigue, no fever, no itching, no muscle weakness, no neck pain, no numbness, no stiffness and no tingling        Prior to Admission Medications   Prescriptions Last Dose Informant Patient Reported? Taking?    Calcium Citrate-Vitamin D 500-400 MG-UNIT CHEW   Yes No   Sig: take 1 tablet by oral route 3 times every day   Ferrous Sulfate Dried (FERROUS SULFATE CR PO)   Yes No   Sig: Take by mouth   Multiple Vitamins-Minerals (MULTIVITAMIN ADULT PO)   Yes No   Sig: Take by mouth   albuterol (PROVENTIL HFA,VENTOLIN HFA) 90 mcg/act inhaler   No No   Sig: Inhale 2 puffs every 6 (six) hours as needed for wheezing or shortness of breath   cyanocobalamin (VITAMIN B-12) 100 mcg tablet   Yes No   Sig: every 24 hours escitalopram (Lexapro) 10 mg tablet   No No   Sig: Take 1 tablet (10 mg total) by mouth daily   mupirocin (BACTROBAN) 2 % ointment   No No   Sig: Apply topically 3 (three) times a day      Facility-Administered Medications: None       Past Medical History:   Diagnosis Date   • Abnormal Pap smear of cervix        • Anemia    • Anxiety    • Asthma    • Diabetes mellitus ([facility] [address] )    • History of transfusion    • HPV (human papilloma virus) infection        • Obesity    • Varicella        Past Surgical History:   Procedure Laterality Date   • BREAST BIOPSY Right     u/s core bx   • BREAST SURGERY      Incisional breast biopsy   •  SECTION      And    • EXPLORATORY LAPAROTOMY W/ BOWEL RESECTION N/A 3/22/2022    Procedure: LAPAROTOMY EXPLORATORY WITH OPEN REPAIR OF INCARCERATED INCISIONAL  HERNIA WITH MESH;  Surgeon: Berenice Martinez MD;  Location: [facility];  Service: General   • GASTRIC BYPASS  2016   • INCISIONAL HERNIA REPAIR N/A 3/22/2022    Procedure: OPEN REPAIR HERNIA INCISIONAL WITH MESH;  Surgeon: Berenice Martinez MD;  Location: [facility];  Service: General   • WA HYSTEROSCOPY BX ENDOMETRIUM&/POLYPC W/WO D&C N/A 2021    Procedure: DILATATION AND CURETTAGE (D&C) WITH HYSTEROSCOPY (MYOSURE);   Surgeon: Beti Dunbar DO;  [facility];  Service: Gynecology   • WA HYSTEROSCOPY BX ENDOMETRIUM&/POLYPC W/WO D&C N/A 2021    Procedure: POLYPECTOMY;  Surgeon: Beti Dunbar DO;  [facility];  Service: Gynecology   • TUBAL LIGATION     • US GUIDED BREAST BIOPSY RIGHT COMPLETE Right 2019       Family History   Problem Relation Age of Onset   • Diabetes Mother    • Hypertension Mother    • Cancer Mother    • Arthritis Mother    • Leukemia Mother    • Hyperlipidemia Mother    • Diabetes Father    • Hypertension Father    • Heart disease Father    • Coronary artery disease Father    • Hyperlipidemia Father    • Stroke Paternal Grandfather • Thyroid cancer Sister 37   • Hypertension Brother    • Breast cancer Maternal Aunt 48   • Breast cancer Paternal Aunt 79   • No Known Problems Maternal Grandmother    • No Known Problems Maternal Grandfather    • No Known Problems Paternal Grandmother    • No Known Problems Son    • No Known Problems Son    • No Known Problems Maternal Aunt    • No Known Problems Maternal Aunt    • No Known Problems Maternal Aunt    • No Known Problems Maternal Aunt    • No Known Problems Maternal Aunt    • Cervical cancer Paternal Aunt 44   • No Known Problems Paternal Aunt    • No Known Problems Paternal Aunt    • No Known Problems Paternal Aunt    • No Known Problems Paternal Aunt    • No Known Problems Paternal Aunt    • Lung cancer Paternal Uncle 80     I have reviewed and agree with the history as documented  E-Cigarette/Vaping   • E-Cigarette Use Never User      E-Cigarette/Vaping Substances   • Nicotine No    • THC No    • CBD No    • Flavoring No    • Other No    • Unknown No      Social History     Tobacco Use   • Smoking status: Former     Packs/day: 0 50     Years: 10 00     Pack years: 5 00     Types: Cigarettes   • Smokeless tobacco: Never   • Tobacco comments:     refused   Vaping Use   • Vaping Use: Never used   Substance Use Topics   • Alcohol use: Yes     Alcohol/week: 0 0 standard drinks     Comment: social    • Drug use: Never       Review of Systems   Constitutional: Negative for appetite change, chills, diaphoresis, fatigue and fever  Eyes: Negative for visual disturbance  Respiratory: Negative for cough, chest tightness, shortness of breath and wheezing  Cardiovascular: Negative for chest pain, palpitations and leg swelling  Gastrointestinal: Negative for abdominal pain, constipation, diarrhea, nausea and vomiting  Musculoskeletal: Negative for back pain, neck pain, neck stiffness and stiffness  Skin: Negative for itching, rash and wound     Neurological: Negative for dizziness, seizures, light-headedness, numbness and headaches  Psychiatric/Behavioral: Negative for confusion  All other systems reviewed and are negative  Physical Exam  Physical Exam  Vitals and nursing note reviewed  Constitutional:       General: She is not in acute distress  Appearance: Normal appearance  She is normal weight  She is not ill-appearing, toxic-appearing or diaphoretic  HENT:      Head: Normocephalic and atraumatic  Nose: Nose normal  No congestion or rhinorrhea  Mouth/Throat:      Mouth: Mucous membranes are moist       Pharynx: No oropharyngeal exudate or posterior oropharyngeal erythema  Eyes:      General: No scleral icterus  Right eye: No discharge  Left eye: No discharge  Extraocular Movements: Extraocular movements intact  Pupils: Pupils are equal, round, and reactive to light  Cardiovascular:      Rate and Rhythm: Normal rate and regular rhythm  Pulses: Normal pulses  Heart sounds: Normal heart sounds  No murmur heard  No friction rub  No gallop  Comments: 2+ DP pulses bilaterally  Pulmonary:      Effort: Pulmonary effort is normal  No respiratory distress  Breath sounds: Normal breath sounds  No stridor  No wheezing, rhonchi or rales  Chest:      Chest wall: No tenderness  Musculoskeletal:         General: Normal range of motion  Cervical back: Normal range of motion  No tenderness  Right lower leg: No edema  Left lower leg: No edema  Comments:  The patient has normal range of motion to dorsiflexion and plantarflexion of the left ankle; the patient has mild swelling to the lateral aspect of the left ankle with tenderness to palpation to the lateral malleolus; there is no tenderness to palpation to the left midfoot, forefoot, or hindfoot; the patient has no tenderness to palpation to the left knee and has normal range of motion of the left knee; there is no tenderness to palpation to the left fifth metatarsal Skin:     General: Skin is warm and dry  Capillary Refill: Capillary refill takes less than 2 seconds  Coloration: Skin is not jaundiced or pale  Neurological:      General: No focal deficit present  Mental Status: She is alert and oriented to person, place, and time  Mental status is at baseline  Comments: 5 out of 5 strength to the bilateral lower extremities  Normal sensation to the distal lower extremities bilaterally   Psychiatric:         Mood and Affect: Mood normal          Behavior: Behavior normal          Vital Signs  ED Triage Vitals [03/19/23 1221]   Temperature Pulse Respirations Blood Pressure SpO2   (!) 97 4 °F (36 3 °C) 72 17 107/79 98 %      Temp src Heart Rate Source Patient Position - Orthostatic VS BP Location FiO2 (%)   -- Monitor Lying Left arm --      Pain Score       8           Vitals:    03/19/23 1221   BP: 107/79   Pulse: 72   Patient Position - Orthostatic VS: Lying         Visual Acuity      ED Medications  Medications - No data to display    Diagnostic Studies  Results Reviewed     None                 XR ankle 3+ views LEFT   ED Interpretation by Maco Justin PA-C (03/19 1255)   No acute osseous abnormalities                 Procedures  Procedures         ED Course                               SBIRT 20yo+    Flowsheet Row Most Recent Value   SBIRT (25 yo +)    In order to provide better care to our patients, we are screening all of our patients for alcohol and drug use  Would it be okay to ask you these screening questions? No Filed at: 03/19/2023 1226                    Medical Decision Making  This is a 80-year-old female presenting to the emergency department for an injury to her left ankle  She sustained this yesterday after a mechanical fall  She is still able to ambulate though with some pain  Vital signs are stable  On physical examination, the patient has tenderness to palpation to the lateral malleolus of the left foot    She has normal range of motion to dorsiflexion and plantarflexion of the left foot  Normal neurologic assessment to the left foot  No acute osseous abnormalities on x-ray  The patient was given an Aircast while here in the emergency department  The patient is stable for discharge at this time  RICE  Follow-up with orthopedics if symptoms do not improve  Follow-up with PCP  Strict return precautions were given  Recommend PCP follow-up as soon as possible  The patient and/or patient's proxy verify their understanding and agree to the plan at this time  All questions answered to the patient and/or their proxy's satisfaction  All labs reviewed and utilized in the medical decision making process (if labs were ordered)  Portions of the record may have been created with voice recognition software   Occasional wrong word or "sound a like" substitutions may have occurred due to the inherent limitations of voice recognition software   Read the chart carefully and recognize, using context, where substitutions have occurred  Sprain of left ankle, unspecified ligament, initial encounter: complicated acute illness or injury  Amount and/or Complexity of Data Reviewed  Radiology: ordered and independent interpretation performed  Decision-making details documented in ED Course  Details: XR Left Ankle          Disposition  Final diagnoses:   Sprain of left ankle, unspecified ligament, initial encounter     Time reflects when diagnosis was documented in both MDM as applicable and the Disposition within this note     Time User Action Codes Description Comment    3/19/2023 12:59 PM Zarina Shoe Add [[id number]] Sprain of left ankle, unspecified ligament, initial encounter       ED Disposition     ED Disposition   Discharge    Condition   Stable    Date/Time   Sun Mar 19, 2023 39 Luisa Moya discharge to home/self care                 Follow-up Information     Follow up With Specialties Details Why Contact Info Additional Information    Ciara Gibbs MD Family Medicine Schedule an appointment as soon as possible for a visit   [address] [phone]       [address] Emergency Department Emergency Medicine Go to  If symptoms worsen [address] [id number]  [address] [facility] Emergency Department, [address], [address]    [address] [facility] Orthopedic Surgery Schedule an appointment as soon as possible for a visit   [address] [id number] [phone] [address] [facility] [address] [facility] [address], [address] [facility] [place] [phone]          Discharge Medication List as of 3/19/2023  1:01 PM      CONTINUE these medications which have NOT CHANGED    Details   albuterol (PROVENTIL HFA,VENTOLIN HFA) 90 mcg/act inhaler Inhale 2 puffs every 6 (six) hours as needed for wheezing or shortness of breath, Starting Fri 7/29/2022, Normal      Calcium Citrate-Vitamin D 500-400 MG-UNIT CHEW take 1 tablet by oral route 3 times every day, Historical Med      cyanocobalamin (VITAMIN B-12) 100 mcg tablet every 24 hours, Starting Wed 2/3/2016, Historical Med      escitalopram (Lexapro) 10 mg tablet Take 1 tablet (10 mg total) by mouth daily, Starting Fri 7/29/2022, Normal      Ferrous Sulfate Dried (FERROUS SULFATE CR PO) Take by mouth, Historical Med      Multiple Vitamins-Minerals (MULTIVITAMIN ADULT PO) Take by mouth, Starting Thu 10/12/2017, Historical Med      mupirocin (BACTROBAN) 2 % ointment Apply topically 3 (three) times a day, Starting Fri 7/29/2022, Normal             No discharge procedures on file      PDMP Review       Value Time User    PDMP Reviewed  Yes 3/25/2022  1:51 PM Jayjay Mcgrath MD          ED Provider  Electronically Signed by           Marnie Manzanares PA-C  03/19/23 1946

## 2023-06-27 ENCOUNTER — ANNUAL EXAM (OUTPATIENT)
Dept: OBGYN CLINIC | Facility: CLINIC | Age: 48
End: 2023-06-27
Payer: COMMERCIAL

## 2023-06-27 VITALS
WEIGHT: 178 LBS | SYSTOLIC BLOOD PRESSURE: 112 MMHG | DIASTOLIC BLOOD PRESSURE: 78 MMHG | BODY MASS INDEX: 33.61 KG/M2 | HEIGHT: 61 IN

## 2023-06-27 DIAGNOSIS — Z12.11 SCREENING FOR COLON CANCER: ICD-10-CM

## 2023-06-27 DIAGNOSIS — Z12.31 ENCOUNTER FOR SCREENING MAMMOGRAM FOR MALIGNANT NEOPLASM OF BREAST: ICD-10-CM

## 2023-06-27 DIAGNOSIS — Z01.419 WELL WOMAN EXAM WITH ROUTINE GYNECOLOGICAL EXAM: Primary | ICD-10-CM

## 2023-06-27 PROCEDURE — S0612 ANNUAL GYNECOLOGICAL EXAMINA: HCPCS | Performed by: OBSTETRICS & GYNECOLOGY

## 2023-06-27 NOTE — PROGRESS NOTES
ASSESSMENT & PLAN:   Diagnoses and all orders for this visit:    Screening for colon cancer  -     Ambulatory referral to Gastroenterology; Future    Encounter for screening mammogram for malignant neoplasm of breast  -     Mammo screening bilateral w 3d & cad; Future          The following were reviewed in today's visit: ASCCP guidelines, breast self exam, mammography screening ordered, menopause, exercise and healthy diet  Patient to return to office in yearly for annual exam      All questions have been answered to her satisfaction  CC:  Annual Gynecologic Examination  Chief Complaint   Patient presents with   • Gynecologic Exam     Patient is here today for her yearly exam, pap up to date(1/22/21-wnl), mammo 11/17/21-order placed, colonoscopy-per patient she never had one-order placed  Patient would like to discuss the pain she has started experiencing during her cycles  She also has a few questions about menopause  HPI: Rosetta Healy is a 50 y o  [id number] who presents for annual gynecologic examination  She has the following concerns:  Having stabbing pain on her right side  Comes on suddenly and intensely  Only happening when she is having her period  Has happened over the past ~ 3-4 months  Typically gets diarrhea during her period, this is not new for her  Periods have been getting more unpredictable with regard to flow - has been now getting heavier  Still happening regularly         Health Maintenance:    Exercise: frequently - 5x/week, cardio and weight training  Breast exams/breast awareness: yes  Diet: well balanced diet  Last mammogram: 11/2021 birads 2 on left, 3 on right after diagnostic US (stable findings x 1 5 yr)  Colorectal cancer screening: never done      Past Medical History:   Diagnosis Date   • Abnormal Pap smear of cervix     2017   • Anemia    • Anxiety    • Asthma    • Diabetes mellitus ([facility] [address] )    • History of transfusion 2018   • HPV (human papilloma virus) infection 2017   • Obesity    • Varicella        Past Surgical History:   Procedure Laterality Date   • BREAST BIOPSY Right 2012    u/s core bx   • BREAST SURGERY      Incisional breast biopsy   •  SECTION      And    • EXPLORATORY LAPAROTOMY W/ BOWEL RESECTION N/A 3/22/2022    Procedure: LAPAROTOMY EXPLORATORY WITH OPEN REPAIR OF INCARCERATED INCISIONAL  HERNIA WITH MESH;  Surgeon: Star Acosta MD;  Location: [facility];  Service: General   • GASTRIC BYPASS  2016   • INCISIONAL HERNIA REPAIR N/A 3/22/2022    Procedure: OPEN REPAIR HERNIA INCISIONAL WITH MESH;  Surgeon: Star Acosta MD;  Location: [facility];  Service: General   • ND HYSTEROSCOPY BX ENDOMETRIUM&/POLYPC W/WO D&C N/A 2021    Procedure: DILATATION AND CURETTAGE (D&C) WITH HYSTEROSCOPY (MYOSURE); Surgeon: Jayson Bettencourt DO;  [facility];  Service: Gynecology   • ND HYSTEROSCOPY BX ENDOMETRIUM&/POLYPC W/WO D&C N/A 2021    Procedure: POLYPECTOMY;  Surgeon: Jayson Bettencourt DO;  [facility];  Service: Gynecology   • TUBAL LIGATION     • US GUIDED BREAST BIOPSY RIGHT COMPLETE Right 2019       Past OB/Gyn History:  Period Duration (Days): 7  Period Pattern: Regular  Menstrual Flow: Heavy, Light  Dysmenorrhea: (!) Mild  Dysmenorrhea Symptoms: Diarrhea, Headache, CrampingPatient's last menstrual period was 2023 (approximate)  Menopausal status: premenopausal    Last Pap: 2021 : NILM, neg HR HPV  History of abnormal Pap smear: yes - NILM + HR HPV in 2019    Patient is currently sexually active     Using condoms      Family History  Family History   Problem Relation Age of Onset   • Diabetes Mother    • Hypertension Mother    • Cancer Mother    • Arthritis Mother    • Leukemia Mother    • Hyperlipidemia Mother    • Diabetes Father    • Hypertension Father    • Heart disease Father    • Coronary artery disease Father    • Hyperlipidemia Father    • Stroke Paternal Grandfather    • Thyroid cancer Sister 37   • Hypertension Brother    • Breast cancer Maternal Aunt 50   • Breast cancer Paternal Aunt 72   • No Known Problems Maternal Grandmother    • No Known Problems Maternal Grandfather    • No Known Problems Paternal Grandmother    • No Known Problems Son    • No Known Problems Son    • No Known Problems Maternal Aunt    • No Known Problems Maternal Aunt    • No Known Problems Maternal Aunt    • No Known Problems Maternal Aunt    • No Known Problems Maternal Aunt    • Cervical cancer Paternal Aunt 44   • No Known Problems Paternal Aunt    • No Known Problems Paternal Aunt    • No Known Problems Paternal Aunt    • No Known Problems Paternal Aunt    • No Known Problems Paternal Aunt    • Lung cancer Paternal Uncle 80       Family history of uterine or ovarian cancer: no  Family history of breast cancer: yes  Family history of colon cancer: no    Social History:  Social History     Socioeconomic History   • Marital status: Single     Spouse name: Not on file   • Number of children: 2   • Years of education: Not on file   • Highest education level: Not on file   Occupational History   • Not on file   Tobacco Use   • Smoking status: Former     Packs/day: 0 50     Years: 10 00     Total pack years: 5 00     Types: Cigarettes   • Smokeless tobacco: Never   • Tobacco comments:     refused   Vaping Use   • Vaping Use: Some days   • Substances: Nicotine   Substance and Sexual Activity   • Alcohol use:  Yes     Alcohol/week: 0 0 standard drinks of alcohol     Comment: social    • Drug use: Never   • Sexual activity: Yes     Partners: Male     Birth control/protection: Condom Male, None   Other Topics Concern   • Not on file   Social History Narrative    Current every day smoker - As per [employer]     Exercises daily    Sleeps 6-7 hours a day      Social Determinants of Health     Financial Resource Strain: Not on file   Food Insecurity: No Food Insecurity (3/24/2022)    Hunger Vital Sign    • Worried About Running Out of Food in the Last Year: Never true    • Ran Out of Food in the Last Year: Never true   Transportation Needs: No Transportation Needs (3/24/2022)    PRAPARE - Transportation    • Lack of Transportation (Medical): No    • Lack of Transportation (Non-Medical): No   Physical Activity: Not on file   Stress: Not on file   Social Connections: Not on file   Intimate Partner Violence: Not on file   Housing Stability: Unknown (3/24/2022)    Housing Stability Vital Sign    • Unable to Pay for Housing in the Last Year: Not on file    • Number of Places Lived in the Last Year: 1    • Unstable Housing in the Last Year: No     Domestic violence screen: negative    Allergies:  No Known Allergies    Medications:    Current Outpatient Medications:   •  albuterol (PROVENTIL HFA,VENTOLIN HFA) 90 mcg/act inhaler, Inhale 2 puffs every 6 (six) hours as needed for wheezing or shortness of breath, Disp: 18 g, Rfl: 1  •  Calcium Citrate-Vitamin D 500-400 MG-UNIT CHEW, take 1 tablet by oral route 3 times every day, Disp: , Rfl:   •  cyanocobalamin (VITAMIN B-12) 100 mcg tablet, every 24 hours, Disp: , Rfl:   •  Ferrous Sulfate Dried (FERROUS SULFATE CR PO), Take by mouth, Disp: , Rfl:   •  Multiple Vitamins-Minerals (MULTIVITAMIN ADULT PO), Take by mouth, Disp: , Rfl:   •  escitalopram (Lexapro) 10 mg tablet, Take 1 tablet (10 mg total) by mouth daily (Patient not taking: Reported on 6/27/2023), Disp: 90 tablet, Rfl: 3  •  mupirocin (BACTROBAN) 2 % ointment, Apply topically 3 (three) times a day (Patient not taking: Reported on 6/27/2023), Disp: 22 g, Rfl: 0    Review of Systems:  Denies fevers, chills, unintentional weight loss, excessive fatigue, chest pain, shortness of breath, abdominal pain, nausea, vomiting, urinary incontinence, urinary frequency, vaginal bleeding, vaginal discharge  All other systems negative unless otherwise stated         Physical Exam:  /78 (BP Location: Right arm, Patient Position: "Sitting, Cuff Size: Large)   Ht 5' 1\" (1 549 m)   Wt 80 7 kg (178 lb)   LMP 06/09/2023 (Approximate)   BMI 33 63 kg/m²  Body mass index is 33 63 kg/m²  GEN: The patient was alert and oriented x3, pleasant well-appearing female in no acute distress  HEENT:  Unremarkable, no anterior or posterior lymphadenopathy, no thyromegaly  CV:  Regular rate and rhythm, normal S1 and S2, no murmurs  RESP:  Clear to auscultation bilaterally, no wheezes, rales or rhonchi  BREAST:  Symmetric breasts with no palpable breast masses or obvious breast lesions  She has no retractions or nipple discharge  She has no axillary abnormalities or palpable masses  GI:  Soft, nontender, non-distended  MSK: bilateral lower extremities are nontender, no edema  : Normal appearing external female genitalia, normal appearing urethral meatus  Normal appearing vaginal epithelium  On bimanual exam, no cervical motion tenderness; uterus is smooth, mobile, nontender 6 weeks size  No tenderness or fullness in the bilateral adnexa                             "

## 2023-08-22 ENCOUNTER — TELEPHONE (OUTPATIENT)
Dept: PLASTIC SURGERY | Facility: HOSPITAL | Age: 48
End: 2023-08-22

## 2023-08-22 NOTE — TELEPHONE ENCOUNTER
Returned patient's call and reviewed criteria for panniculectomy consultation. Needs 3 months of physician documentation and 3 months of being on a prescription cream/powder.

## 2023-09-01 ENCOUNTER — OFFICE VISIT (OUTPATIENT)
Dept: FAMILY MEDICINE CLINIC | Facility: OTHER | Age: 48
End: 2023-09-01
Payer: COMMERCIAL

## 2023-09-01 VITALS
WEIGHT: 175 LBS | BODY MASS INDEX: 33.04 KG/M2 | TEMPERATURE: 98.2 F | OXYGEN SATURATION: 96 % | SYSTOLIC BLOOD PRESSURE: 102 MMHG | HEART RATE: 64 BPM | HEIGHT: 61 IN | DIASTOLIC BLOOD PRESSURE: 68 MMHG | RESPIRATION RATE: 16 BRPM

## 2023-09-01 DIAGNOSIS — L30.4 INTERTRIGO: Primary | ICD-10-CM

## 2023-09-01 PROCEDURE — 99213 OFFICE O/P EST LOW 20 MIN: CPT | Performed by: FAMILY MEDICINE

## 2023-09-01 NOTE — PROGRESS NOTES
Assessment/Plan:    Intertrigo  - Painful, macerated skin rash under abdominal skin fold  - Rash ongoing issue for over a year  - Plans on undergoing surgery to remove excess skin  - Notes rash is worse with sweating  - Has tried barrier creams, powders, hydrocortisone without relief   - Physical exam notable for well demarcated rash under skin fold with overlying macerated skin    Plan  - Start clotrimazole ointment to be applied at night   - If no resolution or not improving in 1 weeks time patient to switch to triamcinolone ointment  - Patient handout provided   - F/u in 1 month sooner if needed       Diagnoses and all orders for this visit:    Intertrigo  -     Clotrimazole 1 % OINT; Apply topically in the morning  -     triamcinolone (KENALOG) 0.1 % ointment; Apply topically 2 (two) times a day          Subjective:      Patient ID: Wolfgang Banks is a 50 y.o. female. Patient presents with ongoing rash on her abdomen just under excess skin folds. Patient notes rash has become an issue since losing a lot of weight and has had excess skin present for over a year. She notes the rash is painful and is worse with exercise or anytime she sweats excessively. She notes she has to keep the area dry and has tried products such as Desitin, hydrocortisone, powders, Vaseline, peroxide without relief. The following portions of the patient's history were reviewed and updated as appropriate: allergies, current medications, past family history, past medical history, past social history, past surgical history and problem list.    Review of Systems   Constitutional: Negative for chills and fever. HENT: Negative for ear pain and sore throat. Eyes: Negative for pain and visual disturbance. Respiratory: Negative for cough and shortness of breath. Cardiovascular: Negative for chest pain and palpitations. Gastrointestinal: Negative for abdominal pain and vomiting. Genitourinary: Negative for dysuria and hematuria. Musculoskeletal: Negative for arthralgias and back pain. Skin: Positive for color change and rash. Neurological: Negative for dizziness, light-headedness and headaches. All other systems reviewed and are negative. Objective:      /68   Pulse 64   Temp 98.2 °F (36.8 °C)   Resp 16   Ht 5' 1" (1.549 m)   Wt 79.4 kg (175 lb)   SpO2 96%   BMI 33.07 kg/m²          Physical Exam  Constitutional:       General: She is not in acute distress. Appearance: Normal appearance. She is not ill-appearing. HENT:      Head: Normocephalic and atraumatic. Right Ear: External ear normal.      Left Ear: External ear normal.      Nose: Nose normal.   Eyes:      Extraocular Movements: Extraocular movements intact. Conjunctiva/sclera: Conjunctivae normal.   Cardiovascular:      Rate and Rhythm: Normal rate and regular rhythm. Pulses: Normal pulses. Heart sounds: Normal heart sounds. Pulmonary:      Effort: Pulmonary effort is normal.      Breath sounds: Normal breath sounds. No wheezing, rhonchi or rales. Abdominal:      General: Abdomen is flat. There is no distension. Palpations: Abdomen is soft. There is no mass. Tenderness: There is no abdominal tenderness. There is no guarding. Musculoskeletal:      Right lower leg: No edema. Left lower leg: No edema. Skin:     General: Skin is warm and dry. Findings: Erythema and rash (well demarcated rash with macerated skin noted under patients anterior abdominal skin fold) present. Neurological:      Mental Status: She is alert. Motor: No weakness.       Gait: Gait normal.

## 2023-09-01 NOTE — PATIENT INSTRUCTIONS
Skin Yeast Infection   WHAT YOU NEED TO KNOW:   Yeast is normally present on the skin. Infection happens when you have too much yeast, or when it gets into a cut on your skin. Certain types of mold and fungus can cause a yeast infection. A skin yeast infection can appear anywhere on your skin or nail beds. Skin yeast infections are usually found on warm, moist parts of the body. Examples include between skin folds or under the breasts. DISCHARGE INSTRUCTIONS:   Return to the emergency department if:   You have signs of infection, such as pus, warmth or red streaks coming from the wound, or a fever. Call your doctor if:   Your symptoms worsen or do not get better within 7 to 10 days. You have new or returning signs of a skin yeast infection after treatment. You have questions or concerns about your condition or care. Medicines:   Antifungal medicine  may be given as a cream, ointment, or pill. Take your medicine as directed. Contact your healthcare provider if you think your medicine is not helping or if you have side effects. Tell your provider if you are allergic to any medicine. Keep a list of the medicines, vitamins, and herbs you take. Include the amounts, and when and why you take them. Bring the list or the pill bottles to follow-up visits. Carry your medicine list with you in case of an emergency. Care for the skin near the infection:  You may only have discolored patches of skin, or areas that are dry and flaking. Care for these skin problems as directed by your healthcare provider. If you have painful skin or an open sore, you will need to protect the skin and prevent damage. You will also need to keep the skin dry as much as possible. Ask your healthcare provider how to care for your skin while the infection clears. The following are general guidelines for caring for painful or open skin:  Keep the skin clean. Ask your healthcare provider if you should wash with mild soap and water. Do not use soap that contains alcohol. Alcohol can dry and irritate the skin and make symptoms worse. Your baby's healthcare provider may tell you to use diaper cream or ointment when you change his or her diaper. This will protect the skin and prevent moisture from collecting. Keep the skin dry. Pat the area dry with a towel. Do not rub, because this may irritate the skin. If you have a skin yeast infection between skin folds, lift the top part gently and hold it while you dry between your skin folds. Always dry your feet completely after you swim or bathe, including between your toes. Dry your skin if you are sweating from exercise or exposure to heat. Use a clean towel each time to prevent spreading or continuing the infection. Keep the skin protected. Ask your healthcare provider if you should cover the area with a bandage or leave it open. Check your skin each day to make sure you do not have new or worsening problems. You may need to have someone check the skin if you cannot see the area easily. Prevent another skin yeast infection:   Do not share clothing or towels    Wear shower shoes if you need to use a public shower    Dry your feet completely after you bathe, and apply antifungal powder or cream as directed    Put on socks before you get dressed so you do not spread fungus from your feet    Wear light clothing that allows air to get to your skin    Manage your weight to prevent skin folds where yeast can collect    Manage diabetes    Use antibiotics correctly to prevent antibiotic resistance    Change your baby's diaper often, and keep the area clean and dry as much as possible    Use a diaper cream or ointment that contains zinc oxide or dimethicone on your baby's diaper area as directed    Follow up with your doctor as directed:  Write down your questions so you remember to ask them during your visits.   © Copyright [employer] 2022 Information is for End User's use only and may not be sold, redistributed or otherwise used for commercial purposes. The above information is an  only. It is not intended as medical advice for individual conditions or treatments. Talk to your doctor, nurse or pharmacist before following any medical regimen to see if it is safe and effective for you.

## 2023-09-07 PROBLEM — L30.4 INTERTRIGO: Status: ACTIVE | Noted: 2023-09-07

## 2023-09-07 NOTE — ASSESSMENT & PLAN NOTE
- Painful, macerated skin rash under abdominal skin fold  - Rash ongoing issue for over a year  - Plans on undergoing surgery to remove excess skin  - Notes rash is worse with sweating  - Has tried barrier creams, powders, hydrocortisone without relief   - Physical exam notable for well demarcated rash under skin fold with overlying macerated skin    Plan  - Start clotrimazole ointment to be applied at night   - If no resolution or not improving in 1 weeks time patient to switch to triamcinolone ointment  - Patient handout provided   - F/u in 1 month sooner if needed

## 2023-10-02 ENCOUNTER — OFFICE VISIT (OUTPATIENT)
Dept: FAMILY MEDICINE CLINIC | Facility: OTHER | Age: 48
End: 2023-10-02
Payer: COMMERCIAL

## 2023-10-02 VITALS
DIASTOLIC BLOOD PRESSURE: 60 MMHG | OXYGEN SATURATION: 96 % | TEMPERATURE: 98.2 F | HEIGHT: 61 IN | SYSTOLIC BLOOD PRESSURE: 110 MMHG | RESPIRATION RATE: 17 BRPM | BODY MASS INDEX: 35.57 KG/M2 | HEART RATE: 67 BPM | WEIGHT: 188.4 LBS

## 2023-10-02 DIAGNOSIS — Z87.898: ICD-10-CM

## 2023-10-02 DIAGNOSIS — D64.9 ANEMIA, UNSPECIFIED TYPE: ICD-10-CM

## 2023-10-02 DIAGNOSIS — Z98.84 H/O BARIATRIC SURGERY: ICD-10-CM

## 2023-10-02 DIAGNOSIS — Z76.89 ENCOUNTER FOR WEIGHT MANAGEMENT: ICD-10-CM

## 2023-10-02 DIAGNOSIS — Z12.11 COLON CANCER SCREENING: ICD-10-CM

## 2023-10-02 DIAGNOSIS — L30.4 INTERTRIGO: Primary | ICD-10-CM

## 2023-10-02 PROCEDURE — 99214 OFFICE O/P EST MOD 30 MIN: CPT | Performed by: FAMILY MEDICINE

## 2023-10-02 NOTE — PROGRESS NOTES
Assessment/Plan:    Intertrigo  - Patient with history of chronic painful, macerated skin rash under abdominal skin folds   - Patient without relief with barrier creams, powders, hydrocortisone cream  - Patient subsequently started on clotrimazole ointment at last visit with notable improvement  - Reports having not used clotrimazole ointment for approx last week and is having recurrence of symptoms  - Has not tried triamcinolone ointment   - On physical examination the rash/skin discoloration within the pannus has improved with significantly decrease erythema, there is increased pigmentation, there is still some small areas of macerated skin, the overlying skin has become thickened and dense to touch    Plan  - Counseled patient on the use of antifungals and that treatment courses are likely going to need to be repeated   - Suggest continuing clotrimazole for another 2-4 weeks if improvement with applications especially at night and barrier creams such as Desitin during the day or prior to working out  - Suggest switching to triamcinolone at night after course of clotrimazole for further inflammation reduction  - Discussed with patient that given the area and her skin likely harboring yeast will likely need recurrent antifungal treatments and will need to reapply clotrimazole every so often with symptom recurrence     Encounter for weight management  - Patient with history of Robyn-en-Y gastric bypass surgery for weight control  - Notes working out and trying multiple diets  - Is currently gaining weight; noted 13lb increase since last office visit 1 month ago  - Is interested in alternative medical management strategies for weight loss at this time     Plan  - Counseled patient on diet/nutrition  - Given history of surgery and ongoing struggle with weight management will refer to weight management program  - F/u on Vitamin/mineral labs given bariatric surgery history which have not been checked recently Diagnoses and all orders for this visit:    Intertrigo    Encounter for weight management  -     Ambulatory Referral to Weight Management; Future    H/O bariatric surgery  -     Vitamin B12; Future  -     Folate; Future  -     Iron Panel (Includes Ferritin, Iron Sat%, Iron, and TIBC); Future  -     Calcium, ionized; Future  -     Vitamin A and E; Future  -     Zinc; Future  -     Magnesium; Future  -     Vitamin B1 (Thiamine), Serum/Plasma, LC/MS/MS; Future  -     CBC and differential; Future    Anemia, unspecified type  -     Iron Panel (Includes Ferritin, Iron Sat%, Iron, and TIBC); Future  -     CBC and differential; Future    H/O weight disorder  -     Ambulatory Referral to Weight Management; Future    Colon cancer screening  -     Cancel: Ambulatory Referral to Gastroenterology; Future  -     Ambulatory Referral to Gastroenterology; Future          Nutrition Assessment and Intervention:     Recommended completion of food recall journal    Referral given to nutritionist or nutrition shared medical appointment    Online resources such as NutritionFacts. Paperspine, [email or web address], [email or web address], or similar provided to patient      Physical Activity Assessment and Intervention:    Activity journal completion recommended      Emotional and Mental Well-being, Sleep, Connectedness Assessment and Intervention:    Sleep/stress assessment performed      Tobacco and Toxic Substance Assessment and Intervention:     Tobacco use screening performed    Alcohol and drug use screening performed          Subjective:      Patient ID: Sheilah Curling is a 50 y.o. female. 50 yoF presenting for follow up regarding rash/skin irritation between excess skin folds along her pannus. Patient started at last visit on clotrimazole ointment and triamcinolone cream for suspected intertrigo secondary to untreated fungal infection. Suspected fungal infection given little to no improvement with barrier creams and steroids previously. Triamcinolone was added as a higher potency topical in the case the antifungal did not relieve symptoms over the course of a couple of weeks. On follow up the patient is reporting that symptoms improved with the topical clotrimazole over the course of 4 weeks but that the skin irritation would come back once she started working out and sweating again. She reports not starting the triamcinolone ointment at at all. She notes at this time restarting the clotrimazole which she had not been using for a week. Patient notes she is interested in the time at exploring alternative methods for continued weight loss. After having undergone gastric bypass surgery with a Robyn-en-Y 5 years ago she is starting to gain weight again. She expresses interest in medical management in the form of pills or injectables. She notes having tried a number of natural methods such has high protein/low carb diets, intermittent fasting, getting plenty of water and fiber and avoid high calorie beverages. She calorie counts as well. She is noted to have gained 13 lbs since her visit last month. She notes ongoing struggle with cravings for sweets/cookies. She continues to exercise regularly and does not think that a lack of exercise is contributing at this point. The following portions of the patient's history were reviewed and updated as appropriate: allergies, current medications, past family history, past medical history, past social history, past surgical history and problem list.    Review of Systems   Constitutional: Negative for chills and fever. HENT: Negative for ear pain and sore throat. Eyes: Negative for pain and visual disturbance. Respiratory: Negative for cough and shortness of breath. Cardiovascular: Negative for chest pain and palpitations. Gastrointestinal: Negative for abdominal pain and vomiting. Genitourinary: Negative for dysuria and hematuria. Musculoskeletal: Negative for arthralgias and back pain. Skin: Negative for color change and rash. Neurological: Negative for dizziness, light-headedness and headaches. All other systems reviewed and are negative. Objective:      /60   Pulse 67   Temp 98.2 °F (36.8 °C)   Resp 17   Ht 5' 1" (1.549 m)   Wt 85.5 kg (188 lb 6.4 oz)   SpO2 96%   BMI 35.60 kg/m²          Physical Exam  Constitutional:       General: She is not in acute distress. Appearance: Normal appearance. She is not ill-appearing. HENT:      Head: Normocephalic and atraumatic. Right Ear: External ear normal.      Left Ear: External ear normal.      Nose: Nose normal.   Eyes:      Extraocular Movements: Extraocular movements intact. Conjunctiva/sclera: Conjunctivae normal.   Cardiovascular:      Rate and Rhythm: Normal rate and regular rhythm. Pulses: Normal pulses. Heart sounds: Normal heart sounds. Pulmonary:      Effort: Pulmonary effort is normal.      Breath sounds: Normal breath sounds. No wheezing, rhonchi or rales. Abdominal:      General: Abdomen is flat. There is no distension. Palpations: Abdomen is soft. There is no mass. Tenderness: There is no abdominal tenderness. There is no guarding. Musculoskeletal:      Right lower leg: No edema. Left lower leg: No edema. Skin:     General: Skin is warm and dry. Findings: Erythema and rash (under left breast) present. Neurological:      Mental Status: She is alert. Motor: No weakness.       Gait: Gait normal.

## 2023-10-02 NOTE — PATIENT INSTRUCTIONS
Dr. Dania Maldonado for weight loss advise/books    Weight Management   AMBULATORY CARE:   Why it is important to manage your weight:  Being overweight increases your risk of health conditions such as heart disease, high blood pressure, type 2 diabetes, and certain types of cancer. It can also increase your risk for osteoarthritis, sleep apnea, and other respiratory problems. Aim for a slow, steady weight loss. Even a small amount of weight loss can lower your risk of health problems. Risks of being overweight:  Extra weight can cause many health problems, including the following:  Diabetes (high blood sugar level)    High blood pressure or high cholesterol    Heart disease    Stroke    Gallbladder or liver disease    Cancer of the colon, breast, prostate, liver, or kidney    Sleep apnea    Arthritis or gout    Screening  is done to check for health conditions before you have signs or symptoms. If you are 28to 79years old, your blood sugar level may be checked every 3 years for signs of prediabetes or diabetes. Your healthcare provider will check your blood pressure at each visit. High blood pressure can lead to a stroke or other problems. Your provider may check for signs of heart disease, cancer, or other health problems. How to lose weight safely:  A safe and healthy way to lose weight is to eat fewer calories and get regular exercise. You can lose up about 1 pound a week by decreasing the number of calories you eat by 500 calories each day. You can decrease calories by eating smaller portion sizes or by cutting out high-calorie foods. Read labels to find out how many calories are in the foods you eat. You can also burn calories with exercise such as walking, swimming, or biking. You will be more likely to keep weight off if you make these changes part of your lifestyle. Exercise at least 30 minutes per day on most days of the week.  You can also fit in more physical activity by taking the stairs instead of the elevator or parking farther away from stores. Ask your healthcare provider about the best exercise plan for you. Healthy meal plan for weight management:  A healthy meal plan includes a variety of foods, contains fewer calories, and helps you stay healthy. A healthy meal plan includes the following:     Eat whole-grain foods more often. A healthy meal plan should contain fiber. Fiber is the part of grains, fruits, and vegetables that is not broken down by your body. Whole-grain foods are healthy and provide extra fiber in your diet. Some examples of whole-grain foods are whole-wheat breads and pastas, oatmeal, brown rice, and bulgur. Eat a variety of vegetables every day. Include dark, leafy greens such as spinach, kale, bethany greens, and mustard greens. Eat yellow and orange vegetables such as carrots, sweet potatoes, and winter squash. Eat a variety of fruits every day. Choose fresh or canned fruit (canned in its own juice or light syrup) instead of juice. Fruit juice has very little or no fiber. Eat low-fat dairy foods. Drink fat-free (skim) milk or 1% milk. Eat fat-free yogurt and low-fat cottage cheese. Try low-fat cheeses such as mozzarella and other reduced-fat cheeses. Choose meat and other protein foods that are low in fat. Choose beans or other legumes such as split peas or lentils. Choose fish, skinless poultry (chicken or turkey), or lean cuts of red meat (beef or pork). Before you cook meat or poultry, cut off any visible fat. Use less fat and oil. Try baking foods instead of frying them. Add less fat, such as margarine, sour cream, regular salad dressing and mayonnaise to foods. Eat fewer high-fat foods. Some examples of high-fat foods include french fries, doughnuts, ice cream, and cakes. Eat fewer sweets. Limit foods and drinks that are high in sugar. This includes candy, cookies, regular soda, and sweetened drinks.   Ways to decrease calories:   Eat smaller portions. Use a small plate with smaller servings. Do not eat second helpings. When you eat at a restaurant, ask for a box and place half of your meal in the box before you eat. Share an entrée with someone else. Replace high-calorie snacks with healthy, low-calorie snacks. Choose fresh fruit, vegetables, fat-free rice cakes, or air-popped popcorn instead of potato chips, nuts, or chocolate. Choose water or calorie-free drinks instead of soda or sweetened drinks. Do not shop for groceries when you are hungry. You may be more likely to make unhealthy food choices. Take a grocery list of healthy foods and shop after you have eaten. Eat regular meals. Do not skip meals. Skipping meals can lead to overeating later in the day. This can make it harder for you to lose weight. Eat a healthy snack in place of a meal if you do not have time to eat a regular meal. Talk with a dietitian to help you create a meal plan and schedule that is right for you. Other things to consider as you try to lose weight:   Be aware of situations that may give you the urge to overeat, such as eating while watching television. Find ways to avoid these situations. For example, read a book, go for a walk, or do crafts. Meet with a weight loss support group or friends who are also trying to lose weight. This may help you stay motivated to continue working on your weight loss goals. © Copyright Jack Ozuna 2023 Information is for End User's use only and may not be sold, redistributed or otherwise used for commercial purposes. The above information is an  only. It is not intended as medical advice for individual conditions or treatments. Talk to your doctor, nurse or pharmacist before following any medical regimen to see if it is safe and effective for you.

## 2023-10-03 PROBLEM — Z76.89 ENCOUNTER FOR WEIGHT MANAGEMENT: Status: ACTIVE | Noted: 2023-10-03

## 2023-10-03 NOTE — ASSESSMENT & PLAN NOTE
- Patient with history of Robyn-en-Y gastric bypass surgery for weight control  - Notes working out and trying multiple diets  - Is currently gaining weight; noted 13lb increase since last office visit 1 month ago  - Is interested in alternative medical management strategies for weight loss at this time     [address] patient on diet/nutrition  - Given history of surgery and ongoing struggle with weight management will refer to weight management program  - F/u on Vitamin/mineral labs given bariatric surgery history which have not been checked recently

## 2023-10-03 NOTE — ASSESSMENT & PLAN NOTE
- Patient with history of chronic painful, macerated skin rash under abdominal skin folds   - Patient without relief with barrier creams, powders, hydrocortisone cream  - Patient subsequently started on clotrimazole ointment at last visit with notable improvement  - Reports having not used clotrimazole ointment for approx last week and is having recurrence of symptoms  - Has not tried triamcinolone ointment   - On physical examination the rash/skin discoloration within the pannus has improved with significantly decrease erythema, there is increased pigmentation, there is still some small areas of macerated skin, the overlying skin has become thickened and dense to touch    Plan  - Counseled patient on the use of antifungals and that treatment courses are likely going to need to be repeated   - Suggest continuing clotrimazole for another 2-4 weeks if improvement with applications especially at night and barrier creams such as Desitin during the day or prior to working out  - Suggest switching to triamcinolone at night after course of clotrimazole for further inflammation reduction  - Discussed with patient that given the area and her skin likely harboring yeast will likely need recurrent antifungal treatments and will need to reapply clotrimazole every so often with symptom recurrence

## 2023-10-12 ENCOUNTER — TELEPHONE (OUTPATIENT)
Dept: BARIATRICS | Facility: CLINIC | Age: 48
End: 2023-10-12

## 2023-10-13 ENCOUNTER — TELEPHONE (OUTPATIENT)
Dept: FAMILY MEDICINE CLINIC | Facility: OTHER | Age: 48
End: 2023-10-13

## 2023-10-16 PROBLEM — K91.2 POSTSURGICAL MALABSORPTION: Status: ACTIVE | Noted: 2023-10-16

## 2023-10-16 PROBLEM — Z48.815 ENCOUNTER FOR SURGICAL AFTERCARE FOLLOWING SURGERY OF DIGESTIVE SYSTEM: Status: ACTIVE | Noted: 2019-03-18

## 2023-10-16 NOTE — ASSESSMENT & PLAN NOTE
-s/p Lap Robyn-En-Y Gastric Bypass with Dr. Susanna Rinaldi at [facility] in 2016. Struggling with weight regain. Obtain UGI to evaluate anatomy and r/o GGF. Diet and lifestyle changes and consult with surgical RD and LCSW and MWM. Initial: 290lbs  Current: 186.8lbs  Killian: 158lbs  Current BMI is Body mass index is 34.39 kg/m². Tolerating a regular diet-yes  Eating at least 60 grams of protein per day-yes  Following 30/60 minute rule with liquids-yes  Drinking at least 64 ounces of fluid per day-yes, but mostly coffee - advised she limit coffee to 16oz daily and increase water to goal  Drinking carbonated beverages-no  Sufficient exercise-yes, gym regularly   Using NSAIDs regularly-no  Using nicotine-no  Using alcohol-no.  Advised about the risks of alcohol s/p bariatric surgery and recommend avoiding all alcohol

## 2023-10-16 NOTE — ASSESSMENT & PLAN NOTE
-At risk for malabsorption of vitamins/minerals secondary to malabsorption and restriction of intake from bariatric surgery  -NOT Currently taking adequate postop bariatric surgery vitamin supplementation: OTC MVI w/ unknown amount of iron, 65mg iron every other day, calcium citrate 500mg BID - advised she start Bienvenido MVI and calcium citrate 500mg TID    -Recent labs with PCP - Ferritin very low -8, B12 low (214) - B12 injection in office today and f/u with Heme ASAP for repeat iron infusions and add additional 1,000mcg sublingual B12 daily    -Obtain rest of CBC/Metabolic panel  -Patient received education about the importance of adhering to a lifelong supplementation regimen to avoid vitamin/mineral deficiencies

## 2023-10-17 ENCOUNTER — TELEPHONE (OUTPATIENT)
Dept: FAMILY MEDICINE CLINIC | Facility: OTHER | Age: 48
End: 2023-10-17

## 2023-10-17 NOTE — TELEPHONE ENCOUNTER
Hi, my name is Jennayaima Banks, [address] [place] [address]. YOB: 1975. I called last week about my blood test and you guys told me they were in but the doctor hadn't signed off yet. I haven't heard anything yet. If you can please give me a call. I just was wondering how my hemoglobins were or can you release the information to my chart? Again, my number is [phone] and it's Wolfgang Banks CAS I/O.

## 2023-10-19 ENCOUNTER — CONSULT (OUTPATIENT)
Dept: BARIATRICS | Facility: CLINIC | Age: 48
End: 2023-10-19
Payer: COMMERCIAL

## 2023-10-19 VITALS
BODY MASS INDEX: 34.37 KG/M2 | DIASTOLIC BLOOD PRESSURE: 72 MMHG | HEIGHT: 62 IN | WEIGHT: 186.8 LBS | SYSTOLIC BLOOD PRESSURE: 106 MMHG | HEART RATE: 65 BPM

## 2023-10-19 DIAGNOSIS — Z76.89 ENCOUNTER FOR WEIGHT MANAGEMENT: ICD-10-CM

## 2023-10-19 DIAGNOSIS — R79.89 LOW VITAMIN B12 LEVEL: ICD-10-CM

## 2023-10-19 DIAGNOSIS — R63.5 WEIGHT GAIN FOLLOWING GASTRIC BYPASS SURGERY: ICD-10-CM

## 2023-10-19 DIAGNOSIS — K91.2 POSTSURGICAL MALABSORPTION: ICD-10-CM

## 2023-10-19 DIAGNOSIS — Z98.84 WEIGHT GAIN FOLLOWING GASTRIC BYPASS SURGERY: ICD-10-CM

## 2023-10-19 DIAGNOSIS — Z98.84 H/O BARIATRIC SURGERY: ICD-10-CM

## 2023-10-19 DIAGNOSIS — E61.1 IRON DEFICIENCY: Primary | ICD-10-CM

## 2023-10-19 DIAGNOSIS — K91.2 POSTSURGICAL MALABSORPTION: Primary | ICD-10-CM

## 2023-10-19 DIAGNOSIS — Z87.898: ICD-10-CM

## 2023-10-19 DIAGNOSIS — D50.0 IRON DEFICIENCY ANEMIA DUE TO CHRONIC BLOOD LOSS: ICD-10-CM

## 2023-10-19 PROCEDURE — 99205 OFFICE O/P NEW HI 60 MIN: CPT | Performed by: PHYSICIAN ASSISTANT

## 2023-10-19 RX ORDER — CYANOCOBALAMIN 1000 UG/ML
1000 INJECTION, SOLUTION INTRAMUSCULAR; SUBCUTANEOUS ONCE
Status: COMPLETED | OUTPATIENT
Start: 2023-10-19 | End: 2023-10-19

## 2023-10-19 RX ADMIN — CYANOCOBALAMIN 1000 MCG: 1000 INJECTION, SOLUTION INTRAMUSCULAR; SUBCUTANEOUS at 09:06

## 2023-10-19 NOTE — PROGRESS NOTES
Assessment/Plan:    Encounter for surgical aftercare following surgery of digestive system  -s/p Lap Robyn-En-Y Gastric Bypass with Dr. Nasima Moore at [facility] in 2016. Struggling with weight regain. Obtain UGI to evaluate anatomy and r/o GGF. Diet and lifestyle changes and consult with surgical RD and LCSW and MWM. Initial: 290lbs  Current: 186.8lbs  Killian: 158lbs  Current BMI is Body mass index is 34.39 kg/m². Tolerating a regular diet-yes  Eating at least 60 grams of protein per day-yes  Following 30/60 minute rule with liquids-yes  Drinking at least 64 ounces of fluid per day-yes, but mostly coffee - advised she limit coffee to 16oz daily and increase water to goal  Drinking carbonated beverages-no  Sufficient exercise-yes, gym regularly   Using NSAIDs regularly-no  Using nicotine-no  Using alcohol-no. Advised about the risks of alcohol s/p bariatric surgery and recommend avoiding all alcohol      Postsurgical malabsorption  -At risk for malabsorption of vitamins/minerals secondary to malabsorption and restriction of intake from bariatric surgery  -NOT Currently taking adequate postop bariatric surgery vitamin supplementation: OTC MVI w/ unknown amount of iron, 65mg iron every other day, calcium citrate 500mg BID - advised she start Bienvenido MVI and calcium citrate 500mg TID    -Recent labs with PCP - Ferritin very low -8, B12 low (214) - B12 injection in office today and f/u with Heme ASAP for repeat iron infusions and add additional 1,000mcg sublingual B12 daily    -Obtain rest of CBC/Metabolic panel  -Patient received education about the importance of adhering to a lifelong supplementation regimen to avoid vitamin/mineral deficiencies       Iron deficiency anemia due to chronic blood loss  -Recent Ferritin is 8 - needs to f/u with Heme for repeat iron infusions (last infusions 1 year ago)       Diagnoses and all orders for this visit:    Postsurgical malabsorption  -     PTH, intact;  Future  -     Vitamin D 25 hydroxy; Future  -     Comprehensive metabolic panel; Future  -     Vitamin B12; Future  -     cyanocobalamin injection 1,000 mcg    Encounter for weight management  -     Ambulatory Referral to Weight Management    H/O weight disorder  -     Ambulatory Referral to Weight Management    Weight gain following gastric bypass surgery  -     FL UPPER GI UGI; Future    Low vitamin B12 level  -     Vitamin B12; Future  -     cyanocobalamin injection 1,000 mcg    Iron deficiency anemia due to chronic blood loss          Subjective:      Patient ID: Mane Hercules is a 50 y.o. female. -s/p Lap Robyn-En-Y Gastric Bypass with Dr. José Miguel Campbell at [facility] in 2016. Presents to the office today to establish care and for weight regain. Tolerating diet without issues; denies N/V, dysphagia, reflux. She did very well and lost 132lbs s/p surgery and maintained this for  years until she lost both of her parents last year and her brother became very ill - she has slowly regained 30lbs and is very frustrated. She wishes to get back on track. Hx of iron infusions. Quit smoking January 2022 to honor her father. She has 2 sons (21, 15) and , works as  for [employer] and taking classes for [employer]. Diet Recall:   Upon waking - 10oz coffee w/ cream  B - Oikos zero [place] yogurt  Snack - 1 banana  L - skips or Naomi sub or rarely oatmeal  Snacks/grazes - chocolate, trail mix  D - chicken and veggies - tries to stop eating pasta - still eats rice and beans - from [place]  HS - sweets and baked goods    Fluids - 60-80oz coffee w/ cream, rarely social ETOH, 16-32oz water, 16oz snapple    The following portions of the patient's history were reviewed and updated as appropriate: allergies, current medications, past family history, past medical history, past social history, past surgical history and problem list.    Review of Systems   Constitutional:  Positive for unexpected weight change (weight regain). Negative for chills and fever. HENT:  Negative for trouble swallowing. Respiratory:  Negative for cough and shortness of breath. Cardiovascular:  Negative for chest pain and palpitations. Gastrointestinal:  Negative for abdominal pain, constipation, diarrhea, nausea and vomiting. Neurological:  Negative for dizziness. Objective:      /72 (BP Location: Right arm, Patient Position: Sitting, Cuff Size: Large)   Pulse 65   Ht 5' 1.8" (1.57 m)   Wt 84.7 kg (186 lb 12.8 oz)   LMP 10/15/2023 (Approximate)   BMI 34.39 kg/m²     Colonoscopy-Completed       Physical Exam  Vitals reviewed. Constitutional:       General: She is not in acute distress. Appearance: She is well-developed. HENT:      Head: Normocephalic and atraumatic. Eyes:      General: No scleral icterus. Cardiovascular:      Rate and Rhythm: Normal rate and regular rhythm. Pulmonary:      Effort: Pulmonary effort is normal. No respiratory distress. Abdominal:      General: There is no distension. Palpations: Abdomen is soft. Tenderness: There is no abdominal tenderness. Skin:     General: Skin is warm and dry. Neurological:      Mental Status: She is alert. Psychiatric:         Mood and Affect: Mood normal.         Behavior: Behavior normal.           BARRIERS: none identified    GOALS:   Continued/Maintain healthy weight loss with good nutrition intakes. Adequate hydration with at least 64oz. fluid intake. Normal vitamin and mineral levels. Exercise as tolerated. Add Extra 1,000mcg sublingual B12    Follow-up in 1 year. We kindly ask that your arrive 15 minutes before your scheduled appointment time with your provider to allow our staff to room you, get your vital signs and update your chart. Follow diet as discussed. Get lab work done in the next 2 weeks. You have been given a lab slip today. Please call the office if you need a replacement.   It is recommended to check with your insurance BEFORE getting labs done to make sure they are covered by your policy. Also, please check with your PCP and other providers before getting labs to avoid duplicate labs. Make sure to HOLD any multivitamins that may contain biotin and any biotin supplements FOR 5 DAYS before any labs since it can affect the results. Follow vitamin and mineral recommendations as reviewed with you. Call our office if you have any problems with abdominal pain especially associated with fever, chills, nausea, vomiting or any other concerns. All  Post-bariatric surgery patients should be aware that very small quantities of any alcohol can cause impairment and it is very possible not to feel the effect. The effect can be in the system for several hours. It is also a stomach irritant. It is advised to AVOID alcohol, Nonsteroidal antiinflammatory drugs (NSAIDS) and nicotine of all forms . Any of these can cause stomach irritation/pain.

## 2023-10-19 NOTE — PATIENT INSTRUCTIONS
GOALS:   Continued/Maintain healthy weight loss with good nutrition intakes. Adequate hydration with at least 64oz. fluid intake. Normal vitamin and mineral levels. Exercise as tolerated. Add Extra 1,000mcg sublingual B12    Follow-up in 1 year. We kindly ask that your arrive 15 minutes before your scheduled appointment time with your provider to allow our staff to room you, get your vital signs and update your chart. Follow diet as discussed. Get lab work done in the next 2 weeks. You have been given a lab slip today. Please call the office if you need a replacement. It is recommended to check with your insurance BEFORE getting labs done to make sure they are covered by your policy. Also, please check with your PCP and other providers before getting labs to avoid duplicate labs. Make sure to HOLD any multivitamins that may contain biotin and any biotin supplements FOR 5 DAYS before any labs since it can affect the results. Follow vitamin and mineral recommendations as reviewed with you. Call our office if you have any problems with abdominal pain especially associated with fever, chills, nausea, vomiting or any other concerns. All  Post-bariatric surgery patients should be aware that very small quantities of any alcohol can cause impairment and it is very possible not to feel the effect. The effect can be in the system for several hours. It is also a stomach irritant. It is advised to AVOID alcohol, Nonsteroidal antiinflammatory drugs (NSAIDS) and nicotine of all forms . Any of these can cause stomach irritation/pain.

## 2023-10-19 NOTE — ASSESSMENT & PLAN NOTE
-Recent Ferritin is 8 - needs to f/u with Heme for repeat iron infusions (last infusions 1 year ago)

## 2023-10-25 ENCOUNTER — TELEPHONE (OUTPATIENT)
Dept: HEMATOLOGY ONCOLOGY | Facility: CLINIC | Age: 48
End: 2023-10-25

## 2023-10-25 ENCOUNTER — TELEPHONE (OUTPATIENT)
Dept: FAMILY MEDICINE CLINIC | Facility: OTHER | Age: 48
End: 2023-10-25

## 2023-10-25 NOTE — TELEPHONE ENCOUNTER
Hi, good afternoon. My name is Wolfgang Banks. YOB: 1975. My phone number is [phone]. I am calling because there was a referral put in for me for hematology for iron infusions. Haven't been feeling really good. I called them today because they hadn't called me back and they don't have any appointments until December. I don't think I can wait that long, so I don't know if I should just go to [facility], but I need the referral then or what I can do if anybody else can put in that order for the infusions. I don't really know what to do at this point. Again, I'm sorry, long message. My number [phone]. Thank you so much.

## 2023-10-25 NOTE — TELEPHONE ENCOUNTER
Appointment Schedule   Who are you speaking with? Patient   If it is not the patient, are they listed on an active communication consent form? N/A   Which provider is the appointment scheduled with? Steven Hernandez PA-C   At which location is the appointment scheduled for? [facility]   When is the appointment scheduled? Please list date and time 12/6/23 @ 4750   What is the reason for this appointment? Follow up for new infusion treatments    Did patient voice understanding of the details of this appointment? Yes   Was the no show policy reviewed with patient?  Yes

## 2023-11-02 ENCOUNTER — TELEPHONE (OUTPATIENT)
Dept: GASTROENTEROLOGY | Facility: CLINIC | Age: 48
End: 2023-11-02

## 2023-11-02 ENCOUNTER — CONSULT (OUTPATIENT)
Dept: GASTROENTEROLOGY | Facility: CLINIC | Age: 48
End: 2023-11-02
Payer: COMMERCIAL

## 2023-11-02 VITALS
BODY MASS INDEX: 34.48 KG/M2 | HEIGHT: 62 IN | DIASTOLIC BLOOD PRESSURE: 69 MMHG | SYSTOLIC BLOOD PRESSURE: 120 MMHG | WEIGHT: 187.4 LBS

## 2023-11-02 DIAGNOSIS — D50.0 IRON DEFICIENCY ANEMIA DUE TO CHRONIC BLOOD LOSS: ICD-10-CM

## 2023-11-02 DIAGNOSIS — R12 HEARTBURN: ICD-10-CM

## 2023-11-02 DIAGNOSIS — Z12.11 COLON CANCER SCREENING: Primary | ICD-10-CM

## 2023-11-02 DIAGNOSIS — R10.13 EPIGASTRIC PAIN: ICD-10-CM

## 2023-11-02 PROCEDURE — 99204 OFFICE O/P NEW MOD 45 MIN: CPT | Performed by: NURSE PRACTITIONER

## 2023-11-02 RX ORDER — PANTOPRAZOLE SODIUM 40 MG/1
40 TABLET, DELAYED RELEASE ORAL DAILY
Qty: 30 TABLET | Refills: 4 | Status: SHIPPED | OUTPATIENT
Start: 2023-11-02 | End: 2023-12-02

## 2023-11-02 NOTE — PROGRESS NOTES
[facility] - Outpatient Consultation  Lashawn Partiad 50 y.o. female MRN: [id number]  Encounter: [id number]          ASSESSMENT AND PLAN:      1. Colon cancer screening  Patient is at age 50 and due for colonoscopy for colon cancer screening.  - Colonoscopy; schedule for colonoscopy for colon cancer screening. Prep and procedure explained to patient in detail. Further recommendations pending results of colonoscopy. - polyethylene glycol (GOLYTELY) 4000 mL solution; Take 4,000 mL by mouth once for 1 dose Take as directed by office prior to procedure  Dispense: 4000 mL; Refill: 0    2. Iron deficiency anemia due to chronic blood loss  Patient has history of iron deficient anemia. Review of labs  done 10/7/2023 showed iron 67, transferrin 329, saturation 15, TIBC 461, and ferritin 8. Hemoglobin 13.7. Iron deficient anemia most likely multifactoral from iron deficiency, malabsorption from gastric bypass surgery Robyn-en-Y but need to rule out GI bleed as contributing factor.  - Colonoscopy; EGD iron supplements per PCP ; schedule EGD and colonoscopy for evaluation of iron deficient anemia rule out GI blood loss as contributing factor. Prep and procedure explained to patient in detail. Further recommendations pending results of EGD and colonoscopy. -Iron supplements per primary care provider    3. Epigastric pain  4. Heartburn  Patient reports epigastric pain, tenderness and heartburn. Abdomen exam positive for tenderness in upper abdomen and mid lower abdomen. Patient denies nausea, vomiting, or dysphagia. Differential diagnosis includes gastritis, esophagitis, ulceration, lesion, biliary etiology,  or H. Pylori.    - pantoprazole (PROTONIX) 40 mg tablet; Take 1 tablet (40 mg total) by mouth daily Take half an hour prior to breakfast  Dispense: 30 tablet; Refill: 4  - US abdomen limited; For further evaluation of biliary system and gallbladder.   -EGD; scheduled for EGD for further evaluation of heartburn and epigastric tenderness. Prep and procedure explained to patient in detail. Further recommendations pending results of EGD. Follow-up 8 weeks after procedure   ______________________________________________________________________    HPI:  Ryan Bynum is a 59-year-old female with a past medical history of SIRS, iron deficient anemia due to chronic blood loss, bradycardia, status post gastric bypass surgery 2016 Robyn-en-Y presenting to the office as a consult. Patient reports epigastric pain associated with tenderness and heartburn . Patient denies nausea, vomiting, dysphagia, acid reflux, or lower abdominal pain. Patient denies blood in stool, blood from rectal area, or black tarry stool. Abdomen exam positive for tenderness in epigastric area and upper abdomen and mid lower abdomen. Patient reports history of iron deficient anemia. Review of labs  done 10/7/2023 showed iron 67, transferrin 329, saturation 15, TIBC 461, and ferritin 8. Hemoglobin 13. 7. Patient reports she is on iron complement and multivitamins. No previous EGD or colonoscopy. Patient is a former smoker. Patient drinks alcohol socially. Patient denies illicit drugs or marijuana use. Patient does not use NSAIDs. Patient is on no blood thinner or antiplatelet medication. No family history of gastric or colon cancer. REVIEW OF SYSTEMS:    CONSTITUTIONAL: Denies any fever, chills, rigors, and weight loss. HEENT: No earache or tinnitus. Denies hearing loss or visual disturbances. CARDIOVASCULAR: No chest pain or palpitations. RESPIRATORY: Denies any cough, hemoptysis, shortness of breath or dyspnea on exertion. GASTROINTESTINAL: As noted in the History of Present Illness. GENITOURINARY: No problems with urination. Denies any hematuria or dysuria. NEUROLOGIC: No dizziness or vertigo, denies headaches. MUSCULOSKELETAL: Denies any muscle or joint pain. SKIN: Denies skin rashes or itching. ENDOCRINE: Denies excessive thirst. Denies intolerance to heat or cold. PSYCHOSOCIAL: Denies depression or anxiety. Denies any recent memory loss. Historical Information   Past Medical History:   Diagnosis Date    Abnormal Pap smear of cervix     2017    Anemia     Anxiety     Asthma     Diabetes mellitus ([address])     History of transfusion 2018    HPV (human papilloma virus) infection     2017    Obesity     Varicella      Past Surgical History:   Procedure Laterality Date    BREAST BIOPSY Right 2012    u/s core bx    BREAST SURGERY      Incisional breast biopsy     SECTION      And     EXPLORATORY LAPAROTOMY W/ BOWEL RESECTION N/A 3/22/2022    Procedure: LAPAROTOMY EXPLORATORY WITH OPEN REPAIR OF INCARCERATED INCISIONAL  HERNIA WITH MESH;  Surgeon: Solange Gregg MD;  [facility];  Service: General    GASTRIC BYPASS  2016    INCISIONAL HERNIA REPAIR N/A 3/22/2022    Procedure: OPEN REPAIR HERNIA INCISIONAL WITH MESH;  Surgeon: Solange Gregg MD;  Location: [facility];  Service: General    DC HYSTEROSCOPY BX ENDOMETRIUM&/POLYPC W/WO D&C N/A 2021    Procedure: DILATATION AND CURETTAGE (D&C) WITH HYSTEROSCOPY (MYOSURE);   Surgeon: Shanta Miller DO;  [facility];  Service: Gynecology    DC HYSTEROSCOPY BX ENDOMETRIUM&/POLYPC W/WO D&C N/A 2021    Procedure: POLYPECTOMY;  Surgeon: Shanta Miller DO;  [facility];  Service: Gynecology    TUBAL LIGATION      US GUIDED BREAST BIOPSY RIGHT COMPLETE Right 2019     Social History   Social History     Substance and Sexual Activity   Alcohol Use Yes    Alcohol/week: 0.0 standard drinks of alcohol    Comment: social      Social History     Substance and Sexual Activity   Drug Use Never     Social History     Tobacco Use   Smoking Status Former    Packs/day: 0.50    Years: 10.00    Total pack years: 5.00    Types: Cigarettes   Smokeless Tobacco Never   Tobacco Comments    refused     Family History   Problem Relation Age of Onset    Diabetes Mother     Hypertension Mother     Cancer Mother     Arthritis Mother     Leukemia Mother     Hyperlipidemia Mother     Diabetes Father     Hypertension Father     Heart disease Father     Coronary artery disease Father     Hyperlipidemia Father     Stroke Paternal Grandfather     Thyroid cancer Sister 37    Hypertension Brother     Breast cancer Maternal Aunt 48    Breast cancer Paternal Aunt 79    No Known Problems Maternal Grandmother     No Known Problems Maternal Grandfather     No Known Problems Paternal Grandmother     No Known Problems Son     No Known Problems Son     No Known Problems Maternal Aunt     No Known Problems Maternal Aunt     No Known Problems Maternal Aunt     No Known Problems Maternal Aunt     No Known Problems Maternal Aunt     Cervical cancer Paternal Aunt 36    No Known Problems Paternal Aunt     No Known Problems Paternal Aunt     No Known Problems Paternal Aunt     No Known Problems Paternal Aunt     No Known Problems Paternal Aunt     Lung cancer Paternal Uncle 80       Meds/Allergies       Current Outpatient Medications:     albuterol (PROVENTIL HFA,VENTOLIN HFA) 90 mcg/act inhaler    Calcium Citrate-Vitamin D 500-400 MG-UNIT CHEW    Clotrimazole 1 % OINT    cyanocobalamin (VITAMIN B-12) 100 mcg tablet    Ferrous Sulfate Dried (FERROUS SULFATE CR PO)    Multiple Vitamins-Minerals (MULTIVITAMIN ADULT PO)    mupirocin (BACTROBAN) 2 % ointment    pantoprazole (PROTONIX) 40 mg tablet    polyethylene glycol (GOLYTELY) 4000 mL solution    triamcinolone (KENALOG) 0.1 % ointment    escitalopram (Lexapro) 10 mg tablet    No Known Allergies        Objective     Blood pressure 120/69, height 5' 1.8" (1.57 m), weight 85 kg (187 lb 6.4 oz), last menstrual period 10/15/2023, not currently breastfeeding. Body mass index is 34.5 kg/m².         PHYSICAL EXAM:      General Appearance:   Alert, cooperative, no distress   HEENT:   Normocephalic, atraumatic, anicteric. Neck:  Supple, symmetrical, trachea midline   Lungs:   Clear to auscultation bilaterally; no rales, rhonchi or wheezing; respirations unlabored    Heart[de-identified]   Regular rate and rhythm; no murmur, rub, or gallop. Abdomen:   Soft, non-tender, non-distended; normal bowel sounds; no masses, no organomegaly    Genitalia:   Deferred    Rectal:   Deferred    Extremities:  No cyanosis, clubbing or edema    Pulses:  2+ and symmetric    Skin:  No jaundice, rashes, or lesions    Lymph nodes:  No palpable cervical lymphadenopathy        Lab Results:   No visits with results within 1 Day(s) from this visit.    Latest known visit with results is:   Appointment on 07/30/2022   Component Date Value    TSH 3RD GENERATON 07/30/2022 0.902     WBC 07/30/2022 7.34     RBC 07/30/2022 4.46     Hemoglobin 07/30/2022 9.9 (L)     Hematocrit 07/30/2022 32.6 (L)     MCV 07/30/2022 73 (L)     MCH 07/30/2022 22.2 (L)     MCHC 07/30/2022 30.4 (L)     RDW 07/30/2022 17.6 (H)     MPV 07/30/2022 9.6     Platelets 71/66/5948 346     nRBC 07/30/2022 0     Neutrophils Relative 07/30/2022 73     Immat GRANS % 07/30/2022 0     Lymphocytes Relative 07/30/2022 19     Monocytes Relative 07/30/2022 6     Eosinophils Relative 07/30/2022 1     Basophils Relative 07/30/2022 1     Neutrophils Absolute 07/30/2022 5.34     Immature Grans Absolute 07/30/2022 0.03     Lymphocytes Absolute 07/30/2022 1.37     Monocytes Absolute 07/30/2022 0.47     Eosinophils Absolute 07/30/2022 0.08     Basophils Absolute 07/30/2022 0.05     Vit D, 25-Hydroxy 07/30/2022 33.4     Testosterone, Free 07/30/2022 0.6     TESTOSTERONE TOTAL 07/30/2022 <3 (L)     DHEA-SO4 07/30/2022 72.6     Vitamin B-12 07/30/2022 592     HUMBERTO 07/30/2022 Negative     Sed Rate 07/30/2022 17     CRP 07/30/2022 1.1     Hepatitis C Ab 07/30/2022 Non-reactive     Iron Saturation 07/30/2022 9 (L)     TIBC 07/30/2022 460 (H)     Iron 07/30/2022 43 (L)     Ferritin 07/30/2022 5 (L) Radiology Results:   No results found.   Answers submitted by the patient for this visit:  Abdominal Pain Questionnaire (Submitted on 10/31/2023)  Chief Complaint: Abdominal pain  arthralgias: Yes  flatus: Yes

## 2023-11-02 NOTE — PATIENT INSTRUCTIONS
Follow antireflux diet and measures. May have 1-2 8 ounce cups of coffee only. Avoid caffeine, carbonated beverages, tomato-based products, chocolate, fried fatty foods. Stay sitting upright for 1 hour after you eat do not eat 3 hours before you go to bed and when you sleep at night sleep with your head of bed elevated on multiple pillows.

## 2023-11-02 NOTE — TELEPHONE ENCOUNTER
Scheduled date of EGD/colonoscopy (as of today):12/7/23  Physician performing EGD/colonoscopy:Dr Graham   Location of EGD/colonoscopy:[facility]   Desired bowel prep reviewed with patient:hayden  Instructions reviewed with patient by:sb  Clearances:  none

## 2023-11-06 ENCOUNTER — OFFICE VISIT (OUTPATIENT)
Dept: BARIATRICS | Facility: CLINIC | Age: 48
End: 2023-11-06

## 2023-11-06 VITALS — WEIGHT: 185 LBS | BODY MASS INDEX: 34.04 KG/M2 | HEIGHT: 62 IN

## 2023-11-06 VITALS — BODY MASS INDEX: 34.06 KG/M2 | WEIGHT: 185 LBS

## 2023-11-06 DIAGNOSIS — Z76.89 ENCOUNTER FOR WEIGHT MANAGEMENT: Primary | ICD-10-CM

## 2023-11-06 DIAGNOSIS — K91.2 POSTSURGICAL MALABSORPTION: Primary | ICD-10-CM

## 2023-11-06 PROCEDURE — RECHECK

## 2023-11-06 PROCEDURE — WMDI30

## 2023-11-06 NOTE — PROGRESS NOTES
Bariatric Follow Up Nutrition Note      Type of surgery  Gastric bypass: laparoscopic  Surgery Date:   7 years  post-op  Surgeon: Dr Bj Reeves  50 y.o.  female  Height 5' 1.8" (1.57 m), weight 82.6 kg (182 lb), last menstrual period 10/15/2023, not currently breastfeeding. Weight on Day of Weight Loss Surgery: 290#  Weight in (lb) to have BMI = 25: 136#  Pre-Op Excess Wt: 154  Killian:  158 in about 7 months post-op, then up to 165# for a long time, now more average was 174#  Post-Op Wt Loss: 105#/ 68% EBWL in 7 year(s)  Goal weight:  160#    Review of History and Medications   Past Medical History:   Diagnosis Date    Abnormal Pap smear of cervix     2017    Anemia     Anxiety     Asthma     Diabetes mellitus ([address])     History of transfusion 2018    HPV (human papilloma virus) infection     2017    Obesity     Varicella      Past Surgical History:   Procedure Laterality Date    BREAST BIOPSY Right     u/s core bx    BREAST SURGERY      Incisional breast biopsy     SECTION      And     EXPLORATORY LAPAROTOMY W/ BOWEL RESECTION N/A 3/22/2022    Procedure: LAPAROTOMY EXPLORATORY WITH OPEN REPAIR OF INCARCERATED INCISIONAL  HERNIA WITH MESH;  Surgeon: Elaina Barbosa MD;  [facility];  Service: General    GASTRIC BYPASS  2016    INCISIONAL HERNIA REPAIR N/A 3/22/2022    Procedure: OPEN REPAIR HERNIA INCISIONAL WITH MESH;  Surgeon: Elaina Barbosa MD;  [facility];  Service: General    TX HYSTEROSCOPY BX ENDOMETRIUM&/POLYPC W/WO D&C N/A 2021    Procedure: DILATATION AND CURETTAGE (D&C) WITH HYSTEROSCOPY (MYOSURE);   Surgeon: Diogenes Grewal DO;  [facility];  Service: Gynecology    TX HYSTEROSCOPY BX ENDOMETRIUM&/POLYPC W/WO D&C N/A 2021    Procedure: POLYPECTOMY;  Surgeon: Diogenes Grewal DO;  [facility];  Service: Gynecology    TUBAL LIGATION      US GUIDED BREAST BIOPSY RIGHT COMPLETE Right 8/23/2019     Social History     Socioeconomic History    Marital status: Single     Spouse name: Not on file    Number of children: 2    Years of education: Not on file    Highest education level: Not on file   Occupational History    Not on file   Tobacco Use    Smoking status: Former     Packs/day: 0.50     Years: 10.00     Total pack years: 5.00     Types: Cigarettes    Smokeless tobacco: Never    Tobacco comments:     refused   Vaping Use    Vaping Use: Former    Substances: Nicotine   Substance and Sexual Activity    Alcohol use: Yes     Alcohol/week: 0.0 standard drinks of alcohol     Comment: social     Drug use: Never    Sexual activity: Yes     Partners: Male     Birth control/protection: Condom Male, None   Other Topics Concern    Not on file   Social History Narrative    Current every day smoker - As per [employer]     Exercises daily    Sleeps 6-7 hours a day      Social Determinants of Health     Financial Resource Strain: Not on file   Food Insecurity: No Food Insecurity (3/24/2022)    Hunger Vital Sign     Worried About Running Out of Food in the Last Year: Never true     Ran Out of Food in the Last Year: Never true   Transportation Needs: No Transportation Needs (3/24/2022)    PRAPARE - Transportation     Lack of Transportation (Medical): No     Lack of Transportation (Non-Medical):  No   Physical Activity: Not on file   Stress: Not on file   Social Connections: Not on file   Intimate Partner Violence: Not on file   Housing Stability: Unknown (3/24/2022)    Housing Stability Vital Sign     Unable to Pay for Housing in the Last Year: Not on file     Number of Places Lived in the Last Year: 1     Unstable Housing in the Last Year: No       Current Outpatient Medications:     albuterol (PROVENTIL HFA,VENTOLIN HFA) 90 mcg/act inhaler, Inhale 2 puffs every 6 (six) hours as needed for wheezing or shortness of breath, Disp: 18 g, Rfl: 1    Calcium Citrate-Vitamin D 500-400 MG-UNIT CHEW, 2 (two) times a day, Disp: , Rfl:     Clotrimazole 1 % OINT, Apply topically in the morning, Disp: 56.7 g, Rfl: 0    cyanocobalamin (VITAMIN B-12) 100 mcg tablet, every 24 hours, Disp: , Rfl:     escitalopram (Lexapro) 10 mg tablet, Take 1 tablet (10 mg total) by mouth daily (Patient not taking: Reported on 6/27/2023), Disp: 90 tablet, Rfl: 3    Ferrous Sulfate Dried (FERROUS SULFATE CR PO), Take by mouth Taken every other day, Disp: , Rfl:     Multiple Vitamins-Minerals (MULTIVITAMIN ADULT PO), Take by mouth, Disp: , Rfl:     mupirocin (BACTROBAN) 2 % ointment, Apply topically 3 (three) times a day, Disp: 22 g, Rfl: 0    pantoprazole (PROTONIX) 40 mg tablet, Take 1 tablet (40 mg total) by mouth daily Take half an hour prior to breakfast, Disp: 30 tablet, Rfl: 4    polyethylene glycol (GOLYTELY) 4000 mL solution, Take 4,000 mL by mouth once for 1 dose Take as directed by office prior to procedure, Disp: 4000 mL, Rfl: 0    triamcinolone (KENALOG) 0.1 % ointment, Apply topically 2 (two) times a day, Disp: 15 g, Rfl: 0    Food Intake and Lifestyle Assessment   Food Intake Assessment completed via usual diet recall  [place]:  5:30am  Breakfast: 6:30 yogurt and 1st cup of coffee  Snack: 9am banana   More snacking to the point may not have lunch. Keeps a lit of snacks at the desk. Was more "junk" food, but not more nuts and dried foods but still caloric  Lunch: may skip because snacking. May just go for a walk or homework for school  Snack: same as above  2x/week will go right to the gym  Home at 4:30pm  Dinner: Lives with some and he has a weight issue also so they are doing a lot of lean protein and veggies. Once a week will be a "carb" night where there will be a rice or past with a meal.  On weekends--more eating out and junk food or will skips often. Her son is with his day so has trouble cooking for one at times.   Snack: sometimes--goldfish    Beverage intake: sugar free beverages (diet snapple), coffee with Nada Shoulder creamer (unknown amt) and warm water  Diet texture/stage: regular  Protein supplement: nothing at this time  Estimated protein intake per day: <75gm  Estimated fluid intake per day: 5 cups of coffee per day with Merari Cassis, 32oz water per day (more when goes to the gym--4-16oz), diet snapple (16opz during the work week, 32oz on the weekends)  Meals eaten away from home: 1-2 times per week  Typical meal pattern: 2 meals per day and grazes on snacks per day  Eating Behaviors: Consumption of high calorie/ high fat foods, Frequent snacking/ grazing, Mindless eating, Emotional eating, Craves salty foods, and Eats and drinks together    Food allergies or intolerances: ice cream (dumping), milk, rice sits heavy can only have a small amt., things that are too sweet but does tolerate chocolate. Cultural or Spiritism considerations: -    Vitamins:  Multi OTC--suggested to start renee multi sample that was provided  Calcium--suggested 500 mg TID  Iron  Vitamin C  Biotin--suggested to hold 1 week prior to blood work  Low iron, has heme appt on 12/6 but wants to move that up.will need infusions    Physical Assessment  Nutrition Related Findings  Dumping with sweet foods    Physical Activity  Types of exercise: gym 4-5 times per week (bike, row, elliptical, strength training recently), hiking when nice   Current physical limitations: -    Psychosocial Assessment   Support systems: friend(s) relative(s) children  Socioeconomic factors: -    Nutrition Diagnosis  Diagnosis: Overweight / Obesity (NC-3.3)  Related to: Physical inactivity and Excessive energy intake  As Evidenced by: BMI >25     Interventions and Teaching   Patient educated on post-op nutrition guidelines. Patient educated and handouts provided.   Capacity of post-surgery stomach  Diet progression  Adequate hydration  Expected weight loss  Weight loss plateaus/ possibility of weight regain  Exercise  Nutrition considerations after surgery  Protein supplements  Meal planning and preparation  Appropriate carbohydrate, protein, and fat intake, and food/fluid choices to maximize safe weight loss, nutrient intake, and tolerance   Dietary and lifestyle changes  Possible problems with poor eating habits  Intuitive eating  Techniques for self monitoring and keeping daily food journal  Vitamin / Mineral supplementation of Multivitamin with minerals, Calcium, and Iron    Education provided to: patient    Barriers to learning: No barriers identified    Readiness to change: preparation    Comprehension: verbalizes understanding     Expected Compliance: good    Pt presents today as a transfer pt who had RYGB in 2016 who is no experiencing some weight regain. Overall she has done great with her weight loss still losing over 100#, but she does feel more comfortable at 160 therefore would like to lose 25#. Reviewed diet recall and pt does appear to be grazing through the day. She reports she did change from more "junk food" to more nuts and dried fruit she is consuming, but did remind pt those options, although healthier, are still fairly caloric. Reviewed the post-op guidelines and came up with some goals. Pt will try to work on not eating and drinking together, so following a 30/30 rule at least, a meal schedule to limit to 2 snacks per day (choose from list provided) and better meet protein needs. Did discuss doing a body comp since pt is starting to add strength training. Pt was interested and scheduled for one month.       Goals  Eliminate sugar sweetened beverages  Food journal via baritastic  Exercise 30 minutes 5 times per week--continue with current regimen  Eat 3 meals per day with protein at each meal  Suggest 1200-1400cals, 70-75gm protein  Meal schedule:  6:30, 9:30, 12:30, 3:30pm, 6:30pm  Eliminate mindless snacking--limit to 2 planned snack from list provided  Body comp check in one month     Time Spent:   45 Minutes

## 2023-11-06 NOTE — PROGRESS NOTES
Patient presents for transfer patient post-op follow up, current weight 185lbs. Eating behaviors/food choices: Patient reports having an eating schedule, midday meal is most difficult due to emotional snacking before that. She will make dinner in the evening for her son but if he is not home she may not eat a dinner. Weekends are more fluid and she may go without some meals during that time. Patient recognizes that some of her snacking is in response to feelings of anger, sadness, boredom or tension at work or when doing school work at home. She is snacking on nuts, dried fruit and regular fruit, she has snacks stored at work. Discussed the impact mood can have on eating habits and to set up environment for success by limiting tempting foods that are there. Suggested cleaning work cabinets of all snacks and only bringing with her portioned items that she can track. Patient's son has a severe peanut allergy so she doesn't want any kind of nuts in the home, suggested she talk with RD about replacement snacks that are still rich in protein. Gave patient the 5-5-5-5-5 Exercises to brainstorm replacement behaviors for the snacking as well as the worksheet on Accepting Cravings so she can reframe negative mindset on having cravings. Patient does go for walks at work when feeling stressed, suggested she bring water with her instead of the snack. 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 Activity/Exercise:  Patient has a gym membership, she goes to the gym five days a week. She enjoys going to the gym, she used to rely mostly on cardio but has expanded to weight lifting. She tries to be active at work by going for walks, she feels a lot of benefit from being active.     Sleep/Rest:  not discussed during this visit    Mental Health/Wellness:  Patient has experienced some significant loss in the past year, both of her parents passed away and her brother was ill. She has a teenage son and another son who just enlisted in the [employer] who she's worried may be deployed. She sees a therapist and feels connected to that provider, she's able to talk openly about her stressors. Encouraged to continue with that treatment to support her mental health which will also support her weight management.     Next Appointment:  With NP on 1/10

## 2023-11-09 ENCOUNTER — ANESTHESIA (OUTPATIENT)
Dept: ANESTHESIOLOGY | Facility: AMBULATORY SURGERY CENTER | Age: 48
End: 2023-11-09

## 2023-11-09 ENCOUNTER — ANESTHESIA EVENT (OUTPATIENT)
Dept: ANESTHESIOLOGY | Facility: AMBULATORY SURGERY CENTER | Age: 48
End: 2023-11-09

## 2023-11-20 ENCOUNTER — APPOINTMENT (EMERGENCY)
Dept: RADIOLOGY | Facility: HOSPITAL | Age: 48
End: 2023-11-20
Payer: COMMERCIAL

## 2023-11-20 ENCOUNTER — TELEMEDICINE (OUTPATIENT)
Dept: FAMILY MEDICINE CLINIC | Facility: OTHER | Age: 48
End: 2023-11-20
Payer: COMMERCIAL

## 2023-11-20 ENCOUNTER — HOSPITAL ENCOUNTER (EMERGENCY)
Facility: HOSPITAL | Age: 48
Discharge: HOME | End: 2023-11-20
Attending: EMERGENCY MEDICINE
Payer: COMMERCIAL

## 2023-11-20 ENCOUNTER — TELEPHONE (OUTPATIENT)
Dept: FAMILY MEDICINE CLINIC | Facility: OTHER | Age: 48
End: 2023-11-20

## 2023-11-20 VITALS
TEMPERATURE: 97.6 F | RESPIRATION RATE: 16 BRPM | DIASTOLIC BLOOD PRESSURE: 76 MMHG | OXYGEN SATURATION: 99 % | SYSTOLIC BLOOD PRESSURE: 130 MMHG | HEART RATE: 82 BPM

## 2023-11-20 DIAGNOSIS — D64.9 SYMPTOMATIC ANEMIA: Primary | ICD-10-CM

## 2023-11-20 DIAGNOSIS — R53.83 FATIGUE: Primary | ICD-10-CM

## 2023-11-20 LAB
ABO GROUP BLD: NORMAL
ALBUMIN SERPL BCP-MCNC: 4 G/DL (ref 3.5–5)
ALP SERPL-CCNC: 91 U/L (ref 34–104)
ALT SERPL W P-5'-P-CCNC: 19 U/L (ref 7–52)
ANION GAP SERPL CALCULATED.3IONS-SCNC: 9 MMOL/L
AST SERPL W P-5'-P-CCNC: 25 U/L (ref 13–39)
ATRIAL RATE: 59 BPM
BASOPHILS # BLD AUTO: 0.06 THOUSANDS/ÂΜL (ref 0–0.1)
BASOPHILS NFR BLD AUTO: 1 % (ref 0–1)
BILIRUB SERPL-MCNC: 0.2 MG/DL (ref 0.2–1)
BILIRUB UR QL STRIP: NEGATIVE
BLD GP AB SCN SERPL QL: NEGATIVE
BUN SERPL-MCNC: 16 MG/DL (ref 5–25)
CALCIUM SERPL-MCNC: 8.9 MG/DL (ref 8.4–10.2)
CARDIAC TROPONIN I PNL SERPL HS: 3 NG/L
CHLORIDE SERPL-SCNC: 104 MMOL/L (ref 96–108)
CLARITY UR: CLEAR
CO2 SERPL-SCNC: 22 MMOL/L (ref 21–32)
COLOR UR: YELLOW
CREAT SERPL-MCNC: 0.71 MG/DL (ref 0.6–1.3)
EOSINOPHIL # BLD AUTO: 0.16 THOUSAND/ÂΜL (ref 0–0.61)
EOSINOPHIL NFR BLD AUTO: 2 % (ref 0–6)
ERYTHROCYTE [DISTWIDTH] IN BLOOD BY AUTOMATED COUNT: 13.1 % (ref 11.6–15.1)
FERRITIN SERPL-MCNC: 12 NG/ML (ref 11–307)
GFR SERPL CREATININE-BSD FRML MDRD: 101 ML/MIN/1.73SQ M
GLUCOSE SERPL-MCNC: 141 MG/DL (ref 65–140)
GLUCOSE UR STRIP-MCNC: NEGATIVE MG/DL
HCG SERPL QL: NEGATIVE
HCT VFR BLD AUTO: 40.7 % (ref 34.8–46.1)
HGB BLD-MCNC: 13.3 G/DL (ref 11.5–15.4)
HGB UR QL STRIP.AUTO: NEGATIVE
IMM GRANULOCYTES # BLD AUTO: 0.02 THOUSAND/UL (ref 0–0.2)
IMM GRANULOCYTES NFR BLD AUTO: 0 % (ref 0–2)
IRON SATN MFR SERPL: 11 % (ref 15–50)
IRON SERPL-MCNC: 38 UG/DL (ref 50–212)
KETONES UR STRIP-MCNC: NEGATIVE MG/DL
LEUKOCYTE ESTERASE UR QL STRIP: NEGATIVE
LYMPHOCYTES # BLD AUTO: 2.11 THOUSANDS/ÂΜL (ref 0.6–4.47)
LYMPHOCYTES NFR BLD AUTO: 27 % (ref 14–44)
MCH RBC QN AUTO: 28.7 PG (ref 26.8–34.3)
MCHC RBC AUTO-ENTMCNC: 32.7 G/DL (ref 31.4–37.4)
MCV RBC AUTO: 88 FL (ref 82–98)
MONOCYTES # BLD AUTO: 0.41 THOUSAND/ÂΜL (ref 0.17–1.22)
MONOCYTES NFR BLD AUTO: 5 % (ref 4–12)
NEUTROPHILS # BLD AUTO: 5.18 THOUSANDS/ÂΜL (ref 1.85–7.62)
NEUTS SEG NFR BLD AUTO: 65 % (ref 43–75)
NITRITE UR QL STRIP: NEGATIVE
NRBC BLD AUTO-RTO: 0 /100 WBCS
P AXIS: 34 DEGREES
PH UR STRIP.AUTO: 5.5 [PH]
PLATELET # BLD AUTO: 270 THOUSANDS/UL (ref 149–390)
PMV BLD AUTO: 11.7 FL (ref 8.9–12.7)
POTASSIUM SERPL-SCNC: 3.8 MMOL/L (ref 3.5–5.3)
PR INTERVAL: 136 MS
PROT SERPL-MCNC: 7.3 G/DL (ref 6.4–8.4)
PROT UR STRIP-MCNC: NEGATIVE MG/DL
QRS AXIS: 14 DEGREES
QRSD INTERVAL: 84 MS
QT INTERVAL: 408 MS
QTC INTERVAL: 403 MS
RBC # BLD AUTO: 4.63 MILLION/UL (ref 3.81–5.12)
RH BLD: POSITIVE
SODIUM SERPL-SCNC: 135 MMOL/L (ref 135–147)
SP GR UR STRIP.AUTO: 1.01 (ref 1–1.03)
SPECIMEN EXPIRATION DATE: NORMAL
T WAVE AXIS: 36 DEGREES
TIBC SERPL-MCNC: 359 UG/DL (ref 250–450)
TSH SERPL DL<=0.05 MIU/L-ACNC: 0.8 UIU/ML (ref 0.45–4.5)
UIBC SERPL-MCNC: 321 UG/DL (ref 155–355)
UROBILINOGEN UR QL STRIP.AUTO: 0.2 E.U./DL
VENTRICULAR RATE: 59 BPM
WBC # BLD AUTO: 7.94 THOUSAND/UL (ref 4.31–10.16)

## 2023-11-20 PROCEDURE — 83550 IRON BINDING TEST: CPT | Performed by: PHYSICIAN ASSISTANT

## 2023-11-20 PROCEDURE — 99285 EMERGENCY DEPT VISIT HI MDM: CPT | Performed by: PHYSICIAN ASSISTANT

## 2023-11-20 PROCEDURE — 93010 ELECTROCARDIOGRAM REPORT: CPT | Performed by: INTERNAL MEDICINE

## 2023-11-20 PROCEDURE — 84484 ASSAY OF TROPONIN QUANT: CPT | Performed by: PHYSICIAN ASSISTANT

## 2023-11-20 PROCEDURE — 84703 CHORIONIC GONADOTROPIN ASSAY: CPT | Performed by: PHYSICIAN ASSISTANT

## 2023-11-20 PROCEDURE — 86850 RBC ANTIBODY SCREEN: CPT | Performed by: PHYSICIAN ASSISTANT

## 2023-11-20 PROCEDURE — 83540 ASSAY OF IRON: CPT | Performed by: PHYSICIAN ASSISTANT

## 2023-11-20 PROCEDURE — 80053 COMPREHEN METABOLIC PANEL: CPT | Performed by: PHYSICIAN ASSISTANT

## 2023-11-20 PROCEDURE — 99213 OFFICE O/P EST LOW 20 MIN: CPT

## 2023-11-20 PROCEDURE — 86901 BLOOD TYPING SEROLOGIC RH(D): CPT | Performed by: PHYSICIAN ASSISTANT

## 2023-11-20 PROCEDURE — 85025 COMPLETE CBC W/AUTO DIFF WBC: CPT | Performed by: PHYSICIAN ASSISTANT

## 2023-11-20 PROCEDURE — 99284 EMERGENCY DEPT VISIT MOD MDM: CPT

## 2023-11-20 PROCEDURE — 71045 X-RAY EXAM CHEST 1 VIEW: CPT

## 2023-11-20 PROCEDURE — 86900 BLOOD TYPING SEROLOGIC ABO: CPT | Performed by: PHYSICIAN ASSISTANT

## 2023-11-20 PROCEDURE — 93005 ELECTROCARDIOGRAM TRACING: CPT

## 2023-11-20 PROCEDURE — 36415 COLL VENOUS BLD VENIPUNCTURE: CPT | Performed by: PHYSICIAN ASSISTANT

## 2023-11-20 PROCEDURE — 81003 URINALYSIS AUTO W/O SCOPE: CPT | Performed by: PHYSICIAN ASSISTANT

## 2023-11-20 PROCEDURE — 96360 HYDRATION IV INFUSION INIT: CPT

## 2023-11-20 PROCEDURE — 82728 ASSAY OF FERRITIN: CPT | Performed by: PHYSICIAN ASSISTANT

## 2023-11-20 PROCEDURE — 84443 ASSAY THYROID STIM HORMONE: CPT | Performed by: PHYSICIAN ASSISTANT

## 2023-11-20 RX ADMIN — SODIUM CHLORIDE 1000 ML: 0.9 INJECTION, SOLUTION INTRAVENOUS at 17:03

## 2023-11-20 NOTE — TELEPHONE ENCOUNTER
Hi, my name is Dolly Friend. On my number's [phone], I was calling because I'm concerned that I might have a like severe anemia issues right now and I'm not sure if I should go in to the emergency room. I don't want to if it's not necessary, wanted to run my symptoms with somebody to see sorry. Thank you again, [phone].

## 2023-11-20 NOTE — PROGRESS NOTES
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 At her patient, she has chronic iron deficiency status post gastric bypass and has received multiple Venofer transfusions as well as blood transfusions in the past.  Patient states her  With hematology is not until December, and that she has no Venofer or other transfusions scheduled until after that. Patient states she recently finished her menstrual period, which she states was very heavy with multiple bloody pads per day. At time of assessment, patient states that she has been feeling progressively weaker over the last several days. Patient endorses worsening dyspnea, unable to climb 1 flight of stairs without having to stop and rest.  Patient additionally endorses bilateral lower extremity cramping which she associates with symptomatic anemia. At time of assessment patient displayed fatigued affect, as well as pallor of lips and oral mucosa observed on virtual platform. Patient advised to present to ED for assessment and possible transfusion, ED called and made aware of transfer. Problem List Items Addressed This Visit    None  Visit Diagnoses       Symptomatic anemia    -  Primary    Relevant Orders    Transfer to other facility (Completed)                 Reason for visit is   Chief Complaint   Patient presents with    Virtual Regular Visit          Encounter provider Abimael Keller MD    Provider located at [address] [id number] [facility] [id number]      Recent Visits  No visits were found meeting these conditions.   Showing recent visits within past 7 days and meeting all other requirements  Today's Visits  Date Type Provider Dept   23 Telemedicine MD Presley Barahona   23 Telephone Levi Hairston   Showing today's visits and meeting all other requirements  Future Appointments  No visits were found meeting these conditions. Showing future appointments within next 150 days and meeting all other requirements       The patient was identified by name and date of birth. Constantino Horton was informed that this is a telemedicine visit and that the visit is being conducted through the [employer]. She agrees to proceed. .  My office door was closed. No one else was in the room. She acknowledged consent and understanding of privacy and security of the video platform. The patient has agreed to participate and understands they can discontinue the visit at any time. Patient is aware this is a billable service.      Subjective  Constantino Horton is a 50 y.o. female who presents today for virtual visit due to complaint of symptomatic anemia      Easily fatigued, has to stop and rest after 1 flight of stairs    Leg cramping   Progressive dyspnea, unable to do one flight of stairs without feeling SOB  Pain in lower back as well, states feels like being "stabbed"   Recent menstrual period, ended 2-3 days ago, pt endorsed heavy bleeding during this period   Pt follows with hematology, no appointments available until December   Has had previous iron and blood infusions with improvement of symptoms          Past Medical History:   Diagnosis Date    Abnormal Pap smear of cervix     2017    Anemia     Anxiety     Asthma     Diabetes mellitus ([address])     History of transfusion 2018    HPV (human papilloma virus) infection     2017    Obesity     Varicella        Past Surgical History:   Procedure Laterality Date    BREAST BIOPSY Right 2012    u/s core bx    BREAST SURGERY      Incisional breast biopsy     SECTION      And  EXPLORATORY LAPAROTOMY W/ BOWEL RESECTION N/A 3/22/2022    Procedure: LAPAROTOMY EXPLORATORY WITH OPEN REPAIR OF INCARCERATED INCISIONAL  HERNIA WITH MESH;  Surgeon: Tristan Palacio MD;  Location: [facility];  Service: General    GASTRIC BYPASS  01/25/2016    INCISIONAL HERNIA REPAIR N/A 3/22/2022    Procedure: OPEN REPAIR HERNIA INCISIONAL WITH MESH;  Surgeon: Tristan Palacio MD;  Location: [facility];  Service: General    DE HYSTEROSCOPY BX ENDOMETRIUM&/POLYPC W/WO D&C N/A 2/16/2021    Procedure: DILATATION AND CURETTAGE (D&C) WITH HYSTEROSCOPY (MYOSURE); Surgeon: Lazaro Holloway DO;  [facility];  Service: Gynecology    DE HYSTEROSCOPY BX ENDOMETRIUM&/POLYPC W/WO D&C N/A 2/16/2021    Procedure: POLYPECTOMY;  Surgeon: Lazaro Holloway DO;  [facility];  Service: Gynecology    TUBAL LIGATION  2009    US GUIDED BREAST BIOPSY RIGHT COMPLETE Right 8/23/2019       No current facility-administered medications for this visit.      Current Outpatient Medications   Medication Sig Dispense Refill    albuterol (PROVENTIL HFA,VENTOLIN HFA) 90 mcg/act inhaler Inhale 2 puffs every 6 (six) hours as needed for wheezing or shortness of breath 18 g 1    Calcium Citrate-Vitamin D 500-400 MG-UNIT CHEW 2 (two) times a day      Clotrimazole 1 % OINT Apply topically in the morning 56.7 g 0    cyanocobalamin (VITAMIN B-12) 100 mcg tablet every 24 hours      escitalopram (Lexapro) 10 mg tablet Take 1 tablet (10 mg total) by mouth daily (Patient not taking: Reported on 6/27/2023) 90 tablet 3    Ferrous Sulfate Dried (FERROUS SULFATE CR PO) Take by mouth Taken every other day      Multiple Vitamins-Minerals (MULTIVITAMIN ADULT PO) Take by mouth      mupirocin (BACTROBAN) 2 % ointment Apply topically 3 (three) times a day 22 g 0    pantoprazole (PROTONIX) 40 mg tablet Take 1 tablet (40 mg total) by mouth daily Take half an hour prior to breakfast 30 tablet 4    polyethylene glycol (GOLYTELY) 4000 mL solution Take 4,000 mL by mouth once for 1 dose Take as directed by office prior to procedure 4000 mL 0    triamcinolone (KENALOG) 0.1 % ointment Apply topically 2 (two) times a day 15 g 0     Facility-Administered Medications Ordered in Other Visits   Medication Dose Route Frequency Provider Last Rate Last Admin    sodium chloride 0.9 % bolus 1,000 mL  1,000 mL Intravenous Once Bernard Callahan PA-C 1,000 mL/hr at 11/20/23 1703 1,000 mL at 11/20/23 1703        No Known Allergies    Review of Systems   Constitutional:  Positive for activity change and fatigue. HENT:  Negative for trouble swallowing. Respiratory:  Positive for shortness of breath. Negative for wheezing. Cardiovascular:  Negative for chest pain. Gastrointestinal:  Negative for abdominal pain. Genitourinary:  Negative for dysuria and urgency. Musculoskeletal:  Positive for myalgias. Negative for arthralgias. Skin:  Negative for pallor and rash. Neurological:  Positive for dizziness and weakness. Hematological:  Negative for adenopathy. Psychiatric/Behavioral:  Negative for agitation. Video Exam    There were no vitals filed for this visit. Physical Exam  Constitutional:       General: She is not in acute distress. Appearance: She is ill-appearing. HENT:      Head: Normocephalic and atraumatic. Right Ear: External ear normal.      Left Ear: External ear normal.      Mouth/Throat:      Comments: Pallor of the lips and oral mucosa observed  Eyes:      General: No scleral icterus. Extraocular Movements: Extraocular movements intact. Conjunctiva/sclera: Conjunctivae normal.   Neurological:      Mental Status: She is alert.           Visit Time  Total Visit Duration: 20 minutes

## 2023-11-21 NOTE — ED PROVIDER NOTES
History  Chief Complaint   Patient presents with    Fatigue     Pt is concerned her anemia is back. Tingling and numbness bilateral toes, exhaustion and burning eyes starting between several weeks and today. Hematology appt on 12/7/23     This is a 20-year-old female with past medical history significant for anemia and type 2 diabetes mellitus presenting to the emergency department today for generalized fatigue. This has been ongoing for approximately 1 month. The patient notes she has heavy menses at baseline and notes she just finished her menstrual cycle. She believes she is anemic again. The patient also notes bilateral dry eyes. She denies associated chest pain but does note exertional shortness of breath. She has no dizziness, lightheadedness, or changes to her vision. She has no nausea, vomiting, diarrhea, or constipation. She has no abdominal pain. She has no dysuria, hematuria, or foul-smelling urine. No cough. No upper respiratory symptoms. She has no fevers or chills. The patient denies other complaints at this time. History provided by:  Patient   used: No    Fatigue  Severity:  Moderate  Onset quality:  Gradual  Duration: numerous weeks. Timing:  Constant  Progression:  Worsening  Chronicity:  New  Relieved by:  Nothing  Worsened by:  Nothing  Ineffective treatments:  None tried  Associated symptoms: shortness of breath and stroke symptoms    Associated symptoms: no abdominal pain, no anorexia, no arthralgias, no chest pain, no cough, no diarrhea, no difficulty walking, no dizziness, no drooling, no dysphagia, no dysuria, no falls, no fever, no foul-smelling urine, no headaches, no melena, no nausea, no seizures, no syncope, no urgency, no vision change and no vomiting    Risk factors: anemia and excessive menstruation        Prior to Admission Medications   Prescriptions Last Dose Informant Patient Reported? Taking?    Calcium Citrate-Vitamin D 500-400 MG-UNIT CHEW 2023 Self Yes Yes   Si (two) times a day   Clotrimazole 1 % OINT Unknown Self No No   Sig: Apply topically in the morning   Ferrous Sulfate Dried (FERROUS SULFATE CR PO) 2023 Self Yes Yes   Sig: Take by mouth Taken every other day   Multiple Vitamins-Minerals (MULTIVITAMIN ADULT PO) 2023 Self Yes Yes   Sig: Take by mouth   albuterol (PROVENTIL HFA,VENTOLIN HFA) 90 mcg/act inhaler More than a month Self No No   Sig: Inhale 2 puffs every 6 (six) hours as needed for wheezing or shortness of breath   cyanocobalamin (VITAMIN B-12) 100 mcg tablet 2023 Self Yes Yes   Sig: every 24 hours   escitalopram (Lexapro) 10 mg tablet  Self No No   Sig: Take 1 tablet (10 mg total) by mouth daily   Patient not taking: Reported on 2023   mupirocin (BACTROBAN) 2 % ointment 2023 Self No Yes   Sig: Apply topically 3 (three) times a day   pantoprazole (PROTONIX) 40 mg tablet 2023  No Yes   Sig: Take 1 tablet (40 mg total) by mouth daily Take half an hour prior to breakfast   polyethylene glycol (GOLYTELY) 4000 mL solution   No No   Sig: Take 4,000 mL by mouth once for 1 dose Take as directed by office prior to procedure   triamcinolone (KENALOG) 0.1 % ointment  Self No No   Sig: Apply topically 2 (two) times a day      Facility-Administered Medications: None       Past Medical History:   Diagnosis Date    Abnormal Pap smear of cervix     2017    Anemia     Anxiety     Asthma     Diabetes mellitus ([address])     History of transfusion 2018    HPV (human papilloma virus) infection     2017    Obesity     Varicella        Past Surgical History:   Procedure Laterality Date    BREAST BIOPSY Right 2012    u/s core bx    BREAST SURGERY      Incisional breast biopsy     SECTION      And     EXPLORATORY LAPAROTOMY W/ BOWEL RESECTION N/A 3/22/2022    Procedure: LAPAROTOMY EXPLORATORY WITH OPEN REPAIR OF INCARCERATED INCISIONAL  HERNIA WITH MESH;  Surgeon: Solange Gregg MD;  Location:  MAIN OR;  Service: General    GASTRIC BYPASS  01/25/2016    INCISIONAL HERNIA REPAIR N/A 3/22/2022    Procedure: OPEN REPAIR HERNIA INCISIONAL WITH MESH;  Surgeon: Domenica Mccracken MD;  Location: [facility];  Service: General    NV HYSTEROSCOPY BX ENDOMETRIUM&/POLYPC W/WO D&C N/A 2/16/2021    Procedure: DILATATION AND CURETTAGE (D&C) WITH HYSTEROSCOPY (MYOSURE); Surgeon: Payton Villarreal DO;  Location: [facility];  Service: Gynecology    NV HYSTEROSCOPY BX ENDOMETRIUM&/POLYPC W/WO D&C N/A 2/16/2021    Procedure: POLYPECTOMY;  Surgeon: Payton Villarreal DO;  Location: [facility];  Service: Gynecology    TUBAL LIGATION  2009    US GUIDED BREAST BIOPSY RIGHT COMPLETE Right 8/23/2019       Family History   Problem Relation Age of Onset    Diabetes Mother     Hypertension Mother     Cancer Mother     Arthritis Mother     Leukemia Mother     Hyperlipidemia Mother     Diabetes Father     Hypertension Father     Heart disease Father     Coronary artery disease Father     Hyperlipidemia Father     Stroke Paternal Grandfather     Thyroid cancer Sister 37    Hypertension Brother     Breast cancer Maternal Aunt 48    Breast cancer Paternal Aunt 79    No Known Problems Maternal Grandmother     No Known Problems Maternal Grandfather     No Known Problems Paternal Grandmother     No Known Problems Son     No Known Problems Son     No Known Problems Maternal Aunt     No Known Problems Maternal Aunt     No Known Problems Maternal Aunt     No Known Problems Maternal Aunt     No Known Problems Maternal Aunt     Cervical cancer Paternal Aunt 36    No Known Problems Paternal Aunt     No Known Problems Paternal Aunt     No Known Problems Paternal Aunt     No Known Problems Paternal Aunt     No Known Problems Paternal Aunt     Lung cancer Paternal Uncle 80     I have reviewed and agree with the history as documented.     E-Cigarette/Vaping    E-Cigarette Use Former User     Comments iWeeb[employer] Nicotine Yes     THC No     CBD No     Flavoring No     Other No     Unknown No      Social History     Tobacco Use    Smoking status: Former     Packs/day: 0.50     Years: 10.00     Total pack years: 5.00     Types: Cigarettes    Smokeless tobacco: Never    Tobacco comments:     refused   Vaping Use    Vaping Use: Former    Substances: Nicotine   Substance Use Topics    Alcohol use: Yes     Alcohol/week: 0.0 standard drinks of alcohol     Comment: social     Drug use: Never       Review of Systems   Constitutional:  Positive for fatigue. Negative for appetite change, chills, diaphoresis and fever. HENT:  Negative for drooling. Eyes:  Negative for visual disturbance. Respiratory:  Positive for shortness of breath. Negative for cough, chest tightness and wheezing. Cardiovascular:  Negative for chest pain, palpitations, leg swelling and syncope. Gastrointestinal:  Negative for abdominal pain, anorexia, diarrhea, dysphagia, melena, nausea and vomiting. Genitourinary:  Positive for vaginal bleeding. Negative for dysuria, urgency, vaginal discharge and vaginal pain. Musculoskeletal:  Negative for arthralgias, falls, neck pain and neck stiffness. Skin:  Negative for rash and wound. Neurological:  Negative for dizziness, seizures, syncope, weakness, light-headedness and headaches. Psychiatric/Behavioral:  Negative for confusion. All other systems reviewed and are negative. Physical Exam  Physical Exam  Vitals and nursing note reviewed. Constitutional:       General: She is not in acute distress. Appearance: Normal appearance. She is normal weight. She is not ill-appearing, toxic-appearing or diaphoretic. HENT:      Head: Normocephalic and atraumatic. Nose: Nose normal. No congestion or rhinorrhea. Mouth/Throat:      Mouth: Mucous membranes are moist.      Pharynx: No oropharyngeal exudate or posterior oropharyngeal erythema. Eyes:      General: No scleral icterus. Right eye: No discharge. Left eye: No discharge. Extraocular Movements: Extraocular movements intact. Pupils: Pupils are equal, round, and reactive to light. Cardiovascular:      Rate and Rhythm: Normal rate and regular rhythm. Pulses: Normal pulses. Heart sounds: Normal heart sounds. No murmur heard. No friction rub. No gallop. Pulmonary:      Effort: Pulmonary effort is normal. No respiratory distress. Breath sounds: Normal breath sounds. No stridor. No wheezing, rhonchi or rales. Chest:      Chest wall: No tenderness. Abdominal:      General: Abdomen is flat. There is no distension. Palpations: Abdomen is soft. Tenderness: There is no abdominal tenderness. There is no right CVA tenderness, left CVA tenderness, guarding or rebound. Musculoskeletal:         General: Normal range of motion. Cervical back: Normal range of motion. No tenderness. Right lower leg: No edema. Left lower leg: No edema. Skin:     General: Skin is warm and dry. Capillary Refill: Capillary refill takes less than 2 seconds. Coloration: Skin is not jaundiced or pale. Neurological:      General: No focal deficit present. Mental Status: She is alert and oriented to person, place, and time. Mental status is at baseline.    Psychiatric:         Mood and Affect: Mood normal.         Behavior: Behavior normal.         Vital Signs  ED Triage Vitals   Temperature Pulse Respirations Blood Pressure SpO2   11/20/23 1612 11/20/23 1612 11/20/23 1612 11/20/23 1612 11/20/23 1612   97.6 °F (36.4 °C) 64 18 138/87 98 %      Temp Source Heart Rate Source Patient Position - Orthostatic VS BP Location FiO2 (%)   11/20/23 1612 11/20/23 1612 11/20/23 1612 11/20/23 1612 --   Oral Monitor Sitting Right arm       Pain Score       11/20/23 1826       6           Vitals:    11/20/23 1612 11/20/23 1826   BP: 138/87 130/76   Pulse: 64 82   Patient Position - Orthostatic VS: Sitting Lying         Visual Acuity      ED Medications  Medications   sodium chloride 0.9 % bolus 1,000 mL (0 mL Intravenous Stopped 11/20/23 1827)       Diagnostic Studies  Results Reviewed       Procedure Component Value Units Date/Time    Ferritin [[id number]]  (Normal) Collected: 11/20/23 1705    Lab Status: Final result Specimen: Blood from Arm, Left Updated: 11/20/23 2239     Ferritin 12 ng/mL     TIBC Panel (incl.  Iron, TIBC, % Iron Saturation) [[id number]]  (Abnormal) Collected: 11/20/23 1705    Lab Status: Final result Specimen: Blood from Arm, Left Updated: 11/20/23 2228     Iron Saturation 11 %      TIBC 359 ug/dL      Iron 38 ug/dL      UIBC 321 ug/dL     TSH, 3rd generation with Free T4 reflex [[id number]]  (Normal) Collected: 11/20/23 1705    Lab Status: Final result Specimen: Blood from Arm, Left Updated: 11/20/23 1745     TSH 3RD GENERATON 0.796 uIU/mL     hCG, qualitative pregnancy [[id number]]  (Normal) Collected: 11/20/23 1705    Lab Status: Final result Specimen: Blood from Arm, Left Updated: 11/20/23 1737     Preg, Serum Negative    HS Troponin 0hr (reflex protocol) [[id number]]  (Normal) Collected: 11/20/23 1705    Lab Status: Final result Specimen: Blood from Arm, Left Updated: 11/20/23 1736     hs TnI 0hr 3 ng/L     Comprehensive metabolic panel [[id number]]  (Abnormal) Collected: 11/20/23 1705    Lab Status: Final result Specimen: Blood from Arm, Left Updated: 11/20/23 1732     Sodium 135 mmol/L      Potassium 3.8 mmol/L      Chloride 104 mmol/L      CO2 22 mmol/L      ANION GAP 9 mmol/L      BUN 16 mg/dL      Creatinine 0.71 mg/dL      Glucose 141 mg/dL      Calcium 8.9 mg/dL      AST 25 U/L      ALT 19 U/L      Alkaline Phosphatase 91 U/L      Total Protein 7.3 g/dL      Albumin 4.0 g/dL      Total Bilirubin 0.20 mg/dL      eGFR 101 ml/min/1.73sq m     Narrative:      [facility] (CKD):     Stage 1 with normal or high GFR (GFR > 90 mL/min/1.73 square meters)    Stage 2 Mild CKD (GFR = 60-89 mL/min/1.73 square meters)    Stage 3A Moderate CKD (GFR = 45-59 mL/min/1.73 square meters)    Stage 3B Moderate CKD (GFR = 30-44 mL/min/1.73 square meters)    Stage 4 Severe CKD (GFR = 15-29 mL/min/1.73 square meters)    Stage 5 End Stage CKD (GFR <15 mL/min/1.73 square meters)  Note: GFR calculation is accurate only with a steady state creatinine    CBC and differential [[id number]] Collected: 11/20/23 1705    Lab Status: Final result Specimen: Blood from Arm, Left Updated: 11/20/23 1711     WBC 7.94 Thousand/uL      RBC 4.63 Million/uL      Hemoglobin 13.3 g/dL      Hematocrit 40.7 %      MCV 88 fL      MCH 28.7 pg      MCHC 32.7 g/dL      RDW 13.1 %      MPV 11.7 fL      Platelets 819 Thousands/uL      nRBC 0 /100 WBCs      Neutrophils Relative 65 %      Immat GRANS % 0 %      Lymphocytes Relative 27 %      Monocytes Relative 5 %      Eosinophils Relative 2 %      Basophils Relative 1 %      Neutrophils Absolute 5.18 Thousands/µL      Immature Grans Absolute 0.02 Thousand/uL      Lymphocytes Absolute 2.11 Thousands/µL      Monocytes Absolute 0.41 Thousand/µL      Eosinophils Absolute 0.16 Thousand/µL      Basophils Absolute 0.06 Thousands/µL     UA w Reflex to Microscopic w Reflex to Culture [[id number]]  (Normal) Collected: 11/20/23 1653    Lab Status: Final result Specimen: Urine, Clean Catch Updated: 11/20/23 1704     Color, UA Yellow     Clarity, UA Clear     Specific Gravity, UA 1.015     pH, UA 5.5     Leukocytes, UA Negative     Nitrite, UA Negative     Protein, UA Negative mg/dl      Glucose, UA Negative mg/dl      Ketones, UA Negative mg/dl      Urobilinogen, UA 0.2 E.U./dl      Bilirubin, UA Negative     Occult Blood, UA Negative                   XR chest 1 view portable   Final Result by Shanita Fletcher MD (11/21 3502)      No acute cardiopulmonary disease.                   Workstation performed: [id number]                    Procedures  ECG 12 Lead Documentation Only    Date/Time: 11/20/2023 4:28 PM    Performed by: Toño Romero PA-C  Authorized by: Toño Romero PA-C    Indications / Diagnosis:  Fatigue  Patient location:  ED  Previous ECG:     Previous ECG:  Compared to current    Comparison ECG info:  3/22/2022    Similarity:  Changes noted  Interpretation:     Interpretation: non-specific    Rate:     ECG rate:  59    ECG rate assessment: bradycardic    Rhythm:     Rhythm: sinus bradycardia    Ectopy:     Ectopy: none    QRS:     QRS axis:  Normal  Conduction:     Conduction: normal    ST segments:     ST segments:  Normal  T waves:     T waves: normal    Comments:      Sinus bradycardia with a rate of 59. Normal axis. No ectopy. No STEMI. ED Course                               SBIRT 22yo+      Flowsheet Row Most Recent Value   Initial Alcohol Screen: US AUDIT-C     1. How often do you have a drink containing alcohol? 0 Filed at: 11/20/2023 1611   2. How many drinks containing alcohol do you have on a typical day you are drinking? 0 Filed at: 11/20/2023 1611   3a. Male UNDER 65: How often do you have five or more drinks on one occasion? 0 Filed at: 11/20/2023 1611   3b. FEMALE Any Age, or MALE 65+: How often do you have 4 or more drinks on one occassion? 0 Filed at: 11/20/2023 1611   Audit-C Score 0 Filed at: 11/20/2023 1611   DAVID: How many times in the past year have you. .. Used an illegal drug or used a prescription medication for non-medical reasons? Never Filed at: 11/20/2023 1611                      Medical Decision Making  66-year-old female presenting to the emergency department today for fatigue. She has a history of heavy menses. She also notes exertional shortness of breath. Vitals are stable. Afebrile. Physical examination is reassuring. EKG shows normal sinus rhythm with a rate of 59 on my independent interpretation.   Chest x-ray shows no acute cardiopulmonary disease on my independent interpretation. Hemoglobin is 13.3. Negative urinalysis. CMP is reassuring. Negative troponin x1. Iron panel was ordered. Reassuring TSH. Negative pregnancy test.  The patient was given intravenous fluids while here in the emergency department. Patient is not anemic. The patient is stable for discharge at this time. Follow-up with PCP. Patient already has an appointment with a hematologist that she can follow-up with. Strict return precautions were given. Recommend PCP follow-up as soon as possible. The patient and/or patient's proxy verify their understanding and agree to the plan at this time. All questions answered to the patient and/or their proxy's satisfaction. All labs reviewed and utilized in the medical decision making process (if labs were ordered). Portions of the record may have been created with voice recognition software. Occasional wrong word or "sound a like" substitutions may have occurred due to the inherent limitations of voice recognition software. Read the chart carefully and recognize, using context, where substitutions have occurred. I reviewed prior notes. I reviewed prior EKG. Problems Addressed:  Fatigue: undiagnosed new problem with uncertain prognosis    Amount and/or Complexity of Data Reviewed  External Data Reviewed: ECG and notes. Labs: ordered. Decision-making details documented in ED Course. Radiology: ordered and independent interpretation performed. Decision-making details documented in ED Course. Details: CXR  ECG/medicine tests: ordered and independent interpretation performed. Decision-making details documented in ED Course.              Disposition  Final diagnoses:   Fatigue     Time reflects when diagnosis was documented in both MDM as applicable and the Disposition within this note       Time User Action Codes Description Comment    11/20/2023  6:05 PM Madisyn Callahan Add [R53.83] Fatigue           ED Disposition       ED Disposition   Discharge    Condition   Stable    Date/Time   Mon Nov 20, 2023 1115    [address] discharge to home/self care.                    Follow-up Information       Follow up With Specialties Details Why Contact Info Additional Information    Keith Mendes MD [facility] Medicine Schedule an appointment as soon as possible for a visit   [address] [phone]       [address] [facility] Emergency Medicine Go to  If symptoms worsen [address] [phone] [address] [facility] Emergency Department, [address], [address] [facility]    Your Hematologist  Call                Discharge Medication List as of 11/20/2023  6:06 PM        CONTINUE these medications which have NOT CHANGED    Details   Calcium Citrate-Vitamin D 500-400 MG-UNIT CHEW 2 (two) times a day, Starting Wed 2/3/2016, Historical Med      cyanocobalamin (VITAMIN B-12) 100 mcg tablet every 24 hours, Starting Wed 2/3/2016, Historical Med      Ferrous Sulfate Dried (FERROUS SULFATE CR PO) Take by mouth Taken every other day, Historical Med      Multiple Vitamins-Minerals (MULTIVITAMIN ADULT PO) Take by mouth, Starting Thu 10/12/2017, Historical Med      mupirocin (BACTROBAN) 2 % ointment Apply topically 3 (three) times a day, Starting Fri 7/29/2022, Normal      pantoprazole (PROTONIX) 40 mg tablet Take 1 tablet (40 mg total) by mouth daily Take half an hour prior to breakfast, Starting Thu 11/2/2023, Until Sat 12/2/2023, Normal      albuterol (PROVENTIL HFA,VENTOLIN HFA) 90 mcg/act inhaler Inhale 2 puffs every 6 (six) hours as needed for wheezing or shortness of breath, Starting Fri 7/29/2022, Normal      Clotrimazole 1 % OINT Apply topically in the morning, Starting Fri 9/1/2023, Normal      escitalopram (Lexapro) 10 mg tablet Take 1 tablet (10 mg total) by mouth daily, Starting Fri 7/29/2022, Normal      polyethylene glycol (GOLYTELY) 4000 mL solution Take 4,000 mL by mouth once for 1 dose Take as directed by office prior to procedure, Starting Thu 11/2/2023, Normal      triamcinolone (KENALOG) 0.1 % ointment Apply topically 2 (two) times a day, Starting Fri 9/1/2023, Normal             No discharge procedures on file.     PDMP Review         Value Time User    PDMP Reviewed  Yes 3/25/2022  1:51 PM Monica Hernandez MD            ED Provider  Electronically Signed by             Tj Vasquez PA-C  11/21/23 2742

## 2023-12-04 ENCOUNTER — TELEPHONE (OUTPATIENT)
Dept: GASTROENTEROLOGY | Facility: CLINIC | Age: 48
End: 2023-12-04

## 2023-12-04 ENCOUNTER — TELEPHONE (OUTPATIENT)
Dept: HEMATOLOGY ONCOLOGY | Facility: CLINIC | Age: 48
End: 2023-12-04

## 2023-12-04 NOTE — TELEPHONE ENCOUNTER
Spoke to pt confirming pt's colonoscopy/egd scheduled on 12/7/23 at [facility] with Dr Russell Portillo. Informed [facility] would be calling tomorrow or Wednesday with the arrival time. Pt has instructions and I answered her questions.

## 2023-12-04 NOTE — TELEPHONE ENCOUNTER
Patient Call    Who are you speaking with? Patient    If it is not the patient, are they listed on an active communication consent form? N/A   What is the reason for this call? Patient is asking for her appointment on 12/6/23 to move to [facility] due to not being able to miss too much work since she has surgery on 12/7/23   Does this require a call back? Yes   If a call back is required, please list [facility] call back number [phone]   If a call back is required, advise that a message will be forwarded to their care team and someone will return their call as soon as possible. Did you relay this information to the patient?  Yes

## 2023-12-06 ENCOUNTER — TELEPHONE (OUTPATIENT)
Dept: HEMATOLOGY ONCOLOGY | Facility: CLINIC | Age: 48
End: 2023-12-06

## 2023-12-06 ENCOUNTER — TELEMEDICINE (OUTPATIENT)
Dept: HEMATOLOGY ONCOLOGY | Facility: CLINIC | Age: 48
End: 2023-12-06
Payer: COMMERCIAL

## 2023-12-06 DIAGNOSIS — Z98.84 H/O BARIATRIC SURGERY: ICD-10-CM

## 2023-12-06 DIAGNOSIS — D50.0 IRON DEFICIENCY ANEMIA DUE TO CHRONIC BLOOD LOSS: ICD-10-CM

## 2023-12-06 DIAGNOSIS — K91.2 POSTSURGICAL MALABSORPTION: Primary | ICD-10-CM

## 2023-12-06 DIAGNOSIS — R11.0 NAUSEA: ICD-10-CM

## 2023-12-06 PROCEDURE — 99214 OFFICE O/P EST MOD 30 MIN: CPT | Performed by: PHYSICIAN ASSISTANT

## 2023-12-06 RX ORDER — SODIUM CHLORIDE 9 MG/ML
20 INJECTION, SOLUTION INTRAVENOUS ONCE
OUTPATIENT
Start: 2023-12-13

## 2023-12-06 NOTE — PROGRESS NOTES
Virtual Regular Visit    Verification of patient location:    Patient is located at Other in the following state in which I hold an active license [place]      Assessment/Plan:  FACUNDO. Secondary to poor absorption from history of gastric bypass surgery as well as blood loss from heavy menses. She has received transfusions of packed red blood cells as well as Venofer multiple times over the years. Recurrent FACUNDO. 7/30/2022 hemoglobin 9.9, MCV 73. Ferritin 5, iron saturation 9%. She had history of nausea with Venofer that lasted entire day of treatment. Due to profound nausea, she was pretreated with Zofran for Venofer 300mg x 5 doses 9/2022 11/20/23 hemoglobin 13.3, MCV 88, white blood cell count 7.94, platelets 855, ferritin 12, saturation 11%  She wishes to proceed with Venofer. Will premedicate with zofran as before. HPI:  Breanne Blue is a 50 y.o. seen for initial consultation 12/24/2019 at the referral of Britney Mccoy MD regarding chronic anemia. 11/25/2019 hemoglobin 9.4, MCV of 76, white blood cell count 7.56 with 67% neutrophils, 26% lymphocytes, 5% monocytes. Vitamin B12 1288. HIV normal.       2/20/2019:  Hemoglobin 6.6, MCV 67. FOBT negative. Iron saturation 3%. Ferritin 2.     7/31/2017:  Hemoglobin 11.2 with MCV 82        She takes oral iron bid since 7/2019. Patient was admitted 2/20/2019 which presented to the ED due to shortness of breath and fatigue. She received 2 units of packed red blood cells as well as IV iron. Patient status post gastric bypass surgery in January 2016. She also has heavy menses. She changes pad/ tampon hourly. Her periods are irregular and heavy. Progestins, IUD, ablation were discussed at her 11/25/2019 office visit with gynecology. She is undecided if she wishes to pursue any of these options. She denies any melena or hematochezia. She has not take NSAIDs regularly. Denies dyspepsia.   Denies any dizziness or lightheadedness. She had nausea from Venofer. Hair is growing back better. Menses have increased even more in flow. Energy is improved. Represented 8/22     10/2020 iron saturation 26%. In September 2021 hemoglobin 12.2, MCV 79. Iron saturation 7%, ferritin 5. Admitted 3/22 - 3/25/22 for repair of a large incarcerated incisional hernia with lysis of adhesions. Mesh was used. Hemoglobin was 11.9 with MCV 79 at time of admission. On discharge hemoglobin 8.3 with MCV 81. April 2022 hemoglobin 9.9 MCV 80      7/30/2022 hemoglobin 9.9, MCV 73. Ferritin 5, iron saturation 9%. B12 592. Menses are very heavy. She is tired, hair is thin. She is experiencing leg cramps. Denies any melena, hematochezia, hematuria. She has been taking oral iron. She is taking supplemental B12. Interval History:   7/30/2022 hemoglobin 9.9, MCV 73. Ferritin 5, iron saturation 9%. She had history of nausea with Venofer that lasted entire day of treatment. Due to profound nausea, she was pretreated with Zofran for Venofer 300mg x 5 doses 9/2022 6/2023 had f/u with gynecology. Still with heavy menses.     11/20/23  presented to ED re: increased fatigue x 1 month    hemoglobin 13.3, MCV 88, white blood cell count 7.94, platelets 450, ferritin 12, saturation 11%    12/7/23 EGD and colonoscopy planned for colon ca screening and dyspepsia                Encounter provider Argelia Gayle PA-C    Provider located at [address] [place] [id number] [phone]      Recent Visits  Date Type Provider Dept   12/04/23 Telephone [address] [place] [address] recent visits within past 7 days and meeting all other requirements  Today's Visits  Date Type Provider Dept   12/06/23 [address], PA-C [facility] today's visits and meeting all other requirements  Future Appointments  No visits were found meeting these conditions. Showing future appointments within next 150 days and meeting all other requirements       The patient was identified by name and date of birth. Leonard Hirsch was informed that this is a telemedicine visit and that the visit is being conducted through the [employer]. She agrees to proceed. .  My office door was closed. No one else was in the room. She acknowledged consent and understanding of privacy and security of the video platform. The patient has agreed to participate and understands they can discontinue the visit at any time. Patient is aware this is a billable service. Past Medical History:   Diagnosis Date    Abnormal Pap smear of cervix     2017    Anemia     Anxiety     Asthma     Diabetes mellitus ([address])     History of transfusion 2018    HPV (human papilloma virus) infection     2017    Obesity     Varicella        Past Surgical History:   Procedure Laterality Date    BREAST BIOPSY Right 2012    u/s core bx    BREAST SURGERY      Incisional breast biopsy     SECTION      And     EXPLORATORY LAPAROTOMY W/ BOWEL RESECTION N/A 3/22/2022    Procedure: LAPAROTOMY EXPLORATORY WITH OPEN REPAIR OF INCARCERATED INCISIONAL  HERNIA WITH MESH;  Surgeon: Adelaida Covarrubias MD;  [facility];  Service: General    GASTRIC BYPASS  2016    INCISIONAL HERNIA REPAIR N/A 3/22/2022    Procedure: OPEN REPAIR HERNIA INCISIONAL WITH MESH;  Surgeon: Adelaida Covarrubias MD;  [facility];  Service: General    DC HYSTEROSCOPY BX ENDOMETRIUM&/POLYPC W/WO D&C N/A 2021    Procedure: DILATATION AND CURETTAGE (D&C) WITH HYSTEROSCOPY (MYOSURE);   Surgeon: Trenton Garcias DO;  [facility];  Service: Gynecology    DC HYSTEROSCOPY BX ENDOMETRIUM&/POLYPC W/WO D&C N/A 2021    Procedure: POLYPECTOMY;  Surgeon: Trenton Garcias DO;  [facility];  Service: Gynecology    TUBAL LIGATION  2009    US GUIDED BREAST BIOPSY RIGHT COMPLETE Right 8/23/2019       Current Outpatient Medications   Medication Sig Dispense Refill    albuterol (PROVENTIL HFA,VENTOLIN HFA) 90 mcg/act inhaler Inhale 2 puffs every 6 (six) hours as needed for wheezing or shortness of breath 18 g 1    Calcium Citrate-Vitamin D 500-400 MG-UNIT CHEW 2 (two) times a day      Clotrimazole 1 % OINT Apply topically in the morning 56.7 g 0    cyanocobalamin (VITAMIN B-12) 100 mcg tablet every 24 hours      escitalopram (Lexapro) 10 mg tablet Take 1 tablet (10 mg total) by mouth daily (Patient not taking: Reported on 6/27/2023) 90 tablet 3    Ferrous Sulfate Dried (FERROUS SULFATE CR PO) Take by mouth Taken every other day      Multiple Vitamins-Minerals (MULTIVITAMIN ADULT PO) Take by mouth      mupirocin (BACTROBAN) 2 % ointment Apply topically 3 (three) times a day 22 g 0    pantoprazole (PROTONIX) 40 mg tablet Take 1 tablet (40 mg total) by mouth daily Take half an hour prior to breakfast 30 tablet 4    polyethylene glycol (GOLYTELY) 4000 mL solution Take 4,000 mL by mouth once for 1 dose Take as directed by office prior to procedure 4000 mL 0    triamcinolone (KENALOG) 0.1 % ointment Apply topically 2 (two) times a day 15 g 0     No current facility-administered medications for this visit. No Known Allergies    Review of Systems    Video Exam    There were no vitals filed for this visit.     Physical Exam     Visit Time  Total Visit Duration: 8 minutes

## 2023-12-07 ENCOUNTER — HOSPITAL ENCOUNTER (OUTPATIENT)
Dept: GASTROENTEROLOGY | Facility: AMBULATORY SURGERY CENTER | Age: 48
Discharge: HOME | End: 2023-12-07
Payer: COMMERCIAL

## 2023-12-07 ENCOUNTER — ANESTHESIA (OUTPATIENT)
Dept: GASTROENTEROLOGY | Facility: AMBULATORY SURGERY CENTER | Age: 48
End: 2023-12-07

## 2023-12-07 ENCOUNTER — ANESTHESIA EVENT (OUTPATIENT)
Dept: GASTROENTEROLOGY | Facility: AMBULATORY SURGERY CENTER | Age: 48
End: 2023-12-07

## 2023-12-07 ENCOUNTER — TELEPHONE (OUTPATIENT)
Dept: FAMILY MEDICINE CLINIC | Facility: OTHER | Age: 48
End: 2023-12-07

## 2023-12-07 VITALS
SYSTOLIC BLOOD PRESSURE: 104 MMHG | HEIGHT: 61 IN | WEIGHT: 185 LBS | DIASTOLIC BLOOD PRESSURE: 60 MMHG | OXYGEN SATURATION: 98 % | TEMPERATURE: 97.4 F | RESPIRATION RATE: 18 BRPM | HEART RATE: 78 BPM | BODY MASS INDEX: 34.93 KG/M2

## 2023-12-07 DIAGNOSIS — D50.0 IRON DEFICIENCY ANEMIA DUE TO CHRONIC BLOOD LOSS: ICD-10-CM

## 2023-12-07 DIAGNOSIS — R12 HEARTBURN: ICD-10-CM

## 2023-12-07 DIAGNOSIS — Z12.11 COLON CANCER SCREENING: ICD-10-CM

## 2023-12-07 DIAGNOSIS — R10.13 EPIGASTRIC PAIN: ICD-10-CM

## 2023-12-07 LAB
EXT PREGNANCY TEST URINE: NEGATIVE
EXT. CONTROL: NORMAL

## 2023-12-07 PROCEDURE — 45385 COLONOSCOPY W/LESION REMOVAL: CPT | Performed by: INTERNAL MEDICINE

## 2023-12-07 PROCEDURE — 88305 TISSUE EXAM BY PATHOLOGIST: CPT | Performed by: STUDENT IN AN ORGANIZED HEALTH CARE EDUCATION/TRAINING PROGRAM

## 2023-12-07 PROCEDURE — 45380 COLONOSCOPY AND BIOPSY: CPT | Performed by: INTERNAL MEDICINE

## 2023-12-07 PROCEDURE — 43239 EGD BIOPSY SINGLE/MULTIPLE: CPT | Performed by: INTERNAL MEDICINE

## 2023-12-07 PROCEDURE — 88342 IMHCHEM/IMCYTCHM 1ST ANTB: CPT | Performed by: STUDENT IN AN ORGANIZED HEALTH CARE EDUCATION/TRAINING PROGRAM

## 2023-12-07 RX ORDER — LIDOCAINE HYDROCHLORIDE 20 MG/ML
INJECTION, SOLUTION EPIDURAL; INFILTRATION; INTRACAUDAL; PERINEURAL AS NEEDED
Status: DISCONTINUED | OUTPATIENT
Start: 2023-12-07 | End: 2023-12-07

## 2023-12-07 RX ORDER — PROPOFOL 10 MG/ML
INJECTION, EMULSION INTRAVENOUS AS NEEDED
Status: DISCONTINUED | OUTPATIENT
Start: 2023-12-07 | End: 2023-12-07

## 2023-12-07 RX ORDER — ONDANSETRON 2 MG/ML
INJECTION INTRAMUSCULAR; INTRAVENOUS AS NEEDED
Status: DISCONTINUED | OUTPATIENT
Start: 2023-12-07 | End: 2023-12-07

## 2023-12-07 RX ORDER — EPHEDRINE SULFATE 50 MG/ML
INJECTION INTRAVENOUS AS NEEDED
Status: DISCONTINUED | OUTPATIENT
Start: 2023-12-07 | End: 2023-12-07

## 2023-12-07 RX ORDER — SODIUM CHLORIDE, SODIUM LACTATE, POTASSIUM CHLORIDE, CALCIUM CHLORIDE 600; 310; 30; 20 MG/100ML; MG/100ML; MG/100ML; MG/100ML
INJECTION, SOLUTION INTRAVENOUS CONTINUOUS PRN
Status: DISCONTINUED | OUTPATIENT
Start: 2023-12-07 | End: 2023-12-07

## 2023-12-07 RX ADMIN — PROPOFOL 25 MG: 10 INJECTION, EMULSION INTRAVENOUS at 13:20

## 2023-12-07 RX ADMIN — ONDANSETRON 4 MG: 2 INJECTION INTRAMUSCULAR; INTRAVENOUS at 13:17

## 2023-12-07 RX ADMIN — EPHEDRINE SULFATE 10 MG: 50 INJECTION INTRAVENOUS at 13:44

## 2023-12-07 RX ADMIN — PROPOFOL 25 MG: 10 INJECTION, EMULSION INTRAVENOUS at 13:24

## 2023-12-07 RX ADMIN — PROPOFOL 25 MG: 10 INJECTION, EMULSION INTRAVENOUS at 13:28

## 2023-12-07 RX ADMIN — PROPOFOL 25 MG: 10 INJECTION, EMULSION INTRAVENOUS at 13:18

## 2023-12-07 RX ADMIN — PROPOFOL 25 MG: 10 INJECTION, EMULSION INTRAVENOUS at 13:26

## 2023-12-07 RX ADMIN — EPHEDRINE SULFATE 10 MG: 50 INJECTION INTRAVENOUS at 13:41

## 2023-12-07 RX ADMIN — PROPOFOL 100 MG: 10 INJECTION, EMULSION INTRAVENOUS at 13:16

## 2023-12-07 RX ADMIN — PROPOFOL 25 MG: 10 INJECTION, EMULSION INTRAVENOUS at 13:22

## 2023-12-07 RX ADMIN — SODIUM CHLORIDE, SODIUM LACTATE, POTASSIUM CHLORIDE, CALCIUM CHLORIDE: 600; 310; 30; 20 INJECTION, SOLUTION INTRAVENOUS at 12:45

## 2023-12-07 RX ADMIN — PROPOFOL 25 MG: 10 INJECTION, EMULSION INTRAVENOUS at 13:17

## 2023-12-07 RX ADMIN — LIDOCAINE HYDROCHLORIDE 100 MG: 20 INJECTION, SOLUTION EPIDURAL; INFILTRATION; INTRACAUDAL; PERINEURAL at 13:16

## 2023-12-07 NOTE — ANESTHESIA POSTPROCEDURE EVALUATION
Post-Op Assessment Note    CV Status:  Stable    Pain management: adequate       Mental Status:  Awake and sleepy   Hydration Status:  Euvolemic   PONV Controlled:  Controlled   Airway Patency:  Patent     Post Op Vitals Reviewed: Yes      Staff: CRNA               /70 (12/07/23 1352)    Temp     Pulse 76 (12/07/23 1352)   Resp 18 (12/07/23 1352)    SpO2 98 % (12/07/23 1352)

## 2023-12-07 NOTE — H&P
History and Physical - [facility]  Giovanny Jiménez 50 y.o. female MRN: [id number]                  HPI: Giovanny Jiménez is a 50y.o. year old female who presents for history of anemia      REVIEW OF SYSTEMS: Per the HPI, and otherwise unremarkable. Historical Information   Past Medical History:   Diagnosis Date    Abdominal pain     Abnormal Pap smear of cervix     2017    Anemia     Anxiety     Asthma     Diabetes mellitus ([address])     GERD (gastroesophageal reflux disease)     History of transfusion 2018    HPV (human papilloma virus) infection     2017    Obesity     Varicella      Past Surgical History:   Procedure Laterality Date    BREAST BIOPSY Right     u/s core bx    BREAST SURGERY      Incisional breast biopsy     SECTION      And     EXPLORATORY LAPAROTOMY W/ BOWEL RESECTION N/A 3/22/2022    Procedure: LAPAROTOMY EXPLORATORY WITH OPEN REPAIR OF INCARCERATED INCISIONAL  HERNIA WITH MESH;  Surgeon: Kamille Del Rosario MD;  [facility];  Service: General    GASTRIC BYPASS  2016    INCISIONAL HERNIA REPAIR N/A 3/22/2022    Procedure: OPEN REPAIR HERNIA INCISIONAL WITH MESH;  Surgeon: Kamille Del Rosario MD;  [facility];  Service: General    NV HYSTEROSCOPY BX ENDOMETRIUM&/POLYPC W/WO D&C N/A 2021    Procedure: DILATATION AND CURETTAGE (D&C) WITH HYSTEROSCOPY (MYOSURE);   Surgeon: Britt Garcia DO;  [facility];  Service: Gynecology    NV HYSTEROSCOPY BX ENDOMETRIUM&/POLYPC W/WO D&C N/A 2021    Procedure: POLYPECTOMY;  Surgeon: Britt Garcia DO;  [facility];  Service: Gynecology    TUBAL LIGATION      US GUIDED BREAST BIOPSY RIGHT COMPLETE Right 2019     Social History   Social History     Substance and Sexual Activity   Alcohol Use Yes    Alcohol/week: 0.0 standard drinks of alcohol    Comment: social      Social History     Substance and Sexual Activity   Drug Use Never     Social History     Tobacco Use Smoking Status Former    Packs/day: 0.50    Years: 10.00    Total pack years: 5.00    Types: Cigarettes    Quit date:     Years since quittin.9   Smokeless Tobacco Never   Tobacco Comments    refused     Family History   Problem Relation Age of Onset    Diabetes Mother     Hypertension Mother     Cancer Mother     Arthritis Mother     Leukemia Mother     Hyperlipidemia Mother     Diabetes Father     Hypertension Father     Heart disease Father     Coronary artery disease Father     Hyperlipidemia Father     Stroke Paternal Grandfather     Thyroid cancer Sister 37    Hypertension Brother     Breast cancer Maternal Aunt 48    Breast cancer Paternal Aunt 79    No Known Problems Maternal Grandmother     No Known Problems Maternal Grandfather     No Known Problems Paternal Grandmother     No Known Problems Son     No Known Problems Son     No Known Problems Maternal Aunt     No Known Problems Maternal Aunt     No Known Problems Maternal Aunt     No Known Problems Maternal Aunt     No Known Problems Maternal Aunt     Cervical cancer Paternal Aunt 36    No Known Problems Paternal Aunt     No Known Problems Paternal Aunt     No Known Problems Paternal Aunt     No Known Problems Paternal Aunt     No Known Problems Paternal Aunt     Lung cancer Paternal Uncle 80       Meds/Allergies       Current Outpatient Medications:     Calcium Citrate-Vitamin D 500-400 MG-UNIT CHEW    cyanocobalamin (VITAMIN B-12) 100 mcg tablet    Ferrous Sulfate Dried (FERROUS SULFATE CR PO)    Multiple Vitamins-Minerals (MULTIVITAMIN ADULT PO)    pantoprazole (PROTONIX) 40 mg tablet    albuterol (PROVENTIL HFA,VENTOLIN HFA) 90 mcg/act inhaler    Clotrimazole 1 % OINT    escitalopram (Lexapro) 10 mg tablet    mupirocin (BACTROBAN) 2 % ointment    triamcinolone (KENALOG) 0.1 % ointment  No current facility-administered medications for this encounter.     Facility-Administered Medications Ordered in Other Encounters:     lactated ringers infusion, , Intravenous, Continuous PRN, New Bag at 12/07/23 1245    No Known Allergies    Objective     /60   Pulse 56   Temp (!) 97.4 °F (36.3 °C) (Temporal)   Resp 18   Ht 5' 1" (1.549 m)   Wt 83.9 kg (185 lb)   LMP 11/12/2023 (Exact Date)   SpO2 97%   BMI 34.96 kg/m²       PHYSICAL EXAM    Gen: NAD  Head: NCAT  CV: RRR  CHEST: Clear  ABD: soft, NT/ND  EXT: no edema      ASSESSMENT/PLAN:  This is a 50y.o. year old female here for colonoscopy EGD, and she is stable and optimized for her procedure.

## 2023-12-07 NOTE — ANESTHESIA PREPROCEDURE EVALUATION
Procedure:  COLONOSCOPY  EGD    Relevant Problems   GI/HEPATIC   (+) H/O bariatric surgery      HEMATOLOGY   (+) Anemia   (+) Iron deficiency anemia due to chronic blood loss      PULMONARY   (+) Mild intermittent asthma without complication      Musculoskeletal and Integument   (+) Intertrigo   (+) Sprain of coronary ligament of knee, left, sequela      Genitourinary   (+) Abnormal uterine bleeding (AUB)      Other   (+) Bradycardia   (+) Heartburn   (+) Obesity   (+) Postoperative incisional hernia   (+) SIRS (systemic inflammatory response syndrome) (HCC)   (+) Viral infection, unspecified      Lab Results   Component Value Date    SODIUM 135 11/20/2023    K 3.8 11/20/2023     11/20/2023    CO2 22 11/20/2023    AGAP 9 11/20/2023    BUN 16 11/20/2023    CREATININE 0.71 11/20/2023    GLUC 141 (H) 11/20/2023    GLUF 75 04/08/2022    CALCIUM 8.9 11/20/2023    AST 25 11/20/2023    ALT 19 11/20/2023    ALKPHOS 91 11/20/2023    TP 7.3 11/20/2023    TBILI 0.20 11/20/2023    EGFR 101 11/20/2023     Lab Results   Component Value Date    WBC 7.94 11/20/2023    HGB 13.3 11/20/2023    HCT 40.7 11/20/2023    MCV 88 11/20/2023     11/20/2023         Physical Exam    Airway    Mallampati score: III  TM Distance: >3 FB  Neck ROM: full     Dental       Cardiovascular      Pulmonary      Other Findings  post-pubertal.      Anesthesia Plan  ASA Score- 2     Anesthesia Type- IV sedation with anesthesia with ASA Monitors. Additional Monitors:     Airway Plan:            Plan Factors-Exercise tolerance (METS): >4 METS. Chart reviewed. EKG reviewed. Imaging results reviewed. Existing labs reviewed. Patient summary reviewed. Induction-     Postoperative Plan-     Informed Consent- Anesthetic plan and risks discussed with patient. I personally reviewed this patient with the CRNA. Discussed and agreed on the Anesthesia Plan with the CRNA. Willy Newton

## 2023-12-07 NOTE — TELEPHONE ENCOUNTER
----- Message from Yadira San-MD Cliff sent at 12/7/2023  3:02 PM EST -----  2 subcentimeter polyps removed.  Repeat colonoscopy in 10 years

## 2023-12-10 ENCOUNTER — TELEPHONE (OUTPATIENT)
Dept: OTHER | Facility: OTHER | Age: 48
End: 2023-12-10

## 2023-12-10 NOTE — TELEPHONE ENCOUNTER
Patient is calling regarding cancelling an appointment. Date/Time:12/11/2023 8:00 a.m.     Patient was rescheduled: YES [] NO [x]    Patient requesting call back to reschedule: YES [] NO [x]

## 2023-12-12 PROCEDURE — 88342 IMHCHEM/IMCYTCHM 1ST ANTB: CPT | Performed by: STUDENT IN AN ORGANIZED HEALTH CARE EDUCATION/TRAINING PROGRAM

## 2023-12-12 PROCEDURE — 88305 TISSUE EXAM BY PATHOLOGIST: CPT | Performed by: STUDENT IN AN ORGANIZED HEALTH CARE EDUCATION/TRAINING PROGRAM

## 2023-12-13 NOTE — RESULT ENCOUNTER NOTE
Please call patient biopsy was okay. Repeat screening colonoscopy in 10 years. EGD biopsy mild gastritis. If any GI symptoms then patient should call our office for evaluation accordingly.

## 2023-12-20 ENCOUNTER — HOSPITAL ENCOUNTER (OUTPATIENT)
Dept: INFUSION CENTER | Facility: CLINIC | Age: 48
Discharge: HOME | End: 2023-12-20
Payer: COMMERCIAL

## 2023-12-20 VITALS
TEMPERATURE: 97.2 F | RESPIRATION RATE: 18 BRPM | DIASTOLIC BLOOD PRESSURE: 80 MMHG | OXYGEN SATURATION: 96 % | SYSTOLIC BLOOD PRESSURE: 136 MMHG | HEART RATE: 55 BPM

## 2023-12-20 DIAGNOSIS — R11.0 NAUSEA: ICD-10-CM

## 2023-12-20 DIAGNOSIS — D50.0 IRON DEFICIENCY ANEMIA DUE TO CHRONIC BLOOD LOSS: Primary | ICD-10-CM

## 2023-12-20 PROCEDURE — 96367 TX/PROPH/DG ADDL SEQ IV INF: CPT

## 2023-12-20 PROCEDURE — 96365 THER/PROPH/DIAG IV INF INIT: CPT

## 2023-12-20 RX ORDER — SODIUM CHLORIDE 9 MG/ML
20 INJECTION, SOLUTION INTRAVENOUS ONCE
Status: CANCELLED | OUTPATIENT
Start: 2023-12-27

## 2023-12-20 RX ORDER — SODIUM CHLORIDE 9 MG/ML
20 INJECTION, SOLUTION INTRAVENOUS ONCE
Status: COMPLETED | OUTPATIENT
Start: 2023-12-20 | End: 2023-12-20

## 2023-12-20 RX ADMIN — SODIUM CHLORIDE 20 ML/HR: 0.9 INJECTION, SOLUTION INTRAVENOUS at 08:45

## 2023-12-20 RX ADMIN — ONDANSETRON 8 MG: 2 INJECTION INTRAMUSCULAR; INTRAVENOUS at 08:45

## 2023-12-20 RX ADMIN — IRON SUCROSE 300 MG: 20 INJECTION, SOLUTION INTRAVENOUS at 09:20

## 2023-12-20 NOTE — PROGRESS NOTES
Miladis James is aware of future appt on 12/29/23  at 230pm.     AVS printed and given to Miladis James:    No (Declined by Miladis James)   Miladis James  tolerated treatment well with no complications.

## 2023-12-20 NOTE — PLAN OF CARE
Problem: Knowledge Deficit  Goal: Patient/family/caregiver demonstrates understanding of disease process, treatment plan, medications, and discharge instructions  Description: Complete learning assessment and assess knowledge base.  Interventions:  - Provide teaching at level of understanding  - Provide teaching via preferred learning methods  Outcome: Completed

## 2023-12-27 DIAGNOSIS — R11.0 NAUSEA: Primary | ICD-10-CM

## 2023-12-27 DIAGNOSIS — D50.0 IRON DEFICIENCY ANEMIA DUE TO CHRONIC BLOOD LOSS: ICD-10-CM

## 2023-12-27 RX ORDER — SODIUM CHLORIDE 9 MG/ML
20 INJECTION, SOLUTION INTRAVENOUS ONCE
Status: CANCELLED | OUTPATIENT
Start: 2023-12-29

## 2023-12-29 ENCOUNTER — HOSPITAL ENCOUNTER (OUTPATIENT)
Dept: INFUSION CENTER | Facility: CLINIC | Age: 48
End: 2023-12-29
Payer: COMMERCIAL

## 2023-12-29 DIAGNOSIS — D50.0 IRON DEFICIENCY ANEMIA DUE TO CHRONIC BLOOD LOSS: Primary | ICD-10-CM

## 2023-12-29 DIAGNOSIS — R11.0 NAUSEA: ICD-10-CM

## 2023-12-29 PROCEDURE — 96375 TX/PRO/DX INJ NEW DRUG ADDON: CPT

## 2023-12-29 PROCEDURE — 96365 THER/PROPH/DIAG IV INF INIT: CPT

## 2023-12-29 RX ORDER — SODIUM CHLORIDE 9 MG/ML
20 INJECTION, SOLUTION INTRAVENOUS ONCE
Status: COMPLETED | OUTPATIENT
Start: 2023-12-29 | End: 2023-12-29

## 2023-12-29 RX ORDER — SODIUM CHLORIDE 9 MG/ML
20 INJECTION, SOLUTION INTRAVENOUS ONCE
Status: CANCELLED | OUTPATIENT
Start: 2024-01-05

## 2023-12-29 RX ADMIN — ONDANSETRON 8 MG: 2 INJECTION INTRAMUSCULAR; INTRAVENOUS at 14:51

## 2023-12-29 RX ADMIN — SODIUM CHLORIDE 20 ML/HR: 9 INJECTION, SOLUTION INTRAVENOUS at 14:47

## 2023-12-29 RX ADMIN — IRON SUCROSE 300 MG: 20 INJECTION, SOLUTION INTRAVENOUS at 15:17

## 2023-12-29 NOTE — PROGRESS NOTES
Patient tolerated her treatment without any adverse reactions.next appointment confirmed 1/5/24 at 1530 at [place]. Patient declined avs

## 2024-01-05 ENCOUNTER — HOSPITAL ENCOUNTER (OUTPATIENT)
Dept: INFUSION CENTER | Facility: CLINIC | Age: 49
End: 2024-01-05
Payer: COMMERCIAL

## 2024-01-05 ENCOUNTER — TELEPHONE (OUTPATIENT)
Age: 49
End: 2024-01-05

## 2024-01-05 VITALS
DIASTOLIC BLOOD PRESSURE: 72 MMHG | SYSTOLIC BLOOD PRESSURE: 104 MMHG | TEMPERATURE: 97.4 F | OXYGEN SATURATION: 95 % | HEART RATE: 56 BPM

## 2024-01-05 DIAGNOSIS — R11.0 NAUSEA: ICD-10-CM

## 2024-01-05 DIAGNOSIS — D50.0 IRON DEFICIENCY ANEMIA DUE TO CHRONIC BLOOD LOSS: Primary | ICD-10-CM

## 2024-01-05 PROCEDURE — 96375 TX/PRO/DX INJ NEW DRUG ADDON: CPT

## 2024-01-05 PROCEDURE — 96365 THER/PROPH/DIAG IV INF INIT: CPT

## 2024-01-05 RX ORDER — SODIUM CHLORIDE 9 MG/ML
20 INJECTION, SOLUTION INTRAVENOUS ONCE
OUTPATIENT
Start: 2024-01-12

## 2024-01-05 RX ORDER — SODIUM CHLORIDE 9 MG/ML
20 INJECTION, SOLUTION INTRAVENOUS ONCE
Status: COMPLETED | OUTPATIENT
Start: 2024-01-05 | End: 2024-01-05

## 2024-01-05 RX ADMIN — ONDANSETRON 8 MG: 2 INJECTION INTRAMUSCULAR; INTRAVENOUS at 15:40

## 2024-01-05 RX ADMIN — SODIUM CHLORIDE 20 ML/HR: 0.9 INJECTION, SOLUTION INTRAVENOUS at 15:40

## 2024-01-05 RX ADMIN — IRON SUCROSE 300 MG: 20 INJECTION, SOLUTION INTRAVENOUS at 16:02

## 2024-01-05 NOTE — PROGRESS NOTES
Patient tolerated Venofer today without issue. PIV removed intact by NAYAN RN, coban wrap in place. Patient confirms next appointment on 1/12 at 0830, declines AVS. Patient ambulatory upon DC without gait disturbance noted.

## 2024-01-05 NOTE — TELEPHONE ENCOUNTER
Patient is calling regarding cancelling an appointment.    Date/Time:01/05/2023 / 8;00 A.M     Patient was rescheduled: YES [] NO [x]    Patient requesting call back to reschedule: YES [] NO [x]

## 2024-01-12 ENCOUNTER — HOSPITAL ENCOUNTER (OUTPATIENT)
Dept: INFUSION CENTER | Facility: CLINIC | Age: 49
End: 2024-01-12
Payer: COMMERCIAL

## 2024-01-12 VITALS
RESPIRATION RATE: 18 BRPM | SYSTOLIC BLOOD PRESSURE: 117 MMHG | HEART RATE: 71 BPM | DIASTOLIC BLOOD PRESSURE: 80 MMHG | TEMPERATURE: 97.4 F | OXYGEN SATURATION: 98 %

## 2024-01-12 DIAGNOSIS — R11.0 NAUSEA: ICD-10-CM

## 2024-01-12 DIAGNOSIS — D50.0 IRON DEFICIENCY ANEMIA DUE TO CHRONIC BLOOD LOSS: Primary | ICD-10-CM

## 2024-01-12 PROCEDURE — 96367 TX/PROPH/DG ADDL SEQ IV INF: CPT

## 2024-01-12 PROCEDURE — 96365 THER/PROPH/DIAG IV INF INIT: CPT

## 2024-01-12 RX ORDER — SODIUM CHLORIDE 9 MG/ML
20 INJECTION, SOLUTION INTRAVENOUS ONCE
Status: COMPLETED | OUTPATIENT
Start: 2024-01-12 | End: 2024-01-12

## 2024-01-12 RX ORDER — SODIUM CHLORIDE 9 MG/ML
20 INJECTION, SOLUTION INTRAVENOUS ONCE
Status: CANCELLED | OUTPATIENT
Start: 2024-01-12

## 2024-01-12 RX ADMIN — SODIUM CHLORIDE 20 ML/HR: 0.9 INJECTION, SOLUTION INTRAVENOUS at 08:34

## 2024-01-12 RX ADMIN — IRON SUCROSE 300 MG: 20 INJECTION, SOLUTION INTRAVENOUS at 08:59

## 2024-01-12 RX ADMIN — ONDANSETRON 8 MG: 2 INJECTION INTRAMUSCULAR; INTRAVENOUS at 08:35

## 2024-01-12 NOTE — PROGRESS NOTES
Pt to clinic for venofer infusion, pt tolerated without complications, no further appointments scheduled with infusion at this time, pt aware of follow up with provider

## 2024-01-12 NOTE — PLAN OF CARE
Problem: Potential for Falls  Goal: Patient will remain free of falls  Description: INTERVENTIONS:  - Educate patient/family on patient safety including physical limitations  - Instruct patient to call for assistance with activity   - Consult OT/PT to assist with strengthening/mobility   - Keep Call bell within reach  - Keep bed low and locked with side rails adjusted as appropriate  - Keep care items and personal belongings within reach  - Initiate and maintain comfort rounds  - Make Fall Risk Sign visible to staff  - Apply yellow socks and bracelet for high fall risk patients  - Consider moving patient to room near nurses station  Outcome: Completed     Problem: Knowledge Deficit  Goal: Patient/family/caregiver demonstrates understanding of disease process, treatment plan, medications, and discharge instructions  Description: Complete learning assessment and assess knowledge base.  Interventions:  - Provide teaching at level of understanding  - Provide teaching via preferred learning methods  Outcome: Completed

## 2024-01-19 DIAGNOSIS — Z00.6 ENCOUNTER FOR EXAMINATION FOR NORMAL COMPARISON OR CONTROL IN CLINICAL RESEARCH PROGRAM: ICD-10-CM

## 2024-05-13 ENCOUNTER — HOSPITAL ENCOUNTER (OUTPATIENT)
Facility: HOSPITAL | Age: 49
Discharge: HOME | End: 2024-05-13
Payer: COMMERCIAL

## 2024-05-13 VITALS — BODY MASS INDEX: 35.87 KG/M2 | WEIGHT: 190 LBS | HEIGHT: 61 IN

## 2024-05-13 DIAGNOSIS — Z12.31 ENCOUNTER FOR SCREENING MAMMOGRAM FOR MALIGNANT NEOPLASM OF BREAST: ICD-10-CM

## 2024-05-13 PROCEDURE — 77063 BREAST TOMOSYNTHESIS BI: CPT

## 2024-05-13 PROCEDURE — 77067 SCR MAMMO BI INCL CAD: CPT

## 2024-10-03 ENCOUNTER — TELEPHONE (OUTPATIENT)
Dept: BARIATRICS | Facility: CLINIC | Age: 49
End: 2024-10-03

## 2024-10-08 ENCOUNTER — VBI (OUTPATIENT)
Dept: ADMINISTRATIVE | Facility: OTHER | Age: 49
End: 2024-10-08

## 2024-10-08 NOTE — TELEPHONE ENCOUNTER
10/08/24 9:39 AM     Chart reviewed for Diabetic Eye Exam ; nothing is submitted to the patient's insurance at this time.     Lori Ham MA   [facility]

## 2024-12-05 LAB
BASOPHILS # BLD AUTO: 0.1 X10E3/UL (ref 0–0.2)
BASOPHILS NFR BLD AUTO: 1 %
EOSINOPHIL # BLD AUTO: 0 X10E3/UL (ref 0–0.4)
EOSINOPHIL NFR BLD AUTO: 1 %
ERYTHROCYTE [DISTWIDTH] IN BLOOD BY AUTOMATED COUNT: 12.2 % (ref 11.7–15.4)
HCT VFR BLD AUTO: 46 % (ref 34–46.6)
HGB BLD-MCNC: 15 G/DL (ref 11.1–15.9)
IMM GRANULOCYTES # BLD: 0 X10E3/UL (ref 0–0.1)
IMM GRANULOCYTES NFR BLD: 0 %
LYMPHOCYTES # BLD AUTO: 2.1 X10E3/UL (ref 0.7–3.1)
LYMPHOCYTES NFR BLD AUTO: 26 %
MCH RBC QN AUTO: 28.9 PG (ref 26.6–33)
MCHC RBC AUTO-ENTMCNC: 32.6 G/DL (ref 31.5–35.7)
MCV RBC AUTO: 89 FL (ref 79–97)
MONOCYTES # BLD AUTO: 0.4 X10E3/UL (ref 0.1–0.9)
MONOCYTES NFR BLD AUTO: 5 %
NEUTROPHILS # BLD AUTO: 5.2 X10E3/UL (ref 1.4–7)
NEUTROPHILS NFR BLD AUTO: 67 %
PLATELET # BLD AUTO: 316 X10E3/UL (ref 150–450)
RBC # BLD AUTO: 5.19 X10E6/UL (ref 3.77–5.28)
VIT B12 SERPL-MCNC: 343 PG/ML (ref 232–1245)
WBC # BLD AUTO: 7.8 X10E3/UL (ref 3.4–10.8)

## 2024-12-06 ENCOUNTER — TELEPHONE (OUTPATIENT)
Age: 49
End: 2024-12-06

## 2024-12-06 NOTE — TELEPHONE ENCOUNTER
Left message for patient to return office call in regards to scheduling an appointment that she is due for

## 2025-04-01 ENCOUNTER — HOSPITAL ENCOUNTER (EMERGENCY)
Facility: HOSPITAL | Age: 50
Discharge: HOME | End: 2025-04-01
Attending: EMERGENCY MEDICINE
Payer: COMMERCIAL

## 2025-04-01 ENCOUNTER — APPOINTMENT (EMERGENCY)
Dept: CT IMAGING | Facility: HOSPITAL | Age: 50
End: 2025-04-01

## 2025-04-01 VITALS
DIASTOLIC BLOOD PRESSURE: 72 MMHG | OXYGEN SATURATION: 96 % | HEART RATE: 65 BPM | TEMPERATURE: 97.8 F | SYSTOLIC BLOOD PRESSURE: 121 MMHG | RESPIRATION RATE: 20 BRPM

## 2025-04-01 DIAGNOSIS — R55 SYNCOPE: Primary | ICD-10-CM

## 2025-04-01 DIAGNOSIS — G43.909 MIGRAINE HEADACHE: ICD-10-CM

## 2025-04-01 LAB
ALBUMIN SERPL BCG-MCNC: 4 G/DL (ref 3.5–5)
ALP SERPL-CCNC: 74 U/L (ref 34–104)
ALT SERPL W P-5'-P-CCNC: 18 U/L (ref 7–52)
ANION GAP SERPL CALCULATED.3IONS-SCNC: 6 MMOL/L (ref 4–13)
AST SERPL W P-5'-P-CCNC: 22 U/L (ref 13–39)
BASOPHILS # BLD AUTO: 0.06 THOUSANDS/ÂΜL (ref 0–0.1)
BASOPHILS NFR BLD AUTO: 1 % (ref 0–1)
BILIRUB SERPL-MCNC: 0.23 MG/DL (ref 0.2–1)
BUN SERPL-MCNC: 10 MG/DL (ref 5–25)
CALCIUM SERPL-MCNC: 8.9 MG/DL (ref 8.4–10.2)
CARDIAC TROPONIN I PNL SERPL HS: <2 NG/L (ref ?–50)
CHLORIDE SERPL-SCNC: 106 MMOL/L (ref 96–108)
CO2 SERPL-SCNC: 26 MMOL/L (ref 21–32)
CREAT SERPL-MCNC: 0.7 MG/DL (ref 0.6–1.3)
EOSINOPHIL # BLD AUTO: 0.11 THOUSAND/ÂΜL (ref 0–0.61)
EOSINOPHIL NFR BLD AUTO: 1 % (ref 0–6)
ERYTHROCYTE [DISTWIDTH] IN BLOOD BY AUTOMATED COUNT: 13.6 % (ref 11.6–15.1)
GFR SERPL CREATININE-BSD FRML MDRD: 102 ML/MIN/1.73SQ M
GLUCOSE SERPL-MCNC: 91 MG/DL (ref 65–140)
HCG SERPL QL: NEGATIVE
HCT VFR BLD AUTO: 40.1 % (ref 34.8–46.1)
HGB BLD-MCNC: 13.5 G/DL (ref 11.5–15.4)
IMM GRANULOCYTES # BLD AUTO: 0.02 THOUSAND/UL (ref 0–0.2)
IMM GRANULOCYTES NFR BLD AUTO: 0 % (ref 0–2)
LYMPHOCYTES # BLD AUTO: 2.11 THOUSANDS/ÂΜL (ref 0.6–4.47)
LYMPHOCYTES NFR BLD AUTO: 27 % (ref 14–44)
MAGNESIUM SERPL-MCNC: 2 MG/DL (ref 1.9–2.7)
MCH RBC QN AUTO: 29.7 PG (ref 26.8–34.3)
MCHC RBC AUTO-ENTMCNC: 33.7 G/DL (ref 31.4–37.4)
MCV RBC AUTO: 88 FL (ref 82–98)
MONOCYTES # BLD AUTO: 0.43 THOUSAND/ÂΜL (ref 0.17–1.22)
MONOCYTES NFR BLD AUTO: 5 % (ref 4–12)
NEUTROPHILS # BLD AUTO: 5.18 THOUSANDS/ÂΜL (ref 1.85–7.62)
NEUTS SEG NFR BLD AUTO: 66 % (ref 43–75)
NRBC BLD AUTO-RTO: 0 /100 WBCS
PLATELET # BLD AUTO: 284 THOUSANDS/UL (ref 149–390)
PMV BLD AUTO: 9.8 FL (ref 8.9–12.7)
POTASSIUM SERPL-SCNC: 3.9 MMOL/L (ref 3.5–5.3)
PROT SERPL-MCNC: 7.1 G/DL (ref 6.4–8.4)
RBC # BLD AUTO: 4.55 MILLION/UL (ref 3.81–5.12)
SODIUM SERPL-SCNC: 138 MMOL/L (ref 135–147)
WBC # BLD AUTO: 7.91 THOUSAND/UL (ref 4.31–10.16)

## 2025-04-01 PROCEDURE — 83735 ASSAY OF MAGNESIUM: CPT

## 2025-04-01 PROCEDURE — 99284 EMERGENCY DEPT VISIT MOD MDM: CPT

## 2025-04-01 PROCEDURE — 84703 CHORIONIC GONADOTROPIN ASSAY: CPT | Performed by: EMERGENCY MEDICINE

## 2025-04-01 PROCEDURE — 93005 ELECTROCARDIOGRAM TRACING: CPT

## 2025-04-01 PROCEDURE — 99285 EMERGENCY DEPT VISIT HI MDM: CPT | Performed by: EMERGENCY MEDICINE

## 2025-04-01 PROCEDURE — 96365 THER/PROPH/DIAG IV INF INIT: CPT

## 2025-04-01 PROCEDURE — 36415 COLL VENOUS BLD VENIPUNCTURE: CPT

## 2025-04-01 PROCEDURE — 84484 ASSAY OF TROPONIN QUANT: CPT

## 2025-04-01 PROCEDURE — 96361 HYDRATE IV INFUSION ADD-ON: CPT

## 2025-04-01 PROCEDURE — 70450 CT HEAD/BRAIN W/O DYE: CPT

## 2025-04-01 PROCEDURE — 85025 COMPLETE CBC W/AUTO DIFF WBC: CPT

## 2025-04-01 PROCEDURE — 80053 COMPREHEN METABOLIC PANEL: CPT

## 2025-04-01 RX ORDER — METOCLOPRAMIDE 10 MG/1
10 TABLET ORAL EVERY 6 HOURS
Qty: 30 TABLET | Refills: 0 | Status: SHIPPED | OUTPATIENT
Start: 2025-04-01

## 2025-04-01 RX ORDER — ONDANSETRON 2 MG/ML
1 INJECTION INTRAMUSCULAR; INTRAVENOUS ONCE
Status: COMPLETED | OUTPATIENT
Start: 2025-04-01 | End: 2025-04-01

## 2025-04-01 RX ORDER — KETOROLAC TROMETHAMINE 30 MG/ML
1 INJECTION, SOLUTION INTRAMUSCULAR; INTRAVENOUS ONCE
Status: COMPLETED | OUTPATIENT
Start: 2025-04-01 | End: 2025-04-01

## 2025-04-01 RX ORDER — DROPERIDOL 2.5 MG/ML
0.62 INJECTION, SOLUTION INTRAMUSCULAR; INTRAVENOUS ONCE
Status: DISCONTINUED | OUTPATIENT
Start: 2025-04-01 | End: 2025-04-01

## 2025-04-01 RX ORDER — ACETAMINOPHEN 10 MG/ML
1000 INJECTION, SOLUTION INTRAVENOUS ONCE
Status: COMPLETED | OUTPATIENT
Start: 2025-04-01 | End: 2025-04-01

## 2025-04-01 RX ADMIN — ACETAMINOPHEN 1000 MG: 10 INJECTION INTRAVENOUS at 15:46

## 2025-04-01 RX ADMIN — SODIUM CHLORIDE 1000 ML: 0.9 INJECTION, SOLUTION INTRAVENOUS at 15:46

## 2025-04-01 NOTE — ED PROVIDER NOTES
Time reflects when diagnosis was documented in both MDM as applicable and the Disposition within this note       Time User Action Codes Description Comment    4/1/2025  6:04 PM Manjeet Cutler Add [R55] Syncope     4/1/2025  6:05 PM Manjeet Cutler Add [G43.909] Migraine headache           ED Disposition       ED Disposition   Discharge    Condition   Stable    Date/Time   Tue Apr 1, 2025  6:03 PM    Comment   Miladis James discharge to home/self care.                   Assessment & Plan       Medical Decision Making  DDx including but not limited to: Migraine headache, atypical migraine headache, tension headache, cluster headache, viral syndrome, dehydration, arrhythmia, metabolic abnormality, vasovagal syncope; doubt: ICH, PE, ACS, SAH, tumor, meningitis, carbon monoxide poisoning, zoster, sinusitis.     Pt is a 50 y/o female presenting to the ED with syncope x1 at work this morning as well as migraine headache in typical distribution beginning last night. Pt felt warm and flushed and nauseated preceding the syncopal event which lasted several seconds. Has been lightheaded since yesterday. No seizure-like activity. No chest pain, dyspnea, focal weakness. Pt has hx of migraines, has not previously syncopized with headache. She has felt tired and mildly dehydrated the past few days. Denies current pregnancy. Hx of gastric bypass, anemia as well.      Counseled patient regarding case, headache currently 3-4 out of 10, some improvement, does not feel lightheaded and no return of symptoms.  PERC rule of zero. States she overall feels better with the liter bolus NS.  She is agreeable to return precautions as well as follow-up with her PCP. She would like to try reglan for migraine headache - rx provided - advised benadryl if she experiences side effects. She is agreeable to plan.     Amount and/or Complexity of Data Reviewed  Labs: ordered.  Radiology: ordered.    Risk  Prescription drug management.        ED  Course as of 04/05/25 1402   Tue Apr 01, 2025   1645   CTH IMPRESSION:     No acute intracranial abnormality.     1809 Counseled patient regarding case, headache currently 3-4 out of 10, some improvement, does not feel lightheaded and no return of symptoms.  States she overall feels better with the liter of fluid.  She is agreeable to return precautions as well as follow-up with her PCP. She would like to try reglan for migraine headache - rx provided - advised benadryl if she experiences side effects. She is agreeable to plan.        Medications   ketorolac (FOR EMS ONLY) (TORADOL) injection 30 mg (0 mg Does not apply Given to EMS 4/1/25 1518)   ondansetron (FOR EMS ONLY) (ZOFRAN) 4 mg/2 mL injection 4 mg (0 mg Does not apply Given to EMS 4/1/25 1519)   acetaminophen (Ofirmev) injection 1,000 mg (0 mg Intravenous Stopped 4/1/25 1646)   sodium chloride 0.9 % bolus 1,000 mL (0 mL Intravenous Stopped 4/1/25 1834)       ED Risk Strat Scores   HEART Risk Score      Flowsheet Row Most Recent Value   Heart Score Risk Calculator    History 0 Filed at: 04/01/2025 1700   ECG 1 Filed at: 04/01/2025 1700   Age 1 Filed at: 04/01/2025 1700   Risk Factors 1 Filed at: 04/01/2025 1700   Troponin 0 Filed at: 04/01/2025 1700   HEART Score 3 Filed at: 04/01/2025 1700          HEART Risk Score      Flowsheet Row Most Recent Value   Heart Score Risk Calculator    History 0 Filed at: 04/01/2025 1700   ECG 1 Filed at: 04/01/2025 1700   Age 1 Filed at: 04/01/2025 1700   Risk Factors 1 Filed at: 04/01/2025 1700   Troponin 0 Filed at: 04/01/2025 1700   HEART Score 3 Filed at: 04/01/2025 1700                          PERC Rule for PE      Flowsheet Row Most Recent Value   PERC Rule for PE    Age >=50 0 Filed at: 04/01/2025 1630   HR >=100 0 Filed at: 04/01/2025 1630   O2 Sat on room air < 95% 0 Filed at: 04/01/2025 1630   History of PE or DVT 0 Filed at: 04/01/2025 1630   Recent trauma or surgery 0 Filed at: 04/01/2025 1630   Hemoptysis 0  Filed at: 2025 1630   Exogenous estrogen 0 Filed at: 2025 1630   Unilateral leg swelling 0 Filed at: 2025 1630   PERC Rule for PE Results 0 Filed at: 2025 1630            SBIRT 20yo+      Flowsheet Row Most Recent Value   Initial Alcohol Screen: US AUDIT-C     1. How often do you have a drink containing alcohol? 1 Filed at: 2025 1518   2. How many drinks containing alcohol do you have on a typical day you are drinking?  1 Filed at: 2025 1518   3a. Male UNDER 65: How often do you have five or more drinks on one occasion? 0 Filed at: 2025 1518   3b. FEMALE Any Age, or MALE 65+: How often do you have 4 or more drinks on one occassion? 0 Filed at: 2025 1518   Audit-C Score 2 Filed at: 2025 1518   DAVID: How many times in the past year have you...    Used an illegal drug or used a prescription medication for non-medical reasons? Never Filed at: 2025 1518                            History of Present Illness       Chief Complaint   Patient presents with    Syncope     Pt was at work today, sitting in chair starting to feel dizzy, fell from chair hitting head on ground. Pt has feeling unwell since yesterday evening, headache, nausea, dizziness. Pt is currently menstruating and has hx anemia. BS for EMS was 90. 15 Toradol and 4mg Zofran given by EMS. -thinners       Past Medical History:   Diagnosis Date    Abdominal pain     Abnormal Pap smear of cervix     2017    Anemia     Anxiety     Asthma     Diabetes mellitus (HCC)     GERD (gastroesophageal reflux disease)     History of transfusion 2018    HPV (human papilloma virus) infection     2017    Obesity     Varicella       Past Surgical History:   Procedure Laterality Date    BREAST BIOPSY Right 2012    u/s core bx    BREAST SURGERY      Incisional breast biopsy     SECTION      And     EXPLORATORY LAPAROTOMY W/ BOWEL RESECTION N/A 3/22/2022    Procedure: LAPAROTOMY EXPLORATORY WITH OPEN REPAIR OF  INCARCERATED INCISIONAL  HERNIA WITH MESH;  Surgeon: Artis Farrell MD;  Location: [facility];  Service: General    GASTRIC BYPASS  01/25/2016    INCISIONAL HERNIA REPAIR N/A 3/22/2022    Procedure: OPEN REPAIR HERNIA INCISIONAL WITH MESH;  Surgeon: Artis Farrell MD;  Location: [facility];  Service: General    CT HYSTEROSCOPY BX ENDOMETRIUM&/POLYPC W/WO D&C N/A 2/16/2021    Procedure: DILATATION AND CURETTAGE (D&C) WITH HYSTEROSCOPY (MYOSURE);  Surgeon: Lata Barrett DO;  Location: [facility];  Service: Gynecology    CT HYSTEROSCOPY BX ENDOMETRIUM&/POLYPC W/WO D&C N/A 2/16/2021    Procedure: POLYPECTOMY;  Surgeon: Lata Barrett DO;  Location: [facility];  Service: Gynecology    TUBAL LIGATION  2009    US GUIDED BREAST BIOPSY RIGHT COMPLETE Right 8/23/2019      Family History   Problem Relation Age of Onset    Diabetes Mother     Hypertension Mother     Cancer Mother     Arthritis Mother     Leukemia Mother     Hyperlipidemia Mother     Diabetes Father     Hypertension Father     Heart disease Father     Coronary artery disease Father     Hyperlipidemia Father     Stroke Paternal Grandfather     Thyroid cancer Sister 43    Hypertension Brother     Breast cancer Maternal Aunt 50    Breast cancer Paternal Aunt 70    No Known Problems Maternal Grandmother     No Known Problems Maternal Grandfather     No Known Problems Paternal Grandmother     No Known Problems Son     No Known Problems Son     No Known Problems Maternal Aunt     No Known Problems Maternal Aunt     No Known Problems Maternal Aunt     No Known Problems Maternal Aunt     No Known Problems Maternal Aunt     Cervical cancer Paternal Aunt 40    No Known Problems Paternal Aunt     No Known Problems Paternal Aunt     No Known Problems Paternal Aunt     No Known Problems Paternal Aunt     No Known Problems Paternal Aunt     Lung cancer Paternal Uncle 85      Social History     Tobacco Use    Smoking status: Former     Current packs/day: 0.00      Average packs/day: 0.5 packs/day for 10.0 years (5.0 ttl pk-yrs)     Types: Cigarettes     Start date:      Quit date:      Years since quittin.2    Smokeless tobacco: Never    Tobacco comments:     refused   Vaping Use    Vaping status: Former    Substances: Nicotine   Substance Use Topics    Alcohol use: Yes     Alcohol/week: 0.0 standard drinks of alcohol     Comment: social     Drug use: Never      E-Cigarette/Vaping    E-Cigarette Use Former User     Comments vaping       E-Cigarette/Vaping Substances    Nicotine Yes     THC No     CBD No     Flavoring No     Other No     Unknown No       I have reviewed and agree with the history as documented.     Pt is a 50 y/o female presenting to the ED with syncope x1 at work this morning as well as migraine headache in typical distribution beginning last night. Pt felt warm and flushed and nauseated preceding the syncopal event which lasted several seconds. Has been lightheaded since yesterday. No seizure-like activity. No chest pain, dyspnea, focal weakness. Pt has hx of migraines, has not previously syncopized with headache. She has felt tired and mildly dehydrated the past few days. Denies current pregnancy. Hx of gastric bypass, anemia as well.          Review of Systems   Constitutional:  Positive for fatigue.   Neurological:  Positive for syncope, light-headedness and headaches.   All other systems reviewed and are negative.          Objective       ED Triage Vitals [25 151]   Temperature Pulse Blood Pressure Respirations SpO2 Patient Position - Orthostatic VS   97.8 °F (36.6 °C) 60 125/80 20 94 % --      Temp Source Heart Rate Source BP Location FiO2 (%) Pain Score    Oral Monitor Right arm -- --      Vitals      Date and Time Temp Pulse SpO2 Resp BP Pain Score FACES Pain Rating User   25 1800 -- 65 96 % 20 121/72 -- -- KB   25 1517 97.8 °F (36.6 °C) 60 94 % 20 125/80 -- -- KB            Physical Exam  Vitals and nursing note  reviewed.   Constitutional:       General: She is not in acute distress.     Appearance: Normal appearance. She is not ill-appearing, toxic-appearing or diaphoretic.   HENT:      Head: Normocephalic and atraumatic.      Nose: Nose normal. No congestion or rhinorrhea.      Mouth/Throat:      Mouth: Mucous membranes are moist.      Pharynx: Oropharynx is clear. No oropharyngeal exudate or posterior oropharyngeal erythema.   Eyes:      General: No scleral icterus.        Right eye: No discharge.         Left eye: No discharge.      Extraocular Movements: Extraocular movements intact.      Conjunctiva/sclera: Conjunctivae normal.      Pupils: Pupils are equal, round, and reactive to light.   Neck:      Vascular: No carotid bruit.   Cardiovascular:      Rate and Rhythm: Normal rate and regular rhythm.      Pulses: Normal pulses.      Heart sounds: Normal heart sounds. No murmur heard.     No friction rub. No gallop.   Pulmonary:      Effort: Pulmonary effort is normal. No respiratory distress.      Breath sounds: Normal breath sounds. No stridor. No wheezing, rhonchi or rales.   Chest:      Chest wall: No tenderness.   Abdominal:      General: Abdomen is flat. Bowel sounds are normal. There is no distension.      Palpations: Abdomen is soft. There is no mass.      Tenderness: There is no abdominal tenderness. There is no right CVA tenderness, left CVA tenderness, guarding or rebound.      Hernia: No hernia is present.   Musculoskeletal:         General: No swelling, tenderness, deformity or signs of injury. Normal range of motion.      Cervical back: Normal range of motion and neck supple. No rigidity or tenderness.      Right lower leg: No edema.      Left lower leg: No edema.   Lymphadenopathy:      Cervical: No cervical adenopathy.   Skin:     General: Skin is warm and dry.      Coloration: Skin is not jaundiced or pale.      Findings: No bruising, erythema, lesion or rash.   Neurological:      General: No focal  deficit present.      Mental Status: She is alert and oriented to person, place, and time.      Cranial Nerves: No cranial nerve deficit.      Sensory: No sensory deficit.      Motor: No weakness.      Coordination: Coordination normal.      Gait: Gait normal.      Deep Tendon Reflexes: Reflexes normal.   Psychiatric:         Mood and Affect: Mood normal.         Behavior: Behavior normal.         Results Reviewed       Procedure Component Value Units Date/Time    hCG, qualitative pregnancy [[id number]]  (Normal) Collected: 04/01/25 1530    Lab Status: Final result Specimen: Blood from Arm, Left Updated: 04/01/25 1728     Preg, Serum Negative    HS Troponin 0hr (reflex protocol) [[id number]]  (Normal) Collected: 04/01/25 1530    Lab Status: Final result Specimen: Blood from Arm, Left Updated: 04/01/25 1618     hs TnI 0hr <2 ng/L     Magnesium [[id number]]  (Normal) Collected: 04/01/25 1530    Lab Status: Final result Specimen: Blood from Arm, Left Updated: 04/01/25 1611     Magnesium 2.0 mg/dL     Comprehensive metabolic panel [[id number]] Collected: 04/01/25 1530    Lab Status: Final result Specimen: Blood from Arm, Left Updated: 04/01/25 1611     Sodium 138 mmol/L      Potassium 3.9 mmol/L      Chloride 106 mmol/L      CO2 26 mmol/L      ANION GAP 6 mmol/L      BUN 10 mg/dL      Creatinine 0.70 mg/dL      Glucose 91 mg/dL      Calcium 8.9 mg/dL      AST 22 U/L      ALT 18 U/L      Alkaline Phosphatase 74 U/L      Total Protein 7.1 g/dL      Albumin 4.0 g/dL      Total Bilirubin 0.23 mg/dL      eGFR 102 ml/min/1.73sq m     Narrative:      [employer] guidelines for Chronic Kidney Disease (CKD):     Stage 1 with normal or high GFR (GFR > 90 mL/min/1.73 square meters)    Stage 2 Mild CKD (GFR = 60-89 mL/min/1.73 square meters)    Stage 3A Moderate CKD (GFR = 45-59 mL/min/1.73 square meters)    Stage 3B Moderate CKD (GFR = 30-44 mL/min/1.73 square meters)    Stage 4 Severe CKD (GFR = 15-29  mL/min/1.73 square meters)    Stage 5 End Stage CKD (GFR <15 mL/min/1.73 square meters)  Note: GFR calculation is accurate only with a steady state creatinine    CBC and differential [[id number]] Collected: 04/01/25 1530    Lab Status: Final result Specimen: Blood from Arm, Left Updated: 04/01/25 1556     WBC 7.91 Thousand/uL      RBC 4.55 Million/uL      Hemoglobin 13.5 g/dL      Hematocrit 40.1 %      MCV 88 fL      MCH 29.7 pg      MCHC 33.7 g/dL      RDW 13.6 %      MPV 9.8 fL      Platelets 284 Thousands/uL      nRBC 0 /100 WBCs      Segmented % 66 %      Immature Grans % 0 %      Lymphocytes % 27 %      Monocytes % 5 %      Eosinophils Relative 1 %      Basophils Relative 1 %      Absolute Neutrophils 5.18 Thousands/µL      Absolute Immature Grans 0.02 Thousand/uL      Absolute Lymphocytes 2.11 Thousands/µL      Absolute Monocytes 0.43 Thousand/µL      Eosinophils Absolute 0.11 Thousand/µL      Basophils Absolute 0.06 Thousands/µL             CT head without contrast   Final Interpretation by Pallav N Shah, MD (04/01 1616)      No acute intracranial abnormality.                  Workstation performed: [id number]             ECG 12 Lead Documentation Only    Date/Time: 4/1/2025 3:24 PM    Performed by: Manjeet Cutler DO  Authorized by: Manjeet Cutler DO    Indications / Diagnosis:  Lightheaded/syncope  ECG reviewed by me, the ED Provider: yes    Patient location:  ED  Previous ECG:     Previous ECG:  Compared to current    Similarity:  No change    Comparison to cardiac monitor: Yes    Interpretation:     Interpretation: non-specific    Rate:     ECG rate:  53    ECG rate assessment: bradycardic    Rhythm:     Rhythm: sinus bradycardia    Ectopy:     Ectopy: none    QRS:     QRS axis:  Normal    QRS intervals:  Normal  Conduction:     Conduction: normal    ST segments:     ST segments:  Normal  T waves:     T waves: normal    Comments:      No STEMI. No arrhythmia.      ED Medication and  Procedure Management   Prior to Admission Medications   Prescriptions Last Dose Informant Patient Reported? Taking?   Calcium Citrate-Vitamin D 500-400 MG-UNIT CHEW  Self Yes No   Si (two) times a day   Clotrimazole 1 % OINT  Self No No   Sig: Apply topically in the morning   Ferrous Sulfate Dried (FERROUS SULFATE CR PO)  Self Yes No   Sig: Take by mouth Taken every other day   Multiple Vitamins-Minerals (MULTIVITAMIN ADULT PO)  Self Yes No   Sig: Take by mouth in the morning   albuterol (PROVENTIL HFA,VENTOLIN HFA) 90 mcg/act inhaler  Self No No   Sig: Inhale 2 puffs every 6 (six) hours as needed for wheezing or shortness of breath   cyanocobalamin (VITAMIN B-12) 100 mcg tablet  Self Yes No   Sig: Take by mouth daily   escitalopram (Lexapro) 10 mg tablet  Self No No   Sig: Take 1 tablet (10 mg total) by mouth daily   Patient not taking: Reported on 2023   mupirocin (BACTROBAN) 2 % ointment  Self No No   Sig: Apply topically 3 (three) times a day   pantoprazole (PROTONIX) 40 mg tablet   No No   Sig: Take 1 tablet (40 mg total) by mouth daily Take half an hour prior to breakfast   triamcinolone (KENALOG) 0.1 % ointment  Self No No   Sig: Apply topically 2 (two) times a day      Facility-Administered Medications: None     Discharge Medication List as of 2025  6:25 PM        START taking these medications    Details   metoclopramide (Reglan) 10 mg tablet Take 1 tablet (10 mg total) by mouth every 6 (six) hours, Starting 2025, Normal           CONTINUE these medications which have NOT CHANGED    Details   albuterol (PROVENTIL HFA,VENTOLIN HFA) 90 mcg/act inhaler Inhale 2 puffs every 6 (six) hours as needed for wheezing or shortness of breath, Starting 2022, Normal      Calcium Citrate-Vitamin D 500-400 MG-UNIT CHEW 2 (two) times a day, Starting Wed 2/3/2016, Historical Med      Clotrimazole 1 % OINT Apply topically in the morning, Starting 2023, Normal      cyanocobalamin (VITAMIN  B-12) 100 mcg tablet Take by mouth daily, Starting Wed 2/3/2016, Historical Med      escitalopram (Lexapro) 10 mg tablet Take 1 tablet (10 mg total) by mouth daily, Starting Fri 7/29/2022, Normal      Ferrous Sulfate Dried (FERROUS SULFATE CR PO) Take by mouth Taken every other day, Historical Med      Multiple Vitamins-Minerals (MULTIVITAMIN ADULT PO) Take by mouth in the morning, Starting Thu 10/12/2017, Historical Med      mupirocin (BACTROBAN) 2 % ointment Apply topically 3 (three) times a day, Starting Fri 7/29/2022, Normal      pantoprazole (PROTONIX) 40 mg tablet Take 1 tablet (40 mg total) by mouth daily Take half an hour prior to breakfast, Starting Thu 11/2/2023, Until Thu 12/7/2023, Normal      triamcinolone (KENALOG) 0.1 % ointment Apply topically 2 (two) times a day, Starting Fri 9/1/2023, Normal           No discharge procedures on file.  ED SEPSIS DOCUMENTATION   Time reflects when diagnosis was documented in both MDM as applicable and the Disposition within this note       Time User Action Codes Description Comment    4/1/2025  6:04 PM Manjeet Cutler [R55] Syncope     4/1/2025  6:05 PM Manjeet Cutler [G43.909] Migraine headache                  Manjeet Cutler,   04/05/25 1405

## 2025-04-01 NOTE — Clinical Note
Miladis James was seen and treated in our emergency department on 4/1/2025.                Diagnosis:     Miladis  may return to work on return date.    She may return on this date: 04/03/2025         If you have any questions or concerns, please don't hesitate to call.      Angela Fernandez MD    ______________________________           _______________          _______________  Hospital Representative                              Date                                Time

## 2025-04-01 NOTE — DISCHARGE INSTRUCTIONS
Continue oral hydration at home, take tylenol and reglan if headache persists - may add benadryl to reglan if you develop restlessness from reglan. Please return to the ED as needed for new, worsening symptoms.

## 2025-04-02 LAB
ATRIAL RATE: 53 BPM
P AXIS: 55 DEGREES
PR INTERVAL: 144 MS
QRS AXIS: 23 DEGREES
QRSD INTERVAL: 78 MS
QT INTERVAL: 444 MS
QTC INTERVAL: 416 MS
T WAVE AXIS: 31 DEGREES
VENTRICULAR RATE: 53 BPM

## 2025-04-02 PROCEDURE — 93010 ELECTROCARDIOGRAM REPORT: CPT | Performed by: STUDENT IN AN ORGANIZED HEALTH CARE EDUCATION/TRAINING PROGRAM

## 2025-04-03 NOTE — ED ATTENDING ATTESTATION
4/1/2025  I, Angela Fernandez MD, saw and evaluated the patient. I have discussed the patient with the resident/non-physician practitioner and agree with the resident's/non-physician practitioner's findings, Plan of Care, and MDM as documented in the resident's/non-physician practitioner's note, except where noted. All available labs and Radiology studies were reviewed.  I was present for key portions of any procedure(s) performed by the resident/non-physician practitioner and I was immediately available to provide assistance.       At this point I agree with the current assessment done in the Emergency Department.  I have conducted an independent evaluation of this patient a history and physical is as follows:    49-year-old female presenting to the ER due to lightheadedness.  Fell from a chair hitting her head.  Feeling nauseous and lightheaded since yesterday.  Currently on her period.  Has been have a headache since yesterday, slow onset.  No neck pain.  Neck is supple.  Nonfocal neurologically.  Not on blood thinners.  No cardiac history.  Nonfocal neurologically.    Agree with workup, EKG, cardiac workup, check electrolytes, pregnancy test, symptomatic treatment, CT head      ED Course         Critical Care Time  Procedures

## 2025-05-13 ENCOUNTER — HOSPITAL ENCOUNTER (OUTPATIENT)
Dept: RADIOLOGY | Facility: HOSPITAL | Age: 50
Discharge: HOME | End: 2025-05-13
Attending: PODIATRIST
Payer: COMMERCIAL

## 2025-05-13 ENCOUNTER — OFFICE VISIT (OUTPATIENT)
Dept: PODIATRY | Facility: CLINIC | Age: 50
End: 2025-05-13
Payer: COMMERCIAL

## 2025-05-13 VITALS — BODY MASS INDEX: 35.3 KG/M2 | HEIGHT: 61 IN | OXYGEN SATURATION: 97 % | HEART RATE: 65 BPM | WEIGHT: 187 LBS

## 2025-05-13 DIAGNOSIS — M79.671 RIGHT FOOT PAIN: ICD-10-CM

## 2025-05-13 DIAGNOSIS — M79.671 RIGHT FOOT PAIN: Primary | ICD-10-CM

## 2025-05-13 DIAGNOSIS — M72.2 PLANTAR FASCIITIS OF RIGHT FOOT: ICD-10-CM

## 2025-05-13 PROCEDURE — 73630 X-RAY EXAM OF FOOT: CPT

## 2025-05-13 PROCEDURE — 99203 OFFICE O/P NEW LOW 30 MIN: CPT | Performed by: PODIATRIST

## 2025-05-13 RX ORDER — MELOXICAM 7.5 MG/1
7.5 TABLET ORAL DAILY
Qty: 30 TABLET | Refills: 2 | Status: SHIPPED | OUTPATIENT
Start: 2025-05-13

## 2025-05-13 NOTE — PROGRESS NOTES
:  Assessment & Plan  Right foot pain    Orders:    XR foot 3+ vw right; Future    meloxicam (Mobic) 7.5 mg tablet; Take 1 tablet (7.5 mg total) by mouth daily    Plantar fasciitis of right foot    Orders:    meloxicam (Mobic) 7.5 mg tablet; Take 1 tablet (7.5 mg total) by mouth daily    The patient's clinical examination today significant for tenderness to palpation to the plantar medial tubercle of the right os calcis consistent with plantar fasciitis.  There is no erythema nor calor nor ecchymosis.  There is very mild palpable edema along the plantar soft tissues.  There is no tenderness with lateral squeeze of the calcaneus.  Pedal pulses are palpable.  Capillary fill time to toes is within normal limits.    X-rays of the patient's right foot taken today were personally viewed interpreted.  There are no signs of fracture or dislocation.  Small plantar and retrocalcaneal spurs are noted.    Patient's clinical examination is consistent with plantar fasciitis of the right heel.  Etiology and treatment goals were discussed with the patient.  Will start with conservative care.  Stretching exercises were included in her AVS.  We discussed the importance of supportive shoe gear and the potential benefits of some good-quality OTC arch supports.  Recommendations were included in her AVS as well.  Work note was provided to allow the patient to wear sneakers at work for the next 8 weeks.  I will also start her on anti-inflammatory therapy with meloxicam 7.5 mg daily for 7 to 10 days, then as needed only.    Recommend follow-up in 3 to 4 weeks.  We can consider a formal course of physical therapy or injection therapy if she fails to improve.      History of Present Illness     Miladis James is a 49 y.o. female   The patient presents today for her initial consultation with [facility] podiatry group with a chief complaint of right heel pain that has been present for about 2 months.  She notes no history of injury or trauma to  the foot.  There is been no changes in activities or shoe gear.  Pain is typically worse when getting up after periods of rest and is exacerbated with prolonged periods of ambulation and weightbearing.  It is alleviated by getting off her feet and resting.  She has taken Aleve on occasion with minimal benefit.      PAST MEDICAL HISTORY:  Past Medical History:   Diagnosis Date    Abdominal pain     Abnormal Pap smear of cervix     2017    Anemia     Anxiety     Asthma     Diabetes mellitus (HCC)     GERD (gastroesophageal reflux disease)     History of transfusion 2018    HPV (human papilloma virus) infection     2017    Obesity     Varicella        PAST SURGICAL HISTORY:  Past Surgical History:   Procedure Laterality Date    BREAST BIOPSY Right     u/s core bx    BREAST SURGERY      Incisional breast biopsy     SECTION      And     EXPLORATORY LAPAROTOMY W/ BOWEL RESECTION N/A 3/22/2022    Procedure: LAPAROTOMY EXPLORATORY WITH OPEN REPAIR OF INCARCERATED INCISIONAL  HERNIA WITH MESH;  Surgeon: Artis Farrell MD;  [facility];  Service: General    GASTRIC BYPASS  2016    INCISIONAL HERNIA REPAIR N/A 3/22/2022    Procedure: OPEN REPAIR HERNIA INCISIONAL WITH MESH;  Surgeon: Artis Farrell MD;  Location[facility];  Service: General    OK HYSTEROSCOPY BX ENDOMETRIUM&/POLYPC W/WO D&C N/A 2021    Procedure: DILATATION AND CURETTAGE (D&C) WITH HYSTEROSCOPY (MYOSURE);  Surgeon: Lata Barrett DO;  [facility];  Service: Gynecology    OK HYSTEROSCOPY BX ENDOMETRIUM&/POLYPC W/WO D&C N/A 2021    Procedure: POLYPECTOMY;  Surgeon: Lata Barrett DO;  [facility];  Service: Gynecology    TUBAL LIGATION      US GUIDED BREAST BIOPSY RIGHT COMPLETE Right 2019        ALLERGIES:  Patient has no known allergies.    MEDICATIONS:  Current Outpatient Medications   Medication Sig Dispense Refill    albuterol (PROVENTIL HFA,VENTOLIN HFA) 90 mcg/act  inhaler Inhale 2 puffs every 6 (six) hours as needed for wheezing or shortness of breath 18 g 1    Calcium Citrate-Vitamin D 500-400 MG-UNIT CHEW 2 (two) times a day      Clotrimazole 1 % OINT Apply topically in the morning 56.7 g 0    cyanocobalamin (VITAMIN B-12) 100 mcg tablet Take by mouth daily      Ferrous Sulfate Dried (FERROUS SULFATE CR PO) Take by mouth Taken every other day      meloxicam (Mobic) 7.5 mg tablet Take 1 tablet (7.5 mg total) by mouth daily 30 tablet 2    metoclopramide (Reglan) 10 mg tablet Take 1 tablet (10 mg total) by mouth every 6 (six) hours 30 tablet 0    Multiple Vitamins-Minerals (MULTIVITAMIN ADULT PO) Take by mouth in the morning      mupirocin (BACTROBAN) 2 % ointment Apply topically 3 (three) times a day 22 g 0    pantoprazole (PROTONIX) 40 mg tablet Take 1 tablet (40 mg total) by mouth daily Take half an hour prior to breakfast 30 tablet 4    triamcinolone (KENALOG) 0.1 % ointment Apply topically 2 (two) times a day 15 g 0    escitalopram (Lexapro) 10 mg tablet Take 1 tablet (10 mg total) by mouth daily (Patient not taking: Reported on 2023) 90 tablet 3     No current facility-administered medications for this visit.       SOCIAL HISTORY:  Social History     Socioeconomic History    Marital status: Single     Spouse name: None    Number of children: 2    Years of education: None    Highest education level: None   Occupational History    None   Tobacco Use    Smoking status: Former     Current packs/day: 0.00     Average packs/day: 0.5 packs/day for 10.0 years (5.0 ttl pk-yrs)     Types: Cigarettes     Start date:      Quit date:      Years since quittin.3    Smokeless tobacco: Never    Tobacco comments:     refused   Vaping Use    Vaping status: Former    Substances: Nicotine   Substance and Sexual Activity    Alcohol use: Yes     Alcohol/week: 0.0 standard drinks of alcohol     Comment: social     Drug use: Never    Sexual activity: Yes     Partners: Male      "Birth control/protection: Condom Male, None   Other Topics Concern    None   Social History Narrative    Current every day smoker - As per [employer]     Exercises daily    Sleeps 6-7 hours a day      Social Drivers of Health     Financial Resource Strain: Not on file   Food Insecurity: No Food Insecurity (3/24/2022)    Hunger Vital Sign     Worried About Running Out of Food in the Last Year: Never true     Ran Out of Food in the Last Year: Never true   Transportation Needs: No Transportation Needs (3/24/2022)    PRAPARE - Transportation     Lack of Transportation (Medical): No     Lack of Transportation (Non-Medical): No   Physical Activity: Not on file   Stress: Not on file   Social Connections: Not on file   Intimate Partner Violence: Not on file   Housing Stability: Unknown (3/24/2022)    Housing Stability Vital Sign     Unable to Pay for Housing in the Last Year: Not on file     Number of Places Lived in the Last Year: 1     Unstable Housing in the Last Year: No      Review of Systems   Constitutional: Negative.    HENT: Negative.     Eyes: Negative.    Respiratory: Negative.     Cardiovascular: Negative.    Endocrine: Negative.    Musculoskeletal: Negative.    Neurological: Negative.    Hematological: Negative.    Psychiatric/Behavioral: Negative.       Objective   Pulse 65   Ht 5' 1\" (1.549 m)   Wt 84.8 kg (187 lb)   SpO2 97%   BMI 35.33 kg/m²      Physical Exam  Vitals and nursing note reviewed.   Constitutional:       General: She is not in acute distress.     Appearance: She is well-developed.   HENT:      Head: Normocephalic and atraumatic.   Eyes:      Conjunctiva/sclera: Conjunctivae normal.   Cardiovascular:      Pulses:           Dorsalis pedis pulses are 2+ on the right side.        Posterior tibial pulses are 2+ on the right side.   Pulmonary:      Effort: Pulmonary effort is normal.   Abdominal:      Palpations: Abdomen is soft.   Feet:      Right foot:      Skin integrity: Skin integrity " normal.      Comments: The patient's clinical examination today significant for tenderness to palpation to the plantar medial tubercle of the right os calcis consistent with plantar fasciitis.  There is no erythema nor calor nor ecchymosis.  There is very mild palpable edema along the plantar soft tissues.  There is no tenderness with lateral squeeze of the calcaneus.  Pedal pulses are palpable.  Capillary fill time to toes is within normal limits.  Skin:     General: Skin is warm and dry.      Capillary Refill: Capillary refill takes less than 2 seconds.   Neurological:      General: No focal deficit present.      Mental Status: She is alert and oriented to person, place, and time.   Psychiatric:         Mood and Affect: Mood normal.

## 2025-05-13 NOTE — PATIENT INSTRUCTIONS
Group Topic: BH Skills Training     Date: 12/10/2024  Start Time: 1005  End Time: 1030  Facilitators: Ronda Latif CTRS    Focus: Journaling/safety plans  Number in attendance: 7  To provide a group session to assist patients with their safety plan for discharge as facilitated by the therapist.    Method: Group  Attendance: Present  Participation: Moderate  Patient Response: Impaired concentration and Indifferent  Mood: Anxious and Depressed  Affect: Type: Anxious and Depressed   Range: Blunted/flat   Congruency: Congruent   Stability: Stable  Behavior/Socialization: Appropriate to group, Cooperative, and Engaged  Thought Process: Goal-directed  Task Performance: Follows directions  Patient Evaluation: Encouragement - needs prompts     Recommend quality over the counter arch supports:    - Powerstep [place] series insoles  - Spenco Orthotic Arch insoles  - Superfeet insoles   - PCSsole- 3/4 length insoles

## 2025-05-13 NOTE — LETTER
May 13, 2025     Patient: Miladis James  YOB: 1975  Date of Visit: 5/13/2025      To Whom it May Concern:    Miladis James is under my professional care. Miladis was seen in my office on 5/13/2025. Miladis will need to wear sneakers at work for the next 6 to 8 weeks as part of her treatment regimen for heel pain.    If you have any questions or concerns, please don't hesitate to call.         Sincerely,          Yaya Agosto DPM        CC: No Recipients

## 2025-06-19 ENCOUNTER — OFFICE VISIT (OUTPATIENT)
Dept: PODIATRY | Facility: CLINIC | Age: 50
End: 2025-06-19
Payer: COMMERCIAL

## 2025-06-19 VITALS — WEIGHT: 189 LBS | BODY MASS INDEX: 35.71 KG/M2

## 2025-06-19 DIAGNOSIS — M79.671 RIGHT FOOT PAIN: ICD-10-CM

## 2025-06-19 DIAGNOSIS — M72.2 PLANTAR FASCIITIS OF RIGHT FOOT: Primary | ICD-10-CM

## 2025-06-19 PROCEDURE — 20550 NJX 1 TENDON SHEATH/LIGAMENT: CPT | Performed by: PODIATRIST

## 2025-06-19 PROCEDURE — 99213 OFFICE O/P EST LOW 20 MIN: CPT | Performed by: PODIATRIST

## 2025-06-19 RX ORDER — TRIAMCINOLONE ACETONIDE 40 MG/ML
20 INJECTION, SUSPENSION INTRA-ARTICULAR; INTRAMUSCULAR
Status: SHIPPED | OUTPATIENT
Start: 2025-06-19

## 2025-06-19 RX ORDER — BUPIVACAINE HYDROCHLORIDE 2.5 MG/ML
2 INJECTION, SOLUTION INFILTRATION; PERINEURAL
Status: SHIPPED | OUTPATIENT
Start: 2025-06-19

## 2025-06-19 RX ADMIN — TRIAMCINOLONE ACETONIDE 20 MG: 40 INJECTION, SUSPENSION INTRA-ARTICULAR; INTRAMUSCULAR at 08:30

## 2025-06-19 RX ADMIN — BUPIVACAINE HYDROCHLORIDE 2 ML: 2.5 INJECTION, SOLUTION INFILTRATION; PERINEURAL at 08:30

## 2025-06-19 NOTE — PATIENT INSTRUCTIONS
Recommend quality over the counter arch supports:    - Powerstep [place] series insoles; comes 3/4 length or full length  - Spenco Orthotic Arch insoles; comes 3/4 length or full length  - Superfeet insoles; comes full length only   - PCSsole- 3/4 length insoles

## 2025-06-19 NOTE — LETTER
June 19, 2025     Patient: Miladis James  YOB: 1975  Date of Visit: 6/19/2025      To Whom it May Concern:    Miladis James is under my professional care. Miladis was seen in my office on 6/19/2025. Miladis will need to wear sneakers at work for the next 2 months as part of her treatment regimen for heel pain.     If you have any questions or concerns, please don't hesitate to call.         Sincerely,          Yaya Agosto DPM        CC: No Recipients

## 2025-06-19 NOTE — PROGRESS NOTES
:  Assessment & Plan  Right foot pain    Orders:    Ambulatory referral to Physical Therapy; Future    Foot/lower extremity injection    Plantar fasciitis of right foot    Orders:    Ambulatory referral to Physical Therapy; Future    Foot/lower extremity injection  The patient's clinical examination today significant for persistent tenderness to palpation to the plantar medial tubercle of the right os calcis.  The patient has had minimal improvement with conservative care thus far.  There is no tenderness palpation of the plantar fascia at the level of the mid arch.  There is no tenderness with lateral squeeze of the calcaneus.  There is no tenderness to the Achilles.  Pulses are palpable.    The patient is amenable to injection therapy today.  After prepping the skin with Betadine followed by alcohol, CSI was administered to the plantar medial tubercle of the right os calcis without complication via medial approach.  The injection was well-tolerated.  Ethyl chloride was utilized for topical anesthesia.    She was also amenable to referral to physical therapy and a consultation was placed.  I also included some stretching exercises for plantar fasciitis in her AVS for her to get started with.  OTC arch support options were also included in her AVS.    Recommend follow-up in 4 weeks to assess efficacy of injection therapy and PT.      History of Present Illness     Miladis James is a 50 y.o. female   The patient presents today for follow-up of chronic right heel pain consistent with plantar fasciitis.  She has had minimal improvement since her last visit, still noting persistent tenderness when getting up after periods of rest or with prolonged periods of ambulation and weightbearing.      PAST MEDICAL HISTORY:  Past Medical History[1]    PAST SURGICAL HISTORY:  Past Surgical History[2]     ALLERGIES:  Patient has no known allergies.    MEDICATIONS:  Current Medications[3]    SOCIAL HISTORY:  Social History[4]    Review of Systems   Constitutional: Negative.    Respiratory: Negative.     Cardiovascular: Negative.    Skin: Negative.    Psychiatric/Behavioral: Negative.       Objective   Wt 85.7 kg (189 lb)   BMI 35.71 kg/m²      Physical Exam  Vitals and nursing note reviewed.   Constitutional:       General: She is not in acute distress.     Appearance: She is well-developed.   HENT:      Head: Normocephalic and atraumatic.     Cardiovascular:      Pulses:           Dorsalis pedis pulses are 2+ on the right side.        Posterior tibial pulses are 2+ on the right side.   Pulmonary:      Effort: Pulmonary effort is normal.     Musculoskeletal:        Feet:    Feet:      Right foot:      Skin integrity: Skin integrity normal.      Comments: The patient's clinical examination today significant for persistent tenderness to palpation to the plantar medial tubercle of the right os calcis.  The patient has had minimal improvement with conservative care thus far.  There is no tenderness palpation of the plantar fascia at the level of the mid arch.  There is no tenderness with lateral squeeze of the calcaneus.  There is no tenderness to the Achilles.  Pulses are palpable.    Skin:     General: Skin is warm and dry.      Capillary Refill: Capillary refill takes less than 2 seconds.     Neurological:      General: No focal deficit present.      Mental Status: She is alert and oriented to person, place, and time.     Psychiatric:         Mood and Affect: Mood normal.         Foot/lower extremity injection    Performed by: Yaya Agosto DPM  Authorized by: Yaya Agosto DPM    Procedure:     Other Assisting Provider: No      Verbal consent obtained?: Yes      Risks and benefits: Risks, benefits and alternatives were discussed      Consent given by:  Patient    Time out: Immediately prior to the procedure a time out was called      Time out performed at:  6/19/2025 8:35 AM    Patient states understanding of procedure being  performed: Yes      Patient identity confirmed:  Verbally with patient and provided demographic data    Supporting Documentation:     Indications:  Pain    Procedure Details:    Prep: patient was prepped and draped in usual sterile fashion                Ethyl Chloride was applied      Needle size: 25 G G    Ultrasound Guidance: no      Approach:  Medial    Laterality:  Right    Cyst Aspiration/Injection: No      Location: aponeurosis      Aponeurosis Structures: Plantar fascia origin      Injection Information:       Medications:  2 mL bupivacaine 0.25 %; 20 mg triamcinolone acetonide 40 mg/mL    Patient tolerance:  Patient tolerated the procedure well with no immediate complications               [1]   Past Medical History:  Diagnosis Date    Abdominal pain     Abnormal Pap smear of cervix     2017    Anemia     Anxiety     Asthma     Diabetes mellitus (HCC)     GERD (gastroesophageal reflux disease)     History of transfusion     HPV (human papilloma virus) infection     2017    Obesity     Varicella    [2]   Past Surgical History:  Procedure Laterality Date    BREAST BIOPSY Right 2012    u/s core bx    BREAST SURGERY      Incisional breast biopsy     SECTION      And     EXPLORATORY LAPAROTOMY W/ BOWEL RESECTION N/A 3/22/2022    Procedure: LAPAROTOMY EXPLORATORY WITH OPEN REPAIR OF INCARCERATED INCISIONAL  HERNIA WITH MESH;  Surgeon: Artis Farrell MD;  [facility];  Service: General    GASTRIC BYPASS  2016    INCISIONAL HERNIA REPAIR N/A 3/22/2022    Procedure: OPEN REPAIR HERNIA INCISIONAL WITH MESH;  Surgeon: Artis Farrell MD;  [facility];  Service: General    CO HYSTEROSCOPY BX ENDOMETRIUM&/POLYPC W/WO D&C N/A 2021    Procedure: DILATATION AND CURETTAGE (D&C) WITH HYSTEROSCOPY (MYOSURE);  Surgeon: Lata Barrett DO;  [facility];  Service: Gynecology    CO HYSTEROSCOPY BX ENDOMETRIUM&/POLYPC W/WO D&C N/A 2021    Procedure: POLYPECTOMY;   Surgeon: Lata Barrett DO;  [facility];  Service: Gynecology    TUBAL LIGATION      US GUIDED BREAST BIOPSY RIGHT COMPLETE Right 2019   [3]   Current Outpatient Medications   Medication Sig Dispense Refill    albuterol (PROVENTIL HFA,VENTOLIN HFA) 90 mcg/act inhaler Inhale 2 puffs every 6 (six) hours as needed for wheezing or shortness of breath 18 g 1    Calcium Citrate-Vitamin D 500-400 MG-UNIT CHEW in the morning and in the evening.      Clotrimazole 1 % OINT Apply topically in the morning 56.7 g 0    cyanocobalamin (VITAMIN B-12) 100 mcg tablet Take by mouth in the morning.      Ferrous Sulfate Dried (FERROUS SULFATE CR PO) Take by mouth Taken every other day      meloxicam (Mobic) 7.5 mg tablet Take 1 tablet (7.5 mg total) by mouth daily 30 tablet 2    metoclopramide (Reglan) 10 mg tablet Take 1 tablet (10 mg total) by mouth every 6 (six) hours 30 tablet 0    Multiple Vitamins-Minerals (MULTIVITAMIN ADULT PO) Take by mouth in the morning      mupirocin (BACTROBAN) 2 % ointment Apply topically 3 (three) times a day 22 g 0    triamcinolone (KENALOG) 0.1 % ointment Apply topically 2 (two) times a day 15 g 0    escitalopram (Lexapro) 10 mg tablet Take 1 tablet (10 mg total) by mouth daily (Patient not taking: Reported on 2023) 90 tablet 3    pantoprazole (PROTONIX) 40 mg tablet Take 1 tablet (40 mg total) by mouth daily Take half an hour prior to breakfast 30 tablet 4     No current facility-administered medications for this visit.   [4]   Social History  Socioeconomic History    Marital status: Single    Number of children: 2   Tobacco Use    Smoking status: Former     Current packs/day: 0.00     Average packs/day: 0.5 packs/day for 10.0 years (5.0 ttl pk-yrs)     Types: Cigarettes     Start date:      Quit date:      Years since quittin.4    Smokeless tobacco: Never    Tobacco comments:     refused   Vaping Use    Vaping status: Former    Substances: Nicotine    Substance and Sexual Activity    Alcohol use: Yes     Alcohol/week: 0.0 standard drinks of alcohol     Comment: social     Drug use: Never    Sexual activity: Yes     Partners: Male     Birth control/protection: Condom Male, None   Social History Narrative    Current every day smoker - As per [employer]     Exercises daily    Sleeps 6-7 hours a day      Social Drivers of Health     Food Insecurity: No Food Insecurity (3/24/2022)    Hunger Vital Sign     Worried About Running Out of Food in the Last Year: Never true     Ran Out of Food in the Last Year: Never true   Transportation Needs: No Transportation Needs (3/24/2022)    PRAPARE - Transportation     Lack of Transportation (Medical): No     Lack of Transportation (Non-Medical): No   Housing Stability: Unknown (3/24/2022)    Housing Stability Vital Sign     Number of Places Lived in the Last Year: 1     Unstable Housing in the Last Year: No

## 2025-06-25 ENCOUNTER — EVALUATION (OUTPATIENT)
Dept: PHYSICAL THERAPY | Facility: REHABILITATION | Age: 50
End: 2025-06-25
Attending: PODIATRIST
Payer: COMMERCIAL

## 2025-06-25 DIAGNOSIS — M79.671 RIGHT FOOT PAIN: Primary | ICD-10-CM

## 2025-06-25 DIAGNOSIS — M72.2 PLANTAR FASCIITIS OF RIGHT FOOT: ICD-10-CM

## 2025-06-25 PROCEDURE — 97161 PT EVAL LOW COMPLEX 20 MIN: CPT | Performed by: PHYSICAL THERAPIST

## 2025-06-25 PROCEDURE — 97110 THERAPEUTIC EXERCISES: CPT | Performed by: PHYSICAL THERAPIST

## 2025-06-25 NOTE — PROGRESS NOTES
PT Evaluation     Today's date: 2025  Patient name: Miladis James  : 1975  MRN: [id number]  Referring provider: Yaya Agosto DPM  Dx:   Encounter Diagnosis     ICD-10-CM    1. Right foot pain  M79.671 Ambulatory referral to Physical Therapy      2. Plantar fasciitis of right foot  M72.2 Ambulatory referral to Physical Therapy                     Assessment  Impairments: abnormal or restricted ROM, activity intolerance, impaired physical strength, lacks appropriate home exercise program, pain with function and weight-bearing intolerance  Functional limitations: Difficulty w/ prolonged walking, navigating stairs, running    Assessment details: Miladis is a 50 y.o. female presenting to PT w/ 5 month history of plantar surface R foot pain. Pt would benefit from skilled PT services to address the below listed impairments and functional limitations to encourage return to PLOF.         Goals  STG: Completes 5x SLHR in 4 weeks.  STG: Subjectively reports pain at worst decreased by 2 points in 4 weeks.   STG: I w/ HEPs 1 week after prescription.   LTG: Returns to running by d/c.   LTG: FOTO >= to goal by d/c.   LTG: I w/ comprehensive HEP by d/c.         Plan  Patient would benefit from: PT eval and skilled physical therapy  Planned modality interventions: TENS, thermotherapy: hydrocollator packs and cryotherapy    Planned therapy interventions: IASTM, joint mobilization, kinesiology taping, manual therapy, massage, strengthening, therapeutic activities, stretching, therapeutic exercise, therapeutic training, neuromuscular re-education, nerve gliding, flexibility, functional ROM exercises, patient/caregiver education, graded activity, graded exercise, graded motor, home exercise program, balance and balance/weight bearing training    Frequency: 1x week  Duration in weeks: 12  Plan of Care beginning date: 2025  Plan of Care expiration date: 2025  Treatment plan discussed with:  patient        Subjective Evaluation    History of Present Illness  Mechanism of injury: 5 month history of R foot pain. Worse w/ prolonged walking and rising from sitting. No numbness or tingling. Starts on bottom of heel and can spread to bottom of whole foot. Had injection last week w/o benefit thus far. Has been doing some stretches from physician. Notes relief of tightness w/ stretches, but still has pain. Does strength training and bike 4-5x/week. She would typically run or walk on the treadmill, but has not been able to because of pain.   Patient Goals  Patient goals for therapy: increased motion, improved balance, decreased pain, increased strength and return to sport/leisure activities    Pain  Current pain ratin  At best pain ratin  At worst pain ratin  Quality: tight, throbbing, sharp, discomfort and pressure          Objective     Active Range of Motion   Left Ankle/Foot   Normal active range of motion    Right Ankle/Foot   Normal active range of motion    Strength/Myotome Testing     Left Ankle/Foot   Normal strength    Right Ankle/Foot   Normal strength    Additional Strength Details  DLHR decreased height  SLHR completes 3x on R, stops due to increased pain on plantar surface of foot       Precautions: N/A      Manuals                                                                 Neuro Re-Ed                                                                                                        Ther Ex             DLHR HEP            Seated PF stretch (toe ext and DF)  HEP                                                                                          Ther Activity                                       Gait Training                                       Modalities

## 2025-06-30 ENCOUNTER — OFFICE VISIT (OUTPATIENT)
Dept: PHYSICAL THERAPY | Facility: REHABILITATION | Age: 50
End: 2025-06-30
Attending: PODIATRIST
Payer: COMMERCIAL

## 2025-06-30 DIAGNOSIS — M72.2 PLANTAR FASCIITIS OF RIGHT FOOT: ICD-10-CM

## 2025-06-30 DIAGNOSIS — M79.671 RIGHT FOOT PAIN: Primary | ICD-10-CM

## 2025-06-30 PROCEDURE — 97110 THERAPEUTIC EXERCISES: CPT

## 2025-06-30 PROCEDURE — 97140 MANUAL THERAPY 1/> REGIONS: CPT

## 2025-06-30 NOTE — PROGRESS NOTES
"Daily Note    Today's date: 25  Patient name: Miladis James  : 1975  MRN: [id number]  Referring provider: Yaya Agosto DPM  Dx:   Encounter Diagnosis     ICD-10-CM    1. Right foot pain  M79.671       2. Plantar fasciitis of right foot  M72.2           Start Time: 1715  Stop Time: 1800  Total time in clinic (min): 45 minutes      Subjective: Miladis reports worse pain in the mornings, and had some increased pain after trying on new shoes this week.    Objective: See treatment diary below.     Foot Posture Index Score    Right Foot  Calcaneal Valgus - +1  Too many toes - -1   Malleoli - 0  Talonavicular bulge - +1  Talar dome - +1  Medial longitudinal arch +2  Total Score R = 5    Left Foot  Calcaneal Valgus - 0  Too many toes - -1   Malleoli - 0  Talonavicular bulge - 0  Talar dome - +1  Medial longitudinal arch +1  Total Score L = 2    Reference Values  Normal =  0 to +5  Pronated =  +6 to +9 Highly Pronated =  10+  Supinated =  -1 to -4  Highly Supinated = -5 to -12    LLD appears WNL, negative Long sit test    Assessment: Miladis tolerated treatment well with consistent cuing throughout. TE's were performed with increased reps and increased resistance. New TE's were demonstrated with proper technique, and tolerated well. Following treatment, the patient demonstrated fatigue and would benefit from continued physical therapy.    Plan: Continue per plan of care.  Progress treatment as tolerated.         Precautions: N/A     Manuals                 Ankle Mobs                  Ankle PROM  TS                 Plantar STM  TS                Neuro Re-Ed                  SLS                  Rockerboard                  Wobble board                                                                                          Ther Ex                                                 Seated calf str HEP  10x10\"            Double leg heel raise HEP  3x10            Ankle 4 way TB  Green 3x12                    "               Toe raises                  Sidelying ankle inversion w/ ecc control                                     Toe curls w/ towel                  1st toe Inv/ev  3x30                                   Calf stretch w/ strap                  Soleus stretch                  BAPS board                                      self STM plantar fascia  3'                Ther Activity                  TG                  FSU                  FSD                  LSU                  LSD                  Gait Training                                                        Modalities                  CP                                               Richie Zavala, PT  6/30/2025,5:36 PM

## 2025-07-07 ENCOUNTER — OFFICE VISIT (OUTPATIENT)
Dept: PHYSICAL THERAPY | Facility: REHABILITATION | Age: 50
End: 2025-07-07
Attending: PODIATRIST
Payer: COMMERCIAL

## 2025-07-07 DIAGNOSIS — M72.2 PLANTAR FASCIITIS OF RIGHT FOOT: ICD-10-CM

## 2025-07-07 DIAGNOSIS — M79.671 RIGHT FOOT PAIN: Primary | ICD-10-CM

## 2025-07-07 PROCEDURE — 97140 MANUAL THERAPY 1/> REGIONS: CPT

## 2025-07-07 PROCEDURE — 97110 THERAPEUTIC EXERCISES: CPT

## 2025-07-07 NOTE — PROGRESS NOTES
"Daily Note    Today's date: 25  Patient name: Miladis James  : 1975  MRN: [id number]  Referring provider: Yaya Agosto DPM  Dx:   Encounter Diagnosis     ICD-10-CM    1. Right foot pain  M79.671       2. Plantar fasciitis of right foot  M72.2           Start Time: 1645  Stop Time: 1730  Total time in clinic (min): 45 minutes      Subjective: Miladis reports feeling better over the weekend,    Objective: See treatment diary below.    Assessment: Miladis tolerated treatment well with consistent cuing throughout. TE's were performed with increased reps and increased resistance. No new TE's were performed today. Following treatment, the patient demonstrated fatigue and would benefit from continued physical therapy.    Plan: Continue per plan of care.  Progress treatment as tolerated.         Precautions: N/A     Manuals  7               Ankle Mobs                  Ankle PROM  TS                 Plantar STM  TS                Neuro Re-Ed                  SLS                  Rockerboard                  Wobble board                                                                                          Ther Ex                                                 Seated calf str HEP  10x10\"  10x10\"           Double leg heel raise HEP  3x10  3x10           Ankle 4 way TB  Green 3x12 Blue 3x12                                 Toe raises                  Sidelying ankle inversion w/ ecc control                                     Toe curls w/ towel                  1st toe Inv/ev  3x30 2x30                                  Calf stretch w/ strap                  Soleus stretch                  BAPS board                                      self STM plantar fascia  3' 3'               Ther Activity                  TG                  FSU                  FSD                  LSU                  LSD                  Gait Training                                                        Modalities            "       CP                                         Richie Zavala, PT  7/7/2025,5:33 PM

## 2025-07-09 ENCOUNTER — OFFICE VISIT (OUTPATIENT)
Dept: PHYSICAL THERAPY | Facility: REHABILITATION | Age: 50
End: 2025-07-09
Attending: PODIATRIST
Payer: COMMERCIAL

## 2025-07-09 DIAGNOSIS — M79.671 RIGHT FOOT PAIN: Primary | ICD-10-CM

## 2025-07-09 DIAGNOSIS — M72.2 PLANTAR FASCIITIS OF RIGHT FOOT: ICD-10-CM

## 2025-07-09 PROCEDURE — 97110 THERAPEUTIC EXERCISES: CPT

## 2025-07-09 PROCEDURE — 97112 NEUROMUSCULAR REEDUCATION: CPT

## 2025-07-09 NOTE — PROGRESS NOTES
"Daily Note    Today's date: 25  Patient name: Miladis James  : 1975  MRN: [id number]  Referring provider: Yaya Agosto DPM  Dx:   Encounter Diagnosis     ICD-10-CM    1. Right foot pain  M79.671       2. Plantar fasciitis of right foot  M72.2           Start Time: 1700  Stop Time: 1745  Total time in clinic (min): 45 minutes      Subjective: Miladis reports only slight pain today.    Objective: See treatment diary below.    Assessment: Miladis tolerated treatment well with consistent cuing throughout. TE's were performed with increased reps and increased resistance. No new TE's were performed today. Following treatment, the patient demonstrated fatigue and would benefit from continued physical therapy.    Plan: Continue per plan of care.  Progress treatment as tolerated.         Precautions: N/A     Manuals  7              Ankle Mobs                  Ankle PROM  TS                 Plantar STM  TS                Neuro Re-Ed                  SLS                  Rockerboard                  Wobble board                                                                                          Ther Ex                                                 Seated calf str HEP  10x10\"  10x10\"   90\"          Double leg heel raise HEP  3x10  3x10  3x15         Ankle 4 way TB  Green 3x12 Blue 3x12 Blue 3x12                                Toe raises                  Sidelying ankle inversion w/ ecc control                                     Toe curls w/ towel                  1st toe Inv/ev  3x30 2x30 2x30                                 Calf stretch w/ strap                  Soleus stretch                  BAPS board                                      self STM plantar fascia  3' 3' 3'              Ther Activity                  TG                  FSU                  FSD                  LSU                  LSD                  Gait Training                                                      "   Modalities                  CP                                           Richie Zavala, PT  7/9/2025,5:39 PM

## 2025-07-14 ENCOUNTER — OFFICE VISIT (OUTPATIENT)
Dept: PHYSICAL THERAPY | Facility: REHABILITATION | Age: 50
End: 2025-07-14
Attending: PODIATRIST
Payer: COMMERCIAL

## 2025-07-14 DIAGNOSIS — M72.2 PLANTAR FASCIITIS OF RIGHT FOOT: ICD-10-CM

## 2025-07-14 DIAGNOSIS — M79.671 RIGHT FOOT PAIN: Primary | ICD-10-CM

## 2025-07-14 PROCEDURE — 97112 NEUROMUSCULAR REEDUCATION: CPT

## 2025-07-14 PROCEDURE — 97140 MANUAL THERAPY 1/> REGIONS: CPT

## 2025-07-14 PROCEDURE — 97110 THERAPEUTIC EXERCISES: CPT

## 2025-07-14 NOTE — PROGRESS NOTES
"Daily Note    Today's date: 25  Patient name: Miladis James  : 1975  MRN: [id number]  Referring provider: Yaya Agosto DPM  Dx:   Encounter Diagnosis     ICD-10-CM    1. Right foot pain  M79.671       2. Plantar fasciitis of right foot  M72.2           Start Time: 1700  Stop Time: 1745  Total time in clinic (min): 45 minutes      Subjective: Miladis reports no c/o today. She notes adherence to HEP and feels okay aftert trialing the treadmill.    Objective: See treatment diary below.    Assessment: Miladis tolerated treatment well with consistent cuing throughout. TE's were performed with increased reps and increased resistance. No new TE's were performed today. Following treatment, the patient demonstrated fatigue and would benefit from continued physical therapy.    Plan: Continue per plan of care.  Progress treatment as tolerated.         Precautions: N/A     Manuals              Ankle Mobs                  Ankle PROM  TS                 Plantar STM  TS   TS IASTM             Neuro Re-Ed                  SLS                  Rockerboard                  Wobble board                                                                                          Ther Ex                  Treadmill for cardio/ROM     7' 2mph                          Seated calf str HEP  10x10\"  10x10\"   90\"  90\"         Double leg heel raise HEP  3x10  3x10  3x15 3x20        Ankle 4 way TB  Green 3x12 Blue 3x12 Blue 3x12 Blue 3x15                               Toe raises                  Sidelying ankle inversion w/ ecc control                                     Toe curls w/ towel                  1st toe Inv/ev  3x30 2x30 2x30                                 Calf stretch w/ strap                  Soleus stretch                  BAPS board                                      self STM plantar fascia  3' 3' 3' 3'             Ther Activity                  TG                  FSU                  FSD        "           LSU                  LSD                  Gait Training                                                        Modalities                  CP                                           Richie Zavala, PT  7/14/2025,5:48 PM

## 2025-07-16 ENCOUNTER — OFFICE VISIT (OUTPATIENT)
Dept: PHYSICAL THERAPY | Facility: REHABILITATION | Age: 50
End: 2025-07-16
Attending: PODIATRIST
Payer: COMMERCIAL

## 2025-07-16 DIAGNOSIS — M79.671 RIGHT FOOT PAIN: Primary | ICD-10-CM

## 2025-07-16 DIAGNOSIS — M72.2 PLANTAR FASCIITIS OF RIGHT FOOT: ICD-10-CM

## 2025-07-16 PROCEDURE — 97140 MANUAL THERAPY 1/> REGIONS: CPT

## 2025-07-16 PROCEDURE — 97110 THERAPEUTIC EXERCISES: CPT

## 2025-07-16 NOTE — PROGRESS NOTES
"Daily Note    Today's date: 25  Patient name: Miladis James  : 1975  MRN: [id number]  Referring provider: Yaya Agosto DPM  Dx:   Encounter Diagnosis     ICD-10-CM    1. Right foot pain  M79.671       2. Plantar fasciitis of right foot  M72.2           Start Time: 1700  Stop Time: 1745  Total time in clinic (min): 45 minutes      Subjective: Miladis reports some soreness today. She notes she was sore following previous session, but it got a bit better after.    Objective: See treatment diary below.    Assessment: Miladis tolerated treatment well with consistent cuing throughout. TE's were performed with increased reps and increased resistance. New TE's were demonstrated with proper technique, and tolerated well. Following treatment, the patient demonstrated fatigue and would benefit from continued physical therapy.    Plan: Continue per plan of care.  Progress treatment as tolerated.         Precautions: N/A     Manuals            Ankle Mobs                  Ankle PROM  TS                 Plantar STM  TS   TS IASTM  TS IASTM           Neuro Re-Ed                  SLS                  Rockerboard                  Wobble board                                                                                          Ther Ex                  Treadmill for cardio/ROM     7' 2mph  8' 2mph !  5' nv                      Seated calf str HEP  10x10\"  10x10\"   90\"  90\"         Double leg heel raise HEP  3x10  3x10  3x15 3x20        Ankle 4 way TB  Green 3x12 Blue 3x12 Blue 3x12 Blue 3x15  Blue 3x15           Clamshell      3x10 GTB       Bridge       3x10 GTB           SLR       3x10 ea       Toe raises                  Sidelying ankle inversion w/ ecc control                                     Toe curls w/ towel                  1st toe Inv/ev  3x30 2x30 2x30                                 Calf stretch w/ strap                  Soleus stretch                  BAPS board               "                        self STM plantar fascia  3' 3' 3' 3'  3'           Ther Activity                  TG                  FSU                  FSD                  LSU                  LSD                  Gait Training                                                        Modalities                  AZUCENA Zavala, PT  7/16/2025,5:35 PM

## 2025-07-21 ENCOUNTER — OFFICE VISIT (OUTPATIENT)
Dept: PHYSICAL THERAPY | Facility: REHABILITATION | Age: 50
End: 2025-07-21
Attending: PODIATRIST
Payer: COMMERCIAL

## 2025-07-21 DIAGNOSIS — M79.671 RIGHT FOOT PAIN: Primary | ICD-10-CM

## 2025-07-21 DIAGNOSIS — M72.2 PLANTAR FASCIITIS OF RIGHT FOOT: ICD-10-CM

## 2025-07-21 PROCEDURE — 97140 MANUAL THERAPY 1/> REGIONS: CPT

## 2025-07-21 PROCEDURE — 97110 THERAPEUTIC EXERCISES: CPT

## 2025-07-21 NOTE — PROGRESS NOTES
"Daily Note    Today's date: 25  Patient name: Miladis aJmes  : 1975  MRN: [id number]  Referring provider: Yaya Agosto DPM  Dx:   Encounter Diagnosis     ICD-10-CM    1. Right foot pain  M79.671       2. Plantar fasciitis of right foot  M72.2           Start Time: 1700  Stop Time: 1745  Total time in clinic (min): 45 minutes      Subjective: Miladis reports that she had an incident on Friday with significantly painful cramping of her whole LLE. It eventually moved to her R foot and she had a cramp in her dorsiflexors followed by worse plantar fascia pain. She currently has 6/10 pain in her foot. She has new supportive shoes that seem to be helpful.     Objective: See treatment diary below.    Assessment: Miladis tolerated treatment well with consistent cuing throughout. TE's were performed with decreased reps. No new TE's were performed today. Following treatment, the patient demonstrated fatigue and would benefit from continued physical therapy.    Plan: Continue per plan of care.  Progress treatment as tolerated.         Precautions: N/A     Manuals          Ankle Mobs                  Ankle PROM  TS                 Plantar STM  TS   TS IASTM  TS IASTM  TS IASTM         Neuro Re-Ed                  SLS                  Rockerboard                  Wobble board                                                                                          Ther Ex                  Treadmill for cardio/ROM     7' 2mph  8' 2mph ! 6' 2mph                      Seated calf str HEP  10x10\"  10x10\"   90\"  90\"         Double leg heel raise HEP  3x10  3x10  3x15 3x20  3x10       Ankle 4 way TB  Green 3x12 Blue 3x12 Blue 3x12 Blue 3x15  Blue 3x15  Blue 3x10         Clamshell      3x10 GTB 3x10 PTB      Bridge       3x10 GTB  3x10 PTB         SLR       3x10 ea       Toe raises                  Sidelying ankle inversion w/ ecc control                                     Toe curls w/ " towel                  1st toe Inv/ev  3x30 2x30 2x30                                 Calf stretch w/ strap                  Soleus stretch                  BAPS board                                      self STM plantar fascia  3' 3' 3' 3'  3'           Ther Activity                  TG                  FSU                  FSD                  LSU                  LSD                  Gait Training                                                        Modalities                  AZUCENA Zavala, PT  7/21/2025,6:25 PM

## 2025-07-24 ENCOUNTER — OFFICE VISIT (OUTPATIENT)
Dept: PODIATRY | Facility: CLINIC | Age: 50
End: 2025-07-24
Payer: COMMERCIAL

## 2025-07-24 VITALS — HEART RATE: 60 BPM | BODY MASS INDEX: 35.3 KG/M2 | OXYGEN SATURATION: 96 % | WEIGHT: 187 LBS | HEIGHT: 61 IN

## 2025-07-24 DIAGNOSIS — M72.2 PLANTAR FASCIITIS OF RIGHT FOOT: Primary | ICD-10-CM

## 2025-07-24 PROCEDURE — 99213 OFFICE O/P EST LOW 20 MIN: CPT | Performed by: PODIATRIST

## 2025-07-28 ENCOUNTER — EVALUATION (OUTPATIENT)
Dept: PHYSICAL THERAPY | Facility: REHABILITATION | Age: 50
End: 2025-07-28
Attending: PODIATRIST
Payer: COMMERCIAL

## 2025-07-28 DIAGNOSIS — M79.671 RIGHT FOOT PAIN: Primary | ICD-10-CM

## 2025-07-28 DIAGNOSIS — M72.2 PLANTAR FASCIITIS OF RIGHT FOOT: ICD-10-CM

## 2025-07-28 PROCEDURE — 97110 THERAPEUTIC EXERCISES: CPT

## 2025-07-28 PROCEDURE — 97140 MANUAL THERAPY 1/> REGIONS: CPT

## 2025-08-05 ENCOUNTER — OFFICE VISIT (OUTPATIENT)
Dept: PHYSICAL THERAPY | Facility: REHABILITATION | Age: 50
End: 2025-08-05
Attending: PODIATRIST
Payer: COMMERCIAL

## 2025-08-05 DIAGNOSIS — M79.671 RIGHT FOOT PAIN: Primary | ICD-10-CM

## 2025-08-05 DIAGNOSIS — M72.2 PLANTAR FASCIITIS OF RIGHT FOOT: ICD-10-CM

## 2025-08-05 PROCEDURE — 97140 MANUAL THERAPY 1/> REGIONS: CPT

## 2025-08-05 PROCEDURE — 97110 THERAPEUTIC EXERCISES: CPT

## 2025-08-12 ENCOUNTER — OFFICE VISIT (OUTPATIENT)
Dept: PHYSICAL THERAPY | Facility: REHABILITATION | Age: 50
End: 2025-08-12
Attending: PODIATRIST
Payer: COMMERCIAL

## 2025-08-19 ENCOUNTER — OFFICE VISIT (OUTPATIENT)
Dept: PHYSICAL THERAPY | Facility: REHABILITATION | Age: 50
End: 2025-08-19
Attending: PODIATRIST
Payer: COMMERCIAL

## 2025-08-19 DIAGNOSIS — M79.671 RIGHT FOOT PAIN: Primary | ICD-10-CM

## 2025-08-19 DIAGNOSIS — M72.2 PLANTAR FASCIITIS OF RIGHT FOOT: ICD-10-CM

## 2025-08-19 PROCEDURE — 97530 THERAPEUTIC ACTIVITIES: CPT

## 2025-08-19 PROCEDURE — 97110 THERAPEUTIC EXERCISES: CPT

## 2025-08-19 PROCEDURE — 97112 NEUROMUSCULAR REEDUCATION: CPT

## (undated) DEVICE — GLOVE INDICATOR PI UNDERGLOVE SZ 8 BLUE

## (undated) DEVICE — DEVICE MYOSURE LITE TISSUE REMOVAL HYSTEROSCOPIC

## (undated) DEVICE — ANTIBACTERIAL UNDYED BRAIDED (POLYGLACTIN 910), SYNTHETIC ABSORBABLE SUTURE: Brand: COATED VICRYL

## (undated) DEVICE — BASIC DOUBLE BASIN 2-LF: Brand: MEDLINE INDUSTRIES, INC.

## (undated) DEVICE — LIGHT HANDLE COVER SLEEVE DISP BLUE STELLAR

## (undated) DEVICE — SUT SILK 3-0 SH CR/8 18 IN C013D

## (undated) DEVICE — ELECTRODE BLADE MOD  E-Z CLEAN 6.5IN -0014M

## (undated) DEVICE — INTENDED FOR TISSUE SEPARATION, AND OTHER PROCEDURES THAT REQUIRE A SHARP SURGICAL BLADE TO PUNCTURE OR CUT.: Brand: BARD-PARKER SAFETY BLADES SIZE 15, STERILE

## (undated) DEVICE — SUT VICRYL 2-0 18 IN J905T

## (undated) DEVICE — TOWEL SET X-RAY

## (undated) DEVICE — PREMIUM DRY TRAY LF: Brand: MEDLINE INDUSTRIES, INC.

## (undated) DEVICE — BETHLEHEM UNIVERSAL MINOR GEN: Brand: CARDINAL HEALTH

## (undated) DEVICE — MAYO STAND COVER: Brand: CONVERTORS

## (undated) DEVICE — MAXI PAD5.51 X 13.78 IN. (14.0 X 35.0 CM)HEAVYCONTOUREDUNSCENTED: Brand: CURITY

## (undated) DEVICE — GLOVE INDICATOR PI UNDERGLOVE SZ 7 BLUE

## (undated) DEVICE — CHLORHEXIDINE 4PCT 4 OZ

## (undated) DEVICE — TUBING SUCTION 5MM X 12 FT

## (undated) DEVICE — GENERAL/ PLASTIC TRAY STANDARD: Brand: DEROYAL

## (undated) DEVICE — MEDI-VAC YANK SUCT HNDL W/TPRD BULBOUS TIP: Brand: CARDINAL HEALTH

## (undated) DEVICE — ENDOPATH ECHELON ENDOSCOPIC LINEAR CUTTER RELOADS, GREEN, 60MM: Brand: ECHELON ENDOPATH

## (undated) DEVICE — JP PERF DRN SIL FLT 10MM FULL: Brand: CARDINAL HEALTH

## (undated) DEVICE — JACKSON-PRATT 100CC BULB RESERVOIR: Brand: CARDINAL HEALTH

## (undated) DEVICE — SPONGE LAP 18 X 18 IN STRL RFD

## (undated) DEVICE — SUT PDS II 1 XLH 96 IN LOOPED Z881G

## (undated) DEVICE — SUT ETHILON 3-0 FSLX 30 IN 1673H

## (undated) DEVICE — CYSTO TUBING SINGLE IRRIGATION

## (undated) DEVICE — FABRIC REINFORCED, SURGICAL GOWN, XL: Brand: CONVERTORS

## (undated) DEVICE — GLOVE SRG BIOGEL 7.5

## (undated) DEVICE — STRL ALLENTOWN HYSTEROSCOPY PK: Brand: CARDINAL HEALTH

## (undated) DEVICE — PLUMEPEN PRO 10FT

## (undated) DEVICE — SUT VICRYL 0 CT-1 27 IN J260H

## (undated) DEVICE — TRAY FOLEY 16FR URIMETER SURESTEP

## (undated) DEVICE — ASTOUND IMPERVIOUS SURGICAL GOWN: Brand: CONVERTORS

## (undated) DEVICE — PROXIMATE SKIN STAPLERS (35 WIDE) CONTAINS 35 STAINLESS STEEL STAPLES (FIXED HEAD): Brand: PROXIMATE

## (undated) DEVICE — PVC URETHRAL CATHETER: Brand: DOVER

## (undated) DEVICE — PAD GROUNDING ADULT

## (undated) DEVICE — DRAPE LAPAROTOMY W/POUCHES

## (undated) DEVICE — UNDER BUTTOCKS DRAPE W/FLUID CONTROL POUCH: Brand: CONVERTORS

## (undated) DEVICE — CURVED, LARGE JAW, OPEN SEALER/DIVIDER NANO-COATED: Brand: LIGASURE IMPACT

## (undated) DEVICE — GLOVE INDICATOR PI UNDERGLOVE SZ 7.5 BLUE

## (undated) DEVICE — GLOVE PI ULTRA TOUCH SZ.6.5

## (undated) DEVICE — CHLORAPREP HI-LITE 26ML ORANGE

## (undated) DEVICE — COVER LIGHT HANDLE RIGID -2/PK

## (undated) DEVICE — GAUZE SPONGES,USP TYPE VII GAUZE, 12 PLY: Brand: CURITY

## (undated) DEVICE — SUT PDS II 1 CTX 36 IN Z371T

## (undated) DEVICE — SUT PDS II 2-0 SH 27 IN Z317H

## (undated) DEVICE — STERILE SURGICAL LUBRICANT,  TUBE: Brand: SURGILUBE